# Patient Record
Sex: FEMALE | Race: WHITE | NOT HISPANIC OR LATINO | Employment: UNEMPLOYED | URBAN - METROPOLITAN AREA
[De-identification: names, ages, dates, MRNs, and addresses within clinical notes are randomized per-mention and may not be internally consistent; named-entity substitution may affect disease eponyms.]

---

## 2019-05-22 ENCOUNTER — OFFICE VISIT (OUTPATIENT)
Dept: GASTROENTEROLOGY | Facility: CLINIC | Age: 64
End: 2019-05-22
Payer: COMMERCIAL

## 2019-05-22 VITALS
HEIGHT: 64 IN | WEIGHT: 156.2 LBS | DIASTOLIC BLOOD PRESSURE: 80 MMHG | BODY MASS INDEX: 26.67 KG/M2 | SYSTOLIC BLOOD PRESSURE: 116 MMHG | HEART RATE: 57 BPM

## 2019-05-22 DIAGNOSIS — R19.7 DIARRHEA, UNSPECIFIED TYPE: Primary | ICD-10-CM

## 2019-05-22 PROCEDURE — 99204 OFFICE O/P NEW MOD 45 MIN: CPT | Performed by: PHYSICIAN ASSISTANT

## 2019-05-22 RX ORDER — LAMOTRIGINE 150 MG/1
TABLET ORAL
COMMUNITY
End: 2022-03-14 | Stop reason: ALTCHOICE

## 2019-05-22 RX ORDER — CYCLOBENZAPRINE HCL 10 MG
TABLET ORAL AS NEEDED
COMMUNITY

## 2019-05-22 RX ORDER — ESTRADIOL 10 UG/1
INSERT VAGINAL 2 TIMES WEEKLY
COMMUNITY
End: 2022-03-14 | Stop reason: ALTCHOICE

## 2019-05-22 RX ORDER — ATORVASTATIN CALCIUM 10 MG/1
TABLET, FILM COATED ORAL
COMMUNITY
End: 2021-08-20 | Stop reason: ALTCHOICE

## 2019-05-22 RX ORDER — HYDROCHLOROTHIAZIDE 25 MG/1
TABLET ORAL
COMMUNITY
End: 2022-02-02

## 2019-05-22 RX ORDER — ZOLPIDEM TARTRATE 10 MG/1
TABLET ORAL AS NEEDED
COMMUNITY
End: 2022-08-08 | Stop reason: SDUPTHER

## 2019-05-22 RX ORDER — BUPROPION HYDROCHLORIDE 300 MG/1
TABLET ORAL
COMMUNITY
End: 2021-11-18

## 2019-05-22 RX ORDER — ATENOLOL 25 MG/1
TABLET ORAL
COMMUNITY

## 2019-05-24 ENCOUNTER — TELEPHONE (OUTPATIENT)
Dept: GASTROENTEROLOGY | Facility: CLINIC | Age: 64
End: 2019-05-24

## 2019-05-24 DIAGNOSIS — K59.1 FUNCTIONAL DIARRHEA: Primary | ICD-10-CM

## 2019-05-24 RX ORDER — CHOLESTYRAMINE 4 G/9G
1 POWDER, FOR SUSPENSION ORAL
Qty: 90 PACKET | Refills: 1 | Status: SHIPPED | OUTPATIENT
Start: 2019-05-24 | End: 2019-06-11

## 2019-05-25 DIAGNOSIS — K59.1 FUNCTIONAL DIARRHEA: ICD-10-CM

## 2019-05-25 RX ORDER — CHOLESTYRAMINE 4 G/9G
1 POWDER, FOR SUSPENSION ORAL
Qty: 90 PACKET | Refills: 6 | Status: SHIPPED | OUTPATIENT
Start: 2019-05-25 | End: 2019-06-11

## 2019-05-31 ENCOUNTER — TELEPHONE (OUTPATIENT)
Dept: GASTROENTEROLOGY | Facility: CLINIC | Age: 64
End: 2019-05-31

## 2019-06-06 RX ORDER — SODIUM, POTASSIUM,MAG SULFATES 17.5-3.13G
SOLUTION, RECONSTITUTED, ORAL ORAL AS NEEDED
COMMUNITY
Start: 2019-05-17 | End: 2022-02-15

## 2019-06-06 RX ORDER — ATORVASTATIN CALCIUM 20 MG/1
10 TABLET, FILM COATED ORAL
COMMUNITY
Start: 2019-05-11

## 2019-06-06 RX ORDER — MONTELUKAST SODIUM 10 MG/1
TABLET ORAL
COMMUNITY
Start: 2019-04-15 | End: 2021-10-07

## 2019-06-06 RX ORDER — CLONAZEPAM 0.5 MG/1
TABLET ORAL 2 TIMES DAILY
COMMUNITY
Start: 2019-05-22 | End: 2022-08-08 | Stop reason: SDUPTHER

## 2019-06-11 ENCOUNTER — OFFICE VISIT (OUTPATIENT)
Dept: GASTROENTEROLOGY | Facility: CLINIC | Age: 64
End: 2019-06-11
Payer: COMMERCIAL

## 2019-06-11 VITALS
HEIGHT: 64 IN | BODY MASS INDEX: 26.5 KG/M2 | WEIGHT: 155.2 LBS | HEART RATE: 56 BPM | DIASTOLIC BLOOD PRESSURE: 70 MMHG | SYSTOLIC BLOOD PRESSURE: 118 MMHG

## 2019-06-11 DIAGNOSIS — R10.84 GENERALIZED ABDOMINAL PAIN: ICD-10-CM

## 2019-06-11 DIAGNOSIS — K52.839 MICROSCOPIC COLITIS, UNSPECIFIED MICROSCOPIC COLITIS TYPE: Primary | ICD-10-CM

## 2019-06-11 PROCEDURE — 99213 OFFICE O/P EST LOW 20 MIN: CPT | Performed by: PHYSICIAN ASSISTANT

## 2019-06-11 RX ORDER — MESALAMINE 375 MG/1
CAPSULE, EXTENDED RELEASE ORAL
COMMUNITY
Start: 2019-06-07 | End: 2021-08-25

## 2019-06-11 RX ORDER — CLOTRIMAZOLE AND BETAMETHASONE DIPROPIONATE 10; .64 MG/G; MG/G
CREAM TOPICAL AS NEEDED
Refills: 1 | COMMUNITY
Start: 2019-06-03

## 2019-06-13 ENCOUNTER — TELEPHONE (OUTPATIENT)
Dept: GASTROENTEROLOGY | Facility: CLINIC | Age: 64
End: 2019-06-13

## 2019-06-19 ENCOUNTER — HOSPITAL ENCOUNTER (OUTPATIENT)
Dept: CT IMAGING | Facility: HOSPITAL | Age: 64
Discharge: HOME | End: 2019-06-19
Payer: COMMERCIAL

## 2019-06-19 DIAGNOSIS — R10.84 GENERALIZED ABDOMINAL PAIN: ICD-10-CM

## 2019-06-19 PROCEDURE — 74177 CT ABD & PELVIS W/CONTRAST: CPT

## 2019-06-19 RX ADMIN — IOHEXOL 100 ML: 350 INJECTION, SOLUTION INTRAVENOUS at 16:40

## 2019-06-21 ENCOUNTER — TELEPHONE (OUTPATIENT)
Dept: GASTROENTEROLOGY | Facility: CLINIC | Age: 64
End: 2019-06-21

## 2019-06-24 ENCOUNTER — TELEPHONE (OUTPATIENT)
Dept: GASTROENTEROLOGY | Facility: CLINIC | Age: 64
End: 2019-06-24

## 2019-06-24 DIAGNOSIS — R10.84 GENERALIZED ABDOMINAL PAIN: Primary | ICD-10-CM

## 2019-06-26 ENCOUNTER — TELEPHONE (OUTPATIENT)
Dept: GASTROENTEROLOGY | Facility: CLINIC | Age: 64
End: 2019-06-26

## 2020-01-06 ENCOUNTER — TRANSCRIBE ORDERS (OUTPATIENT)
Dept: ADMINISTRATIVE | Facility: HOSPITAL | Age: 65
End: 2020-01-06

## 2020-01-06 DIAGNOSIS — K82.4 CHOLESTEROLOSIS OF GALLBLADDER: Primary | ICD-10-CM

## 2020-01-06 DIAGNOSIS — K82.4 GALLBLADDER POLYP: ICD-10-CM

## 2020-01-28 ENCOUNTER — HOSPITAL ENCOUNTER (OUTPATIENT)
Dept: ULTRASOUND IMAGING | Facility: HOSPITAL | Age: 65
Discharge: HOME | End: 2020-01-28
Attending: COLON & RECTAL SURGERY
Payer: COMMERCIAL

## 2020-01-28 DIAGNOSIS — K82.4 GALLBLADDER POLYP: ICD-10-CM

## 2020-01-28 PROCEDURE — 76705 ECHO EXAM OF ABDOMEN: CPT

## 2020-12-04 ENCOUNTER — TRANSCRIBE ORDERS (OUTPATIENT)
Dept: ADMINISTRATIVE | Facility: HOSPITAL | Age: 65
End: 2020-12-04

## 2020-12-04 DIAGNOSIS — K82.4 GALLBLADDER POLYP: Primary | ICD-10-CM

## 2020-12-10 ENCOUNTER — HOSPITAL ENCOUNTER (OUTPATIENT)
Dept: ULTRASOUND IMAGING | Facility: HOSPITAL | Age: 65
Discharge: HOME | End: 2020-12-10
Payer: COMMERCIAL

## 2020-12-10 DIAGNOSIS — K82.4 GALLBLADDER POLYP: ICD-10-CM

## 2020-12-10 PROCEDURE — 76705 ECHO EXAM OF ABDOMEN: CPT

## 2021-03-10 DIAGNOSIS — Z23 ENCOUNTER FOR IMMUNIZATION: ICD-10-CM

## 2021-08-25 ENCOUNTER — OFFICE VISIT (OUTPATIENT)
Dept: GASTROENTEROLOGY | Facility: CLINIC | Age: 66
End: 2021-08-25
Payer: COMMERCIAL

## 2021-08-25 VITALS
BODY MASS INDEX: 26.12 KG/M2 | DIASTOLIC BLOOD PRESSURE: 80 MMHG | SYSTOLIC BLOOD PRESSURE: 126 MMHG | HEART RATE: 70 BPM | WEIGHT: 153 LBS | HEIGHT: 64 IN

## 2021-08-25 DIAGNOSIS — K58.2 IRRITABLE BOWEL SYNDROME WITH BOTH CONSTIPATION AND DIARRHEA: Primary | ICD-10-CM

## 2021-08-25 PROCEDURE — 99213 OFFICE O/P EST LOW 20 MIN: CPT | Performed by: PHYSICIAN ASSISTANT

## 2021-08-25 RX ORDER — CLOBETASOL PROPIONATE 0.5 MG/G
CREAM TOPICAL
COMMUNITY

## 2021-08-25 NOTE — PROGRESS NOTES
[facility] - Outpatient Follow-up Note  Trace Rodgers 63 y o  female MRN: [id number]  Encounter: [id number]          ASSESSMENT AND PLAN:      1  Irritable bowel syndrome with both constipation and diarrhea  She has been diagnosed with microscopic colitis but her symptoms are much more consistent with IBS-M  Start fiber - benefiber BID  Continue probiotic  Start digestive enzymes - reviewed ingredients and discussed at length - this also contains licorice root and peppermint oil by Dr Manny Summers 1 pill daily    She also struggles with GERD  Continue to avoid problem foods and use Pepcid PRN    ______________________________________________________________________    SUBJECTIVE:    82-year-old female with irritable bowel syndrome and GERD presents for routine follow-up  Although she has been diagnosed with microscopic colitis in the past her symptoms are much more consistent with IBS  She reports that she will fluctuate between diarrhea and constipation  Her constipation happens significant in May and June to or she did not have a great bowel movement for approximately 3 and half weeks  She admits that she had taken more Imodium than usual leading up to this but this was only approximately 4 5 pills  Since that time she has not been taking anything except for her probiotic  Although she tries to be somewhat cautious with her diet she is very clear that she has no obvious triggers to her symptoms  Except for potato products, soy and artificial sweeteners  She is understandably increasingly frustrated by her symptoms  She denies any rectal bleeding, melena, dysphagia or unexpected weight loss  She is taking Pepcid as needed for acid reflux with relief  She had been on both budesonide and Apriso for her microscopic colitis without relief  At 1 point she had been taking Imodium 1-2 pills a day which was helping    She is not sure exactly why she stop doing this       REVIEW OF SYSTEMS IS OTHERWISE NEGATIVE  Historical Information   Past Medical History:   Diagnosis Date    Depression     Hyperlipidemia     Hypertension     Skin disorder     lichens sclerosis     Past Surgical History:   Procedure Laterality Date    COLONOSCOPY      2013      Social History   Social History     Substance and Sexual Activity   Alcohol Use Yes    Comment: occ     Social History     Substance and Sexual Activity   Drug Use Never     Social History     Tobacco Use   Smoking Status Never Smoker   Smokeless Tobacco Never Used     Family History   Problem Relation Age of Onset    Kidney disease Father        Meds/Allergies       Current Outpatient Medications:     atenolol (TENORMIN) 25 mg tablet    atorvastatin (LIPITOR) 20 mg tablet    buPROPion (WELLBUTRIN XL) 300 mg 24 hr tablet    clobetasol (TEMOVATE) 0 05 % cream    clonazePAM (KlonoPIN) 0 5 mg tablet    clotrimazole-betamethasone (LOTRISONE) 1-0 05 % cream    cyclobenzaprine (FLEXERIL) 10 mg tablet    estradiol (YUVAFEM) 10 MCG TABS vaginal tablet    hydrochlorothiazide (HYDRODIURIL) 25 mg tablet    lamoTRIgine (LAMICTAL) 150 MG tablet    montelukast (SINGULAIR) 10 mg tablet    SUPREP BOWEL PREP KIT 17 5-3 13-1 6 GM/177ML SOLN    zolpidem (AMBIEN) 10 mg tablet    Allergies   Allergen Reactions    Levofloxacin Other (See Comments)    Cephalexin Diarrhea    Nitrofurantoin Fever    Sulfa Antibiotics Fever    Topiramate Other (See Comments)    Bacitracin-Neomycin-Polymyxin Rash    Sulfamethoxazole-Trimethoprim Diarrhea           Objective     Blood pressure 126/80, pulse 70, height 5' 3 5" (1 613 m), weight 69 4 kg (153 lb)  Body mass index is 26 68 kg/m²        PHYSICAL EXAM:      General Appearance:   Alert, cooperative, no distress   HEENT:   Normocephalic, atraumatic, anicteric      Neck:  Supple, symmetrical, trachea midline   Lungs:   Clear to auscultation bilaterally; no rales, rhonchi or wheezing; respirations unlabored    Heart[de-identified]   Regular rate and rhythm; no murmur, rub, or gallop  Abdomen:   Soft, non-tender, non-distended; normal bowel sounds; no masses, no organomegaly    Genitalia:   Deferred    Rectal:   Deferred    Extremities:  No cyanosis, clubbing or edema    Pulses:  2+ and symmetric    Skin:  No jaundice, rashes, or lesions    Lymph nodes:  No palpable cervical lymphadenopathy        Lab Results:   No visits with results within 1 Day(s) from this visit  Latest known visit with results is:   No results found for any previous visit  Radiology Results:   No results found

## 2021-09-17 ENCOUNTER — HOSPITAL ENCOUNTER (OUTPATIENT)
Dept: ULTRASOUND IMAGING | Facility: HOSPITAL | Age: 66
Discharge: HOME | End: 2021-09-17
Payer: COMMERCIAL

## 2021-09-17 DIAGNOSIS — K58.2 IRRITABLE BOWEL SYNDROME WITH BOTH CONSTIPATION AND DIARRHEA: ICD-10-CM

## 2021-09-17 PROCEDURE — 76700 US EXAM ABDOM COMPLETE: CPT

## 2021-09-24 ENCOUNTER — TELEPHONE (OUTPATIENT)
Dept: SURGERY | Facility: CLINIC | Age: 66
End: 2021-09-24

## 2021-09-24 DIAGNOSIS — K58.9 IRRITABLE BOWEL SYNDROME: Primary | ICD-10-CM

## 2021-09-24 NOTE — TELEPHONE ENCOUNTER
----- Message from Kishor Nowak PA-C sent at 9/22/2021  3:44 PM EDT -----  Has upcoming appt - pls place recall for 1 year us

## 2021-10-07 ENCOUNTER — TELEMEDICINE (OUTPATIENT)
Dept: GASTROENTEROLOGY | Facility: CLINIC | Age: 66
End: 2021-10-07
Payer: COMMERCIAL

## 2021-10-07 DIAGNOSIS — R11.2 NON-INTRACTABLE VOMITING WITH NAUSEA, UNSPECIFIED VOMITING TYPE: ICD-10-CM

## 2021-10-07 DIAGNOSIS — K58.2 IRRITABLE BOWEL SYNDROME WITH BOTH CONSTIPATION AND DIARRHEA: Primary | ICD-10-CM

## 2021-10-07 PROCEDURE — 99213 OFFICE O/P EST LOW 20 MIN: CPT | Performed by: PHYSICIAN ASSISTANT

## 2021-11-18 ENCOUNTER — OFFICE VISIT (OUTPATIENT)
Dept: GASTROENTEROLOGY | Facility: CLINIC | Age: 66
End: 2021-11-18
Payer: COMMERCIAL

## 2021-11-18 VITALS
SYSTOLIC BLOOD PRESSURE: 114 MMHG | DIASTOLIC BLOOD PRESSURE: 80 MMHG | WEIGHT: 152 LBS | BODY MASS INDEX: 26.5 KG/M2 | HEART RATE: 72 BPM

## 2021-11-18 DIAGNOSIS — N18.9 CHRONIC RENAL FAILURE, UNSPECIFIED CKD STAGE: ICD-10-CM

## 2021-11-18 DIAGNOSIS — K58.2 IRRITABLE BOWEL SYNDROME WITH BOTH CONSTIPATION AND DIARRHEA: Primary | ICD-10-CM

## 2021-11-18 PROCEDURE — 99213 OFFICE O/P EST LOW 20 MIN: CPT | Performed by: PHYSICIAN ASSISTANT

## 2021-11-18 RX ORDER — MESALAMINE 375 MG/1
750 CAPSULE, EXTENDED RELEASE ORAL DAILY
Qty: 60 CAPSULE | Refills: 3 | Status: SHIPPED | OUTPATIENT
Start: 2021-11-18 | End: 2022-03-19

## 2021-11-18 RX ORDER — LAMOTRIGINE 100 MG/1
TABLET ORAL
COMMUNITY
Start: 2021-11-09 | End: 2021-11-18

## 2022-01-18 ENCOUNTER — APPOINTMENT (OUTPATIENT)
Dept: LAB | Facility: CLINIC | Age: 67
End: 2022-01-18
Payer: COMMERCIAL

## 2022-01-18 ENCOUNTER — TELEPHONE (OUTPATIENT)
Dept: NEPHROLOGY | Facility: CLINIC | Age: 67
End: 2022-01-18

## 2022-01-18 ENCOUNTER — TELEPHONE (OUTPATIENT)
Dept: GASTROENTEROLOGY | Facility: CLINIC | Age: 67
End: 2022-01-18

## 2022-01-18 DIAGNOSIS — N18.30 STAGE 3 CHRONIC KIDNEY DISEASE, UNSPECIFIED WHETHER STAGE 3A OR 3B CKD (HCC): ICD-10-CM

## 2022-01-18 LAB
ANION GAP SERPL CALCULATED.3IONS-SCNC: 4 MMOL/L (ref 4–13)
BASOPHILS # BLD AUTO: 0.05 THOUSANDS/ΜL (ref 0–0.1)
BASOPHILS NFR BLD AUTO: 1 % (ref 0–1)
BUN SERPL-MCNC: 19 MG/DL (ref 5–25)
CALCIUM SERPL-MCNC: 10.4 MG/DL (ref 8.3–10.1)
CHLORIDE SERPL-SCNC: 103 MMOL/L (ref 100–108)
CO2 SERPL-SCNC: 32 MMOL/L (ref 21–32)
CREAT SERPL-MCNC: 1.05 MG/DL (ref 0.6–1.3)
EOSINOPHIL # BLD AUTO: 0 THOUSAND/ΜL (ref 0–0.61)
EOSINOPHIL NFR BLD AUTO: 0 % (ref 0–6)
ERYTHROCYTE [DISTWIDTH] IN BLOOD BY AUTOMATED COUNT: 12.3 % (ref 11.6–15.1)
GFR SERPL CREATININE-BSD FRML MDRD: 56 ML/MIN/1.73SQ M
GLUCOSE SERPL-MCNC: 126 MG/DL (ref 65–140)
HCT VFR BLD AUTO: 39 % (ref 34.8–46.1)
HGB BLD-MCNC: 13.1 G/DL (ref 11.5–15.4)
IMM GRANULOCYTES # BLD AUTO: 0.02 THOUSAND/UL (ref 0–0.2)
IMM GRANULOCYTES NFR BLD AUTO: 0 % (ref 0–2)
LYMPHOCYTES # BLD AUTO: 3.21 THOUSANDS/ΜL (ref 0.6–4.47)
LYMPHOCYTES NFR BLD AUTO: 34 % (ref 14–44)
MCH RBC QN AUTO: 30.3 PG (ref 26.8–34.3)
MCHC RBC AUTO-ENTMCNC: 33.6 G/DL (ref 31.4–37.4)
MCV RBC AUTO: 90 FL (ref 82–98)
MONOCYTES # BLD AUTO: 0.62 THOUSAND/ΜL (ref 0.17–1.22)
MONOCYTES NFR BLD AUTO: 7 % (ref 4–12)
NEUTROPHILS # BLD AUTO: 5.55 THOUSANDS/ΜL (ref 1.85–7.62)
NEUTS SEG NFR BLD AUTO: 58 % (ref 43–75)
NRBC BLD AUTO-RTO: 0 /100 WBCS
PHOSPHATE SERPL-MCNC: 3.2 MG/DL (ref 2.3–4.1)
PLATELET # BLD AUTO: 308 THOUSANDS/UL (ref 149–390)
PMV BLD AUTO: 10 FL (ref 8.9–12.7)
POTASSIUM SERPL-SCNC: 3.2 MMOL/L (ref 3.5–5.3)
PTH-INTACT SERPL-MCNC: 77.5 PG/ML (ref 18.4–80.1)
RBC # BLD AUTO: 4.33 MILLION/UL (ref 3.81–5.12)
SODIUM SERPL-SCNC: 139 MMOL/L (ref 136–145)
WBC # BLD AUTO: 9.45 THOUSAND/UL (ref 4.31–10.16)

## 2022-01-18 PROCEDURE — 83970 ASSAY OF PARATHORMONE: CPT

## 2022-01-18 PROCEDURE — 36415 COLL VENOUS BLD VENIPUNCTURE: CPT

## 2022-01-18 PROCEDURE — 80048 BASIC METABOLIC PNL TOTAL CA: CPT

## 2022-01-18 PROCEDURE — 82306 VITAMIN D 25 HYDROXY: CPT

## 2022-01-18 PROCEDURE — 85025 COMPLETE CBC W/AUTO DIFF WBC: CPT

## 2022-01-18 PROCEDURE — 84100 ASSAY OF PHOSPHORUS: CPT

## 2022-01-18 NOTE — TELEPHONE ENCOUNTER
Markyl Rape patient - Patient will be out of med tomorrow and needs pre-auth for ([phone]) [employer] below med    mesalamine (APRISO) 0 375 g 24 hr capsule [[id number]    [facility] needs pre-auth for above med     Thx

## 2022-01-19 ENCOUNTER — TELEPHONE (OUTPATIENT)
Dept: NEPHROLOGY | Facility: CLINIC | Age: 67
End: 2022-01-19

## 2022-01-19 LAB — 25(OH)D3 SERPL-MCNC: 24 NG/ML (ref 30–100)

## 2022-01-19 NOTE — TELEPHONE ENCOUNTER
Called pt and advised to get urine culture test done to see if there is a bacteria or not, per Dr Brunner Mt  Pt will get it done tomorrow or Friday

## 2022-01-19 NOTE — TELEPHONE ENCOUNTER
I called [facility]/ Benefits [employer] EPO @ [phone] (Automated System) to check eligible for the patient and as of 1/19/2022 this patient plan terminated on 1/1/2022  Mary Arnold       I called [id number] [facility] @ [phone] and spoke to [facility] () to check eligible for the patient and as of 1/19/2022 the patient has current active coverage as of 1/15/2022  The reference number for this call is: [id number]    Mary Arnold

## 2022-01-19 NOTE — TELEPHONE ENCOUNTER
Called and informed pt that Dr Shanae Ellison advised to get urine culture test done (order in Epic)   Pt stated that did blood and urine tests yesterday and they are  abnormal  Pt stated that does not feel good and dont know if and when will get the urine culture test

## 2022-01-19 NOTE — TELEPHONE ENCOUNTER
Patient of Dr Jazmyne Schulte called stating that she had her blood work and urine test done on Tuesday 1/18/2022 at [facility] lab  Patient has been dealing with a UTI since 11/14/2021, patient does not have burning anymore but her bladder feels like it is on fire and sore  Patient puts a hot water bottle on her abdomen just to make it feel better  Patient would like for Dr Jazmyne Schulte to review labs and urine test results  and see what patient can take  Patient does have an up coming Nephrology Consult appointment on Tuesday with Dr Jazmyne Schulte  Please call patient @ [phone] to advise her on what she should do   Monalisa Martinez,

## 2022-01-19 NOTE — TELEPHONE ENCOUNTER
Called and LM letting pt know that her [employer] coverage was e-rejected  Acknowledged that is does show her [employer] is active and requested that she call us back to verify all info before her appt with Dr Sarah Beth Bonilla on 1/25/22

## 2022-01-20 ENCOUNTER — APPOINTMENT (OUTPATIENT)
Dept: LAB | Facility: CLINIC | Age: 67
End: 2022-01-20
Payer: COMMERCIAL

## 2022-01-20 DIAGNOSIS — N39.0 URINARY TRACT INFECTION WITHOUT HEMATURIA, SITE UNSPECIFIED: ICD-10-CM

## 2022-01-20 PROCEDURE — 87086 URINE CULTURE/COLONY COUNT: CPT

## 2022-01-22 LAB — BACTERIA UR CULT: NORMAL

## 2022-01-25 ENCOUNTER — CONSULT (OUTPATIENT)
Dept: NEPHROLOGY | Facility: CLINIC | Age: 67
End: 2022-01-25
Payer: COMMERCIAL

## 2022-01-25 VITALS
HEART RATE: 65 BPM | DIASTOLIC BLOOD PRESSURE: 80 MMHG | WEIGHT: 158 LBS | SYSTOLIC BLOOD PRESSURE: 140 MMHG | TEMPERATURE: 97.1 F | OXYGEN SATURATION: 97 % | HEIGHT: 63 IN | BODY MASS INDEX: 28 KG/M2 | RESPIRATION RATE: 16 BRPM

## 2022-01-25 DIAGNOSIS — M79.7 FIBROMYALGIA: ICD-10-CM

## 2022-01-25 DIAGNOSIS — F31.9 BIPOLAR 1 DISORDER, DEPRESSED (HCC): ICD-10-CM

## 2022-01-25 DIAGNOSIS — N18.9 CHRONIC RENAL FAILURE, UNSPECIFIED CKD STAGE: ICD-10-CM

## 2022-01-25 DIAGNOSIS — I10 PRIMARY HYPERTENSION: ICD-10-CM

## 2022-01-25 DIAGNOSIS — K52.839 MICROSCOPIC COLITIS, UNSPECIFIED MICROSCOPIC COLITIS TYPE: Primary | ICD-10-CM

## 2022-01-25 PROCEDURE — 99244 OFF/OP CNSLTJ NEW/EST MOD 40: CPT | Performed by: INTERNAL MEDICINE

## 2022-01-25 RX ORDER — BUPROPION HYDROCHLORIDE 150 MG/1
150 TABLET ORAL AS NEEDED
COMMUNITY
End: 2022-02-15

## 2022-01-25 NOTE — ASSESSMENT & PLAN NOTE
Blood pressure is reasonably well controlled with medication  I will stop hydrochlorothiazide at this point as causing possible kidney failure    Advised to monitor blood pressure at home

## 2022-01-25 NOTE — ASSESSMENT & PLAN NOTE
Lab Results   Component Value Date    EGFR 56 01/18/2022    CREATININE 1 05 01/18/2022   Patient does have fluctuating kidney function  GFR is about 56 though I think is partly acute  Patient is not drinking water and she is also on hydrochlorothiazide  I will stop hydrochlorothiazide with advised to drink lots of water    Pathophysiology kidney disease discussed at length with her  Importance of hydration and avoiding nephrotoxic medicine also discussed with     I will repeat blood test and urine test before next visit    Patient had a kidney ultrasound which was essentially normal

## 2022-01-25 NOTE — PROGRESS NOTES
NEPHROLOGY OFFICE CONSULT  Perez Becerra 61 y o  female MRN: [id number]    Encounter: [id number] 1/25/2022    REASON FOR VISIT: Perez Becerra is a 61 y  o female who was referred by Keesha Steiner for evaluation of Consult and Chronic Kidney Disease    HPI:    Briana Boyd came in today for evaluation management of CKD  [de-identified] woman with his hypertension who also problem the bipolar disorder along with lichen planus  She is complaining of recurrent UTI  Also has some bladder spasm  She denies fever chills    Does have abdominal discomfort     She claims he had kidney problem the past which responded well to hydration     Denies any chest pain or palpitation  Denies taking nonsteroidal pain killer      REVIEW OF SYSTEMS:    Review of Systems   Constitutional: Negative for activity change and fatigue  HENT: Negative for congestion and ear discharge  Eyes: Negative for photophobia and pain  Respiratory: Negative for apnea and choking  Cardiovascular: Negative for chest pain and palpitations  Gastrointestinal: Negative for abdominal distention and blood in stool  Endocrine: Negative for heat intolerance and polyphagia  Genitourinary: Positive for dysuria  Negative for flank pain and urgency  Musculoskeletal: Negative for neck pain and neck stiffness  Skin: Negative for color change and wound  Allergic/Immunologic: Negative for food allergies and immunocompromised state  Neurological: Negative for seizures and facial asymmetry  Hematological: Negative for adenopathy  Does not bruise/bleed easily  Psychiatric/Behavioral: Negative for self-injury and suicidal ideas           PAST MEDICAL HISTORY:  Past Medical History:   Diagnosis Date    Chronic kidney disease     Depression     H/O bladder infections     Hyperlipidemia     Hypertension     Skin disorder     lichens sclerosis       PAST SURGICAL HISTORY:  Past Surgical History:   Procedure Laterality Date    COLONOSCOPY      2013 SOCIAL HISTORY:  Social History     Substance and Sexual Activity   Alcohol Use Yes    Comment: occ     Social History     Substance and Sexual Activity   Drug Use Never     Social History     Tobacco Use   Smoking Status Never Smoker   Smokeless Tobacco Never Used       FAMILY HISTORY:  Family History   Problem Relation Age of Onset    Kidney disease Father        MEDICATIONS:    Current Outpatient Medications:     atenolol (TENORMIN) 25 mg tablet, atenolol 25 mg tabs, Disp: , Rfl:     atorvastatin (LIPITOR) 20 mg tablet, 10 mg  , Disp: , Rfl:     buPROPion (WELLBUTRIN XL) 150 mg 24 hr tablet, Take 150 mg by mouth as needed  , Disp: , Rfl:     clobetasol (TEMOVATE) 0 05 % cream, clobetasol 0 05 % topical cream, Disp: , Rfl:     clonazePAM (KlonoPIN) 0 5 mg tablet, , Disp: , Rfl:     clotrimazole-betamethasone (LOTRISONE) 1-0 05 % cream, as needed  , Disp: , Rfl: 1    cyclobenzaprine (FLEXERIL) 10 mg tablet, as needed  , Disp: , Rfl:     estradiol (YUVAFEM) 10 MCG TABS vaginal tablet, 2 (two) times a week  , Disp: , Rfl:     hydrochlorothiazide (HYDRODIURIL) 25 mg tablet, hydrochlorothiazide 25 mg tabs, Disp: , Rfl:     lamoTRIgine (LAMICTAL) 150 MG tablet, Lamictal 150 mg tablet, Disp: , Rfl:     mesalamine (APRISO) 0 375 g 24 hr capsule, Take 2 capsules (0 75 g total) by mouth daily, Disp: 60 capsule, Rfl: 3    SUPREP BOWEL PREP KIT 17 5-3 13-1 6 GM/177ML SOLN, as needed  , Disp: , Rfl:     zolpidem (AMBIEN) 10 mg tablet, as needed  , Disp: , Rfl:     PHYSICAL EXAM:  Vitals:    01/25/22 1258   BP: 140/80   BP Location: Right arm   Patient Position: Sitting   Pulse: 65   Resp: 16   Temp: (!) 97 1 °F (36 2 °C)   TempSrc: Temporal   SpO2: 97%   Weight: 71 7 kg (158 lb)   Height: 5' 3" (1 6 m)     Body mass index is 27 99 kg/m²  Physical Exam  Constitutional:       General: She is not in acute distress  Appearance: She is well-developed  HENT:      Head: Normocephalic     Eyes: General: No scleral icterus  Conjunctiva/sclera: Conjunctivae normal       Pupils: Pupils are equal, round, and reactive to light  Neck:      Vascular: No JVD  Cardiovascular:      Rate and Rhythm: Normal rate  Heart sounds: Normal heart sounds  Pulmonary:      Effort: Pulmonary effort is normal  No respiratory distress  Breath sounds: Normal breath sounds  No wheezing  Abdominal:      General: Bowel sounds are normal       Palpations: Abdomen is soft  Tenderness: There is abdominal tenderness  Musculoskeletal:         General: Normal range of motion  Cervical back: Neck supple  No rigidity  Skin:     General: Skin is warm  Findings: No rash  Neurological:      Mental Status: She is alert and oriented to person, place, and time  Psychiatric:         Behavior: Behavior normal          LAB RESULTS:  Results for orders placed or performed in visit on 01/20/22   Urine culture    Specimen: Urine, Clean Catch   Result Value Ref Range    Urine Culture >100,000 cfu/ml         ASSESSMENT and PLAN:    Chronic renal failure  Lab Results   Component Value Date    EGFR 56 01/18/2022    CREATININE 1 05 01/18/2022   Patient does have fluctuating kidney function  GFR is about 56 though I think is partly acute  Patient is not drinking water and she is also on hydrochlorothiazide  I will stop hydrochlorothiazide with advised to drink lots of water    Pathophysiology kidney disease discussed at length with her  Importance of hydration and avoiding nephrotoxic medicine also discussed with     I will repeat blood test and urine test before next visit  Patient had a kidney ultrasound which was essentially normal    Primary hypertension  Blood pressure is reasonably well controlled with medication  I will stop hydrochlorothiazide at this point as causing possible kidney failure  Advised to monitor blood pressure at home    Microscopic colitis  At present asymptomatic    Being monitored by GI    Bipolar 1 disorder, depressed ([facility] )  On multiple medication    Fibromyalgia  Does have diffuse pain though denies taking nonsteroidal pain killer    Everything discussed at length with the patient  Importance of hydration discussed with her  Advised to come find primary care doctor for multitude of complaints she has  She also need to see urologist because of recurrent UTI and possible bladder spasm  Thank you very much for consultation I will monitor the patient with you        Portions of the record may have been created with voice recognition software  Occasional wrong word or "sound a like" substitutions may have occurred due to the inherent limitations of voice recognition software  Read the chart carefully and recognize, using context, where substitutions have occurred  If you have any questions, please contact the dictating provider

## 2022-01-25 NOTE — PATIENT INSTRUCTIONS
Chronic Kidney Disease   AMBULATORY CARE:   Chronic kidney disease (CKD)  is the gradual and permanent loss of kidney function  Normally, the kidneys remove fluid, chemicals, and waste from your blood  These wastes are turned into urine by your kidneys  CKD may worsen over time and lead to kidney failure  Common signs and symptoms include the following:   · Changes in how often you need to urinate    · Swelling in your arms, legs, or feet    · Shortness of breath    · Fatigue or weakness    · Bad or bitter taste in your mouth    · Nausea, vomiting, or loss of appetite    Call your local emergency number (911 in the [address] [facility]) if:   · You have a seizure  · You have shortness of breath  Seek care immediately if:   · You are confused and very drowsy  Call your doctor or nephrologist if:   · You suddenly gain or lose more weight than your healthcare provider has told you is okay  · You have itchy skin or a rash  · You urinate more or less than you normally do  · You have blood in your urine  · You have nausea and are vomiting  · You have fatigue or muscle weakness  · You have hiccups that will not stop  · You have questions or concerns about your condition or care  How CKD is diagnosed:  CKD has 5 stages  Your healthcare provider will use results from the following tests to find the stage of CKD you have:  · Blood and urine tests  show how well your kidneys are working  They may also show the cause of your CKD  · Ultrasound, CT scan, or MRI  pictures may be used to check your kidneys  You may be given contrast liquid to help your kidneys show up better in the pictures  Tell the healthcare provider if you have ever had an allergic reaction to contrast liquid  Do not enter the MRI room with anything metal  Metal can cause serious injury  Tell the healthcare provider if you have any metal in or on your body      · A biopsy  is a procedure to remove a small piece of tissue from your kidney  It is done to find the cause of your CKD  Treatment  can help control signs and symptoms, and prevent a worse stage of CKD  Your care team may include specialists, such as a dietitian or a heart specialist  This depends on the stage of your CKD and if you have other health conditions to manage  Healthcare providers will work with you to create a plan based on your decisions for treatment  Your treatment plan may include any of the following:  · Medicines  may be given to decrease your blood pressure and get rid of extra fluid  You may also receive medicine to manage health conditions that may occur with CKD, such as anemia, diabetes, and heart disease  · Dialysis  is a treatment to remove chemicals and waste from your blood when your kidneys can no longer do this  · Surgery  may be needed to create an arteriovenous fistula (AVF) in your arm or insert a catheter into your abdomen  This is done so you can receive dialysis  · A kidney transplant  may be done if your CKD becomes severe  What you can do to manage CKD: Management may include making some lifestyle changes  Tell your healthcare provider if you have any concerns about being able to make changes  He or she can help you find solutions, including working with specialists  Ask for help creating a plan to break large goals into smaller steps  Your plan may include any of the following:  · Manage other health conditions  Your healthcare provider will work with you to make a care plan that meets your needs  You will be checked regularly for heart disease or other conditions that can make CKD worse, such as diabetes  Your blood pressure will be closely monitored  You will also get a target blood pressure and help making a plan to reach your target  This may include taking your blood pressure at home  · Maintain a healthy weight  Your weight and body mass index (BMI) will be checked regularly   BMI helps find if your weight is healthy for your height  Your healthcare provider will use other tests to check your muscle and protein levels  Extra weight can strain your kidneys  A low weight or low muscle mass can make you feel more tired  You may have trouble doing your daily activities  Ask your provider what a healthy weight is for you  He or she can help you create a plan to lose or gain weight safely, if needed  The plan may include keeping a food diary  This is a list of foods and liquids you have each day  Your provider will use the diary to help you make changes, if needed  Changes are based on your health and any other conditions you have, such as diabetes  · Create an exercise plan  Regular exercise can help you manage CKD, high blood pressure, and diabetes  Exercise also helps control weight  Your provider can help you create exercise goals and a plan to reach those goals  For example, your goal may be to exercise for 30 minutes in a day  Your plan can include breaking exercise into 10 minute sessions, 3 times during the day  · Create a healthy eating plan  Your provider may tell you to eat food low in potassium, phosphorus, or protein  Your provider may also recommend vitamin or mineral supplements  Do not take any supplements without talking to your provider  A dietitian can help you plan meals if needed  Ask how much liquid to drink each day and which liquids are best for you  · Limit sodium (salt) as directed  You may need to limit sodium to less than 2,300 milligrams (mg) each day  Ask your dietitian or healthcare provider how much sodium you can have each day  The amount depends on your stage of kidney disease  Table salt, canned foods, soups, salted snacks, and processed meats, like deli meats and sausage, are high in sodium  Your provider or a dietitian can show you how to read food labels for sodium  · Limit alcohol as directed  Alcohol can cause fluid retention and can affect your kidneys   Ask how much alcohol is safe for you  A drink of alcohol is 12 ounces of beer, 5 ounces of wine, or 1½ ounces of liquor  · Do not smoke  Nicotine and other chemicals in cigarettes and cigars can cause kidney damage  Ask your provider for information if you currently smoke and need help to quit  E-cigarettes or smokeless tobacco still contain nicotine  Talk to your provider before you use these products  · Ask about over-the-counter medicines  Medicines such as NSAIDs and laxatives may harm your kidneys  Some cough and cold medicines can raise your blood pressure  Always ask if a medicine is safe before you take it  · Ask about vaccines you may need  CKD can increase your risk for infections such as pneumonia, influenza, and hepatitis  Vaccines lower your risk for infection  Your healthcare provider will tell you which vaccines you need and when to get them  Follow up with your doctor or nephrologist as directed: You will need to return for tests to monitor your kidney and nerve function, and your parathyroid hormone level  Your medicines may be changed, based on certain test results  Write down your questions so you remember to ask them during your visits  © Copyright [employer] 2021 Information is for End User's use only and may not be sold, redistributed or otherwise used for commercial purposes  All illustrations and images included in CareNotes® are the copyrighted property of [employer]  or Momo Castillo  The above information is an  only  It is not intended as medical advice for individual conditions or treatments  Talk to your doctor, nurse or pharmacist before following any medical regimen to see if it is safe and effective for you

## 2022-01-26 ENCOUNTER — OFFICE VISIT (OUTPATIENT)
Dept: FAMILY MEDICINE CLINIC | Facility: CLINIC | Age: 67
End: 2022-01-26
Payer: COMMERCIAL

## 2022-01-26 VITALS
DIASTOLIC BLOOD PRESSURE: 78 MMHG | OXYGEN SATURATION: 99 % | TEMPERATURE: 97.7 F | BODY MASS INDEX: 27.82 KG/M2 | HEIGHT: 63 IN | WEIGHT: 157 LBS | HEART RATE: 66 BPM | SYSTOLIC BLOOD PRESSURE: 112 MMHG

## 2022-01-26 DIAGNOSIS — N32.89 BLADDER SPASM: ICD-10-CM

## 2022-01-26 DIAGNOSIS — Z12.31 ENCOUNTER FOR SCREENING MAMMOGRAM FOR MALIGNANT NEOPLASM OF BREAST: ICD-10-CM

## 2022-01-26 DIAGNOSIS — R53.83 FATIGUE, UNSPECIFIED TYPE: ICD-10-CM

## 2022-01-26 DIAGNOSIS — E78.2 MIXED HYPERLIPIDEMIA: ICD-10-CM

## 2022-01-26 DIAGNOSIS — Z01.419 ENCOUNTER FOR GYNECOLOGICAL EXAMINATION: ICD-10-CM

## 2022-01-26 DIAGNOSIS — F31.9 BIPOLAR 1 DISORDER, DEPRESSED (HCC): ICD-10-CM

## 2022-01-26 DIAGNOSIS — N18.31 CHRONIC RENAL FAILURE, STAGE 3A (HCC): ICD-10-CM

## 2022-01-26 DIAGNOSIS — R41.3 MEMORY DIFFICULTIES: ICD-10-CM

## 2022-01-26 DIAGNOSIS — L90.0 LICHEN SCLEROSUS: ICD-10-CM

## 2022-01-26 DIAGNOSIS — M79.7 FIBROMYALGIA: ICD-10-CM

## 2022-01-26 DIAGNOSIS — Z76.89 ENCOUNTER TO ESTABLISH CARE: Primary | ICD-10-CM

## 2022-01-26 DIAGNOSIS — K52.839 MICROSCOPIC COLITIS, UNSPECIFIED MICROSCOPIC COLITIS TYPE: ICD-10-CM

## 2022-01-26 DIAGNOSIS — I10 PRIMARY HYPERTENSION: ICD-10-CM

## 2022-01-26 PROCEDURE — 0503F POSTPARTUM CARE VISIT: CPT

## 2022-01-26 PROCEDURE — 99204 OFFICE O/P NEW MOD 45 MIN: CPT

## 2022-01-26 NOTE — PROGRESS NOTES
BMI Counseling: Body mass index is 27 81 kg/m²  The BMI is above normal  Nutrition recommendations include encouraging healthy choices of fruits and vegetables, decreasing fast food intake, consuming healthier snacks and increasing intake of lean protein  Exercise recommendations include moderate physical activity 150 minutes/week  Rationale for BMI follow-up plan is due to patient being overweight or obese         Assessment/Plan:     Problem List Items Addressed This Visit        Digestive    Microscopic colitis     Stable - sees GI regularly, on Lamictal 150mg daily             Cardiovascular and Mediastinum    Primary hypertension     Stable takes atenolol daily, continue current medication             Genitourinary    Chronic renal failure     Lab Results   Component Value Date    EGFR 56 01/18/2022    CREATININE 1 05 01/18/2022    Sees Dr No Mcguire            Other    Fibromyalgia     Reports diffused pain, on Flexeril 10mg as needed          Relevant Orders    Ambulatory Referral to Rheumatology    TSH, 3rd generation with Free T4 reflex    Bipolar 1 disorder, depressed ([address] )     On Wellbutrin, Ambien and Klonopin, looking to establish with new psychiatrist  She plans to ween of the medications in feature          Relevant Orders    Ambulatory Referral to Psychiatry    Lichen sclerosus     Sees OBGYN and using estradiol, looks to establish with new gyn          Relevant Orders    Ambulatory Referral to Rheumatology    Ambulatory Referral to Dermatology    Ambulatory Referral to Urogynecology      Other Visit Diagnoses     Encounter to establish care    -  Primary    Relevant Orders    Ambulatory referral to Obstetrics / Gynecology    Encounter for screening mammogram for malignant neoplasm of breast        Relevant Orders    Mammo screening bilateral w 3d & cad    Memory difficulties        reports ongoing issue with memory and concentraion, saw neurologist over 20 years ago     Relevant Orders    Ambulatory Referral to Neurology    Mixed hyperlipidemia        On lipitor 20mg daily, will check lipid panel     Relevant Orders    Lipid panel    Bladder spasm        Chronic UTI and bladder spasms, sees Dr Yefri Peace Referral to Urogynecology    Fatigue, unspecified type        Ongoing fatigue not improving on Wellbutrin     Relevant Orders    TSH, 3rd generation with Free T4 reflex    Encounter for gynecological examination        Relevant Orders    Ambulatory referral to Obstetrics / Gynecology      - schedule appointments with the specialists  - obtain mammogram   - obtain blood work   - return in about 4 months     Subjective:      Patient ID: Jalyn Jasso is a 61 y o  female  Here to establish care  She is transfering her care to [facility] due to insurance changes  She has multiple chronic complains that she is looking to be refered for  No acute complains today   She already sees Dr Karen Garcia for her chronic UTIs  She is looking to establish with new psychiatrist, GYN, neurology, rheumatology, and dermatology       The following portions of the patient's history were reviewed and updated as appropriate:   Past Medical History:  She has a past medical history of Bipolar 1 disorder, depressed ([address] ), Chronic kidney disease, Depression, H/O bladder infections, Hyperlipidemia, Hypertension, Lichen sclerosus, Microscopic colitis, Skin disorder, and Urinary tract infection  ,  _______________________________________________________________________  Medical Problems:  does not have any pertinent problems on file ,  _______________________________________________________________________  Past Surgical History:   has a past surgical history that includes Colonoscopy  ,  _______________________________________________________________________  Family History:  family history includes Kidney disease in her father ,  _______________________________________________________________________  Social History:   reports that she has never smoked  She has never used smokeless tobacco  She reports current alcohol use  She reports that she does not use drugs  ,  _______________________________________________________________________  Allergies:  is allergic to levofloxacin, cephalexin, nitrofurantoin, sulfa antibiotics, topiramate, bacitracin-neomycin-polymyxin, and sulfamethoxazole-trimethoprim     _______________________________________________________________________  Current Outpatient Medications   Medication Sig Dispense Refill    atenolol (TENORMIN) 25 mg tablet atenolol 25 mg tabs      atorvastatin (LIPITOR) 20 mg tablet 10 mg        buPROPion (WELLBUTRIN XL) 150 mg 24 hr tablet Take 150 mg by mouth as needed        clobetasol (TEMOVATE) 0 05 % cream clobetasol 0 05 % topical cream      clonazePAM (KlonoPIN) 0 5 mg tablet       clotrimazole-betamethasone (LOTRISONE) 1-0 05 % cream as needed    1    cyclobenzaprine (FLEXERIL) 10 mg tablet as needed        estradiol (YUVAFEM) 10 MCG TABS vaginal tablet 2 (two) times a week        hydrochlorothiazide (HYDRODIURIL) 25 mg tablet hydrochlorothiazide 25 mg tabs      lamoTRIgine (LAMICTAL) 150 MG tablet Lamictal 150 mg tablet      mesalamine (APRISO) 0 375 g 24 hr capsule Take 2 capsules (0 75 g total) by mouth daily 60 capsule 3    SUPREP BOWEL PREP KIT 17 5-3 13-1 6 GM/177ML SOLN as needed        zolpidem (AMBIEN) 10 mg tablet as needed         No current facility-administered medications for this visit      _______________________________________________________________________  Review of Systems   Constitutional: Positive for chills and fatigue (ongoing )  Negative for fever  HENT: Negative for congestion, ear pain, sinus pressure and sinus pain  Respiratory: Negative for cough, chest tightness and shortness of breath  Cardiovascular: Negative for chest pain and palpitations     Gastrointestinal: Positive for abdominal distention, abdominal pain and diarrhea (chronic)  Negative for blood in stool, nausea and vomiting  Endocrine: Positive for cold intolerance (ongoing )  Genitourinary: Positive for frequency, pelvic pain and urgency  Negative for difficulty urinating, dyspareunia, flank pain and hematuria  Chronic UTSs, sees Dr Estrada Drop    Musculoskeletal: Positive for arthralgias (chronic)  Negative for back pain  Skin: Positive for rash (hx of Lichen sclerosus)  Allergic/Immunologic: Positive for environmental allergies  Neurological: Negative for dizziness, light-headedness, numbness and headaches  Psychiatric/Behavioral: Positive for decreased concentration (ongoing ) and dysphoric mood  The patient is not nervous/anxious  Objective:  Vitals:    01/26/22 1504   BP: 112/78   BP Location: Right arm   Patient Position: Sitting   Pulse: 66   Temp: 97 7 °F (36 5 °C)   TempSrc: Tympanic   SpO2: 99%   Weight: 71 2 kg (157 lb)   Height: 5' 3" (1 6 m)     Body mass index is 27 81 kg/m²  Physical Exam  Constitutional:       General: She is not in acute distress  Appearance: Normal appearance  She is not ill-appearing  HENT:      Head: Normocephalic  Right Ear: Tympanic membrane and ear canal normal       Left Ear: Tympanic membrane and ear canal normal       Nose: Nose normal       Mouth/Throat:      Mouth: Mucous membranes are moist    Eyes:      Conjunctiva/sclera: Conjunctivae normal       Pupils: Pupils are equal, round, and reactive to light  Cardiovascular:      Rate and Rhythm: Normal rate and regular rhythm  Pulses: Normal pulses  Heart sounds: Normal heart sounds  Pulmonary:      Effort: Pulmonary effort is normal  No respiratory distress  Breath sounds: Normal breath sounds  No wheezing  Abdominal:      General: Bowel sounds are normal       Palpations: Abdomen is soft  Musculoskeletal:         General: Tenderness (joints) present  No swelling  Normal range of motion     Skin: General: Skin is warm and dry  Neurological:      Mental Status: She is alert and oriented to person, place, and time  Cranial Nerves: No cranial nerve deficit  Sensory: No sensory deficit  Motor: No weakness  Gait: Gait normal    Psychiatric:         Mood and Affect: Mood is depressed  Affect is tearful  Behavior: Behavior normal          Thought Content:  Thought content normal          Judgment: Judgment normal

## 2022-01-26 NOTE — ASSESSMENT & PLAN NOTE
On Wellbutrin, Ambien and Klonopin, looking to establish with new psychiatrist  She plans to ween of the medications in feature

## 2022-01-29 ENCOUNTER — TELEPHONE (OUTPATIENT)
Dept: RHEUMATOLOGY | Facility: CLINIC | Age: 67
End: 2022-01-29

## 2022-01-30 NOTE — TELEPHONE ENCOUNTER
Patient scheduled on Wednesday for fibromyalgia - please check if she has seen rheum before and advise her that we do not manage fibromyalgia

## 2022-01-31 NOTE — PROGRESS NOTES
Assessment and Plan:   Ms Nicho Bolden is a 51-year-old female with history significant for microscopic colitis and lichen sclerosis, who presents for further evaluation of joint pains  She is referred by RICK Dela Cruz for a rheumatology consult  Sydnee Shelbi presents today for further evaluation of upper extremity symmetric arthralgias she has been experiencing prominently over the past 5-6 months, which appears to be more so related to exertional activities  She does not describe symptoms that would be concerning for an inflammatory arthritis such as prominent joint swelling or morning stiffness, and I do not appreciate any significant synovitis on her examination today  Her presentation appears to be consistent with a component of fibromyalgia and likely tendinitis related issues affecting her shoulders and elbows  I will complete an autoimmune workup, but my suspicion for this contributing to her joint pains is low  I recommend she continue with Tylenol as needed and also establish with physical therapy to help with her symptoms     - For management of the fibromyalgia she can continue this through her primary care physician  Plan:  Diagnoses and all orders for this visit:    Fibromyalgia  -     Ambulatory Referral to Rheumatology    Diffuse arthralgia  -     Sjogren's Antibodies; Future  -     RF Screen w/ Reflex to Titer; Future  -     Cyclic citrul peptide antibody, IgG; Future  -     Sedimentation rate, automated; Future  -     C-reactive protein; Future  -     HLA-B27 antigen; Future  -     XR knee 3 vw left non injury; Future  -     XR wrist 3+ vw left; Future  -     Ambulatory Referral to Physical Therapy;  Future    Microscopic colitis, unspecified microscopic colitis type    Lichen sclerosus  -     Ambulatory Referral to Rheumatology    Other orders  -     lamoTRIgine (LaMICtal) 100 mg tablet      I have personally reviewed pertinent films in PACS of the CT abdomen which does not show osseous disease  Activities as tolerated  Exercise: try to maintain a low impact exercise regimen as much as possible  Walk for 30 minutes a day for at least 3 days a week  Continue other medications as prescribed by PCP and other specialists  RTC PRN  HPI  Ms Pritesh Mayfield is a 21-year-old female with history significant for microscopic colitis and lichen sclerosis, who presents for further evaluation of joint pains  She is referred by RICK Al for a rheumatology consult  Patient reports over the past 5-6 months she has felt "tendon like pain" in her bilateral shoulders and elbows  She is able to raise her arms above her head but has difficulty with flexing at her elbows and pushing with her upper arms  She also reports knee pain and has previously been diagnosed with osteoarthritis  She reports pain at the bases of her bilateral thumbs and occasionally in her left wrist   She denies any significant pain throughout her hands otherwise, at the right wrist, hips, ankles or feet  She has noticed swelling of her left wrist and after sustaining a fall and injury to her left knee a few weeks ago also feels like her left knee has been swollen but has improved in appearance  She does not experience any morning stiffness of her joints  She does not take any over-the-counter pain medications for her symptoms  She reports dry eyes  She has a skin rash on her lower abdomen which has been diagnosed as lichen sclerosus  She has a second opinion scheduled with Dr Dimitri Whitehead in June  She has a history of microscopic colitis and is scheduled to see Gastroenterology  Her symptoms have been controlled with mesalamine  She reports a history of 1 miscarriage    She denies fevers, unintentional weight loss, alopecia, dry mouth, inflammatory eye disease, other types of skin rash, photosensitivity, mouth/nose ulcers, swollen glands, pleuritic chest pain, blood clots, miscarriages or a family history of autoimmune disease  In the past through her primary care physician she has been checked for autoimmune diseases such as lupus and rheumatoid arthritis and reports that the testing was normal     The following portions of the patient's history were reviewed and updated as appropriate: allergies, current medications, past family history, past medical history, past social history, past surgical history and problem list       Review of Systems  Constitutional: Negative for fevers, chills, night sweats, fatigue  ENT/Mouth: Negative for hearing changes, ear pain, nasal congestion, sinus pain, hoarseness, sore throat, rhinorrhea, swallowing difficulty  Eyes: Negative for pain, redness, discharge, vision changes  Cardiovascular: Negative for chest pain, SOB, palpitations  Respiratory: Negative for cough, sputum, wheezing, dyspnea  Gastrointestinal: Negative for nausea, vomiting, diarrhea, constipation, pain, heartburn  Genitourinary: Negative for dysuria, urinary frequency, hematuria  Musculoskeletal: As per HPI  Skin: Negative for color changes  Neuro: Negative for weakness, numbness, tingling, loss of consciousness  Psych: Negative for anxiety  Heme/Lymph: Negative for easy bruising, bleeding, lymphadenopathy          Past Medical History:   Diagnosis Date    Bipolar 1 disorder, depressed ([facility] [address]     Chronic kidney disease     Depression     H/O bladder infections     Hyperlipidemia     Hypertension     Lichen sclerosus     Microscopic colitis     Skin disorder     lichens sclerosis    Urinary tract infection        Past Surgical History:   Procedure Laterality Date    COLONOSCOPY      2013        Social History     Socioeconomic History    Marital status: Single     Spouse name: Not on file    Number of children: Not on file    Years of education: Not on file    Highest education level: Not on file   Occupational History    Not on file   Tobacco Use    Smoking status: Former Smoker    Smokeless tobacco: Never Used    Tobacco comment: heavy smoker previously   Vaping Use    Vaping Use: Never used   Substance and Sexual Activity    Alcohol use: Yes     Comment: socially    Drug use: Never    Sexual activity: Not on file   Other Topics Concern    Not on file   Social History Narrative    Not on file     Social Determinants of Health     Financial Resource Strain: Not on file   Food Insecurity: Not on file   Transportation Needs: Not on file   Physical Activity: Not on file   Stress: Not on file   Social Connections: Not on file   Intimate Partner Violence: Not on file   Housing Stability: Not on file       Family History   Problem Relation Age of Onset    Kidney disease Father        Allergies   Allergen Reactions    Levofloxacin Other (See Comments)    Cephalexin Diarrhea    Nitrofurantoin Fever    Sulfa Antibiotics Fever    Topiramate Other (See Comments)    Bacitracin-Neomycin-Polymyxin Rash    Sulfamethoxazole-Trimethoprim Diarrhea       Current Outpatient Medications:     atenolol (TENORMIN) 25 mg tablet, atenolol 25 mg tabs, Disp: , Rfl:     atorvastatin (LIPITOR) 20 mg tablet, 10 mg  , Disp: , Rfl:     clonazePAM (KlonoPIN) 0 5 mg tablet, , Disp: , Rfl:     cyclobenzaprine (FLEXERIL) 10 mg tablet, as needed  , Disp: , Rfl:     lamoTRIgine (LaMICtal) 100 mg tablet, , Disp: , Rfl:     lamoTRIgine (LAMICTAL) 150 MG tablet, Lamictal 150 mg tablet, Disp: , Rfl:     mesalamine (APRISO) 0 375 g 24 hr capsule, Take 2 capsules (0 75 g total) by mouth daily, Disp: 60 capsule, Rfl: 3    zolpidem (AMBIEN) 10 mg tablet, as needed  , Disp: , Rfl:     buPROPion (WELLBUTRIN XL) 150 mg 24 hr tablet, Take 150 mg by mouth as needed   (Patient not taking: Reported on 2/2/2022 ), Disp: , Rfl:     clobetasol (TEMOVATE) 0 05 % cream, clobetasol 0 05 % topical cream, Disp: , Rfl:     clotrimazole-betamethasone (LOTRISONE) 1-0 05 % cream, as needed  , Disp: , Rfl: 1    estradiol (YUVAFEM) 10 MCG TABS vaginal tablet, 2 (two) times a week  , Disp: , Rfl:     SUPREP BOWEL PREP KIT 17 5-3 13-1 6 GM/177ML SOLN, as needed   (Patient not taking: Reported on 2/2/2022 ), Disp: , Rfl:       Objective:    Vitals:    02/02/22 1153   BP: 138/84   Pulse: 55   Weight: 71 2 kg (157 lb)       Physical Exam  General: Well appearing, well nourished, in no distress  Oriented x 3, normal mood and affect  Ambulating without difficulty  Skin: Good turgor, no unusual bruising or prominent lesions  She has scaly white plaque like lesions on her abdomen that was diagnosed as lichen sclerosus  Hair: Normal texture and distribution  Nails: Normal color, no deformities  HEENT:  Head: Normocephalic, atraumatic  Eyes: Conjunctiva clear, sclera non-icteric, EOM intact  Extremities: No amputations or deformities, cyanosis, edema  Musculoskeletal:   Hands - bilateral CMC mild squaring noted  Her bilateral PIP and MCP joints are unremarkable  Wrists - I do not appreciate any significant soft tissue swelling or tenderness bilaterally  Elbows - lateral epicondyle tenderness noted  Otherwise the bilateral elbows are unremarkable  Shoulders - unremarkable  Knees - mild soft tissue swelling noted at the left knee without tenderness  The right knee is unremarkable  Ankles and feet - unremarkable  Around 14/18 fibromyalgia tender points positive  Neurologic: Alert and oriented  No focal neurological deficits appreciated  Psychiatric: Normal mood and affect  KANDACE Bañuelos    Rheumatology

## 2022-01-31 NOTE — TELEPHONE ENCOUNTER
The other issues are not rheumatological diagnoses  Microscopic colitis needs to be addressed with GI and lichen sclerosis with gynecology  If she still wants to keep the appointment thats fine but please let her know if she is diagnosed with fibromyalgia we do not treat this in Rheumatology, thanks

## 2022-01-31 NOTE — TELEPHONE ENCOUNTER
Spoke with patient, has never seen Rheum before, she would still like to be seen for her other issues sclerosis issues and microscopic colitis  I did let her know I would have you review these DX and let her know if she could still keep the appointment, she became very upset on the phone

## 2022-02-02 ENCOUNTER — OFFICE VISIT (OUTPATIENT)
Dept: RHEUMATOLOGY | Facility: CLINIC | Age: 67
End: 2022-02-02
Payer: COMMERCIAL

## 2022-02-02 VITALS
SYSTOLIC BLOOD PRESSURE: 138 MMHG | WEIGHT: 157 LBS | DIASTOLIC BLOOD PRESSURE: 84 MMHG | HEART RATE: 55 BPM | BODY MASS INDEX: 27.81 KG/M2

## 2022-02-02 DIAGNOSIS — K52.839 MICROSCOPIC COLITIS, UNSPECIFIED MICROSCOPIC COLITIS TYPE: ICD-10-CM

## 2022-02-02 DIAGNOSIS — L90.0 LICHEN SCLEROSUS: ICD-10-CM

## 2022-02-02 DIAGNOSIS — M79.7 FIBROMYALGIA: Primary | ICD-10-CM

## 2022-02-02 DIAGNOSIS — M25.50 DIFFUSE ARTHRALGIA: ICD-10-CM

## 2022-02-02 PROCEDURE — 99205 OFFICE O/P NEW HI 60 MIN: CPT | Performed by: INTERNAL MEDICINE

## 2022-02-02 RX ORDER — LAMOTRIGINE 100 MG/1
TABLET ORAL
COMMUNITY
Start: 2022-02-01 | End: 2022-04-04 | Stop reason: SDUPTHER

## 2022-02-04 NOTE — PROGRESS NOTES
2/7/2022      Chief Complaint   Patient presents with    Urinary Tract Infection    Lichen sclerosus     Assessment and Plan    61 y o  female new patient    1  Recurrent UTI  2  Pelvic pain/bladder pain  3  UTI  4  Lichen sclerosus    - 1 positive urine culture on file from 1/26/22 growing E Coli  This was not treated with abx  UA micro from 1/18/22- positive nitrites, no culture to review  She is currently symptomatic for UTI complaints of severe bladder pain and burning with urination   - Discussed with patient that likely symptoms and recurrent UTIs are multifactorial given lichen sclerosis, atrophic vaginitis, and her microscopic colitis with ongoing constipation issues  - recommend continuing regimen of topical clobestasol as well as estradiol  - recommend proper hydration, avoidance of constipation, cranberry supplementation, D mannose, and probiotics to prevent UTIs  - Standing urine culture orders placed  If recurrent UTIs will place on a daily low-dose antibiotic  If urine cultures are negative would recommend treatment for painful bladder syndrome   - Rx for Augmentin x1 week sent to her pharmacy for treatment of current UTI  - Will obtain US kidney and bladder given reported history below  - will follow up in 3 months for symptom reassessment  History of Present Illness  Ashutosh Sheppard is a 61 y o  female here for new patient evaluation recurrent UTIs  Per chart review she has had one positive urine culture on file from 1/26/22 growing E Coli  UA micro from 1/18/22- positive nitrites, no culture to review  She reports she was not treated with antibiotics for her recent positive urine culture  She complains of UTI symptoms including bladder pain and burning with urination  She has lichen sclerosis and is currently managed on estradiol vaginal tablets and clobetasol  She does report longstanding and lifelong history of recurrent UTIs    She reports that approximately 30 years ago she had a workup of what sounds like VCUG or IVP and had prior dilation of urethral stricture  She denies any other urologic procedures  She does report previously being hospitalized for 1 UTI in the past when she was younger  She denies any family history of  malignancy  She is a former smoker  Abdominal ultrasound from 09/17/2021 was negative for any urologic abnormalities  Medical history includes microscopic colitis, fibromyalgia, hypertension, bipolar disorder, and CKD  PVR=7 mL   Urine dip negative for blood, leukocytes, or nitrites  Review of Systems   Constitutional: Negative for chills and fever  Respiratory: Negative for shortness of breath  Cardiovascular: Negative for chest pain  Gastrointestinal: Positive for abdominal pain, constipation and diarrhea  Genitourinary: Positive for dysuria, pelvic pain and vaginal pain  Negative for difficulty urinating, flank pain, frequency, hematuria and urgency  Neurological: Negative for dizziness       Past Medical History  Past Medical History:   Diagnosis Date    B[facility] 1 disorder, depressed ([facility] [address] )     Chronic kidney disease     Depression     H/O bladder infections     Hyperlipidemia     Hypertension     Lichen sclerosus     Microscopic colitis     Skin disorder     lichens sclerosis    Urinary tract infection        Past Social History  Past Surgical History:   Procedure Laterality Date    COLONOSCOPY      2013      Social History     Tobacco Use   Smoking Status Former Smoker   Smokeless Tobacco Never Used   Tobacco Comment    heavy smoker previously       Past Family History  Family History   Problem Relation Age of Onset    Kidney disease Father        Past Social history  Social History     Socioeconomic History    Marital status: Single     Spouse name: Not on file    Number of children: Not on file    Years of education: Not on file    Highest education level: Not on file   Occupational History    Not on file   Tobacco Use    Smoking status: Former Smoker    Smokeless tobacco: Never Used    Tobacco comment: heavy smoker previously   Vaping Use    Vaping Use: Never used   Substance and Sexual Activity    Alcohol use: Yes     Comment: socially    Drug use: Never    Sexual activity: Not on file   Other Topics Concern    Not on file   Social History Narrative    Not on file     Social Determinants of Health     Financial Resource Strain: Not on file   Food Insecurity: Not on file   Transportation Needs: Not on file   Physical Activity: Not on file   Stress: Not on file   Social Connections: Not on file   Intimate Partner Violence: Not on file   Housing Stability: Not on file       Current Medications  Current Outpatient Medications   Medication Sig Dispense Refill    atenolol (TENORMIN) 25 mg tablet atenolol 25 mg tabs      atorvastatin (LIPITOR) 20 mg tablet 10 mg        clobetasol (TEMOVATE) 0 05 % cream clobetasol 0 05 % topical cream      clonazePAM (KlonoPIN) 0 5 mg tablet       clotrimazole-betamethasone (LOTRISONE) 1-0 05 % cream as needed    1    cyclobenzaprine (FLEXERIL) 10 mg tablet as needed        estradiol (YUVAFEM) 10 MCG TABS vaginal tablet 2 (two) times a week        lamoTRIgine (LaMICtal) 100 mg tablet       lamoTRIgine (LAMICTAL) 150 MG tablet Lamictal 150 mg tablet      mesalamine (APRISO) 0 375 g 24 hr capsule Take 2 capsules (0 75 g total) by mouth daily 60 capsule 3    zolpidem (AMBIEN) 10 mg tablet as needed        buPROPion (WELLBUTRIN XL) 150 mg 24 hr tablet Take 150 mg by mouth as needed   (Patient not taking: Reported on 2/2/2022 )      SUPREP BOWEL PREP KIT 17 5-3 13-1 6 GM/177ML SOLN as needed   (Patient not taking: Reported on 2/2/2022 )       No current facility-administered medications for this visit         Allergies  Allergies   Allergen Reactions    Levofloxacin Other (See Comments)    Cephalexin Diarrhea    Nitrofurantoin Fever    Sulfa Antibiotics Fever    Topiramate Other (See Comments)    Bacitracin-Neomycin-Polymyxin Rash    Sulfamethoxazole-Trimethoprim Diarrhea         The following portions of the patient's history were reviewed and updated as appropriate: allergies, current medications, past medical history, past social history, past surgical history and problem list       Vitals  Vitals:    02/07/22 1240   BP: 110/82   Pulse: 55   SpO2: 98%   Weight: 71 7 kg (158 lb)   Height: 5' 3" (1 6 m)           Physical Exam  Physical Exam  Constitutional:       Appearance: Normal appearance  HENT:      Head: Normocephalic and atraumatic  Right Ear: External ear normal       Left Ear: External ear normal    Eyes:      General: No scleral icterus  Conjunctiva/sclera: Conjunctivae normal    Cardiovascular:      Pulses: Normal pulses  Pulmonary:      Effort: Pulmonary effort is normal    Musculoskeletal:         General: Normal range of motion  Cervical back: Normal range of motion  Neurological:      General: No focal deficit present  Mental Status: She is alert and oriented to person, place, and time  Psychiatric:         Mood and Affect: Mood normal          Behavior: Behavior normal          Thought Content:  Thought content normal          Judgment: Judgment normal            Results  Recent Results (from the past 1 hour(s))   POCT urine dip    Collection Time: 02/07/22 12:51 PM   Result Value Ref Range    LEUKOCYTE ESTERASE,UA -     NITRITE,UA -     SL AMB POCT UROBILINOGEN 0 2     POCT URINE PROTEIN -      PH,UA 5 0     BLOOD,UA -     SPECIFIC GRAVITY,UA 1 020     KETONES,UA -     BILIRUBIN,UA -     GLUCOSE, UA -      COLOR,UA yellow     CLARITY,UA cloudy    POCT Measure PVR    Collection Time: 02/07/22 12:52 PM   Result Value Ref Range    POST-VOID RESIDUAL VOLUME, ML POC 7 mL   ]  No results found for: PSA  Lab Results   Component Value Date    CALCIUM 10 4 (H) 01/18/2022    K 3 2 (L) 01/18/2022    CO2 32 01/18/2022     01/18/2022    BUN 19 01/18/2022    CREATININE 1 05 01/18/2022     Lab Results   Component Value Date    WBC 9 45 01/18/2022    HGB 13 1 01/18/2022    HCT 39 0 01/18/2022    MCV 90 01/18/2022     01/18/2022           Orders  Orders Placed This Encounter   Procedures    POCT urine dip    POCT Measure PVR       Vlad Junior PA-C

## 2022-02-07 ENCOUNTER — OFFICE VISIT (OUTPATIENT)
Dept: UROLOGY | Facility: CLINIC | Age: 67
End: 2022-02-07
Payer: COMMERCIAL

## 2022-02-07 VITALS
WEIGHT: 158 LBS | DIASTOLIC BLOOD PRESSURE: 82 MMHG | HEIGHT: 63 IN | BODY MASS INDEX: 28 KG/M2 | SYSTOLIC BLOOD PRESSURE: 110 MMHG | HEART RATE: 55 BPM | OXYGEN SATURATION: 98 %

## 2022-02-07 DIAGNOSIS — N39.0 RECURRENT UTI: Primary | ICD-10-CM

## 2022-02-07 DIAGNOSIS — N30.00 ACUTE CYSTITIS WITHOUT HEMATURIA: ICD-10-CM

## 2022-02-07 DIAGNOSIS — R10.2 PELVIC PAIN: ICD-10-CM

## 2022-02-07 LAB
POST-VOID RESIDUAL VOLUME, ML POC: 7 ML
SL AMB  POCT GLUCOSE, UA: NORMAL
SL AMB LEUKOCYTE ESTERASE,UA: NORMAL
SL AMB POCT BILIRUBIN,UA: NORMAL
SL AMB POCT BLOOD,UA: NORMAL
SL AMB POCT CLARITY,UA: NORMAL
SL AMB POCT COLOR,UA: YELLOW
SL AMB POCT KETONES,UA: NORMAL
SL AMB POCT NITRITE,UA: NORMAL
SL AMB POCT PH,UA: 5
SL AMB POCT SPECIFIC GRAVITY,UA: 1.02
SL AMB POCT URINE PROTEIN: NORMAL
SL AMB POCT UROBILINOGEN: 0.2

## 2022-02-07 PROCEDURE — 51798 US URINE CAPACITY MEASURE: CPT | Performed by: PHYSICIAN ASSISTANT

## 2022-02-07 PROCEDURE — 99203 OFFICE O/P NEW LOW 30 MIN: CPT | Performed by: PHYSICIAN ASSISTANT

## 2022-02-07 PROCEDURE — 81002 URINALYSIS NONAUTO W/O SCOPE: CPT | Performed by: PHYSICIAN ASSISTANT

## 2022-02-07 RX ORDER — AMOXICILLIN AND CLAVULANATE POTASSIUM 500; 125 MG/1; MG/1
1 TABLET, FILM COATED ORAL EVERY 12 HOURS SCHEDULED
Qty: 14 TABLET | Refills: 0 | Status: SHIPPED | OUTPATIENT
Start: 2022-02-07 | End: 2022-02-14

## 2022-02-09 ENCOUNTER — TELEPHONE (OUTPATIENT)
Dept: PSYCHIATRY | Facility: CLINIC | Age: 67
End: 2022-02-09

## 2022-02-09 NOTE — TELEPHONE ENCOUNTER
Behavorial Health Outpatient Intake Questions    Referred by:PCP    Please advised interviewee that they need to answer all questions truthfully to allow for best care and any misrepresentations of information may affect their ability to be seen at this clinic   => Was this discussed? Yes     Behavorial Health Outpatient Intake History -     Presenting Problem (in patient's words): Major depression since childhood, was disabled for like 7 yrs due to Major Depression  PCP discouraged her from going back to work told her she would have to either choose her work life or social life  Has trouble functioning day to day, needs to make lists, spends lots of time alone good at putting on a smile  Has lots of autoimmune diseases wants to wean off of medications to see if she can live w/o medication  Went to counseling since 10years old but stopped 20 yrs ago  Are there any developmental disabilities? ? If yes, can they speak to you on the phone? If they are too limited to speak to you on phone, refer out No    Are you taking any psychiatric medications? Yes    => If yes, who prescribes? If yes, are they injectable medications? Lamotrigine and Kolonopin prescribed by PCP  Does the patient have a language barrier or hearing impairment? No    Have you been treated at [address] [place] [address] by a therapist or a doctor in the past? If yes, who? No    Has the patient been hospitalized for mental health? Yes   If yes, how long ago was last hospitalization and where was it? 20 years ago at [facility] in [place]    Do you actively use alcohol or marijuana or illegal substances? If yes, what and how much - refer out to Drug and alcohol treatment if use is excessive or daily use of illegal substances No concerns of substance abuse are reported  Sober 30 years    Do you have a community treatment team or ? No    Legal History-     Does the patient have any history of arrests, longterm/assisted time, or DUIs?  No  If Yes-  1) What types of charges? 2) When were they last incarcerated? 3) Are they currently on parole or probation? Minor Child-    Who has custody of the child? Is there a custody agreement? If there is a custody agreement remind parent that they must bring a copy to the first appt or they will not be seen  Intake Team, please check with provider before scheduling if flags come up such as:  - complex case  - legal history (other than DUI)  - communication barrier concerns are present  - if, in your judgment, this needs further review    ACCEPTED as a patient Yes  => Appointment Date: Monday April 4,2022 at 11:30am with Kenan Hollis    Referred Elsewhere? No    Name of Insurance Co: [employer] ID# [id number]  Insurance Phone # [phone]  If ins is primary or secondary Primary  If patient is a minor, parents information such as Name, D  O B of guarantor

## 2022-02-14 ENCOUNTER — APPOINTMENT (OUTPATIENT)
Dept: RADIOLOGY | Facility: CLINIC | Age: 67
End: 2022-02-14
Payer: COMMERCIAL

## 2022-02-14 ENCOUNTER — LAB (OUTPATIENT)
Dept: LAB | Facility: CLINIC | Age: 67
End: 2022-02-14
Payer: COMMERCIAL

## 2022-02-14 DIAGNOSIS — M25.50 DIFFUSE ARTHRALGIA: ICD-10-CM

## 2022-02-14 DIAGNOSIS — R53.83 FATIGUE, UNSPECIFIED TYPE: ICD-10-CM

## 2022-02-14 DIAGNOSIS — E78.2 MIXED HYPERLIPIDEMIA: ICD-10-CM

## 2022-02-14 DIAGNOSIS — M79.7 FIBROMYALGIA: ICD-10-CM

## 2022-02-14 LAB
CHOLEST SERPL-MCNC: 151 MG/DL
CRP SERPL QL: <3 MG/L
ERYTHROCYTE [SEDIMENTATION RATE] IN BLOOD: 10 MM/HOUR (ref 0–29)
HDLC SERPL-MCNC: 76 MG/DL
LDLC SERPL CALC-MCNC: 60 MG/DL (ref 0–100)
NONHDLC SERPL-MCNC: 75 MG/DL
TRIGL SERPL-MCNC: 77 MG/DL
TSH SERPL DL<=0.05 MIU/L-ACNC: 1.5 UIU/ML (ref 0.36–3.74)

## 2022-02-14 PROCEDURE — 86200 CCP ANTIBODY: CPT

## 2022-02-14 PROCEDURE — 84443 ASSAY THYROID STIM HORMONE: CPT

## 2022-02-14 PROCEDURE — 80061 LIPID PANEL: CPT

## 2022-02-14 PROCEDURE — 86235 NUCLEAR ANTIGEN ANTIBODY: CPT

## 2022-02-14 PROCEDURE — 86430 RHEUMATOID FACTOR TEST QUAL: CPT

## 2022-02-14 PROCEDURE — 81374 HLA I TYPING 1 ANTIGEN LR: CPT

## 2022-02-14 PROCEDURE — 85652 RBC SED RATE AUTOMATED: CPT

## 2022-02-14 PROCEDURE — 73562 X-RAY EXAM OF KNEE 3: CPT

## 2022-02-14 PROCEDURE — 36415 COLL VENOUS BLD VENIPUNCTURE: CPT

## 2022-02-14 PROCEDURE — 73110 X-RAY EXAM OF WRIST: CPT

## 2022-02-14 PROCEDURE — 86140 C-REACTIVE PROTEIN: CPT

## 2022-02-15 ENCOUNTER — OFFICE VISIT (OUTPATIENT)
Dept: GASTROENTEROLOGY | Facility: CLINIC | Age: 67
End: 2022-02-15
Payer: COMMERCIAL

## 2022-02-15 VITALS
WEIGHT: 157.2 LBS | BODY MASS INDEX: 27.85 KG/M2 | HEIGHT: 63 IN | HEART RATE: 56 BPM | DIASTOLIC BLOOD PRESSURE: 62 MMHG | SYSTOLIC BLOOD PRESSURE: 122 MMHG

## 2022-02-15 DIAGNOSIS — K52.839 MICROSCOPIC COLITIS, UNSPECIFIED MICROSCOPIC COLITIS TYPE: Primary | ICD-10-CM

## 2022-02-15 LAB
CCP AB SER IA-ACNC: 1.6
ENA SS-A AB SER-ACNC: <0.2 AI (ref 0–0.9)
ENA SS-B AB SER-ACNC: <0.2 AI (ref 0–0.9)
RHEUMATOID FACT SER QL LA: NEGATIVE

## 2022-02-15 PROCEDURE — 99213 OFFICE O/P EST LOW 20 MIN: CPT | Performed by: PHYSICIAN ASSISTANT

## 2022-02-15 NOTE — PROGRESS NOTES
[facility] - Outpatient Follow-up Note  Audra Harrison 61 y o  female MRN: [id number]  Encounter: [id number]          ASSESSMENT AND PLAN:      1  Microscopic colitis, unspecified microscopic colitis type  She is better on Lialda 2 pills daily and Imodium 1 pill daily   She still has lower abdominal/pelvic discomfort which she has been relating to UTIs  She still has rare episodes of watery stool with incontinence    She is being treated for recurrent UTIs  She is weaning off her antidepressants  She is coping a new dx of CKD stage 3    We agreed she would f/u in 4 mos and re-evaluate symptoms    ______________________________________________________________________    SUBJECTIVE:  61-year-old female with a history of microscopic colitis presents for routine follow-up  After her last visit she restarted mesalamine 2 pills daily  She remains on Imodium 1 pill daily  She admits that her symptoms have improved  She still has rare episodes watery stool with incontinence  She reports that 2 weeks ago she was walking around surrounds her and had an incontinent episode but did not even realize it until she got home later that day  She continues to report that her bowel movements typically will happen at night which wakes her up  She is currently struggling with recurrent urinary tract infections as follow-up with urology for this  She is trying to wean off some of her antidepressants  She is coping with a new diagnosis of chronic kidney disease  She is applying for disability  She reports lower abdominal discomfort which she has always related to her urinary tract infections however now admits that it does seem to worsen slightly after a bowel movement  REVIEW OF SYSTEMS IS OTHERWISE NEGATIVE        Historical Information   Past Medical History:   Diagnosis Date    B[facility] disorder[facility] depressed [facility] [address] [facility]     Chronic kidney disease     Depression     H/O bladder infections     Hyperlipidemia     Hypertension     Lichen sclerosus     Microscopic colitis     Skin disorder     lichens sclerosis    Urinary tract infection      Past Surgical History:   Procedure Laterality Date    COLONOSCOPY      2013      Social History   Social History     Substance and Sexual Activity   Alcohol Use Yes    Comment: socially     Social History     Substance and Sexual Activity   Drug Use Never     Social History     Tobacco Use   Smoking Status Former Smoker   Smokeless Tobacco Never Used   Tobacco Comment    heavy smoker previously     Family History   Problem Relation Age of Onset    Kidney disease Father        Meds/Allergies       Current Outpatient Medications:     atenolol (TENORMIN) 25 mg tablet    atorvastatin (LIPITOR) 20 mg tablet    clobetasol (TEMOVATE) 0 05 % cream    clonazePAM (KlonoPIN) 0 5 mg tablet    clotrimazole-betamethasone (LOTRISONE) 1-0 05 % cream    cyclobenzaprine (FLEXERIL) 10 mg tablet    estradiol (YUVAFEM) 10 MCG TABS vaginal tablet    lamoTRIgine (LaMICtal) 100 mg tablet    lamoTRIgine (LAMICTAL) 150 MG tablet    mesalamine (APRISO) 0 375 g 24 hr capsule    zolpidem (AMBIEN) 10 mg tablet    Allergies   Allergen Reactions    Levofloxacin Other (See Comments)    Cephalexin Diarrhea    Nitrofurantoin Fever    Sulfa Antibiotics Fever    Topiramate Other (See Comments)    Bacitracin-Neomycin-Polymyxin Rash    Sulfamethoxazole-Trimethoprim Diarrhea           Objective     There were no vitals taken for this visit  There is no height or weight on file to calculate BMI  PHYSICAL EXAM:      General Appearance:   Alert, cooperative, no distress   HEENT:   Normocephalic, atraumatic, anicteric      Neck:  Supple, symmetrical, trachea midline   Lungs:   Clear to auscultation bilaterally; no rales, rhonchi or wheezing; respirations unlabored    Heart[de-identified]   Regular rate and rhythm; no murmur, rub, or gallop     Abdomen:   Soft, non-tender, non-distended; normal bowel sounds; no masses, no organomegaly    Genitalia:   Deferred    Rectal:   Deferred    Extremities:  No cyanosis, clubbing or edema    Pulses:  2+ and symmetric    Skin:  No jaundice, rashes, or lesions    Lymph nodes:  No palpable cervical lymphadenopathy        Lab Results:   No visits with results within 1 Day(s) from this visit  Latest known visit with results is:   Appointment on 02/14/2022   Component Date Value    Cyclic Citrullinated Pep* 02/14/2022 1 6     Sed Rate 02/14/2022 10     CRP 02/14/2022 <3 0     Cholesterol 02/14/2022 151     Triglycerides 02/14/2022 77     HDL, Direct 02/14/2022 76     LDL Calculated 02/14/2022 60     Non-HDL-Chol (CHOL-HDL) 02/14/2022 75     TSH 3RD GENERATON 02/14/2022 1 500          Radiology Results:   No results found  Answers for HPI/ROS submitted by the patient on 2/8/2022  Chronicity: chronic  Onset: more than 1 year ago  Onset quality: gradual  Frequency: rarely  Episode duration: 3 days  Progression since onset: waxing and waning  Pain location: LLQ, RLQ  Pain - numeric: 7/10  Pain quality: burning, a sensation of fullness  Radiates to: perineum  anorexia: Yes  arthralgias: Yes  belching: Yes  constipation: Yes  diarrhea: Yes  dysuria: Yes  fever: No  flatus: Yes  frequency: Yes  headaches: No  hematochezia: No  hematuria: No  melena: No  myalgias: Yes  nausea:  No  weight loss: No  vomiting: No  Aggravated by: bowel movement  Relieved by: being still, palpation, recumbency

## 2022-02-22 ENCOUNTER — TELEPHONE (OUTPATIENT)
Dept: RHEUMATOLOGY | Facility: CLINIC | Age: 67
End: 2022-02-22

## 2022-02-22 LAB — HLA-B27 QL NAA+PROBE: POSITIVE

## 2022-02-28 ENCOUNTER — HOSPITAL ENCOUNTER (OUTPATIENT)
Dept: MAMMOGRAPHY | Facility: CLINIC | Age: 67
Discharge: HOME | End: 2022-02-28
Payer: COMMERCIAL

## 2022-02-28 VITALS — BODY MASS INDEX: 27.82 KG/M2 | HEIGHT: 63 IN | WEIGHT: 157 LBS

## 2022-02-28 DIAGNOSIS — Z12.31 ENCOUNTER FOR SCREENING MAMMOGRAM FOR MALIGNANT NEOPLASM OF BREAST: ICD-10-CM

## 2022-02-28 PROCEDURE — 77063 BREAST TOMOSYNTHESIS BI: CPT

## 2022-02-28 PROCEDURE — 77067 SCR MAMMO BI INCL CAD: CPT

## 2022-03-14 ENCOUNTER — EVALUATION (OUTPATIENT)
Dept: PHYSICAL THERAPY | Facility: CLINIC | Age: 67
End: 2022-03-14
Payer: COMMERCIAL

## 2022-03-14 ENCOUNTER — OFFICE VISIT (OUTPATIENT)
Dept: OBGYN CLINIC | Facility: CLINIC | Age: 67
End: 2022-03-14
Payer: COMMERCIAL

## 2022-03-14 VITALS
BODY MASS INDEX: 28.17 KG/M2 | HEIGHT: 63 IN | SYSTOLIC BLOOD PRESSURE: 118 MMHG | DIASTOLIC BLOOD PRESSURE: 74 MMHG | WEIGHT: 159 LBS

## 2022-03-14 DIAGNOSIS — Z78.0 ASYMPTOMATIC POSTMENOPAUSAL STATUS: ICD-10-CM

## 2022-03-14 DIAGNOSIS — N89.8 VAGINAL DRYNESS: ICD-10-CM

## 2022-03-14 DIAGNOSIS — Z01.419 ENCOUNTER FOR GYNECOLOGICAL EXAMINATION: Primary | ICD-10-CM

## 2022-03-14 DIAGNOSIS — Z76.89 ENCOUNTER TO ESTABLISH CARE: ICD-10-CM

## 2022-03-14 DIAGNOSIS — M25.50 DIFFUSE ARTHRALGIA: Primary | ICD-10-CM

## 2022-03-14 PROBLEM — M24.149 ARTICULAR CARTILAGE DISORDER OF HAND: Status: ACTIVE | Noted: 2020-03-09

## 2022-03-14 PROBLEM — M65.341 ACQUIRED TRIGGER FINGER OF RIGHT RING FINGER: Status: ACTIVE | Noted: 2020-03-09

## 2022-03-14 PROBLEM — M18.0 OSTEOARTHRITIS OF CARPOMETACARPAL (CMC) JOINT OF BOTH THUMBS: Status: ACTIVE | Noted: 2020-03-09

## 2022-03-14 PROCEDURE — 97112 NEUROMUSCULAR REEDUCATION: CPT | Performed by: PHYSICAL THERAPIST

## 2022-03-14 PROCEDURE — 97162 PT EVAL MOD COMPLEX 30 MIN: CPT | Performed by: PHYSICAL THERAPIST

## 2022-03-14 PROCEDURE — 0503F POSTPARTUM CARE VISIT: CPT | Performed by: NURSE PRACTITIONER

## 2022-03-14 PROCEDURE — 99386 PREV VISIT NEW AGE 40-64: CPT | Performed by: NURSE PRACTITIONER

## 2022-03-14 RX ORDER — ESTRADIOL 10 UG/1
1 INSERT VAGINAL 3 TIMES WEEKLY
Qty: 12 TABLET | Refills: 12 | Status: SHIPPED | OUTPATIENT
Start: 2022-03-14 | End: 2022-07-21

## 2022-03-14 RX ORDER — LOPERAMIDE HYDROCHLORIDE 2 MG/1
2 TABLET ORAL DAILY
COMMUNITY

## 2022-03-14 RX ORDER — BUPROPION HYDROCHLORIDE 150 MG/1
TABLET, EXTENDED RELEASE ORAL
COMMUNITY
Start: 2022-03-05 | End: 2022-03-14 | Stop reason: ALTCHOICE

## 2022-03-14 NOTE — PROGRESS NOTES
Diagnoses and all orders for this visit:    Encounter for gynecological examination  -     Ambulatory referral to Obstetrics / Gynecology    Asymptomatic postmenopausal status    Vaginal dryness  -     estradiol (VAGIFEM, YUVAFEM) 10 MCG TABS vaginal tablet; Insert 1 tablet (10 mcg total) into the vagina 3 (three) times a week    Encounter to establish care  -     Ambulatory referral to Obstetrics / Gynecology    Other orders  -     Vaseline prn  -     Coconut oil prn        Call as needed, encouraged calcium/vit D in her diet, call with any PMB, all questions answered  RV in 3-6 months for re-evaluation      Pleasant 61 y  o NP postmenopausal female here for annual exam  She denies postmenopausal bleeding  She had a Total hysterectomy many years ago for menorrhagia  She was diagnosed with Lichen sclerosus 14 years ago via biopsy and has been on Vagifem with good relief  However, she would like to increase it to 3 times weekly because she seems to do better when it is more often  She is also on clobetasol prn  Denies history of abnormal pap smears, last Pap 01/06/2022 was ASCUS HPV neg in [place], no pap done today  She agrees to ASCCP guidelines to repeat Pap in 3 years  Previous Pap was in 2020 and neg  She denies vaginal issues but her symptoms are controlled by multiple topical natural creams in addition to the prescribed ones  She denies pelvic pain  She denies postmenopausal issues  Not sexually active in 3 years  Last colonoscopy done in 2019, she may be due soon but will check with Dr Thelma You  Last mammogram 02/28/2022  She is seeing Nephrology now for recent diagnosis of kidney failure      Past Medical History:   Diagnosis Date    B[facility] disorder[facility] depressed [facility] [address]     Chronic kidney disease     Depression     H/O bladder infections     Hyperlipidemia     Hypertension     Lichen sclerosus     Microscopic colitis     Skin disorder     lichens sclerosis    Urinary tract infection      Past Surgical History:   Procedure Laterality Date    COLONOSCOPY      2013      Family History   Problem Relation Age of Onset    Kidney disease Father     No Known Problems Half-Sister     No Known Problems Son      Social History     Tobacco Use    Smoking status: Former Smoker    Smokeless tobacco: Never Used    Tobacco comment: heavy smoker previously   Vaping Use    Vaping Use: Never used   Substance Use Topics    Alcohol use: Yes     Comment: socially    Drug use: Never       Current Outpatient Medications:     atenolol (TENORMIN) 25 mg tablet, atenolol 25 mg tabs, Disp: , Rfl:     atorvastatin (LIPITOR) 20 mg tablet, 10 mg  , Disp: , Rfl:     clobetasol (TEMOVATE) 0 05 % cream, clobetasol 0 05 % topical cream, Disp: , Rfl:     clonazePAM (KlonoPIN) 0 5 mg tablet, , Disp: , Rfl:     clotrimazole-betamethasone (LOTRISONE) 1-0 05 % cream, as needed  , Disp: , Rfl: 1    cyclobenzaprine (FLEXERIL) 10 mg tablet, as needed  , Disp: , Rfl:     lamoTRIgine (LaMICtal) 100 mg tablet, , Disp: , Rfl:     loperamide (IMODIUM A-D) 2 MG tablet, Take 2 mg by mouth 4 (four) times a day as needed for diarrhea, Disp: , Rfl:     mesalamine (APRISO) 0 375 g 24 hr capsule, Take 2 capsules (0 75 g total) by mouth daily, Disp: 60 capsule, Rfl: 3    zolpidem (AMBIEN) 10 mg tablet, as needed  , Disp: , Rfl:     estradiol (VAGIFEM, YUVAFEM) 10 MCG TABS vaginal tablet, Insert 1 tablet (10 mcg total) into the vagina 3 (three) times a week, Disp: 12 tablet, Rfl: 12  Patient Active Problem List    Diagnosis Date Noted    Chronic renal failure 01/25/2022    Primary hypertension 01/25/2022    Fibromyalgia 01/24/2022    Articular cartilage disorder of hand 03/09/2020    Acquired trigger finger of right ring finger 03/09/2020    Osteoarthritis of carpometacarpal (CMC) joint of both thumbs 03/09/2020    Microscopic colitis 76/34/4656    Lichen planus 03/52/0723    Lichen sclerosus 40/84/0037    [facility]       Allergies   Allergen Reactions    Levofloxacin Other (See Comments)    Cephalexin Diarrhea    Nitrofurantoin Fever    Sulfa Antibiotics Fever    Topiramate Other (See Comments)    Bacitracin-Neomycin-Polymyxin Rash    Sulfamethoxazole-Trimethoprim Diarrhea       OB History    Para Term  AB Living   1 1 1     1   SAB IAB Ectopic Multiple Live Births                  # Outcome Date GA Lbr Johnathan/2nd Weight Sex Delivery Anes PTL Lv   1 Term               Obstetric Comments   1st menses 10years old          Vitals:    22 1314   BP: 118/74   BP Location: Left arm   Patient Position: Sitting   Cuff Size: Standard   Weight: 72 1 kg (159 lb)   Height: 5' 3" (1 6 m)     Body mass index is 28 17 kg/m²  Review of Systems   Constitutional: Negative for chills, fatigue, fever and unexpected weight change  Respiratory: Negative for shortness of breath  Gastrointestinal: Negative for anal bleeding, blood in stool, constipation and diarrhea  Genitourinary: Negative for difficulty urinating, dysuria and hematuria  Physical Exam   Constitutional: She appears well-developed and well-nourished  No distress  HENT: atraumatic, EOMI  Head: Normocephalic  Neck: Normal range of motion  Neck supple  Pulmonary: Effort normal   Breasts: bilateral without masses, skin changes or nipple discharge  Bilaterally soft and warm to touch  No areas of erythema or pain  Abdominal: Soft  Pelvic exam was performed with patient supine  No labial fusion  There is no rash, tenderness, lesion or injury on the right labia  There is no rash, tenderness, lesion or injury on the left labia  Urethral meatus does not show any tenderness, inflammation or discharge  Palpation of midline bladder without pain or discomfort  UTERUS/CERVIX ABSENT  Right adnexum displays no mass, no tenderness and no fullness  Left adnexum displays no mass, no tenderness and no fullness   No erythema or tenderness in the vagina  No foreign body in the vagina  No signs of injury around the vagina or anus  Perineum without lesions, signs of injury, erythema or swelling  No vaginal discharge found  Vagina with hypopigmented areas c/w LSA, as well as surrounding erythema noted on external vulva, very atrophic vagina  Loss of labia minor noted  Lymphadenopathy:        Right: No inguinal adenopathy present  Left: No inguinal adenopathy present

## 2022-03-14 NOTE — PROGRESS NOTES
PT Evaluation     Today's date: 3/14/2022  Patient name: Abebe Martin  : 1955  MRN: [id number]  Referring provider: Gabriel Odell MD  Dx:   Encounter Diagnosis     ICD-10-CM    1  Diffuse arthralgia  M25 50 Ambulatory Referral to Physical Therapy       Start Time:   Stop Time: 1010  Total time in clinic (min): 43 minutes    Assessment  Assessment details: Pt is a 62 y/o female presenting to physical therapy with chief complaint of B shoulder pain secondary to rheumatological factors  She presents with WNL B shoulder AROM with minimal discomfort at end range  She has weakness and pain in positions that require more stabilization (flexion and abduction), and only minimal weakness in ER/IR and pronation/supination  She has difficulty resisting rhythmic stabilization in a position where her scapula is supported  This in combination with her weakness in pain with flex/abd MMT suggest poor stabilization, endurance, and strength of the RC musculature  She would benefit from physical therapy in order to improve pain, strength, and function to reach maximal therapeutic potential  Impairments: abnormal gait, abnormal or restricted ROM, activity intolerance, impaired physical strength, lacks appropriate home exercise program, pain with function and poor posture     Symptom irritability: moderateBarriers to therapy: Rheumatological factors  Understanding of Dx/Px/POC: good   Prognosis: good    Goals  STG: 3 weeks  1  Pt will demonstrate independence with HEP  2  Pt will improve R shoulder strength by at least 1/2 grade  3  Pt will report pain no more than 5/10    LT weeks  1  Pt will improve B shoulder strength to at least 4+/5  2  Pt will be able to lift weights at the gym with minimal discomfort  3  Pt will be able to drive for 1 hour without B shoulder pain  4   Pt will report pain no more than 2/10      Plan  Patient would benefit from: skilled physical therapy  Planned modality interventions: cryotherapy and thermotherapy: hydrocollator packs  Planned therapy interventions: therapeutic exercise, therapeutic activities, stretching, strengthening, patient education, neuromuscular re-education, massage, manual therapy, balance, gait training and home exercise program  Frequency: 2x week  Duration in weeks: 6  Treatment plan discussed with: patient        Subjective Evaluation    History of Present Illness  Mechanism of injury: Pt reports she is feeling good today, but in the past couple weeks she has most discomfort in the shoulders and elbows  She reports she went to the rheumatologist, and is HLA-B27 positive, however she cannot take anti-inflammatories due to colitis  She goes to the gym and mainly does the recumbent bike, but when she does any UE lifting exercises she feels it in the shoulders and elbows  She reports she is able to do all of her normal activities but she compensates when she starts to feel pain  Recurrent probem    Quality of life: good    Pain  At best pain ratin  At worst pain ratin  Quality: dull ache and discomfort  Relieving factors: rest  Aggravating factors: lifting  Progression: improved    Treatments  Previous treatment: physical therapy  Patient Goals  Patient goals for therapy: increased strength, decreased pain, independence with ADLs/IADLs and return to sport/leisure activities          Objective     Postural Observations  Seated posture: fair  Standing posture: fair        Active Range of Motion   Left Shoulder   Normal active range of motion    Right Shoulder   Normal active range of motion    Joint Play   Left Shoulder  Joints within functional limits are the posterior capsule and inferior capsule  Right Shoulder  Joints within functional limits are the posterior capsule and inferior capsule       Strength/Myotome Testing     Left Shoulder     Planes of Motion   Flexion: 4-   Abduction: 4-   External rotation at 0°: 4   Internal rotation at 0°: 4 Right Shoulder     Planes of Motion   Flexion: 4-   Abduction: 4-   External rotation at 0°: 4   Internal rotation at 0°: 4     Additional Strength Details  B pron/sup: 4/5 no pain    Pain with B shoulder flexion and abduction MMT    Tests     Additional Tests Details  Mod difficulty with resisting rhythmic stab on each UE in a supine position (with scap supported)             Precautions: HLA-B27 pos, HTN, HLD, depression    Access Code: [email or web address]  URL: [email or web address]       Manuals 3/14                                                                Neuro Re-Ed             Standing ABC HEP            Standing serratus punch HEP            Scap ret HEP            Prone I T                                                    Ther Ex             UBE             S/L er/abd                                                                                           Ther Activity                                       Gait Training                                       Modalities

## 2022-03-14 NOTE — PATIENT INSTRUCTIONS
Breast Self Exam for Women   AMBULATORY CARE:   A breast self-exam (BSE)  is a way to check your breasts for lumps and other changes  Regular BSEs can help you know how your breasts normally look and feel  Most breast lumps or changes are not cancer, but you should always have them checked by a healthcare provider  Why you should do a BSE:  Breast cancer is the most common type of cancer in women  Even if you have mammograms, you may still want to do a BSE regularly  If you know how your breasts normally feel and look, it may help you know when to contact your healthcare provider  Mammograms can miss some cancers  You may find a lump during a BSE that did not show up on a mammogram   When you should do a BSE:  If you have periods, you may want to do your BSE 1 week after your period ends  This is the time when your breasts may be the least swollen, lumpy, or tender  You can do regular BSEs even if you are breastfeeding or have breast implants  Call your doctor if:   · You find any lumps or changes in your breasts  · You have breast pain or fluid coming from your nipples  · You have questions or concerns about your condition or care  How to do a BSE:       · Look at your breasts in a mirror  Look at the size and shape of each breast and nipple  Check for swelling, lumps, dimpling, scaly skin, or other skin changes  Look for nipple changes, such as a nipple that is painful or beginning to pull inward  Gently squeeze both nipples and check to see if fluid (that is not breast milk) comes out of them  If you find any of these or other breast changes, contact your healthcare provider  Check your breasts while you sit or  the following 3 positions:    ? Hang your arms down at your sides  ? Raise your hands and join them behind your head  ? Put firm pressure with your hands on your hips  Bend slightly forward while you look at your breasts in the mirror  · Lie down and feel your breasts    When you lie down, your breast tissue spreads out evenly over your chest  This makes it easier for you to feel for lumps and anything that may not be normal for your breasts  Do a BSE on one breast at a time  ? Place a small pillow or towel under your left shoulder  Put your left arm behind your head  ? Use the 3 middle fingers of your right hand  Use your fingertip pads, on the top of your fingers  Your fingertip pad is the most sensitive part of your finger  ? Use small circles to feel your breast tissue  Use your fingertip pads to make dime-sized, overlapping circles on your breast and armpits  Use light, medium, and firm pressure  First, press lightly  Second, press with medium pressure to feel a little deeper into the breast  Last, use firm pressure to feel deep within your breast     ? Examine your entire breast area  Examine the breast area from above the breast to below the breast where you feel only ribs  Make small circles with your fingertips, starting in the middle of your armpit  Make circles going up and down the breast area  Continue toward your breast and all the way across it  Examine the area from your armpit all the way over to the middle of your chest (breastbone)  Stop at the middle of your chest     ? Move the pillow or towel to your right shoulder, and put your right arm behind your head  Use the 3 fingertip pads of your left hand, and repeat the above steps to do a BSE on your right breast     What else you can do to check for breast problems or cancer:  Talk to your healthcare provider about mammograms  A mammogram is an x-ray of your breasts to screen for breast cancer or other problems  Your provider can tell you the benefits and risks of mammograms  The first mammogram is usually at age 39 or 48  Your provider may recommend you start at 36 or younger if your risk for breast cancer is high  Mammograms usually continue every 1 to 2 years until age 76         Follow up with your doctor as directed:  Write down your questions so you remember to ask them during your visits  © Copyright [employer] 2022 Information is for End User's use only and may not be sold, redistributed or otherwise used for commercial purposes  All illustrations and images included in CareNotes® are the copyrighted property of [employer]  or [employer]o Castillo  The above information is an  only  It is not intended as medical advice for individual conditions or treatments  Talk to your doctor, nurse or pharmacist before following any medical regimen to see if it is safe and effective for you

## 2022-03-21 ENCOUNTER — APPOINTMENT (OUTPATIENT)
Dept: PHYSICAL THERAPY | Facility: CLINIC | Age: 67
End: 2022-03-21
Payer: COMMERCIAL

## 2022-03-22 ENCOUNTER — APPOINTMENT (OUTPATIENT)
Dept: PHYSICAL THERAPY | Facility: CLINIC | Age: 67
End: 2022-03-22
Payer: COMMERCIAL

## 2022-03-29 ENCOUNTER — APPOINTMENT (OUTPATIENT)
Dept: PHYSICAL THERAPY | Facility: CLINIC | Age: 67
End: 2022-03-29
Payer: COMMERCIAL

## 2022-03-29 NOTE — PROGRESS NOTES
Pt called to cancel all future appointments, stating she had arm pain after visit and has a lot of other health issues  Feels she cannot commit  Subjective and objective information and goals unable to be updated at this time  Pt DC from skilled therapy

## 2022-03-31 ENCOUNTER — APPOINTMENT (OUTPATIENT)
Dept: PHYSICAL THERAPY | Facility: CLINIC | Age: 67
End: 2022-03-31
Payer: COMMERCIAL

## 2022-04-04 ENCOUNTER — TELEPHONE (OUTPATIENT)
Dept: PSYCHOLOGY | Facility: CLINIC | Age: 67
End: 2022-04-04

## 2022-04-04 ENCOUNTER — OFFICE VISIT (OUTPATIENT)
Dept: PSYCHIATRY | Facility: CLINIC | Age: 67
End: 2022-04-04
Payer: COMMERCIAL

## 2022-04-04 DIAGNOSIS — F39 MOOD DISORDER (HCC): Primary | ICD-10-CM

## 2022-04-04 DIAGNOSIS — F31.9 BIPOLAR 1 DISORDER, DEPRESSED (HCC): ICD-10-CM

## 2022-04-04 PROCEDURE — 90792 PSYCH DIAG EVAL W/MED SRVCS: CPT

## 2022-04-04 RX ORDER — LAMOTRIGINE 25 MG/1
75 TABLET ORAL DAILY
Qty: 90 TABLET | Refills: 0 | Status: SHIPPED | OUTPATIENT
Start: 2022-04-04 | End: 2022-05-02

## 2022-04-04 NOTE — PSYCH
[facility]    Name and Date of Birth:  Agustin Dotson 61 y o  1955    Date of Referral: April 4, 2022    Presenting Symptoms and Stressors:      Symptoms:  depressive symptoms, worsening depression, anxiety, anxiety symptoms, worsening anxiety, mood instability, increased confusion and problems with memory  Stressors:  family issues, financial problems, health issues, chronic pain, ongoing anxiety and chronic mental illness    Access to Weapons:  No    Smoking Status: denies current use    Substance Use:  None at present    Suicidal Ideation: None at present    Homicidal Ideation: None    Depressed Mood: Yes, depressed mood, anhedonia, sadness, hopelessness, helplessness, low energy, low motivation, decreased interest, excessive guilt, difficulty with decision making, poor concentration, decreased memory, ruminations, negative thoughts, mood swings, irritability    Jeanine/Hypomania: mood swings, racing thoughts, poor concentration, increase in goal directed activity    Psychosis: None    Agitation: No    Appetite Changes: fluctuating appetite    Sleep Disturbance: fluctuating sleep pattern    Diagnoses:  1  mood disorder    Current Psychiatrist or Therapist:    Psychiatrist: Nurse Practitioner with [address] [address] in [facility]  Therapist: None    Do they Require Ambulatory Assistance: No    Communication Assistance: not required     Legal Issues: None        RICK Herron

## 2022-04-04 NOTE — BH TREATMENT PLAN
TREATMENT PLAN (Medication Management Only)        [facility]    Name and Date of Birth:  Leslee Traylor 61 y o  1955  Date of Treatment Plan: April 4, 2022  Diagnosis/Diagnoses:    1  Mood disorder ([facility] [address] )    2  Bipolar 1 disorder, depressed ([facility] [address] )      Strengths/Personal Resources for Self-Care: supportive family, supportive friends  Area/Areas of need (in own words): anxiety symptoms, depressive symptoms  1  Long Term Goal: alleviate acceptable anxiety level  Target Date:6 months - 10/4/2022  Person/Persons responsible for completion of goal: family  2  Short Term Objective (s) - How will we reach this goal?:   A  Provider new recommended medication/dosage changes and/or continue medication(s): decrease lamictal to 75mg  B  Attend medication management appointments regularly  C  Take psychiatric medications responsibly  Target Date:6 months - 10/4/2022  Person/Persons Responsible for Completion of Goal: Marylu Collins and gagandeep martinez  Progress Towards Goals: starting treatment  Treatment Modality: medication management every 4 weeks  Review due 180 days from date of this plan: 6 months - 10/4/2022  Expected length of service: maintenance  My Physician/PA/NP and I have developed this plan together and I agree to work on the goals and objectives  I understand the treatment goals that were developed for my treatment
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
## 2022-04-19 PROBLEM — F39 MOOD DISORDER (HCC): Status: ACTIVE | Noted: 2022-04-19

## 2022-04-20 ENCOUNTER — HOSPITAL ENCOUNTER (OUTPATIENT)
Dept: ULTRASOUND IMAGING | Facility: CLINIC | Age: 67
Discharge: HOME | End: 2022-04-20
Payer: COMMERCIAL

## 2022-04-20 DIAGNOSIS — N39.0 RECURRENT UTI: ICD-10-CM

## 2022-04-20 DIAGNOSIS — R10.2 PELVIC PAIN: ICD-10-CM

## 2022-04-20 PROCEDURE — 76770 US EXAM ABDO BACK WALL COMP: CPT

## 2022-04-26 ENCOUNTER — LAB (OUTPATIENT)
Dept: LAB | Facility: CLINIC | Age: 67
End: 2022-04-26
Payer: COMMERCIAL

## 2022-04-26 DIAGNOSIS — K52.839 MICROSCOPIC COLITIS, UNSPECIFIED MICROSCOPIC COLITIS TYPE: ICD-10-CM

## 2022-04-26 DIAGNOSIS — N18.9 CHRONIC RENAL FAILURE, UNSPECIFIED CKD STAGE: ICD-10-CM

## 2022-04-26 LAB
25(OH)D3 SERPL-MCNC: 39.1 NG/ML (ref 30–100)
ALBUMIN SERPL BCP-MCNC: 4.2 G/DL (ref 3.5–5)
ALP SERPL-CCNC: 76 U/L (ref 46–116)
ALT SERPL W P-5'-P-CCNC: 58 U/L (ref 12–78)
ANION GAP SERPL CALCULATED.3IONS-SCNC: 4 MMOL/L (ref 4–13)
AST SERPL W P-5'-P-CCNC: 32 U/L (ref 5–45)
BACTERIA UR QL AUTO: NORMAL /HPF
BASOPHILS # BLD AUTO: 0.04 THOUSANDS/ΜL (ref 0–0.1)
BASOPHILS NFR BLD AUTO: 1 % (ref 0–1)
BILIRUB SERPL-MCNC: 0.52 MG/DL (ref 0.2–1)
BILIRUB UR QL STRIP: NEGATIVE
BUN SERPL-MCNC: 22 MG/DL (ref 5–25)
CALCIUM SERPL-MCNC: 9.6 MG/DL (ref 8.3–10.1)
CHLORIDE SERPL-SCNC: 104 MMOL/L (ref 100–108)
CLARITY UR: CLEAR
CO2 SERPL-SCNC: 30 MMOL/L (ref 21–32)
COLOR UR: ABNORMAL
CREAT SERPL-MCNC: 1.33 MG/DL (ref 0.6–1.3)
CREAT UR-MCNC: 155 MG/DL
CRP SERPL QL: <3 MG/L
EOSINOPHIL # BLD AUTO: 0.12 THOUSAND/ΜL (ref 0–0.61)
EOSINOPHIL NFR BLD AUTO: 2 % (ref 0–6)
ERYTHROCYTE [DISTWIDTH] IN BLOOD BY AUTOMATED COUNT: 12.8 % (ref 11.6–15.1)
GFR SERPL CREATININE-BSD FRML MDRD: 42 ML/MIN/1.73SQ M
GLUCOSE P FAST SERPL-MCNC: 109 MG/DL (ref 65–99)
GLUCOSE UR STRIP-MCNC: NEGATIVE MG/DL
HCT VFR BLD AUTO: 39.4 % (ref 34.8–46.1)
HGB BLD-MCNC: 13.5 G/DL (ref 11.5–15.4)
HGB UR QL STRIP.AUTO: NEGATIVE
IMM GRANULOCYTES # BLD AUTO: 0.01 THOUSAND/UL (ref 0–0.2)
IMM GRANULOCYTES NFR BLD AUTO: 0 % (ref 0–2)
KETONES UR STRIP-MCNC: NEGATIVE MG/DL
LEUKOCYTE ESTERASE UR QL STRIP: NEGATIVE
LYMPHOCYTES # BLD AUTO: 2.98 THOUSANDS/ΜL (ref 0.6–4.47)
LYMPHOCYTES NFR BLD AUTO: 43 % (ref 14–44)
MCH RBC QN AUTO: 28.9 PG (ref 26.8–34.3)
MCHC RBC AUTO-ENTMCNC: 34.3 G/DL (ref 31.4–37.4)
MCV RBC AUTO: 84 FL (ref 82–98)
MONOCYTES # BLD AUTO: 0.55 THOUSAND/ΜL (ref 0.17–1.22)
MONOCYTES NFR BLD AUTO: 8 % (ref 4–12)
NEUTROPHILS # BLD AUTO: 3.32 THOUSANDS/ΜL (ref 1.85–7.62)
NEUTS SEG NFR BLD AUTO: 46 % (ref 43–75)
NITRITE UR QL STRIP: NEGATIVE
NON-SQ EPI CELLS URNS QL MICRO: NORMAL /HPF
NRBC BLD AUTO-RTO: 0 /100 WBCS
PH UR STRIP.AUTO: 6 [PH]
PHOSPHATE SERPL-MCNC: 3.9 MG/DL (ref 2.3–4.1)
PLATELET # BLD AUTO: 265 THOUSANDS/UL (ref 149–390)
PMV BLD AUTO: 10.3 FL (ref 8.9–12.7)
POTASSIUM SERPL-SCNC: 3.5 MMOL/L (ref 3.5–5.3)
PROT SERPL-MCNC: 7.5 G/DL (ref 6.4–8.2)
PROT UR STRIP-MCNC: ABNORMAL MG/DL
PROT UR-MCNC: 14 MG/DL
PROT/CREAT UR: 0.09 MG/G{CREAT} (ref 0–0.1)
PTH-INTACT SERPL-MCNC: 106.3 PG/ML (ref 18.4–80.1)
RBC # BLD AUTO: 4.67 MILLION/UL (ref 3.81–5.12)
RBC #/AREA URNS AUTO: NORMAL /HPF
SODIUM SERPL-SCNC: 138 MMOL/L (ref 136–145)
SP GR UR STRIP.AUTO: 1.02 (ref 1–1.03)
UROBILINOGEN UR STRIP-ACNC: <2 MG/DL
WBC # BLD AUTO: 7.02 THOUSAND/UL (ref 4.31–10.16)
WBC #/AREA URNS AUTO: NORMAL /HPF

## 2022-04-26 PROCEDURE — 80053 COMPREHEN METABOLIC PANEL: CPT

## 2022-04-26 PROCEDURE — 83970 ASSAY OF PARATHORMONE: CPT

## 2022-04-26 PROCEDURE — 81001 URINALYSIS AUTO W/SCOPE: CPT

## 2022-04-26 PROCEDURE — 84100 ASSAY OF PHOSPHORUS: CPT

## 2022-04-26 PROCEDURE — 82570 ASSAY OF URINE CREATININE: CPT

## 2022-04-26 PROCEDURE — 82306 VITAMIN D 25 HYDROXY: CPT

## 2022-04-26 PROCEDURE — 86140 C-REACTIVE PROTEIN: CPT

## 2022-04-26 PROCEDURE — 36415 COLL VENOUS BLD VENIPUNCTURE: CPT

## 2022-04-26 PROCEDURE — 85025 COMPLETE CBC W/AUTO DIFF WBC: CPT

## 2022-04-26 PROCEDURE — 84156 ASSAY OF PROTEIN URINE: CPT

## 2022-04-27 ENCOUNTER — TELEPHONE (OUTPATIENT)
Dept: UROLOGY | Facility: CLINIC | Age: 67
End: 2022-04-27

## 2022-04-27 NOTE — TELEPHONE ENCOUNTER
Patient called stating she had a missed call and was calling back regarding previous encounter, please advise

## 2022-04-27 NOTE — TELEPHONE ENCOUNTER
Called and left message for patient to call back regarding the following message from [address] [place] [address]    ----- Message from Troy Wilks PA-C sent at 4/27/2022  2:35 PM EDT -----  Urine culture negative

## 2022-05-02 ENCOUNTER — OFFICE VISIT (OUTPATIENT)
Dept: PSYCHIATRY | Facility: CLINIC | Age: 67
End: 2022-05-02
Payer: COMMERCIAL

## 2022-05-02 DIAGNOSIS — F31.9 BIPOLAR 1 DISORDER, DEPRESSED (HCC): Primary | ICD-10-CM

## 2022-05-02 DIAGNOSIS — F39 MOOD DISORDER (HCC): ICD-10-CM

## 2022-05-02 PROCEDURE — 99214 OFFICE O/P EST MOD 30 MIN: CPT

## 2022-05-02 RX ORDER — LAMOTRIGINE 25 MG/1
25 TABLET ORAL DAILY
Qty: 60 TABLET | Refills: 0 | Status: SHIPPED | OUTPATIENT
Start: 2022-05-02 | End: 2022-06-14

## 2022-05-02 RX ORDER — HYDROXYZINE HYDROCHLORIDE 25 MG/1
25 TABLET, FILM COATED ORAL EVERY 8 HOURS PRN
Qty: 30 TABLET | Refills: 0 | Status: SHIPPED | OUTPATIENT
Start: 2022-05-02 | End: 2022-08-08 | Stop reason: SINTOL

## 2022-05-02 NOTE — PSYCH
Regular Visit    Problem List Items Addressed This Visit        Other    Mood disorder ([facility] )    Relevant Medications    lamoTRIgine (LaMICtal) 25 mg tablet    hydrOXYzine HCL (ATARAX) 25 mg tablet      Other Visit Diagnoses     Bipolar 1 disorder, depressed ([facility] )    -  Primary    Relevant Medications    hydrOXYzine HCL (ATARAX) 25 mg tablet             Encounter provider RICK Lazo    Provider located at   [address] [address] [phone]    Recent Visits  No visits were found meeting these conditions  Showing recent visits within past 7 days and meeting all other requirements  Today's Visits  Date Type Provider Dept   05/02/22 Office Visit RICK Lazo  [facility]   Showing today's visits and meeting all other requirements  Future Appointments  No visits were found meeting these conditions    Showing future appointments within next 150 days and meeting all other requirements       HPI     Current Outpatient Medications   Medication Sig Dispense Refill    atenolol (TENORMIN) 25 mg tablet atenolol 25 mg tabs      atorvastatin (LIPITOR) 20 mg tablet 10 mg        clobetasol (TEMOVATE) 0 05 % cream clobetasol 0 05 % topical cream      clonazePAM (KlonoPIN) 0 5 mg tablet       clotrimazole-betamethasone (LOTRISONE) 1-0 05 % cream as needed    1    cyclobenzaprine (FLEXERIL) 10 mg tablet as needed        estradiol (VAGIFEM, YUVAFEM) 10 MCG TABS vaginal tablet Insert 1 tablet (10 mcg total) into the vagina 3 (three) times a week 12 tablet 12    hydrochlorothiazide (HYDRODIURIL) 25 mg tablet Take 1 tablet (25 mg total) by mouth daily 60 tablet 1    hydrOXYzine HCL (ATARAX) 25 mg tablet Take 1 tablet (25 mg total) by mouth every 8 (eight) hours as needed for anxiety 30 tablet 0    lamoTRIgine (LaMICtal) 25 mg tablet Take 1 tablet (25 mg total) by mouth daily 60 tablet 0    loperamide (IMODIUM A-D) 2 MG tablet Take 2 mg by mouth 4 (four) times a day as needed for diarrhea      mesalamine (APRISO) 0 375 g 24 hr capsule TAKE 2 CAPSULES BY MOUTH EVERY DAY 60 capsule 0    zolpidem (AMBIEN) 10 mg tablet as needed         No current facility-administered medications for this visit  Review of Systems        MEDICATION MANAGEMENT NOTE        Kanawha Devan    Name and Date of Birth:  Ly Garcia 61 y o  1955 MRN: [id number]    Date of Visit: May 2, 2022    Allergies   Allergen Reactions    Levofloxacin Other (See Comments)    Cephalexin Diarrhea    Nitrofurantoin Fever    Sulfa Antibiotics Fever    Topiramate Other (See Comments)    Bacitracin-Neomycin-Polymyxin Rash    Sulfamethoxazole-Trimethoprim Diarrhea     SUBJECTIVE:    Tanisha Guaman is seen today for a follow up for Bipolar Disorder  She continues to do relatively well since the last visit  Patient continues to wean off Lamictal per her choice, as she reports chronic mental confusion, inability to write legibly due to being on the medication for over 20 years  Feels a break in medication would be very beneficial for her and she is interested in starting an antidepressant once the Lamictal is completely tapered off  Has a history of bipolar disorder, however denies all manic and elevated mood symptoms, struggling more with the depression symptoms including dysphoria, lack of energy motivation, fatigue, hopelessness  Stressors causing the symptoms include her physical health, feeling guilty about being on disability, living alone in her house, struggling to develop personal relationships, and worrying about her son and money  She agrees that an antidepressant in the near future will most likely be needed but she really wants to see how she reacts to having no medications in her system as she has been on these medications almost all her life    She agrees to go to the emergency room if severe symptoms arise such as suicidal ideation or manic symptoms  She remains on therapy wait list is looking for to beginning  She was previously referred to the partial program during our initial visit however patient did not to follow through because the program is online and she does not want to drive all the way to [facility] to complete the program in person  Let provider know if she ever wants to pursue the partial program in the future  Denies suicidal ideation  She denies any side effects from medications  PLAN:    Decrease Lamictal 25 mg daily before patient discontinues medication completely in 1 month as she would like to try a medication holiday due to potential side effects of Lamictal including mental confusion  Consider Wellbutrin during next visit for depression and low energy symptoms  Continue Klonopin 0 5 mg daily prescribed and managed by PCP  Initiate p r n  Atarax 25 mg as needed for breakthrough anxiety    Aware of 24 hour and weekend coverage for urgent situations accessed by calling [facility] main practice number  Continue psychotherapy with therapist    Diagnoses and all orders for this visit:    Bipolar 1 disorder, depressed ([facility] )  -     hydrOXYzine HCL (ATARAX) 25 mg tablet; Take 1 tablet (25 mg total) by mouth every 8 (eight) hours as needed for anxiety    Mood disorder ([facility])  -     lamoTRIgine (LaMICtal) 25 mg tablet;  Take 1 tablet (25 mg total) by mouth daily        Current Outpatient Medications on File Prior to Visit   Medication Sig Dispense Refill    atenolol (TENORMIN) 25 mg tablet atenolol 25 mg tabs      atorvastatin (LIPITOR) 20 mg tablet 10 mg        clobetasol (TEMOVATE) 0 05 % cream clobetasol 0 05 % topical cream      clonazePAM (KlonoPIN) 0 5 mg tablet       clotrimazole-betamethasone (LOTRISONE) 1-0 05 % cream as needed    1    cyclobenzaprine (FLEXERIL) 10 mg tablet as needed        estradiol (VAGIFEM, YUVAFEM) 10 MCG TABS vaginal tablet Insert 1 tablet (10 mcg total) into the vagina 3 (three) times a week 12 tablet 12    hydrochlorothiazide (HYDRODIURIL) 25 mg tablet Take 1 tablet (25 mg total) by mouth daily 60 tablet 1    loperamide (IMODIUM A-D) 2 MG tablet Take 2 mg by mouth 4 (four) times a day as needed for diarrhea      mesalamine (APRISO) 0 375 g 24 hr capsule TAKE 2 CAPSULES BY MOUTH EVERY DAY 60 capsule 0    zolpidem (AMBIEN) 10 mg tablet as needed        [DISCONTINUED] lamoTRIgine (LaMICtal) 25 mg tablet Take 3 tablets (75 mg total) by mouth daily 90 tablet 0     No current facility-administered medications on file prior to visit  HPI ROS Appetite Changes and Sleep:     She reports frequent awakenings, adequate appetite, low energy   Denies homicidal ideation, denies suicidal ideation    Review Of Systems:      HPI ROS:               Medication Side Effects:  Mental confusion from Lamictal, has been on for over 20 years    Depression (10 worst): 8/10    Anxiety (10 worst): 8/10    Safety concerns (SI, HI, etc): Denies si and hi    Sleep: Waking up at 4am with anxiety    Energy: low    Appetite: good    Weight Change: denies        Mental Status Evaluation:    Appearance Adequate hygiene and grooming   Behavior calm and cooperative   Mood anxious and depressed  Depression Scale - 8 of 10 (0 = No depression)  Anxiety Scale - 8 of 10 (0 = No anxiety)   Speech Normal rate and volume   Affect appropriate   Thought Processes Goal directed and coherent   Thought Content Does not verbalize delusional material   Associations Tightly connected   Perceptual Disturbances Denies hallucinations and does not appear to be responding to internal stimuli   Risk Potential Suicidal/Homicidal Ideation - No evidence of suicidal or homicidal ideation and patient does not verbalize suicidal or homicidal ideation  Risk of Violence - No evidence of risk for violence found on assessment  Risk of Self Mutilation - No evidence of risk for self mutilation found on assessment   Orientation oriented to person, place, time/date and situation   Memory recent and remote memory grossly intact   Consciousness alert and awake   Attention/Concentration attention span and concentration are age appropriate   Insight intact   Judgement intact   Muscle Strength and Gait normal muscle strength and normal muscle tone, normal gait/station and normal balance   Motor Activity no abnormal movements   Language no difficulty naming common objects, no difficulty repeating a phrase, no difficulty writing a sentence   Fund of Knowledge adequate knowledge of current events  adequate fund of knowledge regarding past history  adequate fund of knowledge regarding vocabulary      Past Psychiatric History Update:     Inpatient Psychiatric Admission Since Last Encounter:   no  Changes to Outpatient Psychiatric Treatment Team:    no  Suicide Attempt Or Self Mutilation Since Last Encounter:   no  Incidence of Violent Behavior Since Last Encounter:   no    Traumatic History Update:     New Onset of Abuse Since Last Encounter:   no  Traumatic Events Since Last Encounter:   no    Past Medical History:    Past Medical History:   Diagnosis Date    Bipolar 1 disorder, depressed ([facility] )     Bipolar 1 disorder, depressed ([facility] ) 1/24/1975    Chronic kidney disease     Depression     H/O bladder infections     Hyperlipidemia     Hypertension     Lichen sclerosus     Microscopic colitis     Skin disorder     lichens sclerosis    Urinary tract infection      No past medical history pertinent negatives    Past Surgical History:   Procedure Laterality Date    COLONOSCOPY      2013      Allergies   Allergen Reactions    Levofloxacin Other (See Comments)    Cephalexin Diarrhea    Nitrofurantoin Fever    Sulfa Antibiotics Fever    Topiramate Other (See Comments)    Bacitracin-Neomycin-Polymyxin Rash    Sulfamethoxazole-Trimethoprim Diarrhea     Substance Abuse History:    Social History     Substance and Sexual Activity   Alcohol Use Yes    Comment: socially     Social History     Substance and Sexual Activity   Drug Use Never     Social History:    Social History     Socioeconomic History    Marital status: Single     Spouse name: Not on file    Number of children: Not on file    Years of education: Not on file    Highest education level: Not on file   Occupational History    Not on file   Tobacco Use    Smoking status: Former Smoker    Smokeless tobacco: Never Used    Tobacco comment: heavy smoker previously   Vaping Use    Vaping Use: Never used   Substance and Sexual Activity    Alcohol use: Yes     Comment: socially    Drug use: Never    Sexual activity: Not Currently     Partners: Male   Other Topics Concern    Not on file   Social History Narrative    Not on file     Social Determinants of Health     Financial Resource Strain: Not on file   Food Insecurity: Not on file   Transportation Needs: Not on file   Physical Activity: Not on file   Stress: Not on file   Social Connections: Not on file   Intimate Partner Violence: Not on file   Housing Stability: Not on file     Family Psychiatric History:     Family History   Problem Relation Age of Onset    Kidney disease Father     No Known Problems Half-Sister     No Known Problems Son      History Review: The following portions of the patient's history were reviewed and updated as appropriate: allergies, current medications, past family history, past medical history, past social history, past surgical history and problem list     OBJECTIVE:     Vital signs in last 24 hours: There were no vitals filed for this visit  Laboratory Results: I have personally reviewed all pertinent laboratory/tests results      Suicide/Homicide Risk Assessment:    Risk of Harm to Self:  Protective Factors: no current suicidal ideation, access to mental health treatment, being a parent, compliant with medications, compliant with mental health treatment, connection to community  Based on today's assessment, Isha Newman presents the following risk of harm to self: minimal    Risk of Harm to Others:  Based on today's assessment, Isha Newman presents the following risk of harm to others: minimal    The following interventions are recommended: referral for psychotherapy    Medications Risks/Benefits:      Risks, Benefits And Possible Side Effects Of Medications:    Discussed risks and benefits of treatment with patient including risks of misuse, abuse or dependence, sedation and respiratory depression related to treatment with benzodiazepine medications and risk of rash related to treatment with Lamictal     Controlled Medication Discussion:     Isha Newman has been filling controlled prescriptions on time as prescribed according to [address] [place] [address] Drive Monitoring Program  Discussed with Isha Newman the risks of sedation, respiratory depression, impairment of ability to drive and potential for abuse and addiction related to treatment with benzodiazepine medications  She understands risk of treatment with benzodiazepine medications, agrees to not drive if feels impaired and agrees to take medications as prescribed  Discussed with Bettye Hamman Box warning on concurrent use of benzodiazepines and opioid medications including sedation, respiratory depression, coma and death  She understands the risk of treatment with benzodiazepines in addition to opioids and wants to continue taking those medications  Discussed with Isha Trent increased risk of impairment related to concurrent use of benzodiazepines and hypnotic medications including excessive sedation, psychomotor impairment and respiratory depression  She understands the risk of treatment with benzodiazepines in addition to hypnotic medications and wants to continue taking those medications    Treatment Plan:    Due for update/Updated:   no  Last treatment plan done 4/4/22 by gagandeep martinez    Treatment Plan due on 10/4/22      RICK Mcneal 05/02/22

## 2022-05-12 ENCOUNTER — TELEPHONE (OUTPATIENT)
Dept: NEPHROLOGY | Facility: CLINIC | Age: 67
End: 2022-05-12

## 2022-05-12 NOTE — TELEPHONE ENCOUNTER
I called [phone] @ [phone] (Automated System) to check eligible for the patient and as of 5/12/2022 the patient has current active coverage as of 5/12/2022   Piper Naylor,

## 2022-05-13 ENCOUNTER — TELEMEDICINE (OUTPATIENT)
Dept: FAMILY MEDICINE CLINIC | Facility: CLINIC | Age: 67
End: 2022-05-13
Payer: COMMERCIAL

## 2022-05-13 DIAGNOSIS — R05.9 COUGH: ICD-10-CM

## 2022-05-13 DIAGNOSIS — B34.9 VIRAL INFECTION, UNSPECIFIED: Primary | ICD-10-CM

## 2022-05-13 DIAGNOSIS — R09.81 CONGESTION OF NASAL SINUS: ICD-10-CM

## 2022-05-13 DIAGNOSIS — Z20.822 EXPOSURE TO COVID-19 VIRUS: ICD-10-CM

## 2022-05-13 PROCEDURE — 99214 OFFICE O/P EST MOD 30 MIN: CPT

## 2022-05-13 RX ORDER — FLUTICASONE PROPIONATE 50 MCG
1 SPRAY, SUSPENSION (ML) NASAL DAILY
Qty: 18.2 ML | Refills: 1 | Status: SHIPPED | OUTPATIENT
Start: 2022-05-13

## 2022-05-13 RX ORDER — GUAIFENESIN 600 MG
1200 TABLET, EXTENDED RELEASE 12 HR ORAL EVERY 12 HOURS SCHEDULED
Qty: 30 TABLET | Refills: 0 | Status: SHIPPED | OUTPATIENT
Start: 2022-05-13 | End: 2022-08-08 | Stop reason: SINTOL

## 2022-05-13 RX ORDER — AZITHROMYCIN 250 MG/1
TABLET, FILM COATED ORAL
Qty: 6 TABLET | Refills: 0 | Status: SHIPPED | OUTPATIENT
Start: 2022-05-13 | End: 2022-05-18

## 2022-05-13 RX ORDER — METHYLPREDNISOLONE 4 MG/1
TABLET ORAL
Qty: 21 EACH | Refills: 0 | Status: SHIPPED | OUTPATIENT
Start: 2022-05-13 | End: 2022-08-08 | Stop reason: ALTCHOICE

## 2022-05-13 NOTE — PROGRESS NOTES
COVID-19 Outpatient Progress Note    Assessment/Plan:    Problem List Items Addressed This Visit    None     Visit Diagnoses     Viral infection, unspecified    -  Primary    Relevant Medications    azithromycin (Zithromax) 250 mg tablet    methylPREDNISolone 4 MG tablet therapy pack    guaiFENesin (MUCINEX) 600 mg 12 hr tablet    fluticasone (FLONASE) 50 mcg/act nasal spray    Congestion of nasal sinus        Relevant Medications    azithromycin (Zithromax) 250 mg tablet    methylPREDNISolone 4 MG tablet therapy pack    guaiFENesin (MUCINEX) 600 mg 12 hr tablet    fluticasone (FLONASE) 50 mcg/act nasal spray    Cough        Is bothering her the most     Relevant Medications    azithromycin (Zithromax) 250 mg tablet    methylPREDNISolone 4 MG tablet therapy pack    guaiFENesin (MUCINEX) 600 mg 12 hr tablet    fluticasone (FLONASE) 50 mcg/act nasal spray    Exposure to COVID-19 virus        exposed early thhis month     Relevant Medications    azithromycin (Zithromax) 250 mg tablet    methylPREDNISolone 4 MG tablet therapy pack    guaiFENesin (MUCINEX) 600 mg 12 hr tablet    fluticasone (FLONASE) 50 mcg/act nasal spray         Disposition:     Patient is fully vaccinated and has received their booster shot  According to CDC guidelines, quarantine is not required after close contact exposure and they were advised to wear a mask for 10 days after the exposure  If patient were to develop symptoms, they should immediately self isolate and call our office for further guidance  Discussed symptom directed medication options with patient  Discussed vitamin D, vitamin C, and/or zinc supplementation with patient  I have spent 20 minutes directly with the patient   Greater than 50% of this time was spent in counseling/coordination of care regarding: diagnostic results, prognosis, risks and benefits of treatment options, instructions for management, patient and family education, importance of treatment compliance, risk factor reductions and impressions  Encounter provider RICK Wilson    Provider located at [facility] [address] [facility]  [address] [place] [address] [address] [place] [address] [phone]    Recent Visits  No visits were found meeting these conditions  Showing recent visits within past 7 days and meeting all other requirements  Today's Visits  Date Type Provider Dept   05/13/22 Telemedicine RICK Wilson [facility] [place] [address] [facility]   Showing today's visits and meeting all other requirements  Future Appointments  No visits were found meeting these conditions  Showing future appointments within next 150 days and meeting all other requirements     This virtual check-in was done via [employer] and patient was informed that this is a secure, HIPAA-compliant platform  She agrees to proceed  Patient agrees to participate in a virtual check in via telephone or video visit instead of presenting to the office to address urgent/immediate medical needs  Patient is aware this is a billable service  After connecting through [facility], the patient was identified by name and date of birth  Agustin Dotson was informed that this was a telemedicine visit and that the exam was being conducted confidentially over secure lines  My office door was closed  No one else was in the room  Agustin Dotson acknowledged consent and understanding of privacy and security of the telemedicine visit  I informed the patient that I have reviewed her record in Epic and presented the opportunity for her to ask any questions regarding the visit today  The patient agreed to participate  Verification of patient location:  Patient is located in the following state in which I hold an active license: [place]    Subjective:   Agustin Dotson is a 61 y o  female who is concerned about COVID-19   Patient's symptoms include fever, fatigue, nasal congestion, sore throat, cough (improved ), chest tightness, myalgias and headache  Patient denies chills, malaise, rhinorrhea, anosmia, loss of taste, shortness of breath, abdominal pain, nausea, vomiting and diarrhea  - Date of symptom onset: 5/3/2022  - Date of exposure: 5/1/2022      COVID-19 vaccination status: Fully vaccinated with booster    Exposure:   Contact with a person who is under investigation (PUI) for or who is positive for COVID-19 within the last 14 days?: Yes    Hospitalized recently for fever and/or lower respiratory symptoms?: No      Currently a healthcare worker that is involved in direct patient care?: No      Lab Results   Component Value Date    SARSCOV2 Negative 03/29/2021     Past Medical History:   Diagnosis Date    Bipolar 1 disorder, depressed ([facility] )     Bipolar 1 disorder, depressed ([facility] ) 1/24/1975    Chronic kidney disease     Depression     H/O bladder infections     Hyperlipidemia     Hypertension     Lichen sclerosus     Microscopic colitis     Skin disorder     lichens sclerosis    Urinary tract infection      Past Surgical History:   Procedure Laterality Date    COLONOSCOPY      2013      Current Outpatient Medications   Medication Sig Dispense Refill    azithromycin (Zithromax) 250 mg tablet Take 2 tablets (500 mg total) by mouth daily for 1 day, THEN 1 tablet (250 mg total) daily for 4 days  6 tablet 0    fluticasone (FLONASE) 50 mcg/act nasal spray 1 spray into each nostril in the morning   18 2 mL 1    guaiFENesin (MUCINEX) 600 mg 12 hr tablet Take 2 tablets (1,200 mg total) by mouth every 12 (twelve) hours 30 tablet 0    methylPREDNISolone 4 MG tablet therapy pack Use as directed on package 21 each 0    atenolol (TENORMIN) 25 mg tablet atenolol 25 mg tabs      atorvastatin (LIPITOR) 20 mg tablet 10 mg        clobetasol (TEMOVATE) 0 05 % cream clobetasol 0 05 % topical cream      clonazePAM (KlonoPIN) 0 5 mg tablet       clotrimazole-betamethasone (LOTRISONE) 1-0 05 % cream as needed    1    cyclobenzaprine (FLEXERIL) 10 mg tablet as needed        estradiol (VAGIFEM, YUVAFEM) 10 MCG TABS vaginal tablet Insert 1 tablet (10 mcg total) into the vagina 3 (three) times a week 12 tablet 12    hydrochlorothiazide (HYDRODIURIL) 25 mg tablet Take 1 tablet (25 mg total) by mouth daily 60 tablet 1    hydrOXYzine HCL (ATARAX) 25 mg tablet Take 1 tablet (25 mg total) by mouth every 8 (eight) hours as needed for anxiety 30 tablet 0    lamoTRIgine (LaMICtal) 25 mg tablet Take 1 tablet (25 mg total) by mouth daily 60 tablet 0    loperamide (IMODIUM A-D) 2 MG tablet Take 2 mg by mouth 4 (four) times a day as needed for diarrhea      mesalamine (APRISO) 0 375 g 24 hr capsule TAKE 2 CAPSULES BY MOUTH EVERY DAY 60 capsule 0    zolpidem (AMBIEN) 10 mg tablet as needed         No current facility-administered medications for this visit  Allergies   Allergen Reactions    Levofloxacin Other (See Comments)    Cephalexin Diarrhea    Nitrofurantoin Fever    Sulfa Antibiotics Fever    Topiramate Other (See Comments)    Bacitracin-Neomycin-Polymyxin Rash    Sulfamethoxazole-Trimethoprim Diarrhea       Review of Systems   Constitutional: Positive for fatigue and fever  Negative for chills  HENT: Positive for congestion and sore throat  Negative for rhinorrhea  Respiratory: Positive for cough (improved ) and chest tightness  Negative for shortness of breath  Gastrointestinal: Negative for abdominal pain, diarrhea, nausea and vomiting  Musculoskeletal: Positive for myalgias  Neurological: Positive for headaches  Objective: There were no vitals filed for this visit  Physical Exam  Nursing note reviewed  Constitutional:       General: She is not in acute distress  Appearance: Normal appearance  She is not ill-appearing  HENT:      Head: Normocephalic  Pulmonary:      Effort: Pulmonary effort is normal  No respiratory distress     Neurological:      Mental Status: She is alert and oriented to person, place, and time  Psychiatric:         Mood and Affect: Mood normal          Behavior: Behavior normal          Thought Content: Thought content normal          Judgment: Judgment normal          VIRTUAL VISIT DISCLAIMER    Carlota Castelan verbally agrees to participate in [employer]  Pt is aware that [employer] could be limited without vital signs or the ability to perform a full hands-on physical Scooter Clark understands she or the provider may request at any time to terminate the video visit and request the patient to seek care or treatment in person

## 2022-05-21 DIAGNOSIS — K58.2 IRRITABLE BOWEL SYNDROME WITH BOTH CONSTIPATION AND DIARRHEA: ICD-10-CM

## 2022-05-21 RX ORDER — MESALAMINE 375 MG/1
CAPSULE, EXTENDED RELEASE ORAL
Qty: 60 CAPSULE | Refills: 0 | Status: SHIPPED | OUTPATIENT
Start: 2022-05-21 | End: 2022-06-20

## 2022-05-24 DIAGNOSIS — R05.9 COUGH: Primary | ICD-10-CM

## 2022-05-24 DIAGNOSIS — R53.83 FATIGUE, UNSPECIFIED TYPE: ICD-10-CM

## 2022-05-24 RX ORDER — ALBUTEROL SULFATE 90 UG/1
2 AEROSOL, METERED RESPIRATORY (INHALATION) EVERY 6 HOURS PRN
Qty: 18 G | Refills: 5 | Status: SHIPPED | OUTPATIENT
Start: 2022-05-24 | End: 2023-07-10 | Stop reason: ALTCHOICE

## 2022-05-24 RX ORDER — MONTELUKAST SODIUM 10 MG/1
10 TABLET ORAL
Qty: 30 TABLET | Refills: 1 | Status: SHIPPED | OUTPATIENT
Start: 2022-05-24 | End: 2022-07-18

## 2022-05-25 ENCOUNTER — APPOINTMENT (OUTPATIENT)
Dept: RADIOLOGY | Facility: CLINIC | Age: 67
End: 2022-05-25
Payer: COMMERCIAL

## 2022-05-25 DIAGNOSIS — R05.9 COUGH: ICD-10-CM

## 2022-05-25 DIAGNOSIS — R53.83 FATIGUE, UNSPECIFIED TYPE: ICD-10-CM

## 2022-05-25 PROCEDURE — 71046 X-RAY EXAM CHEST 2 VIEWS: CPT

## 2022-05-31 ENCOUNTER — OFFICE VISIT (OUTPATIENT)
Dept: UROLOGY | Facility: CLINIC | Age: 67
End: 2022-05-31
Payer: COMMERCIAL

## 2022-05-31 VITALS
HEIGHT: 63 IN | DIASTOLIC BLOOD PRESSURE: 96 MMHG | BODY MASS INDEX: 27.5 KG/M2 | SYSTOLIC BLOOD PRESSURE: 142 MMHG | HEART RATE: 65 BPM | OXYGEN SATURATION: 98 % | WEIGHT: 155.2 LBS

## 2022-05-31 DIAGNOSIS — N39.0 RECURRENT UTI: Primary | ICD-10-CM

## 2022-05-31 DIAGNOSIS — N28.89 DILATED RENAL PELVIS: ICD-10-CM

## 2022-05-31 LAB
POST-VOID RESIDUAL VOLUME, ML POC: 100 ML
SL AMB  POCT GLUCOSE, UA: NORMAL
SL AMB LEUKOCYTE ESTERASE,UA: NORMAL
SL AMB POCT BILIRUBIN,UA: NORMAL
SL AMB POCT BLOOD,UA: NORMAL
SL AMB POCT CLARITY,UA: CLEAR
SL AMB POCT COLOR,UA: YELLOW
SL AMB POCT KETONES,UA: NORMAL
SL AMB POCT NITRITE,UA: NORMAL
SL AMB POCT PH,UA: 7
SL AMB POCT SPECIFIC GRAVITY,UA: 1.01
SL AMB POCT URINE PROTEIN: NORMAL
SL AMB POCT UROBILINOGEN: 0.2

## 2022-05-31 PROCEDURE — 51798 US URINE CAPACITY MEASURE: CPT | Performed by: PHYSICIAN ASSISTANT

## 2022-05-31 PROCEDURE — 99214 OFFICE O/P EST MOD 30 MIN: CPT | Performed by: PHYSICIAN ASSISTANT

## 2022-05-31 PROCEDURE — 81002 URINALYSIS NONAUTO W/O SCOPE: CPT | Performed by: PHYSICIAN ASSISTANT

## 2022-05-31 RX ORDER — BENZONATATE 200 MG/1
CAPSULE ORAL AS NEEDED
COMMUNITY
Start: 2022-05-06 | End: 2022-06-20

## 2022-05-31 NOTE — PROGRESS NOTES
5/31/2022      Chief Complaint   Patient presents with    Follow-up     Assessment and Plan    1  Recurrent UTI symptoms  2  Pelvic pain/bladder pain, resolved  3  Atrophic vaginitis  4  Lichen sclerosus  - 1 positive urine culture on file from 1/26/22 growing E Coli   no positive urine cultures since last office visit  - recommend continuing regimen of topical clobestasol as well as estradiol  - recommend proper hydration, avoidance of constipation, and avoidance of bladder irritants  - she is currently asymptomatic and reports above his provided improvement in her symptoms  If any ongoing issues could consider further evaluation with cystoscopy  5  Mild right pelvocaliectasis  - May be similar to CT 6/19/2019 but increased since ultrasound 9/17/2021, of unclear significance  - Renal function recently worsened, Cr 1 33 and GFR 42  - given worsening renal function and above findings would recommend further evaluating with CT urogram   She is asymptomatic and denies any flank pain  Will obtain BMP prior to scan to check renal function and ensure she is able to receive contrast     - Consider further evaluation with Lasix renal scan if indicated    - will follow-up after completion of CT  History of Present Illness  Jo Ann Rosales is a 61 y o  female here for follow up evaluation of  frequent UTI symptoms  She has not had any positive urine culture since last office visit  She has a total of 1 positive urine culture on file from 01/26/2022 growing E coli  She has history of lichen sclerosis and is managed on estradiol vaginal tablets and clobetasol  She apparently has history of longstanding and lifelong recurrent UTI issues  She reports that approximately 30 years ago she had a workup of what sounds like VCUG or IVP and had prior dilation of urethral stricture  She denies any other urologic procedures  She does report previously being hospitalized for 1 UTI in the past when she was younger    She denies any family history of  malignancy  She is a former smoker  Since last office visit she reports she has overall been doing well with conservative measures  She denies any dysuria of or pelvic/bladder pain  She denies any UTIs since last visit  She denies any flank pain  She is following with nephrologist and has had a decline in her kidney function  Urine dip negative leukocytes, blood, or nitrites  VTV=092 mL     Review of Systems   Constitutional: Negative for chills and fever  Respiratory: Negative for shortness of breath  Cardiovascular: Negative for chest pain  Gastrointestinal: Negative for abdominal pain  Genitourinary: Negative for difficulty urinating, dysuria, flank pain, frequency, hematuria, pelvic pain and urgency  Neurological: Negative for dizziness         Past Medical History  Past Medical History:   Diagnosis Date    Bipolar 1 disorder, depressed ([facility] )     Bipolar 1 disorder, depressed ([place] [address] ) 1[birth year]24/1975    Chronic kidney disease     Depression     H/O bladder infections     Hyperlipidemia     Hypertension     Lichen sclerosus     Microscopic colitis     Skin disorder     lichens sclerosis    Urinary tract infection        Past Social History  Past Surgical History:   Procedure Laterality Date    COLONOSCOPY      2013      Social History     Tobacco Use   Smoking Status Former Smoker   Smokeless Tobacco Never Used   Tobacco Comment    heavy smoker previously       Past Family History  Family History   Problem Relation Age of Onset    Kidney disease Father     No Known Problems Half-Sister     No Known Problems Son        Past Social history  Social History     Socioeconomic History    Marital status: Single     Spouse name: Not on file    Number of children: Not on file    Years of education: Not on file    Highest education level: Not on file   Occupational History    Not on file   Tobacco Use    Smoking status: Former Smoker    Smokeless tobacco: Never Used    Tobacco comment: heavy smoker previously   Vaping Use    Vaping Use: Never used   Substance and Sexual Activity    Alcohol use: Yes     Comment: socially    Drug use: Never    Sexual activity: Not Currently     Partners: Male   Other Topics Concern    Not on file   Social History Narrative    Not on file     Social Determinants of Health     Financial Resource Strain: Not on file   Food Insecurity: Not on file   Transportation Needs: Not on file   Physical Activity: Not on file   Stress: Not on file   Social Connections: Not on file   Intimate Partner Violence: Not on file   Housing Stability: Not on file       Current Medications  Current Outpatient Medications   Medication Sig Dispense Refill    albuterol (Ventolin HFA) 90 mcg/act inhaler Inhale 2 puffs every 6 (six) hours as needed for wheezing 18 g 5    atenolol (TENORMIN) 25 mg tablet atenolol 25 mg tabs      atorvastatin (LIPITOR) 20 mg tablet 10 mg        benzonatate (TESSALON) 200 MG capsule as needed      clobetasol (TEMOVATE) 0 05 % cream clobetasol 0 05 % topical cream      clonazePAM (KlonoPIN) 0 5 mg tablet 2 (two) times a day      clotrimazole-betamethasone (LOTRISONE) 1-0 05 % cream as needed    1    cyclobenzaprine (FLEXERIL) 10 mg tablet as needed        estradiol (VAGIFEM, YUVAFEM) 10 MCG TABS vaginal tablet Insert 1 tablet (10 mcg total) into the vagina 3 (three) times a week 12 tablet 12    hydrochlorothiazide (HYDRODIURIL) 25 mg tablet Take 1 tablet (25 mg total) by mouth daily 60 tablet 1    loperamide (IMODIUM A-D) 2 MG tablet Take 2 mg by mouth daily      mesalamine (APRISO) 0 375 g 24 hr capsule TAKE 2 CAPSULES BY MOUTH EVERY DAY 60 capsule 0    montelukast (SINGULAIR) 10 mg tablet Take 1 tablet (10 mg total) by mouth daily at bedtime 30 tablet 1    zolpidem (AMBIEN) 10 mg tablet as needed        fluticasone (FLONASE) 50 mcg/act nasal spray 1 spray into each nostril in the morning   (Patient not taking: No sig reported) 18 2 mL 1    guaiFENesin (MUCINEX) 600 mg 12 hr tablet Take 2 tablets (1,200 mg total) by mouth every 12 (twelve) hours (Patient not taking: No sig reported) 30 tablet 0    hydrOXYzine HCL (ATARAX) 25 mg tablet Take 1 tablet (25 mg total) by mouth every 8 (eight) hours as needed for anxiety (Patient not taking: No sig reported) 30 tablet 0    lamoTRIgine (LaMICtal) 25 mg tablet Take 1 tablet (25 mg total) by mouth daily (Patient not taking: No sig reported) 60 tablet 0    methylPREDNISolone 4 MG tablet therapy pack Use as directed on package (Patient not taking: No sig reported) 21 each 0     No current facility-administered medications for this visit  Allergies  Allergies   Allergen Reactions    Levofloxacin Other (See Comments)    Cephalexin Diarrhea    Nitrofurantoin Fever    Sulfa Antibiotics Fever    Topiramate Other (See Comments)    Bacitracin-Neomycin-Polymyxin Rash    Sulfamethoxazole-Trimethoprim Diarrhea         The following portions of the patient's history were reviewed and updated as appropriate: allergies, current medications, past medical history, past social history, past surgical history and problem list       Vitals  Vitals:    05/31/22 1009   BP: 142/96   BP Location: Left arm   Patient Position: Sitting   Cuff Size: Adult   Pulse: 65   SpO2: 98%   Weight: 70 4 kg (155 lb 3 2 oz)   Height: 5' 3" (1 6 m)           Physical Exam  Physical Exam  Constitutional:       Appearance: Normal appearance  HENT:      Head: Normocephalic and atraumatic  Right Ear: External ear normal       Left Ear: External ear normal    Eyes:      General: No scleral icterus  Conjunctiva/sclera: Conjunctivae normal    Cardiovascular:      Pulses: Normal pulses  Pulmonary:      Effort: Pulmonary effort is normal    Musculoskeletal:         General: Normal range of motion  Cervical back: Normal range of motion  Neurological:      General: No focal deficit present  Mental Status: She is alert and oriented to person, place, and time  Psychiatric:         Mood and Affect: Mood normal          Behavior: Behavior normal          Thought Content:  Thought content normal          Judgment: Judgment normal            Results  Recent Results (from the past 1 hour(s))   POCT urine dip    Collection Time: 05/31/22 10:20 AM   Result Value Ref Range    LEUKOCYTE ESTERASE,UA -     NITRITE,UA -     SL AMB POCT UROBILINOGEN 0 2     POCT URINE PROTEIN -      PH,UA 7 0     BLOOD,UA -     SPECIFIC GRAVITY,UA 1 010     KETONES,UA -     BILIRUBIN,UA -     GLUCOSE, UA -      COLOR,UA yellow     CLARITY,UA Clear    POCT Measure PVR    Collection Time: 05/31/22 10:22 AM   Result Value Ref Range    POST-VOID RESIDUAL VOLUME, ML  mL   ]  No results found for: PSA  Lab Results   Component Value Date    CALCIUM 9 6 04/26/2022    K 3 5 04/26/2022    CO2 30 04/26/2022     04/26/2022    BUN 22 04/26/2022    CREATININE 1 33 (H) 04/26/2022     Lab Results   Component Value Date    WBC 7 02 04/26/2022    HGB 13 5 04/26/2022    HCT 39 4 04/26/2022    MCV 84 04/26/2022     04/26/2022           Orders  Orders Placed This Encounter   Procedures    POCT urine dip    POCT Measure PVR       Kelechi Kowalski PA-C

## 2022-06-06 ENCOUNTER — OFFICE VISIT (OUTPATIENT)
Dept: PSYCHIATRY | Facility: CLINIC | Age: 67
End: 2022-06-06
Payer: COMMERCIAL

## 2022-06-06 DIAGNOSIS — F31.9 BIPOLAR 1 DISORDER, DEPRESSED (HCC): Primary | ICD-10-CM

## 2022-06-06 PROCEDURE — 99214 OFFICE O/P EST MOD 30 MIN: CPT

## 2022-06-06 NOTE — PSYCH
Regular Visit    Problem List Items Addressed This Visit    None     Visit Diagnoses     Bipolar 1 disorder, depressed ([address] )    -  Primary             Encounter provider RICK Lowry    Provider located at   [address] [address] [phone]    Recent Visits  No visits were found meeting these conditions  Showing recent visits within past 7 days and meeting all other requirements  Today's Visits  Date Type Provider Dept   06/06/22 Office Visit RICK Lowry  [facility]   Showing today's visits and meeting all other requirements  Future Appointments  No visits were found meeting these conditions  Showing future appointments within next 150 days and meeting all other requirements       HPI     Current Outpatient Medications   Medication Sig Dispense Refill    albuterol (Ventolin HFA) 90 mcg/act inhaler Inhale 2 puffs every 6 (six) hours as needed for wheezing 18 g 5    atenolol (TENORMIN) 25 mg tablet atenolol 25 mg tabs      atorvastatin (LIPITOR) 20 mg tablet 10 mg        benzonatate (TESSALON) 200 MG capsule as needed      clobetasol (TEMOVATE) 0 05 % cream clobetasol 0 05 % topical cream      clonazePAM (KlonoPIN) 0 5 mg tablet 2 (two) times a day      clotrimazole-betamethasone (LOTRISONE) 1-0 05 % cream as needed    1    cyclobenzaprine (FLEXERIL) 10 mg tablet as needed        estradiol (VAGIFEM, YUVAFEM) 10 MCG TABS vaginal tablet Insert 1 tablet (10 mcg total) into the vagina 3 (three) times a week 12 tablet 12    fluticasone (FLONASE) 50 mcg/act nasal spray 1 spray into each nostril in the morning   (Patient not taking: No sig reported) 18 2 mL 1    guaiFENesin (MUCINEX) 600 mg 12 hr tablet Take 2 tablets (1,200 mg total) by mouth every 12 (twelve) hours (Patient not taking: No sig reported) 30 tablet 0    hydrochlorothiazide (HYDRODIURIL) 25 mg tablet Take 1 tablet (25 mg total) by mouth daily 60 tablet 1    hydrOXYzine HCL (ATARAX) 25 mg tablet Take 1 tablet (25 mg total) by mouth every 8 (eight) hours as needed for anxiety (Patient not taking: No sig reported) 30 tablet 0    lamoTRIgine (LaMICtal) 25 mg tablet Take 1 tablet (25 mg total) by mouth daily (Patient not taking: No sig reported) 60 tablet 0    loperamide (IMODIUM A-D) 2 MG tablet Take 2 mg by mouth daily      mesalamine (APRISO) 0 375 g 24 hr capsule TAKE 2 CAPSULES BY MOUTH EVERY DAY 60 capsule 0    methylPREDNISolone 4 MG tablet therapy pack Use as directed on package (Patient not taking: No sig reported) 21 each 0    montelukast (SINGULAIR) 10 mg tablet Take 1 tablet (10 mg total) by mouth daily at bedtime 30 tablet 1    zolpidem (AMBIEN) 10 mg tablet as needed         No current facility-administered medications for this visit  Review of Systems        MEDICATION MANAGEMENT NOTE        [facility]    Name and Date of Birth:  Taylor Garibay 61 y o  1955 MRN: [id number]    Date of Visit: June 6, 2022    Allergies   Allergen Reactions    Levofloxacin Other (See Comments)    Cephalexin Diarrhea    Nitrofurantoin Fever    Sulfa Antibiotics Fever    Topiramate Other (See Comments)    Bacitracin-Neomycin-Polymyxin Rash    Sulfamethoxazole-Trimethoprim Diarrhea     SUBJECTIVE:    Nadja Rodriguez is seen today for a follow up for Bipolar Disorder  She continues to do relatively well since the last visit  Patient has slowly weaned off Lamictal and medication has been discontinued completely 2 weeks ago  She wanted to stop the medication due to side effects of chronic mental confusion, inability to write legibly due to being on the medication for over 20 years  Patient states it is too early to tell how she is doing since Lamictal has been discontinued because she was sick with flu-like symptoms the past month    Plans to continue this medication holiday and assess her mother health this coming month as she no longer feels physically sick  She was also worried about her dx of chronic renal failure, has an upcoming appointment with the nephrologist to determine the best course of action and she does not want to start any new medications until she develops a plan with the nephrologist   Is open to an antidepressant if needed in the future  Hx of history of bipolar disorder, denies all manic and elevated mood symptoms, struggling more with the depression symptoms such as  dysphoria, fatigue, hopelessness  Stressors remain the same and include her physical health, feeling guilty about being on disability, and living alone  Appears euthymic during today's encounter and optimistic for the future  She brightens when talking about her hobbies  Continues to remain on the therapy wait list and is looking forward to beginning, she was given additional therapy resources today to initiate therapy as soon as possible  Denies thoughts of self-harm or suicidal ideation  She denies any side effects from medications  PLAN:    Pt discontinued Lamictal as previously discussed, continues to denies all symptoms of elevated mood  Continue Klonopin 0 5 mg daily prescribed and managed by PCP Discussed with patient the risks of sedation, respiratory depression, impairment of ability to drive and potential for abuse and addiction related to treatment with benzodiazepine medications  The patient understands risk of treatment with benzodiazepine medications, agrees to not drive if feels impaired and agrees to take medications as prescribed  Patient was also informed of risks of being on or starting opioid medications due to drug interactions and potential for serious respiratory depression and death  Continue Ambien as needed for insomnia  Continue p r n   Atarax 25 mg as needed for breakthrough anxiety          Aware of 24 hour and weekend coverage for urgent situations accessed by calling [facility] main practice number  Referral for individual psychotherapy    Diagnoses and all orders for this visit:    Bipolar 1 disorder, depressed ([address] )        Current Outpatient Medications on File Prior to Visit   Medication Sig Dispense Refill    albuterol (Ventolin HFA) 90 mcg/act inhaler Inhale 2 puffs every 6 (six) hours as needed for wheezing 18 g 5    atenolol (TENORMIN) 25 mg tablet atenolol 25 mg tabs      atorvastatin (LIPITOR) 20 mg tablet 10 mg        benzonatate (TESSALON) 200 MG capsule as needed      clobetasol (TEMOVATE) 0 05 % cream clobetasol 0 05 % topical cream      clonazePAM (KlonoPIN) 0 5 mg tablet 2 (two) times a day      clotrimazole-betamethasone (LOTRISONE) 1-0 05 % cream as needed    1    cyclobenzaprine (FLEXERIL) 10 mg tablet as needed        estradiol (VAGIFEM, YUVAFEM) 10 MCG TABS vaginal tablet Insert 1 tablet (10 mcg total) into the vagina 3 (three) times a week 12 tablet 12    fluticasone (FLONASE) 50 mcg/act nasal spray 1 spray into each nostril in the morning   (Patient not taking: No sig reported) 18 2 mL 1    guaiFENesin (MUCINEX) 600 mg 12 hr tablet Take 2 tablets (1,200 mg total) by mouth every 12 (twelve) hours (Patient not taking: No sig reported) 30 tablet 0    hydrochlorothiazide (HYDRODIURIL) 25 mg tablet Take 1 tablet (25 mg total) by mouth daily 60 tablet 1    hydrOXYzine HCL (ATARAX) 25 mg tablet Take 1 tablet (25 mg total) by mouth every 8 (eight) hours as needed for anxiety (Patient not taking: No sig reported) 30 tablet 0    lamoTRIgine (LaMICtal) 25 mg tablet Take 1 tablet (25 mg total) by mouth daily (Patient not taking: No sig reported) 60 tablet 0    loperamide (IMODIUM A-D) 2 MG tablet Take 2 mg by mouth daily      mesalamine (APRISO) 0 375 g 24 hr capsule TAKE 2 CAPSULES BY MOUTH EVERY DAY 60 capsule 0    methylPREDNISolone 4 MG tablet therapy pack Use as directed on package (Patient not taking: No sig reported) 21 each 0    montelukast (SINGULAIR) 10 mg tablet Take 1 tablet (10 mg total) by mouth daily at bedtime 30 tablet 1    zolpidem (AMBIEN) 10 mg tablet as needed         No current facility-administered medications on file prior to visit  HPI ROS Appetite Changes and Sleep:     She reports difficulty falling asleep, adequate appetite, adequate energy level   Denies homicidal ideation, denies suicidal ideation    Review Of Systems:      HPI ROS:               Medication Side Effects:  denies    Depression (10 worst): 6/10    Anxiety (10 worst): 7/10    Safety concerns (SI, HI, etc): Denies si and hi    Sleep: Restless during the night    Energy: low    Appetite: fair    Weight Change: denies        Mental Status Evaluation:    Appearance Adequate hygiene and grooming   Behavior calm and cooperative   Mood anxious and depressed  Depression Scale - 6 of 10 (0 = No depression)  Anxiety Scale - 7 of 10 (0 = No anxiety)   Speech Normal rate and volume   Affect appropriate   Thought Processes Goal directed and coherent   Thought Content Does not verbalize delusional material   Associations Tightly connected   Perceptual Disturbances Denies hallucinations and does not appear to be responding to internal stimuli   Risk Potential Suicidal/Homicidal Ideation - No evidence of suicidal or homicidal ideation and patient does not verbalize suicidal or homicidal ideation  Risk of Violence - No evidence of risk for violence found on assessment  Risk of Self Mutilation - No evidence of risk for self mutilation found on assessment   Orientation oriented to person, place, time/date and situation   Memory recent and remote memory grossly intact   Consciousness alert and awake   Attention/Concentration attention span and concentration appear shorter than expected for age   Insight intact   Judgement intact   Muscle Strength and Gait normal muscle strength and normal muscle tone, normal gait/station and normal balance Motor Activity no abnormal movements   Language no difficulty naming common objects, no difficulty repeating a phrase, no difficulty writing a sentence   Fund of Knowledge adequate knowledge of current events  adequate fund of knowledge regarding past history  adequate fund of knowledge regarding vocabulary      Past Psychiatric History Update:     Inpatient Psychiatric Admission Since Last Encounter:   no  Changes to Outpatient Psychiatric Treatment Team:    no  Suicide Attempt Or Self Mutilation Since Last Encounter:   no  Incidence of Violent Behavior Since Last Encounter:   no    Traumatic History Update:     New Onset of Abuse Since Last Encounter:   no  Traumatic Events Since Last Encounter:   no    Past Medical History:    Past Medical History:   Diagnosis Date    Bipolar 1 disorder, depressed ([facility] )     Bipolar 1 disorder, depressed ([facility] ) 1/24/1975    Chronic kidney disease     Depression     H/O bladder infections     Hyperlipidemia     Hypertension     Lichen sclerosus     Microscopic colitis     Skin disorder     lichens sclerosis    Urinary tract infection      No past medical history pertinent negatives    Past Surgical History:   Procedure Laterality Date    COLONOSCOPY      2013      Allergies   Allergen Reactions    Levofloxacin Other (See Comments)    Cephalexin Diarrhea    Nitrofurantoin Fever    Sulfa Antibiotics Fever    Topiramate Other (See Comments)    Bacitracin-Neomycin-Polymyxin Rash    Sulfamethoxazole-Trimethoprim Diarrhea     Substance Abuse History:    Social History     Substance and Sexual Activity   Alcohol Use Yes    Comment: socially     Social History     Substance and Sexual Activity   Drug Use Never     Social History:    Social History     Socioeconomic History    Marital status: Single     Spouse name: Not on file    Number of children: Not on file    Years of education: Not on file    Highest education level: Not on file   Occupational History    Not on file   Tobacco Use    Smoking status: Former Smoker    Smokeless tobacco: Never Used    Tobacco comment: heavy smoker previously   Vaping Use    Vaping Use: Never used   Substance and Sexual Activity    Alcohol use: Yes     Comment: socially    Drug use: Never    Sexual activity: Not Currently     Partners: Male   Other Topics Concern    Not on file   Social History Narrative    Not on file     Social Determinants of Health     Financial Resource Strain: Not on file   Food Insecurity: Not on file   Transportation Needs: Not on file   Physical Activity: Not on file   Stress: Not on file   Social Connections: Not on file   Intimate Partner Violence: Not on file   Housing Stability: Not on file     Family Psychiatric History:     Family History   Problem Relation Age of Onset    Kidney disease Father     No Known Problems Half-Sister     No Known Problems Son      History Review: The following portions of the patient's history were reviewed and updated as appropriate: allergies, current medications, past family history, past medical history, past social history, past surgical history and problem list     OBJECTIVE:     Vital signs in last 24 hours: There were no vitals filed for this visit    Laboratory Results:   Recent Labs (last 2 months):   Office Visit on 05/31/2022   Component Date Value    LEUKOCYTE ESTERASE,UA 05/31/2022 -     NITRITE,UA 05/31/2022 -     SL AMB POCT UROBILINOGEN 05/31/2022 0 2     POCT URINE PROTEIN 05/31/2022 -      PH,UA 05/31/2022 7 0     BLOOD,UA 05/31/2022 -     SPECIFIC GRAVITY,UA 05/31/2022 1 010     KETONES,UA 05/31/2022 -     BILIRUBIN,UA 05/31/2022 -     GLUCOSE, UA 05/31/2022 -      COLOR,UA 05/31/2022 yellow     CLARITY,UA 05/31/2022 Clear     POST-VOID RESIDUAL VOLUM* 05/31/2022 100    Lab on 04/26/2022   Component Date Value    Sodium 04/26/2022 138     Potassium 04/26/2022 3 5     Chloride 04/26/2022 104     CO2 04/26/2022 30     ANION GAP 04/26/2022 4     BUN 04/26/2022 22     Creatinine 04/26/2022 1 33 (A)    Glucose, Fasting 04/26/2022 109 (A)    Calcium 04/26/2022 9 6     AST 04/26/2022 32     ALT 04/26/2022 58     Alkaline Phosphatase 04/26/2022 76     Total Protein 04/26/2022 7 5     Albumin 04/26/2022 4 2     Total Bilirubin 04/26/2022 0 52     eGFR 04/26/2022 42     CRP 04/26/2022 <3 0     WBC 04/26/2022 7 02     RBC 04/26/2022 4 67     Hemoglobin 04/26/2022 13 5     Hematocrit 04/26/2022 39 4     MCV 04/26/2022 84     MCH 04/26/2022 28 9     MCHC 04/26/2022 34 3     RDW 04/26/2022 12 8     MPV 04/26/2022 10 3     Platelets 25/84/4314 265     nRBC 04/26/2022 0     Neutrophils Relative 04/26/2022 46     Immat GRANS % 04/26/2022 0     Lymphocytes Relative 04/26/2022 43     Monocytes Relative 04/26/2022 8     Eosinophils Relative 04/26/2022 2     Basophils Relative 04/26/2022 1     Neutrophils Absolute 04/26/2022 3 32     Immature Grans Absolute 04/26/2022 0 01     Lymphocytes Absolute 04/26/2022 2 98     Monocytes Absolute 04/26/2022 0 55     Eosinophils Absolute 04/26/2022 0 12     Basophils Absolute 04/26/2022 0 04     Phosphorus 04/26/2022 3 9     Creatinine, Ur 04/26/2022 155 0     Protein Urine Random 04/26/2022 14     Prot/Creat Ratio, Ur 04/26/2022 0 09     PTH 04/26/2022 106 3 (A)    Color, UA 04/26/2022 Light Yellow     Clarity, UA 04/26/2022 Clear     Specific Gravity, UA 04/26/2022 1 017     pH, UA 04/26/2022 6 0     Leukocytes, UA 04/26/2022 Negative     Nitrite, UA 04/26/2022 Negative     Protein, UA 04/26/2022 Trace (A)    Glucose, UA 04/26/2022 Negative     Ketones, UA 04/26/2022 Negative     Urobilinogen, UA 04/26/2022 <2 0     Bilirubin, UA 04/26/2022 Negative     Blood, UA 04/26/2022 Negative     Vit D, 25-Hydroxy 04/26/2022 39 1     RBC, UA 04/26/2022 1-2     WBC, UA 04/26/2022 1-2     Epithelial Cells 04/26/2022 Occasional     Bacteria, UA 04/26/2022 None Seen Ancillary Orders on 04/08/2022   Component Date Value    Urine Culture 04/26/2022 40,000-49,000 cfu/ml       I have personally reviewed all pertinent laboratory/tests results  Suicide/Homicide Risk Assessment:    Risk of Harm to Self:  Protective Factors: no current suicidal ideation, access to mental health treatment, being a parent, compliant with medications, compliant with mental health treatment  Based on today's assessment, Viola Huston presents the following risk of harm to self: minimal    Risk of Harm to Others:  Based on today's assessment, Viola Huston presents the following risk of harm to others: none    The following interventions are recommended: referral for psychotherapy    Medications Risks/Benefits:      Risks, Benefits And Possible Side Effects Of Medications:    Discussed risks and benefits of treatment with patient including risks of misuse, abuse or dependence, sedation and respiratory depression related to treatment with benzodiazepine medications     Controlled Medication Discussion:     Viola Huston has been filling controlled prescriptions on time as prescribed according to [address] [place] [address] Drive Monitoring Program  Discussed with Viola Huston the risks of sedation, respiratory depression, impairment of ability to drive and potential for abuse and addiction related to treatment with benzodiazepine medications  She understands risk of treatment with benzodiazepine medications, agrees to not drive if feels impaired and agrees to take medications as prescribed  Discussed with Brittney Cordero warning on concurrent use of benzodiazepines and opioid medications including sedation, respiratory depression, coma and death   She understands the risk of treatment with benzodiazepines in addition to opioids and wants to continue taking those medications  Discussed with Viola Huston increased risk of impairment related to concurrent use of benzodiazepines and hypnotic medications including excessive sedation, psychomotor impairment and respiratory depression  She understands the risk of treatment with benzodiazepines in addition to hypnotic medications and wants to continue taking those medications    Treatment Plan:    Due for update/Updated:   no  Last treatment plan done 4/4/22 by gagandeep martinez  Treatment Plan due on 10/4/22      RICK Boyle 06/06/22

## 2022-06-07 ENCOUNTER — TELEPHONE (OUTPATIENT)
Dept: NEPHROLOGY | Facility: CLINIC | Age: 67
End: 2022-06-07

## 2022-06-07 NOTE — TELEPHONE ENCOUNTER
I called [address] [place] [address] @ [phone] ([employer]) to check eligible for the patient and as of 6/7/2022 the patient has current active coverage as of 6/7/2022   Obdulio Liang,

## 2022-06-13 ENCOUNTER — OFFICE VISIT (OUTPATIENT)
Dept: DERMATOLOGY | Facility: CLINIC | Age: 67
End: 2022-06-13
Payer: COMMERCIAL

## 2022-06-13 VITALS — BODY MASS INDEX: 27.46 KG/M2 | WEIGHT: 155 LBS | HEIGHT: 63 IN

## 2022-06-13 DIAGNOSIS — L90.0 LICHEN SCLEROSUS: ICD-10-CM

## 2022-06-13 DIAGNOSIS — L98.9 UNKNOWN SKIN LESION: Primary | ICD-10-CM

## 2022-06-13 DIAGNOSIS — D22.9 NEVUS: ICD-10-CM

## 2022-06-13 DIAGNOSIS — Z13.89 SCREENING FOR SKIN CONDITION: ICD-10-CM

## 2022-06-13 PROCEDURE — 11104 PUNCH BX SKIN SINGLE LESION: CPT | Performed by: DERMATOLOGY

## 2022-06-13 PROCEDURE — 88313 SPECIAL STAINS GROUP 2: CPT | Performed by: DERMATOLOGY

## 2022-06-13 PROCEDURE — 11103 TANGNTL BX SKIN EA SEP/ADDL: CPT | Performed by: DERMATOLOGY

## 2022-06-13 PROCEDURE — 88305 TISSUE EXAM BY PATHOLOGIST: CPT | Performed by: DERMATOLOGY

## 2022-06-13 PROCEDURE — 99204 OFFICE O/P NEW MOD 45 MIN: CPT | Performed by: DERMATOLOGY

## 2022-06-13 NOTE — PROGRESS NOTES
[facility] DERMATOLOGY  [address] [place] [id number]  [phone]  [phone]     MRN: [id number] : 1955  Encounter: [id number]  Patient Information: Layo Perez  Chief complaint: lichen sclerosis    History of present illness:  82-year-old female presents for concerns regarding history of lichen sclerosis biopsied previously on the vulva over 15 years ago with extensive involvement on the abdomen which has never been confirmed and treated  Also lesion that she noted in the scalp as well numerous pigmented lesions nothing particularly that is changed  Past Medical History:   Diagnosis Date    Bipolar 1 disorder, depressed ([facility] [address] )     Bipolar 1 disorder, depressed ([facility] ) 1975    Chronic kidney disease     Depression     H/O bladder infections     Hyperlipidemia     Hypertension     Lichen sclerosus     Microscopic colitis     Skin disorder     lichens sclerosis    Urinary tract infection      Past Surgical History:   Procedure Laterality Date    COLONOSCOPY            Social History   Social History     Substance and Sexual Activity   Alcohol Use Yes    Comment: socially     Social History     Substance and Sexual Activity   Drug Use Never     Social History     Tobacco Use   Smoking Status Former Smoker   Smokeless Tobacco Never Used   Tobacco Comment    heavy smoker previously     Family History   Problem Relation Age of Onset    Kidney disease Father     No Known Problems Half-Sister     No Known Problems Son      Meds/Allergies   Allergies   Allergen Reactions    Levofloxacin Other (See Comments)    Cephalexin Diarrhea    Nitrofurantoin Fever    Sulfa Antibiotics Fever    Topiramate Other (See Comments)    Bacitracin-Neomycin-Polymyxin Rash    Sulfamethoxazole-Trimethoprim Diarrhea       Meds:  Prior to Admission medications    Medication Sig Start Date End Date Taking?  Authorizing Provider   albuterol (Ventolin HFA) 90 mcg/act inhaler Inhale 2 puffs every 6 (six) hours as needed for wheezing 5/24/22  Yes Ann Glory CRNP   atenolol (TENORMIN) 25 mg tablet atenolol 25 mg tabs   Yes Historical Provider, MD   atorvastatin (LIPITOR) 20 mg tablet 10 mg   5/11/19  Yes Historical Provider, MD   benzonatate (TESSALON) 200 MG capsule as needed 5/6/22  Yes Historical Provider, MD   clobetasol (TEMOVATE) 0 05 % cream clobetasol 0 05 % topical cream   Yes Historical Provider, MD   clonazePAM (KlonoPIN) 0 5 mg tablet 2 (two) times a day 5/22/19  Yes Historical Provider, MD   cyclobenzaprine (FLEXERIL) 10 mg tablet as needed     Yes Historical Provider, MD   hydrochlorothiazide (HYDRODIURIL) 25 mg tablet Take 1 tablet (25 mg total) by mouth daily 3/22/22  Yes Ann Glory CRPHILLIP   loperamide (IMODIUM A-D) 2 MG tablet Take 2 mg by mouth daily   Yes Historical Provider, MD   mesalamine (APRISO) 0 375 g 24 hr capsule TAKE 2 CAPSULES BY MOUTH EVERY DAY 5/21/22  Yes Noy Stapleton PA-C   montelukast (SINGULAIR) 10 mg tablet Take 1 tablet (10 mg total) by mouth daily at bedtime 5/24/22  Yes Ann Glory CRPHILLIP   zolpidem (AMBIEN) 10 mg tablet as needed     Yes Historical Provider, MD   clotrimazole-betamethasone (Vincent [place]) 1-0 05 % cream as needed   6/3/19   Historical Provider, MD   estradiol (VAGIFEM, YUVAFEM) 10 MCG TABS vaginal tablet Insert 1 tablet (10 mcg total) into the vagina 3 (three) times a week 3/14/22 5/31/22  AnaliaRICK Polk   fluticasone (FLONASE) 50 mcg/act nasal spray 1 spray into each nostril in the morning    Patient not taking: No sig reported 5/13/22   Ann Glory, CRNP   guaiFENesin (MUCINEX) 600 mg 12 hr tablet Take 2 tablets (1,200 mg total) by mouth every 12 (twelve) hours  Patient not taking: No sig reported 5/13/22   Ann Glory, CRNP   hydrOXYzine HCL (ATARAX) 25 mg tablet Take 1 tablet (25 mg total) by mouth every 8 (eight) hours as needed for anxiety  Patient not taking: No sig reported 5/2/22 RICK Jhaveri   lamoTRIgine (LaMICtal) 25 mg tablet Take 1 tablet (25 mg total) by mouth daily  Patient not taking: No sig reported 5/2/22   RICK Jhaveri   methylPREDNISolone 4 MG tablet therapy pack Use as directed on package  Patient not taking: No sig reported 5/13/22   RICK Mishra       Subjective:     Review of Systems:    General: negative for - chills, fatigue, fever,  weight gain or weight loss  Psychological: negative for - anxiety, behavioral disorder, concentration difficulties, decreased libido, depression, irritability, memory difficulties, mood swings, sleep disturbances or suicidal ideation  ENT: negative for - hearing difficulties , nasal congestion, nasal discharge, oral lesions, sinus pain, sneezing, sore throat  Allergy and Immunology: negative for - hives, insect bite sensitivity,  Hematological and Lymphatic: negative for - bleeding problems, blood clots,bruising, swollen lymph nodes  Endocrine: negative for - hair pattern changes, hot flashes, malaise/lethargy, mood swings, palpitations, polydipsia/polyuria, skin changes, temperature intolerance or unexpected weight change  Respiratory: negative for - cough, hemoptysis, orthopnea, shortness of breath, or wheezing  Cardiovascular: negative for - chest pain, dyspnea on exertion, edema,  Gastrointestinal: negative for - abdominal pain, nausea/vomiting  Genito-Urinary: negative for - dysuria, incontinence, irregular/heavy menses or urinary frequency/urgency  Musculoskeletal: negative for - gait disturbance, joint pain, joint stiffness, joint swelling, muscle pain, muscular weakness  Dermatological:  As in HPI  Neurological: negative for confusion, dizziness, headaches, impaired coordination/balance, memory loss, numbness/tingling, seizures, speech problems, tremors or weakness       Objective:   Ht 5' 3" (1 6 m)   Wt 70 3 kg (155 lb)   BMI 27 46 kg/m²     Physical Exam:    General Appearance:    Alert, cooperative, no distress   Head:    Normocephalic, without obvious abnormality, atraumatic           Skin:   A full skin exam was performed including scalp, head scalp, eyes, ears, nose, lips, neck, chest, axilla, abdomen, back, buttocks, bilateral upper extremities, bilateral lower extremities, hands, feet, fingers, toes, fingernails, and toenails pearly crusted 13 mm area noted on the left parietal scalp above the ear extensive sclerotic plaques noted on widespread areas the abdomen as shown below numerous pigmented lesions all regular shape color nothing markedly atypical noted on exam          Shave Biopsy Procedure Note    Pre-operative Diagnosis:  Rule out basal cell carcinoma    Plan:  1  Instructed to keep the wound dry and covered for 24 and clean thereafter  2  Warning signs of infection were reviewed  3  Recommended that the patient use OTC acetaminophen as needed for pain  4  Return  Pending results of biopsy(ies)    Locations:  Left parietal scalp    Indications:  Suspicious area    Anesthesia: Lidocaine 1% with epinephrine without added sodium bicarbonate    Procedure Details     Patient informed of the risks (including bleeding and infection) and benefits of the   procedure and Verbal informed consent obtained  The lesion and surrounding area were given a sterile prep using alcohol and draped in the usual sterile fashion  A Blue blade razor was used to obtain a specimen  Hemostasis achieved with aluminum chloride  Petrolatum and a sterile dressing applied  The specimen was sent for pathologic examination  The patient tolerated the procedure(s) well  Complications:  None  Punch Biopsy Procedure Note    Pre-operative Diagnosis:  Lichen sclerosis    Plan:  1  Instructed to keep the wound dry and covered for 24-48h and clean thereafter  2  Warning signs of infection were reviewed  3  Recommended that the patient use acetaminophen as needed for pain     Location(s):  Left abdomen    Indications:  Confirm diagnosis    Anesthesia: Lidocaine 1% with epinephrine without added sodium bicarbonate    Procedure Details     Patient informed of the risks (including bleeding,scaring and infection) and benefits of the procedure explained  Verbal informed consent obtained  The lesion and surrounding area was given a sterile prep using alcohol  The skin was then stretched perpendicular to the skin tension lines and the specimen obtained  using the 4 mm punch with a forceps and iris scissor  Gel foam was used for hemostasis along with a pressure bandage  The specimen was sent for pathologic examination  The patient tolerated the procedure well  Wound care instructions given to patient  Condition:  Stable    Complications:  none  Assessment:     1  Unknown skin lesion     2  Lichen sclerosus  Ambulatory Referral to Dermatology   3  Nevus     4  Screening for skin condition           Plan:   Skin lesion possible basal cell carcinoma will go ahead and see with biopsy shows if it is positive would refer for Mohs surgery  Lichen sclerosis pretty extensive will go ahead and give patient a larger tube of clobetasol to see if this will help because of the extensive areas involved will go ahead and have her treat 1 area on the left abdomen as we discussed no improvement we will consider other options  Nevi reviewed the concept of ABCDE and ugly duckling nothing markedly atypical patient reassured  Screening for Dermatologic Disorders: Nothing else of concern noted on complete exam follow up in 1 year       Janelle Fierro MD  6/13/2022,12:57 PM    Portions of the record may have been created with voice recognition software   Occasional wrong word or "sound a like" substitutions may have occurred due to the inherent limitations of voice recognition software   Read the chart carefully and recognize, using context, where substitutions have occurred

## 2022-06-13 NOTE — PATIENT INSTRUCTIONS
Skin lesion possible basal cell carcinoma will go ahead and see with biopsy shows if it is positive would refer for Mohs surgery  Lichen sclerosis pretty extensive will go ahead and give patient a larger tube of clobetasol to see if this will help because of the extensive areas involved will go ahead and have her treat 1 area on the left abdomen as we discussed no improvement we will consider other options  Nevi reviewed the concept of ABCDE and ugly duckling nothing markedly atypical patient reassured  Screening for Dermatologic Disorders: Nothing else of concern noted on complete exam follow up in 1 year

## 2022-06-14 DIAGNOSIS — C44.41 BASAL CELL CARCINOMA (BCC) OF PARIETAL REGION OF SCALP: Primary | ICD-10-CM

## 2022-06-15 ENCOUNTER — OFFICE VISIT (OUTPATIENT)
Dept: GASTROENTEROLOGY | Facility: CLINIC | Age: 67
End: 2022-06-15
Payer: COMMERCIAL

## 2022-06-15 ENCOUNTER — TELEPHONE (OUTPATIENT)
Dept: DERMATOLOGY | Facility: CLINIC | Age: 67
End: 2022-06-15

## 2022-06-15 DIAGNOSIS — N18.9 CHRONIC KIDNEY DISEASE, UNSPECIFIED CKD STAGE: ICD-10-CM

## 2022-06-15 DIAGNOSIS — K52.839 MICROSCOPIC COLITIS, UNSPECIFIED MICROSCOPIC COLITIS TYPE: Primary | ICD-10-CM

## 2022-06-15 PROCEDURE — 99213 OFFICE O/P EST LOW 20 MIN: CPT | Performed by: PHYSICIAN ASSISTANT

## 2022-06-15 NOTE — PROGRESS NOTES
[facility] - Outpatient Follow-up Note  Shazia Cisneros 61 y o  female MRN: [id number]  Encounter: [id number]          ASSESSMENT AND PLAN:      1  Microscopic colitis, unspecified microscopic colitis type  She is better on Mesalamine 2 pills daily  She wants to come off due to concerns about her CKD which is reasonable  Will drop to one pill daily for 3 weeks then stop if doing well    ______________________________________________________________________    SUBJECTIVE:  79-year-old female with a history of microscopic colitis presents for follow-up  Overall she is doing very well  She remains on mesalamine 2 pills daily  She has only very intermittent episodes of diarrhea  She denies any severe abdominal pain and has had no rectal bleeding  He she continues to struggle with her diagnosis of chronic kidney disease  She is also struggling because she was diagnosed with a basal cell carcinoma on her scalp which she has to have surgically removed  She is being tested for various rheumatological issues  She successfully came off her Lamictal completely but is still struggling to cope with this  Because of all of these things she is under a great deal of stress  REVIEW OF SYSTEMS IS OTHERWISE NEGATIVE        Historical Information   Past Medical History:   Diagnosis Date    [facility] ([facility] )     Bipolar 1 disorder, depressed ([facility] ) 1/24/1975    Chronic kidney disease     Depression     H/O bladder infections     Hyperlipidemia     Hypertension     Lichen sclerosus     Microscopic colitis     Skin disorder     lichens sclerosis    Urinary tract infection      Past Surgical History:   Procedure Laterality Date    COLONOSCOPY      2013      Social History   Social History     Substance and Sexual Activity   Alcohol Use Yes    Comment: socially     Social History     Substance and Sexual Activity   Drug Use Never     Social History     Tobacco Use Smoking Status Former Smoker   Smokeless Tobacco Never Used   Tobacco Comment    heavy smoker previously     Family History   Problem Relation Age of Onset    Kidney disease Father     No Known Problems Half-Sister     No Known Problems Son        Meds/Allergies       Current Outpatient Medications:     albuterol (Ventolin HFA) 90 mcg/act inhaler    atenolol (TENORMIN) 25 mg tablet    atorvastatin (LIPITOR) 20 mg tablet    benzonatate (TESSALON) 200 MG capsule    clobetasol (TEMOVATE) 0 05 % cream    clonazePAM (KlonoPIN) 0 5 mg tablet    clotrimazole-betamethasone (LOTRISONE) 1-0 05 % cream    cyclobenzaprine (FLEXERIL) 10 mg tablet    estradiol (VAGIFEM, YUVAFEM) 10 MCG TABS vaginal tablet    fluticasone (FLONASE) 50 mcg/act nasal spray    guaiFENesin (MUCINEX) 600 mg 12 hr tablet    hydrochlorothiazide (HYDRODIURIL) 25 mg tablet    hydrOXYzine HCL (ATARAX) 25 mg tablet    loperamide (IMODIUM A-D) 2 MG tablet    mesalamine (APRISO) 0 375 g 24 hr capsule    methylPREDNISolone 4 MG tablet therapy pack    montelukast (SINGULAIR) 10 mg tablet    zolpidem (AMBIEN) 10 mg tablet    Allergies   Allergen Reactions    Levofloxacin Other (See Comments)    Cephalexin Diarrhea    Nitrofurantoin Fever    Sulfa Antibiotics Fever    Topiramate Other (See Comments)    Bacitracin-Neomycin-Polymyxin Rash    Sulfamethoxazole-Trimethoprim Diarrhea           Objective     not currently breastfeeding  There is no height or weight on file to calculate BMI  PHYSICAL EXAM:      General Appearance:   Alert, cooperative, no distress   HEENT:   Normocephalic, atraumatic, anicteric      Neck:  Supple, symmetrical, trachea midline   Lungs:   Clear to auscultation bilaterally; no rales, rhonchi or wheezing; respirations unlabored    Heart[de-identified]   Regular rate and rhythm; no murmur, rub, or gallop     Abdomen:   Soft, non-tender, non-distended; normal bowel sounds; no masses, no organomegaly    Genitalia:   Deferred  Rectal:   Deferred    Extremities:  No cyanosis, clubbing or edema    Pulses:  2+ and symmetric    Skin:  No jaundice, rashes, or lesions    Lymph nodes:  No palpable cervical lymphadenopathy        Lab Results:   No visits with results within 1 Day(s) from this visit  Latest known visit with results is:   Office Visit on 06/13/2022   Component Date Value    Case Report 06/13/2022                      Value:Surgical Pathology Report                         Case: [id number]                                   Authorizing Provider:  Pina Oviedo MD          Collected:           06/13/2022 1313              Ordering Location:     [facility]           Received:            06/13/2022 [place] [address] Dermatology                                                           Pathologist:           Nguyen Borrego MD                                                       Specimens:   A) - Skin, Other, specimen a : left parietal scalp                                                  B) - Skin, Other, specimen b: left abdomen                                                 Final Diagnosis 06/13/2022                      Value: This result contains rich text formatting which cannot be displayed here   Additional Information 06/13/2022                      Value: This result contains rich text formatting which cannot be displayed here  Emaline Folds Gross Description 06/13/2022                      Value: This result contains rich text formatting which cannot be displayed here      Clinical Information 06/13/2022                      Value:Specimen a: left parietal scalp; skin; shave biopsy; 61year old female with a suspicious area; differential rule out basal cell carcinoma  Specimen b: left abdomen; skin; punch biopsy; 61year old female with a confirm diagnosis; differential lichen sclerosis    Attention; derm path         Radiology Results:   XR chest pa & lateral    Result Date: 5/27/2022  Narrative: CHEST INDICATION:   R05 9: Cough, unspecified R53 83: Other fatigue  COMPARISON:  Abdomen CT from 6/19/2019  EXAM PERFORMED/VIEWS:  XR CHEST PA & LATERAL FINDINGS: Cardiomediastinal silhouette appears unremarkable  The lungs are clear  No pneumothorax or pleural effusion  Osseous structures appear within normal limits for patient age  Impression: No acute cardiopulmonary disease  Workstation performed: [id number]   Answers for HPI/ROS submitted by the patient on 6/12/2022  Chronicity: chronic  Onset: more than 1 year ago  Onset quality: sudden  Frequency: intermittently  Episode duration: 5 hours  Progression since onset: resolved  Pain location: generalized abdominal region  Pain - numeric: 10/10  Pain quality: a sensation of fullness  anorexia: No  arthralgias: Yes  belching: Yes  constipation: Yes  diarrhea: Yes  dysuria: No  fever: No  flatus: No  frequency: Yes  headaches: Yes  hematochezia: No  hematuria: No  melena: No  myalgias: No  nausea:  No  weight loss: No  vomiting: No  Aggravated by: nothing  Relieved by: nothing  Diagnostic workup: ultrasound

## 2022-06-15 NOTE — TELEPHONE ENCOUNTER
Pre- operative Mohs Telephone Scheduling Note    Do you have a pacemaker or defibrillator? no    Do you take antibiotics before skin or dental procedures? no  If yes, will likely require pre-operative antibiotics  Ask  the patient why they take the antibiotics (usually because of joint replacement)  Do you have a history of a joint replacements within the past 2 years? no   If yes, will likely require pre-operative antibiotics  Ask if orthopaedic surgeon has prescribed pre-operative antibiotics to take before procedures/dental work? Do you take any OTC medications that thin your blood (Aspirin, Aleve, Ibuprofen) or supplements that thin your blood (fish oil, garlic, vitamin E, Ginko Biloba)? no    Do you take any prescribed medications that thin your blood (Coumadin, Plavix, Xarelto, Eliquis or another prescribed blood thinner)? no    Do you have an allergy to lidocaine or epinephrine? no    Do you have an allergy to shellfish? no    Do you smoke? no      If yes,  patient to try and stop 2 days before surgery and 7 days after the surgery  Minimizing smoking as much as possible during this time will improve healing and the cosmetic result after surgery  Date scheduled: 06/28/2022 with Dr Wily Cardenas @ 11:00 am [facility]  Left parietal scalp     Coordination of Care with other provider (Oculoplastics, Plastics, ENT) required? no   IF YES, PLEASE FORWARD TO APPROPRIATE PERSONNEL TO HELP COORDINATE  Are there remaining tumors to be scheduled? no    Was Prior Authorization obtained?  No (please use  [email or web address] to document prior auth)

## 2022-06-15 NOTE — TELEPHONE ENCOUNTER
Telephone call to patient, In regards scheduling MOHS  Left voice message to patient to please return my call at there earliest convenience

## 2022-06-20 ENCOUNTER — OFFICE VISIT (OUTPATIENT)
Dept: NEPHROLOGY | Facility: CLINIC | Age: 67
End: 2022-06-20
Payer: COMMERCIAL

## 2022-06-20 VITALS
HEIGHT: 63 IN | TEMPERATURE: 97.6 F | DIASTOLIC BLOOD PRESSURE: 80 MMHG | HEART RATE: 68 BPM | OXYGEN SATURATION: 100 % | WEIGHT: 155.6 LBS | SYSTOLIC BLOOD PRESSURE: 130 MMHG | BODY MASS INDEX: 27.57 KG/M2 | RESPIRATION RATE: 16 BRPM

## 2022-06-20 DIAGNOSIS — K58.2 IRRITABLE BOWEL SYNDROME WITH BOTH CONSTIPATION AND DIARRHEA: ICD-10-CM

## 2022-06-20 DIAGNOSIS — N18.31 CHRONIC RENAL FAILURE, STAGE 3A (HCC): Primary | ICD-10-CM

## 2022-06-20 DIAGNOSIS — M18.0 OSTEOARTHRITIS OF CARPOMETACARPAL (CMC) JOINT OF BOTH THUMBS: ICD-10-CM

## 2022-06-20 DIAGNOSIS — I10 PRIMARY HYPERTENSION: ICD-10-CM

## 2022-06-20 PROBLEM — Z94.0 KIDNEY TRANSPLANT STATUS: Status: ACTIVE | Noted: 2022-06-20

## 2022-06-20 PROBLEM — N17.9 AKI (ACUTE KIDNEY INJURY) (HCC): Status: ACTIVE | Noted: 2022-06-20

## 2022-06-20 PROCEDURE — 99214 OFFICE O/P EST MOD 30 MIN: CPT | Performed by: INTERNAL MEDICINE

## 2022-06-20 RX ORDER — MESALAMINE 375 MG/1
CAPSULE, EXTENDED RELEASE ORAL
Qty: 60 CAPSULE | Refills: 0 | Status: SHIPPED | OUTPATIENT
Start: 2022-06-20 | End: 2022-07-19

## 2022-06-20 RX ORDER — MELATONIN
1000 DAILY
COMMUNITY

## 2022-06-20 NOTE — ASSESSMENT & PLAN NOTE
May be related to volume depletion because of intercurrent illness  Patient is back on hydrochlorothiazide 25 mg because of blood pressure control    Advised to reduce hydrochlorothiazide to 12 5 and we will continue to monitor

## 2022-06-20 NOTE — PROGRESS NOTES
NEPHROLOGY OFFICE FOLLOW UP  Sherly Donahue 61 y o  female MRN: [id number]    Encounter: [id number] 6/20/2022    REASON FOR VISIT: Sherly Donahue is a 61 y o  female who is here on 6/20/2022 for Chronic Kidney Disease and Follow-up    HPI:    Rick Houser came into regular follow-up recurrent chronic renal failure  [de-identified] woman with history of hypertension who also to microscopic colitis    Since I saw her last she has some sort of viral syndrome  She is feeling much better now  Quite worried and anxious about CKD as does runs in the family     Denies any acute complaint except some back pain    She is also being closely monitored by urologist      REVIEW OF SYSTEMS:    Review of Systems   Constitutional: Negative for activity change and fatigue  HENT: Negative for congestion and ear discharge  Eyes: Negative for photophobia and pain  Respiratory: Negative for apnea and choking  Cardiovascular: Negative for chest pain and palpitations  Gastrointestinal: Negative for abdominal distention and blood in stool  Endocrine: Negative for heat intolerance and polyphagia  Genitourinary: Negative for flank pain and urgency  Musculoskeletal: Negative for neck pain and neck stiffness  Skin: Negative for color change and wound  Allergic/Immunologic: Negative for food allergies and immunocompromised state  Neurological: Negative for seizures and facial asymmetry  Hematological: Negative for adenopathy  Does not bruise/bleed easily  Psychiatric/Behavioral: Negative for self-injury and suicidal ideas           PAST MEDICAL HISTORY:  Past Medical History:   Diagnosis Date    [facility]     Bipolar 1 disorder, depressed ([facility] ) 1/24/1975    Chronic kidney disease     Depression     H/O bladder infections     Hyperlipidemia     Hypertension     Lichen sclerosus     Microscopic colitis     Skin disorder     lichens sclerosis    Urinary tract infection        PAST SURGICAL HISTORY:  Past Surgical History:   Procedure Laterality Date    COLONOSCOPY      2013        SOCIAL HISTORY:  Social History     Substance and Sexual Activity   Alcohol Use Yes    Comment: socially     Social History     Substance and Sexual Activity   Drug Use Never     Social History     Tobacco Use   Smoking Status Former Smoker   Smokeless Tobacco Never Used   Tobacco Comment    heavy smoker previously       FAMILY HISTORY:  Family History   Problem Relation Age of Onset    Kidney disease Father     No Known Problems Half-Sister     No Known Problems Son        MEDICATIONS:    Current Outpatient Medications:     albuterol (Ventolin HFA) 90 mcg/act inhaler, Inhale 2 puffs every 6 (six) hours as needed for wheezing, Disp: 18 g, Rfl: 5    atenolol (TENORMIN) 25 mg tablet, atenolol 25 mg tabs, Disp: , Rfl:     atorvastatin (LIPITOR) 20 mg tablet, 10 mg  , Disp: , Rfl:     cholecalciferol (VITAMIN D3) 1,000 units tablet, Take 1,000 Units by mouth daily, Disp: , Rfl:     clonazePAM (KlonoPIN) 0 5 mg tablet, 2 (two) times a day, Disp: , Rfl:     cyclobenzaprine (FLEXERIL) 10 mg tablet, as needed  , Disp: , Rfl:     estradiol (VAGIFEM, YUVAFEM) 10 MCG TABS vaginal tablet, Insert 1 tablet (10 mcg total) into the vagina 3 (three) times a week, Disp: 12 tablet, Rfl: 12    hydrochlorothiazide (HYDRODIURIL) 25 mg tablet, Take 1 tablet (25 mg total) by mouth daily (Patient taking differently: Take 12 5 mg by mouth daily), Disp: 60 tablet, Rfl: 1    loperamide (IMODIUM A-D) 2 MG tablet, Take 2 mg by mouth daily, Disp: , Rfl:     mesalamine (APRISO) 0 375 g 24 hr capsule, TAKE 2 CAPSULES BY MOUTH EVERY DAY (Patient taking differently: Take 375 mg by mouth 1 (one) time 1 cap daily), Disp: 60 capsule, Rfl: 0    montelukast (SINGULAIR) 10 mg tablet, Take 1 tablet (10 mg total) by mouth daily at bedtime, Disp: 30 tablet, Rfl: 1    zolpidem (AMBIEN) 10 mg tablet, as needed  , Disp: , Rfl:     clobetasol (TEMOVATE) 0 05 % cream, clobetasol 0 05 % topical cream (Patient not taking: No sig reported), Disp: , Rfl:     clotrimazole-betamethasone (LOTRISONE) 1-0 05 % cream, as needed   (Patient not taking: No sig reported), Disp: , Rfl: 1    fluticasone (FLONASE) 50 mcg/act nasal spray, 1 spray into each nostril in the morning  (Patient not taking: No sig reported), Disp: 18 2 mL, Rfl: 1    guaiFENesin (MUCINEX) 600 mg 12 hr tablet, Take 2 tablets (1,200 mg total) by mouth every 12 (twelve) hours (Patient not taking: No sig reported), Disp: 30 tablet, Rfl: 0    hydrOXYzine HCL (ATARAX) 25 mg tablet, Take 1 tablet (25 mg total) by mouth every 8 (eight) hours as needed for anxiety (Patient not taking: No sig reported), Disp: 30 tablet, Rfl: 0    methylPREDNISolone 4 MG tablet therapy pack, Use as directed on package (Patient not taking: No sig reported), Disp: 21 each, Rfl: 0    PHYSICAL EXAM:  Vitals:    06/20/22 1438   BP: 130/80   BP Location: Right arm   Patient Position: Sitting   Pulse: 68   Resp: 16   Temp: 97 6 °F (36 4 °C)   TempSrc: Temporal   SpO2: 100%   Weight: 70 6 kg (155 lb 9 6 oz)   Height: 5' 3" (1 6 m)     Body mass index is 27 56 kg/m²  Physical Exam  Constitutional:       General: She is not in acute distress  Appearance: She is well-developed  HENT:      Head: Normocephalic  Eyes:      General: No scleral icterus  Conjunctiva/sclera: Conjunctivae normal    Neck:      Vascular: No JVD  Cardiovascular:      Rate and Rhythm: Normal rate  Heart sounds: Normal heart sounds  Pulmonary:      Effort: Pulmonary effort is normal       Breath sounds: No wheezing  Abdominal:      Palpations: Abdomen is soft  Tenderness: There is no abdominal tenderness  Musculoskeletal:         General: Normal range of motion  Cervical back: Neck supple  Skin:     General: Skin is warm  Findings: No rash     Neurological:      Mental Status: She is alert and oriented to person, place, and time    Psychiatric:         Behavior: Behavior normal          LAB RESULTS:  Results for orders placed or performed in visit on 06/13/22   Tissue Exam   Result Value Ref Range    Case Report       Surgical Pathology Report                         Case: [id number]                                   Authorizing Provider:  Singh Walls MD          Collected:           06/13/2022 1313              Ordering Location:     [facility]           Received[facility]            [facility]                                                           Pathologist:           Heahter Srivastava MD                                                       Specimens:   A) - Skin, Other, specimen a : left parietal scalp                                                  B) - Skin, Other, specimen b: left abdomen                                                 Final Diagnosis       A  Skin, left parietal scalp, shave biopsy:  BASAL CELL CARCINOMA, NODULAR AND MICRONODULAR TYPES; transected at the base of the biopsy and present at peripheral biopsy margins  B  Skin, left abdomen, punch biopsy:  FINDINGS CONSISTENT WITH LICHEN SCLEROSUS ET ATROPHICUS  (See note)    Note: A PASF was reviewed and is negative for fungal elements  Additional Information       All reported additional testing was performed with appropriately reactive controls  These tests were developed and their performance characteristics determined by [facility] or appropriate performing facility, though some tests may be performed on tissues which have not been validated for performance characteristics (such as staining performed on alcohol exposed cell blocks and decalcified tissues)  Results should be interpreted with caution and in the context of the patients clinical condition   These tests may not be cleared or approved by the [place]  Food and Drug Administration, though the FDA has determined that such clearance or approval is not necessary  These tests are used for clinical purposes and they should not be regarded as investigational or for research  This laboratory has been approved by [place], designated as a high-complexity laboratory and is qualified to perform these tests  Vineet Gupta Description          A  The specimen is received in formalin, labeled with the patient's name and hospital number, and is designated "left parietal scalp"  The specimen consists of a 0 8 x 0 6 x 0 1 cm shave biopsy of tan-white skin  The epithelial surface exhibits a brown keratotic patch abutting the closest peripheral margin measuring 0 4 x 0 3 cm in greatest dimension  The epithelial surface is inked red and the margin of resection is inked green  The specimen is bisected revealing grossly unremarkable cut surfaces  The specimen is entirely submitted between sponges, 1 cassette  B  The specimen is received in formalin, labeled with the patient's name and hospital number, and is designated "left abdomen"  The specimen consists of a 0 4 x 0 4 cm punch biopsy of tan-white skin excised to a depth of 0 3 cm  The epithelial surface appears keratotic and is inked red and the margin of resection is inked green  The specimen is bisected revealing grossly unremarkable cut surfaces  The specimen is entirely submitted between sponges, 1 cassette  Note: The estimated total formalin fixation time based upon information provided by the submitting clinician and the standard processing schedule is under 72 hours    Bharat García          Clinical Information       Specimen a: left parietal scalp; skin; shave biopsy; 61year old female with a suspicious area; differential rule out basal cell carcinoma  Specimen b: left abdomen; skin; punch biopsy; 61year old female with a confirm diagnosis; differential lichen sclerosis    Attention; derm path       ASSESSMENT and PLAN:      NANDA (acute kidney injury) ([address] )  May be related to volume depletion because of intercurrent illness  Patient is back on hydrochlorothiazide 25 mg because of blood pressure control  Advised to reduce hydrochlorothiazide to 12 5 and we will continue to monitor    Chronic renal failure  Lab Results   Component Value Date    EGFR 42 04/26/2022    EGFR 56 01/18/2022    CREATININE 1 33 (H) 04/26/2022    CREATININE 1 05 01/18/2022   Does seems to have stage III CKD likely because of hypertension  Asymptomatic and progress notes I discussed with her  Advised high distended and avoiding nephrotoxic medicine and we will continue to monitor    Primary hypertension  Reasonable control provided with hydrochlorothiazide 12 5 to avoid any nephrotoxicity advised to monitor blood pressure at home        Everything discussed at length with the patient  Discussed with the  too  As I mentioned before we would reduce hydrochlorothiazide 12 5 for blood pressure management  Patient will be getting CT with contrast for the anatomic abnormal in kidney  We will like to avoid any contrast in view of CKD and possible acute kidney injury  We will discuss with the urologist   She will get BMP before contrast and she will get further blood work before I see her      Portions of the record may have been created with voice recognition software  Occasional wrong word or "sound a like" substitutions may have occurred due to the inherent limitations of voice recognition software  Read the chart carefully and recognize, using context, where substitutions have occurred  If you have any questions, please contact the dictating provider

## 2022-06-20 NOTE — ASSESSMENT & PLAN NOTE
Lab Results   Component Value Date    EGFR 42 04/26/2022    EGFR 56 01/18/2022    CREATININE 1 33 (H) 04/26/2022    CREATININE 1 05 01/18/2022   Does seems to have stage III CKD likely because of hypertension  Asymptomatic and progress notes I discussed with her    Advised high distended and avoiding nephrotoxic medicine and we will continue to monitor

## 2022-06-20 NOTE — ASSESSMENT & PLAN NOTE
Reasonable control provided with hydrochlorothiazide 12 5 to avoid any nephrotoxicity advised to monitor blood pressure at home

## 2022-06-21 ENCOUNTER — OFFICE VISIT (OUTPATIENT)
Dept: NEUROLOGY | Facility: CLINIC | Age: 67
End: 2022-06-21
Payer: COMMERCIAL

## 2022-06-21 VITALS
BODY MASS INDEX: 27.56 KG/M2 | DIASTOLIC BLOOD PRESSURE: 70 MMHG | HEIGHT: 63 IN | OXYGEN SATURATION: 98 % | SYSTOLIC BLOOD PRESSURE: 120 MMHG | HEART RATE: 51 BPM

## 2022-06-21 DIAGNOSIS — I10 PRIMARY HYPERTENSION: ICD-10-CM

## 2022-06-21 DIAGNOSIS — R41.3 MEMORY DIFFICULTIES: ICD-10-CM

## 2022-06-21 PROCEDURE — 99244 OFF/OP CNSLTJ NEW/EST MOD 40: CPT | Performed by: PSYCHIATRY & NEUROLOGY

## 2022-06-21 RX ORDER — HYDROCHLOROTHIAZIDE 12.5 MG/1
12.5 TABLET ORAL DAILY
Qty: 60 TABLET | Refills: 2 | Status: SHIPPED | OUTPATIENT
Start: 2022-06-21

## 2022-06-21 NOTE — PROGRESS NOTES
Bala Marquez is a 61 y o  female  Chief Complaint   Patient presents with    Memory difficulties       Assessment:  1  Memory difficulties        Plan:  MRI of the brain with neuro quant  Blood work  Referral to neuropsych  Follow-up in 3 months  Discussion:  Differential diagnosis discussed with the patient, I do not think patient has a true cognitive impairment but given her symptoms will do an MRI of the brain with neuro quant and also would recommend blood work, patient to call me after the above test to discuss the results, she was advised mentally stimulating exercises and to keep her blood pressure cholesterol and sugar under control, I have advised her to discuss with her family physician and with her psychiatrist regarding her difficulty in attention and concentration, to take safety precautions and to go to the hospital if has any worsening symptoms and call me otherwise to see me back in 3 months and follow up with other physicians  Subjective:    HPI   Patient is here for evaluation of difficulty in attention and concentration and memory, he has been having the symptoms for the last several months she tells me that she was on Lamictal Wellbutrin and Klonopin for almost 20 years for her mood disorder and recently she has gone off of the medications and has been having more difficulty with attention and concentration and focusing, she is able to do her ADLs she has slight difficulty with managing her finances she lives by herself and has a son who is not living at home she is currently retired does not have any suicidal or homicidal thoughts, she is able to drive without any issues and is able to cook, she is under lot of stress as she was diagnosed with kidney disease, no other complaints      Vitals:    06/21/22 1249   BP: 120/70   BP Location: Right arm   Patient Position: Sitting   Cuff Size: Adult   Pulse: (!) 51   SpO2: 98%   Height: 5' 3" (1 6 m)       Current Medications    Current Outpatient Medications:     albuterol (Ventolin HFA) 90 mcg/act inhaler, Inhale 2 puffs every 6 (six) hours as needed for wheezing, Disp: 18 g, Rfl: 5    atenolol (TENORMIN) 25 mg tablet, atenolol 25 mg tabs, Disp: , Rfl:     atorvastatin (LIPITOR) 20 mg tablet, 10 mg  , Disp: , Rfl:     cholecalciferol (VITAMIN D3) 1,000 units tablet, Take 1,000 Units by mouth daily, Disp: , Rfl:     clobetasol (TEMOVATE) 0 05 % cream, clobetasol 0 05 % topical cream, Disp: , Rfl:     clonazePAM (KlonoPIN) 0 5 mg tablet, 2 (two) times a day, Disp: , Rfl:     clotrimazole-betamethasone (LOTRISONE) 1-0 05 % cream, as needed, Disp: , Rfl: 1    cyclobenzaprine (FLEXERIL) 10 mg tablet, as needed  , Disp: , Rfl:     fluticasone (FLONASE) 50 mcg/act nasal spray, 1 spray into each nostril in the morning , Disp: 18 2 mL, Rfl: 1    hydrochlorothiazide (HYDRODIURIL) 12 5 mg tablet, Take 1 tablet (12 5 mg total) by mouth daily, Disp: 60 tablet, Rfl: 2    loperamide (IMODIUM A-D) 2 MG tablet, Take 2 mg by mouth daily, Disp: , Rfl:     mesalamine (APRISO) 0 375 g 24 hr capsule, TAKE 2 CAPSULES BY MOUTH EVERY DAY (Patient taking differently: Take 375 mg by mouth 1 (one) time 1 cap daily), Disp: 60 capsule, Rfl: 0    montelukast (SINGULAIR) 10 mg tablet, Take 1 tablet (10 mg total) by mouth daily at bedtime, Disp: 30 tablet, Rfl: 1    zolpidem (AMBIEN) 10 mg tablet, as needed  , Disp: , Rfl:     estradiol (VAGIFEM, YUVAFEM) 10 MCG TABS vaginal tablet, Insert 1 tablet (10 mcg total) into the vagina 3 (three) times a week, Disp: 12 tablet, Rfl: 12    guaiFENesin (MUCINEX) 600 mg 12 hr tablet, Take 2 tablets (1,200 mg total) by mouth every 12 (twelve) hours (Patient not taking: No sig reported), Disp: 30 tablet, Rfl: 0    hydrOXYzine HCL (ATARAX) 25 mg tablet, Take 1 tablet (25 mg total) by mouth every 8 (eight) hours as needed for anxiety (Patient not taking: No sig reported), Disp: 30 tablet, Rfl: 0    methylPREDNISolone 4 MG tablet therapy pack, Use as directed on package (Patient not taking: Reported on 6/21/2022), Disp: 21 each, Rfl: 0      Allergies  Levofloxacin, Cephalexin, Nitrofurantoin, Sulfa antibiotics, Topiramate, Bacitracin-neomycin-polymyxin, and Sulfamethoxazole-trimethoprim    Past Medical History  Past Medical History:   Diagnosis Date    [facility] )     Bipolar 1 disorder, depressed ([facility] ) 1/24/1975    Chronic kidney disease     Depression     H/O bladder infections     Hyperlipidemia     Hypertension     Lichen sclerosus     Microscopic colitis     Skin disorder     lichens sclerosis    Urinary tract infection          Past Surgical History:  Past Surgical History:   Procedure Laterality Date    COLONOSCOPY      2013          Family History:  Family History   Problem Relation Age of Onset    Kidney disease Father     No Known Problems Half-Sister     No Known Problems Son        Social History:   reports that she has quit smoking  She has never used smokeless tobacco  She reports current alcohol use  She reports that she does not use drugs  I have reviewed the past medical history, surgical history, social and family history, current medications, allergies vitals, review of systems, and updated this information as appropriate today  Objective:    Physical Exam    Neurological Exam    GENERAL:  Cooperative in no acute distress  Well-developed and well-nourished    HEAD and NECK   Head is atraumatic normocephalic with no lesions or masses  Neck is supple with full range of motion    CARDIOVASCULAR  Carotid Arteries-no carotid bruits  NEUROLOGIC:  Mental Status-the patient is awake alert and oriented without aphasia or apraxia, Dale is 25/30  Cranial Nerves: Visual fields are full to confrontation  Extraocular movements are full without nystagmus  Pupils are 2-1/2 mm and reactive  Face is symmetrical to light touch  Movements of facial expression move symmetrically   Hearing is normal to finger rub bilaterally  Soft palate lifts symmetrically  Shoulder shrug is symmetrical  Tongue is midline without atrophy  Motor: No drift is noted on arm extension  Strength is full in the upper and lower extremities with normal bulk and tone  Sensory: Intact to temperature and vibratory sensation in the upper and lower extremities bilaterally  Cortical function is intact  Coordination: Finger to nose testing is performed accurately  Romberg is negative  Gait reveals a normal base with symmetrical arm swing  Tandem walk is normal   Reflexes 2+ and symmetrical          ROS:  Review of Systems   Constitutional: Negative  Negative for appetite change and fever  HENT: Negative  Negative for hearing loss, tinnitus, trouble swallowing and voice change  Eyes: Negative  Negative for photophobia and pain  Respiratory: Negative  Negative for shortness of breath  Cardiovascular: Negative  Negative for palpitations  Gastrointestinal: Negative  Negative for nausea and vomiting  Endocrine: Negative  Negative for cold intolerance  Genitourinary: Negative  Negative for dysuria, frequency and urgency  Musculoskeletal: Negative  Negative for myalgias and neck pain  Skin: Negative  Negative for rash  Neurological: Positive for tremors, weakness and numbness  Negative for dizziness, seizures, syncope, facial asymmetry, speech difficulty, light-headedness and headaches  Patient states numbness of face  Hematological: Negative  Does not bruise/bleed easily  Psychiatric/Behavioral: Positive for sleep disturbance  Negative for confusion and hallucinations

## 2022-06-28 ENCOUNTER — PROCEDURE VISIT (OUTPATIENT)
Dept: DERMATOLOGY | Facility: CLINIC | Age: 67
End: 2022-06-28
Payer: COMMERCIAL

## 2022-06-28 VITALS
SYSTOLIC BLOOD PRESSURE: 102 MMHG | HEIGHT: 64 IN | DIASTOLIC BLOOD PRESSURE: 62 MMHG | HEART RATE: 59 BPM | TEMPERATURE: 97.9 F | BODY MASS INDEX: 26.63 KG/M2 | WEIGHT: 156 LBS

## 2022-06-28 DIAGNOSIS — C44.41 BASAL CELL CARCINOMA (BCC) OF PARIETAL REGION OF SCALP: ICD-10-CM

## 2022-06-28 PROCEDURE — 17312 MOHS ADDL STAGE: CPT | Performed by: DERMATOLOGY

## 2022-06-28 PROCEDURE — 17311 MOHS 1 STAGE H/N/HF/G: CPT | Performed by: DERMATOLOGY

## 2022-06-28 PROCEDURE — 12032 INTMD RPR S/A/T/EXT 2.6-7.5: CPT | Performed by: DERMATOLOGY

## 2022-06-28 NOTE — PROGRESS NOTES
MOHS Procedure Note    Patient: Suhas Miranda  : 1955  MRN: [id number]  Date: 2022    History of Present Illness:  The patient is a 61 y o  female who presents with complaints of Basal Cell Carcinoma, left parietal scalp    Past Medical History:   Diagnosis Date    Basal cell carcinoma (BCC) of parietal region of scalp 2022    Left parietal scalp    [facility]     Bipolar 1 disorder, depressed ([facility] ) 1975    Chronic kidney disease     Depression     H/O bladder infections     Hyperlipidemia     Hypertension     Lichen sclerosus     Microscopic colitis     Skin disorder     lichens sclerosis    Urinary tract infection        Past Surgical History:   Procedure Laterality Date    COLONOSCOPY      2013     MOHS SURGERY Left 2022    Left parietal scalp         Current Outpatient Medications:     albuterol (Ventolin HFA) 90 mcg/act inhaler, Inhale 2 puffs every 6 (six) hours as needed for wheezing, Disp: 18 g, Rfl: 5    atenolol (TENORMIN) 25 mg tablet, atenolol 25 mg tabs, Disp: , Rfl:     atorvastatin (LIPITOR) 20 mg tablet, 10 mg  , Disp: , Rfl:     cholecalciferol (VITAMIN D3) 1,000 units tablet, Take 1,000 Units by mouth daily, Disp: , Rfl:     clobetasol (TEMOVATE) 0 05 % cream, clobetasol 0 05 % topical cream, Disp: , Rfl:     clonazePAM (KlonoPIN) 0 5 mg tablet, 2 (two) times a day, Disp: , Rfl:     clotrimazole-betamethasone (LOTRISONE) 1-0 05 % cream, as needed, Disp: , Rfl: 1    cyclobenzaprine (FLEXERIL) 10 mg tablet, as needed  , Disp: , Rfl:     fluticasone (FLONASE) 50 mcg/act nasal spray, 1 spray into each nostril in the morning , Disp: 18 2 mL, Rfl: 1    hydrochlorothiazide (HYDRODIURIL) 12 5 mg tablet, Take 1 tablet (12 5 mg total) by mouth daily, Disp: 60 tablet, Rfl: 2    loperamide (IMODIUM A-D) 2 MG tablet, Take 2 mg by mouth daily, Disp: , Rfl:     mesalamine (APRISO) 0 375 g 24 hr capsule, TAKE 2 CAPSULES BY MOUTH EVERY DAY (Patient taking differently: Take 375 mg by mouth 1 (one) time 1 cap daily), Disp: 60 capsule, Rfl: 0    montelukast (SINGULAIR) 10 mg tablet, Take 1 tablet (10 mg total) by mouth daily at bedtime, Disp: 30 tablet, Rfl: 1    zolpidem (AMBIEN) 10 mg tablet, as needed  , Disp: , Rfl:     estradiol (VAGIFEM, YUVAFEM) 10 MCG TABS vaginal tablet, Insert 1 tablet (10 mcg total) into the vagina 3 (three) times a week, Disp: 12 tablet, Rfl: 12    guaiFENesin (MUCINEX) 600 mg 12 hr tablet, Take 2 tablets (1,200 mg total) by mouth every 12 (twelve) hours (Patient not taking: No sig reported), Disp: 30 tablet, Rfl: 0    hydrOXYzine HCL (ATARAX) 25 mg tablet, Take 1 tablet (25 mg total) by mouth every 8 (eight) hours as needed for anxiety (Patient not taking: No sig reported), Disp: 30 tablet, Rfl: 0    methylPREDNISolone 4 MG tablet therapy pack, Use as directed on package (Patient not taking: No sig reported), Disp: 21 each, Rfl: 0    Allergies   Allergen Reactions    Levofloxacin Other (See Comments)    Cephalexin Diarrhea    Nitrofurantoin Fever    Sulfa Antibiotics Fever    Topiramate Other (See Comments)    Bacitracin-Neomycin-Polymyxin Rash    Sulfamethoxazole-Trimethoprim Diarrhea       Physical Exam:   Vitals:    06/28/22 1142   BP: 102/62   Pulse: 59   Temp: 97 9 °F (36 6 °C)     General: Awake, Alert, Oriented x 3, Mood and affect appropriate  Respiratory: Respirations even and unlabored  Cardiovascular: Peripheral pulses intact; no edema  Musculoskeletal Exam: N/A    Assessment: 1 5 cm x 1 5 cm pink car, biopsy proven Basal Cell Carcinoma nodular micronodular type     Plan: MOHS    MOHS Procedure Timeout    Flowsheet Row Most Recent Value   Timeout: 1150   Patient Identity Verified: Yes   Correct Site Verified: Yes   Correct Procedure Verified: Yes          MOHS Diagnosis/Indication/Location/ID    Flowsheet Row Most Recent Value   Pathology Type Basal cell carcinoma   Anatomic Site --  [facility] parietal scalp]   Indications for MOHS tumor location   MOHS ID [id number]          MOHS Site/Accession/Pre-Post    Flowsheet Row Most Recent Value   Original Site Identified (as submitted by referring clinician) Photo   Biopsy Accession/Specimen # (as submitted by referring clincian) [id number]   Pre-MOHS Size Length (cm) 1 5   Pre-MOHS Size Width (cm) 1 5   Post-MOHS Size-Length (cm) 2   Post MOHS Size-Width (cm) 2 4   Repair Type Intermediate layered closure   Suture Type Vicryl, Prolene   Prolene Suture Size 4   Vicryl Suture Size 4   Final repair length (cm): 3 5   Anesthetic Used 1% Lidocaine with epinephrine          MOHS Tumor Stage 1 Information    Flowsheet Row Most Recent Value   Tissue Sections (blocks) 2   Microscopic Exam Section 1: No tumor identified in section  Microscopic Exam Section 2: Irregularly shaped islands of basaloid keratinocytes with peripheral palisading and retraction artifact consistent with basal cell carcinoma were noted on microscopic analysis  The cells have scant cytoplasm and round dark nuclei , Arising from the epidermis and superficial follicles are small, superficial, multicentric buds of basaloid keratinocytes associated with a fibromyxoid stroma and clefting  Nuclei at the periphery of the islands have a palisaded arrangement  Tumor Clear After Stage I? No          MOHS Tumor Stage 2 Information    Flowsheet Row Most Recent Value   Tissue Sections (blocks) 1   Microscopic Exam Section 1: No tumor identified in section  Tumor Clear After Stage II? Yes                      Patient identified, procedure verified, site identified and verified  Time out completed  Surgical removal of the lesion discussed with the patient (risks and benefits, including possibility of scarring, infection, recurrence or potential for further treatment)  I have specifically identified the site with the patient   I have discussed the fact that the patient will have a scar after the procedure regardless of granulation or repair with sutures  I have discussed that the repair options can range from granulation in some cases to linear or curvilinear closures to larger flaps or grafts  There are sometimes flaps or grafts used that require multiples stages of surgery and will not be completed today, rather be completed over a series of appointments  I have discussed that occasionally due to location, size or depth of the lesion I may recommend consultation with and transfer of care for further removal or the reconstruction to another provider such as ophthalmology surgery, plastic surgery, ENT surgery, or surgical oncology  There are cases in which other testing such as imaging with MRI or CT scan or testing of lymph nodes is recommended because of the nature/depth/location of tumor seen during the removal  There is a risk of injury to nerves causing temporary or permanent numbness or the inability to move muscles full such as the inability to lift eyebrows  Questions answered and verbal and written consent was obtained  The tumor qualifies for Mohs based on AUC criteria  Dr Phillip Molina  served as the surgeon and pathologist during the procedure  With the patient in the supine position and under adequate local anesthesia with 1% lidocaine with epinephrine 1:100,000, the defect was scrubbed with Hibiclens  Sterile drapes were placed from the sterile tray  Because of the location of the surgical defect, an intermediate closure was judged to give the best possible cosmetic and functional result  The edges of the defect were carefully debrided removing any dead or coagulated tissue  Hemostasis was obtained by pinpoint electrocoagulation  Careful planning of removal of redundant tissue at either end of the defect was drawn out so that the suture lines would fall in the optimal orientation with regard to the relaxed skin tension lines  These were then removed with a #15 blade scalpel    The wound was then approximated by a deep layer of buried vertical mattress sutures and the cutaneous margins were approximated and closed by superficial sutures as noted above  Estimated blood loss was less than 5 mL  The patient tolerated the procedure well  The wound was dressed with petrolatum, a non-stick pad, and a compression dressing  Sonia Sotomayor MD served as the surgeon and pathologist during the procedure  Postoperative care: Wound care discussed at length  I urged the patient to call us if any problems or question should arise  Complications: none  Post-op medications: none  Patient condition after procedure: stable  Discharge plans: Plan for suture removal 14 days, at next scheduled appointment  BCC cleared with 2 stages of mohs and repaired with 3 5cm closure  Well tolerated  S/R in 2 weeks      Scribe Attestation    I,:  Nilam Morrell MA am acting as a scribe while in the presence of the attending physician :       I,:  Sonia Sotomayor MD personally performed the services described in this documentation    as scribed in my presence :

## 2022-06-28 NOTE — PATIENT INSTRUCTIONS
Mohs Microscopic Surgery After Care    WOUND CARE AFTER SURGERY:    Do NOT to remove the pressure bandage for 48 hours  Keep the area clean and dry while this bandage is on  After removing the bandage for the first time, gently clean the area with soap and water  If the bandage is difficult to remove, getting the bandage wet in the shower will sometimes help soften the adhesive and allow it to be removed more easily  You will now need to cleanse this area daily in the shower with gentle soap  There is no need to scrub the area  Apply plain Vaseline ointment (this is over the counter and not a prescription) to the site followed by a clean appropriately sized bandage to area  Non stick dressing and paper tape (or Hypafix) are recommended for sensitive skin but a bandaid is fine if it covers the area well  You will need to continue the above daily wound care until you return for suture removal in 10 days (generally 7 days for the face, 10-14 days off the face)      RESTRICTIONS:     For two DAYS:   - You will need to take it very easy as this time is highest risk for bleeding  Being a "couch potato" during these two days is generally recommended  - For surgeries on the face/neck/scalp: Avoid leaning down to pick things up off the floor as this brings blood up to your head  Instead, squat down to pick things up  For two WEEKS:   - No heavy lifting (anything greater than 10 pounds)   - You can start to do slow, gentle activities such as slow walking but nothing to increase your heart rate and blood pressure too much (such as cardiovascular exercise)  It is important to take it easy as there is still a risk for bleeding and a high risk popping of stitches open during this time  If we did surgery near the eyes (including the nose, forehead, front part of your scalp, cheeks): It is VERY common to get a large amount of swelling around your eyes (puffy eyes)   Although less frequent, this can be enough to swell your eyes shut and can also come along with bruising  This should not hurt and is very expected and normal  It is typically worst at ~ 3 days out from your surgery and dramatically better 1 week post-operatively  MANAGING YOUR PAIN AFTER SURGERY     You can expect to have some pain after surgery  This is normal  The pain is typically worse the first two days after surgery, and quickly begins to get better  The best strategy for controlling your pain after surgery is around the clock pain control  You can take over the counter Acetaminophen (Tylenol) for discomfort, if no contraindications  If you are taking this at the maximum dose, you can alternate this with Motrin (ibuprofen or Advil) as well  Alternating these medications with each other allows you to maximize your pain control  In addition to Tylenol and Motrin, you can use heating pads or ice packs on your incisions to help reduce your pain  How will I alternate your regular strength over-the-counter pain medication? You will take a dose of pain medication every three hours  Start by taking 650 mg of Tylenol (2 pills of 325 mg)   3 hours later take 600 mg of Motrin (3 pills of 200 mg)   3 hours after taking the Motrin take 650 mg of Tylenol   3 hours after that take 600 mg of Motrin  See example - if your first dose of Tylenol is at 12:00 PM     12:00 PM  Tylenol 650 mg (2 pills of 325 mg)    3:00 PM  Motrin 600 mg (3 pills of 200 mg)    6:00 PM  Tylenol 650 mg (2 pills of 325 mg)    9:00 PM  Motrin 600 mg (3 pills of 200 mg)    Continue alternating every 3 hours      Important:   Do not take more than 4000mg of Tylenol or 3200mg of Motrin in a 24-hour period  What if I still have pain? If you have pain that is not controlled with the over-the-counter pain medications (Tylenol and Motrin or Advil), don't hesitate to call our staff using the number provided   We will help make sure you are managing your pain in the best way possible, and if necessary, we can provide a prescription for additional pain medication  CALL OUR OFFICE IMMEDIATELY FOR ANY SIGNS OF INFECTION:    This includes fever, chills, increased redness, warmth, tenderness, severe discomfort/pain, or pus or foul smell coming from the wound  [facility] directly at [phone] (SKIN)    IF BLEEDING IS NOTICED:    Place a clean cloth over the area and apply firm pressure for thirty minutes  Check the wound ONLY after 30 minutes of direct pressure; do not cheat and sneak a peak, as that does not count  If bleeding persists after 30 minutes of legitimate direct pressure, then try one more round of direct pressure to the area  Should the bleeding become heavier or not stop after the second attempt, call [facility] directly at [phone] (SKIN)  Your call will get routed to the dermatology surgeon on call even after hours

## 2022-07-08 ENCOUNTER — APPOINTMENT (OUTPATIENT)
Dept: LAB | Facility: CLINIC | Age: 67
End: 2022-07-08
Payer: COMMERCIAL

## 2022-07-08 DIAGNOSIS — N28.89 DILATED RENAL PELVIS: ICD-10-CM

## 2022-07-08 DIAGNOSIS — R41.3 MEMORY DIFFICULTIES: ICD-10-CM

## 2022-07-08 LAB
ANION GAP SERPL CALCULATED.3IONS-SCNC: 5 MMOL/L (ref 4–13)
BUN SERPL-MCNC: 20 MG/DL (ref 5–25)
CALCIUM SERPL-MCNC: 9.6 MG/DL (ref 8.3–10.1)
CHLORIDE SERPL-SCNC: 106 MMOL/L (ref 100–108)
CO2 SERPL-SCNC: 30 MMOL/L (ref 21–32)
CREAT SERPL-MCNC: 1.08 MG/DL (ref 0.6–1.3)
FOLATE SERPL-MCNC: >20 NG/ML (ref 3.1–17.5)
GFR SERPL CREATININE-BSD FRML MDRD: 54 ML/MIN/1.73SQ M
GLUCOSE P FAST SERPL-MCNC: 104 MG/DL (ref 65–99)
POTASSIUM SERPL-SCNC: 4 MMOL/L (ref 3.5–5.3)
SODIUM SERPL-SCNC: 141 MMOL/L (ref 136–145)
VIT B12 SERPL-MCNC: 656 PG/ML (ref 100–900)

## 2022-07-08 PROCEDURE — 86618 LYME DISEASE ANTIBODY: CPT

## 2022-07-08 PROCEDURE — 82746 ASSAY OF FOLIC ACID SERUM: CPT

## 2022-07-08 PROCEDURE — 82607 VITAMIN B-12: CPT

## 2022-07-08 PROCEDURE — 36415 COLL VENOUS BLD VENIPUNCTURE: CPT

## 2022-07-08 PROCEDURE — 80048 BASIC METABOLIC PNL TOTAL CA: CPT

## 2022-07-09 LAB — B BURGDOR IGG+IGM SER-ACNC: <0.2 AI

## 2022-07-11 ENCOUNTER — OFFICE VISIT (OUTPATIENT)
Dept: DERMATOLOGY | Facility: CLINIC | Age: 67
End: 2022-07-11

## 2022-07-11 DIAGNOSIS — Z48.02 ENCOUNTER FOR REMOVAL OF SUTURES: Primary | ICD-10-CM

## 2022-07-11 PROCEDURE — RECHECK: Performed by: DERMATOLOGY

## 2022-07-11 NOTE — PROGRESS NOTES
Suture removal    Date/Time: 7/11/2022 1:01 PM  Performed by: Ruth Salinas MA  Authorized by: Kervin Mcadams MD   Universal Protocol:  Procedure performed by:  Consent given by: patient  Timeout called at: 7/11/2022 12:45 PM   Patient understanding: patient states understanding of the procedure being performed  Patient consent: the patient's understanding of the procedure matches consent given  Procedure consent: procedure consent matches procedure scheduled  Relevant documents: relevant documents present and verified  Test results: test results not available  Site marked: the operative site was not marked  Radiology Images displayed and confirmed  If images not available, report reviewed: imaging studies not available  Patient identity confirmed: verbally with patient        Patient location:  Clinic  Location:     Laterality:  Left    Location:  [address] location:  Scalp (left parietal scalp)  Procedure details: Tools used:  Suture removal kit    Wound appearance:  No sign(s) of infection    Number of sutures removed:  10  Post-procedure details:     Post-removal:  No dressing applied (Vaseline)    Patient tolerance of procedure: Tolerated well, no immediate complications  Comments: Follow Up 3-6 months skin exam           Well healing scar, sutures removed      Scribe Attestation    I,:  Ruth Salinas MA am acting as a scribe while in the presence of the attending physician :       I,:  Kervin Mcadams MD personally performed the services described in this documentation    as scribed in my presence :

## 2022-07-12 ENCOUNTER — HOSPITAL ENCOUNTER (OUTPATIENT)
Dept: MRI IMAGING | Facility: CLINIC | Age: 67
Discharge: HOME | End: 2022-07-12
Payer: COMMERCIAL

## 2022-07-12 DIAGNOSIS — R41.3 MEMORY DIFFICULTIES: ICD-10-CM

## 2022-07-12 DIAGNOSIS — N18.9 CKD (CHRONIC KIDNEY DISEASE): Primary | ICD-10-CM

## 2022-07-12 PROCEDURE — G1004 CDSM NDSC: HCPCS

## 2022-07-12 PROCEDURE — 70551 MRI BRAIN STEM W/O DYE: CPT

## 2022-07-18 ENCOUNTER — HOSPITAL ENCOUNTER (OUTPATIENT)
Dept: CT IMAGING | Facility: CLINIC | Age: 67
Discharge: HOME | End: 2022-07-18
Payer: COMMERCIAL

## 2022-07-18 DIAGNOSIS — N28.89 DILATED RENAL PELVIS: ICD-10-CM

## 2022-07-18 DIAGNOSIS — R53.83 FATIGUE, UNSPECIFIED TYPE: ICD-10-CM

## 2022-07-18 DIAGNOSIS — R05.9 COUGH: ICD-10-CM

## 2022-07-18 PROCEDURE — G1004 CDSM NDSC: HCPCS

## 2022-07-18 PROCEDURE — 74178 CT ABD&PLV WO CNTR FLWD CNTR: CPT

## 2022-07-18 RX ORDER — MONTELUKAST SODIUM 10 MG/1
TABLET ORAL
Qty: 30 TABLET | Refills: 1 | Status: SHIPPED | OUTPATIENT
Start: 2022-07-18 | End: 2022-09-07

## 2022-07-18 RX ADMIN — IOHEXOL 100 ML: 350 INJECTION, SOLUTION INTRAVENOUS at 09:18

## 2022-07-19 DIAGNOSIS — K58.2 IRRITABLE BOWEL SYNDROME WITH BOTH CONSTIPATION AND DIARRHEA: ICD-10-CM

## 2022-07-19 RX ORDER — MESALAMINE 0.38 G/1
CAPSULE, EXTENDED RELEASE ORAL
Qty: 60 CAPSULE | Refills: 0 | Status: SHIPPED | OUTPATIENT
Start: 2022-07-19 | End: 2022-08-11

## 2022-07-20 NOTE — PROGRESS NOTES
7/21/2022      Chief Complaint   Patient presents with    Follow-up     Assessment and Plan    1  Right extrarenal pelvis  - Advised patient this is a normal anatomical variant   - No hydronephrosis seen on CT     2  Left renal cyst  - This appears benign on CT and measures 1 1 cm, which does not require ongoing surveillance    3  Recurrent UTI symptoms  - No recent positive urine cultures  - Urine dip today negative  PVR=7 mL   - She has atrophic vaginitis and lichen sclerosis which are contributory  - recommend continued topical clobetasol as well as estradiol     - can follow-up as needed at this time    History of Present Illness  Obdulia Buckley is a 61 y o  female here for follow up evaluation of  imaging review  Patient was seen for frequent UTI symptoms  She has had 1 positive urine culture on file in January of 2022 growing E coli  No recent positive urine cultures  She is significant history of lichen sclerosis and atrophic vaginitis and is managed on topical agents  Has had lifelong issues with recurrent UTIs  Approximately 30 years ago she reportedly had workup of what sounds like VCUG or IVP and had prior dilation of urethral stricture  She apparently had been hospitalized for UTI in the past when she was younger  She has no family history of  malignancy  She is a former smoker  She was seen to have mild right pelvocaliectasis on imaging  CT renal protocol shows a right extrarenal pelvis  No hydronephrosis is seen  She no concerning masses or lesions  She was seen to have a decline in her renal function which appears to have improved  Patient reports she is overall doing better since she discontinued her Lamictal   She denies any recent UTIs  Currently is asymptomatic  Review of Systems   Constitutional: Negative for chills and fever  Respiratory: Negative for shortness of breath  Cardiovascular: Negative for chest pain  Gastrointestinal: Negative for abdominal pain  Genitourinary: Negative for difficulty urinating, dysuria, flank pain, frequency, hematuria and urgency  Neurological: Negative for dizziness                    Past Medical History  Past Medical History:   Diagnosis Date    Basal cell carcinoma (BCC) of parietal region of scalp 06/13/2022    Left parietal scalp    [facility] )     Bipolar 1 disorder, depressed ([facility] ) 01/24/1975    Chronic kidney disease     Depression     H/O bladder infections     Hyperlipidemia     Hypertension     Lichen sclerosus     Microscopic colitis     Skin disorder     lichens sclerosis    Urinary tract infection        Past Social History  Past Surgical History:   Procedure Laterality Date    COLONOSCOPY      2013     MOHS SURGERY Left 06/28/2022    Left parietal scalp     Social History     Tobacco Use   Smoking Status Former Smoker   Smokeless Tobacco Never Used   Tobacco Comment    heavy smoker previously       Past Family History  Family History   Problem Relation Age of Onset    Squamous cell carcinoma Mother     Kidney disease Father     Diabetic kidney disease Father     No Known Problems Son     No Known Problems Half-Sister        Past Social history  Social History     Socioeconomic History    Marital status: Single     Spouse name: Not on file    Number of children: Not on file    Years of education: Not on file    Highest education level: Not on file   Occupational History    Not on file   Tobacco Use    Smoking status: Former Smoker    Smokeless tobacco: Never Used    Tobacco comment: heavy smoker previously   Vaping Use    Vaping Use: Never used   Substance and Sexual Activity    Alcohol use: Yes     Comment: socially    Drug use: Never    Sexual activity: Not Currently     Partners: Male   Other Topics Concern    Not on file   Social History Narrative    Not on file     Social Determinants of Health     Financial Resource Strain: Not on file   Food Insecurity: Not on file   Transportation Needs: Not on file   Physical Activity: Not on file   Stress: Not on file   Social Connections: Not on file   Intimate Partner Violence: Not on file   Housing Stability: Not on file       Current Medications  Current Outpatient Medications   Medication Sig Dispense Refill    albuterol (Ventolin HFA) 90 mcg/act inhaler Inhale 2 puffs every 6 (six) hours as needed for wheezing 18 g 5    atenolol (TENORMIN) 25 mg tablet atenolol 25 mg tabs      atorvastatin (LIPITOR) 20 mg tablet 10 mg        cholecalciferol (VITAMIN D3) 1,000 units tablet Take 1,000 Units by mouth daily      clobetasol (TEMOVATE) 0 05 % cream clobetasol 0 05 % topical cream      clonazePAM (KlonoPIN) 0 5 mg tablet 2 (two) times a day      cyclobenzaprine (FLEXERIL) 10 mg tablet as needed        estradiol (VAGIFEM, YUVAFEM) 10 MCG TABS vaginal tablet Insert 1 tablet (10 mcg total) into the vagina 3 (three) times a week 12 tablet 12    hydrochlorothiazide (HYDRODIURIL) 12 5 mg tablet Take 1 tablet (12 5 mg total) by mouth daily 60 tablet 2    loperamide (IMODIUM A-D) 2 MG tablet Take 2 mg by mouth daily      mesalamine (APRISO) 0 375 g 24 hr capsule TAKE 2 CAPSULES BY MOUTH EVERY DAY (Patient taking differently: Take 375 mg by mouth daily) 60 capsule 0    montelukast (SINGULAIR) 10 mg tablet TAKE 1 TABLET BY MOUTH DAILY AT BEDTIME 30 tablet 1    Specialty Vitamins Products (VITAMINS FOR THE HAIR PO) Take by mouth SUGAR BEAR HAIR VITAMIN      zolpidem (AMBIEN) 10 mg tablet as needed        clotrimazole-betamethasone (LOTRISONE) 1-0 05 % cream as needed (Patient not taking: Reported on 7/21/2022)  1    fluticasone (FLONASE) 50 mcg/act nasal spray 1 spray into each nostril in the morning   18 2 mL 1    guaiFENesin (MUCINEX) 600 mg 12 hr tablet Take 2 tablets (1,200 mg total) by mouth every 12 (twelve) hours (Patient not taking: No sig reported) 30 tablet 0    hydrOXYzine HCL (ATARAX) 25 mg tablet Take 1 tablet (25 mg total) by mouth every 8 (eight) hours as needed for anxiety (Patient not taking: No sig reported) 30 tablet 0    methylPREDNISolone 4 MG tablet therapy pack Use as directed on package (Patient not taking: No sig reported) 21 each 0     No current facility-administered medications for this visit  Allergies  Allergies   Allergen Reactions    Levofloxacin Other (See Comments)    Cephalexin Diarrhea    Nitrofurantoin Fever    Sulfa Antibiotics Fever    Topiramate Other (See Comments)    Bacitracin-Neomycin-Polymyxin Rash    Sulfamethoxazole-Trimethoprim Diarrhea         The following portions of the patient's history were reviewed and updated as appropriate: allergies, current medications, past medical history, past social history, past surgical history and problem list       Vitals  Vitals:    07/21/22 1101   BP: 120/76   Pulse: 55   SpO2: 99%   Weight: 70 3 kg (155 lb)   Height: 5' 4" (1 626 m)           Physical Exam  Physical Exam  Constitutional:       Appearance: Normal appearance  HENT:      Head: Normocephalic and atraumatic  Right Ear: External ear normal       Left Ear: External ear normal    Eyes:      General: No scleral icterus  Conjunctiva/sclera: Conjunctivae normal    Cardiovascular:      Pulses: Normal pulses  Pulmonary:      Effort: Pulmonary effort is normal    Musculoskeletal:         General: Normal range of motion  Cervical back: Normal range of motion  Neurological:      General: No focal deficit present  Mental Status: She is alert and oriented to person, place, and time  Psychiatric:         Mood and Affect: Mood normal          Behavior: Behavior normal          Thought Content:  Thought content normal          Judgment: Judgment normal            Results  Recent Results (from the past 1 hour(s))   POCT urine dip    Collection Time: 07/21/22 11:07 AM   Result Value Ref Range    LEUKOCYTE ESTERASE,UA -     NITRITE,UA -     SL AMB POCT UROBILINOGEN 0 2 POCT URINE PROTEIN -      PH,UA 6 5     BLOOD,UA -     SPECIFIC GRAVITY,UA 1 015     [address] [facility] -     GLUCOSE, UA -      [id number] [facility]    POCT Measure PVR    Collection Time: 07/21/22 11:10 AM   Result Value Ref Range    POST-VOID RESIDUAL VOLUME, ML POC 7 mL   ]  No results found for: PSA  Lab Results   Component Value Date    CALCIUM 9 6 07/08/2022    K 4 0 07/08/2022    CO2 30 07/08/2022     07/08/2022    BUN 20 07/08/2022    CREATININE 1 08 07/08/2022     Lab Results   Component Value Date    WBC 7 02 04/26/2022    HGB 13 5 04/26/2022    HCT 39 4 04/26/2022    MCV 84 04/26/2022     04/26/2022           Orders  Orders Placed This Encounter   Procedures    POCT urine dip    POCT Measure PVR     Bri Adhikari PA-C

## 2022-07-21 ENCOUNTER — OFFICE VISIT (OUTPATIENT)
Dept: UROLOGY | Facility: CLINIC | Age: 67
End: 2022-07-21
Payer: COMMERCIAL

## 2022-07-21 VITALS
OXYGEN SATURATION: 99 % | HEIGHT: 64 IN | BODY MASS INDEX: 26.46 KG/M2 | DIASTOLIC BLOOD PRESSURE: 76 MMHG | WEIGHT: 155 LBS | SYSTOLIC BLOOD PRESSURE: 120 MMHG | HEART RATE: 55 BPM

## 2022-07-21 DIAGNOSIS — N39.0 RECURRENT UTI: Primary | ICD-10-CM

## 2022-07-21 LAB
POST-VOID RESIDUAL VOLUME, ML POC: 7 ML
SL AMB  POCT GLUCOSE, UA: NORMAL
SL AMB LEUKOCYTE ESTERASE,UA: NORMAL
SL AMB POCT BILIRUBIN,UA: NORMAL
SL AMB POCT BLOOD,UA: NORMAL
SL AMB POCT CLARITY,UA: CLEAR
SL AMB POCT COLOR,UA: YELLOW
SL AMB POCT KETONES,UA: NORMAL
SL AMB POCT NITRITE,UA: NORMAL
SL AMB POCT PH,UA: 6.5
SL AMB POCT SPECIFIC GRAVITY,UA: 1.01
SL AMB POCT URINE PROTEIN: NORMAL
SL AMB POCT UROBILINOGEN: 0.2

## 2022-07-21 PROCEDURE — 51798 US URINE CAPACITY MEASURE: CPT | Performed by: PHYSICIAN ASSISTANT

## 2022-07-21 PROCEDURE — 99213 OFFICE O/P EST LOW 20 MIN: CPT | Performed by: PHYSICIAN ASSISTANT

## 2022-07-21 PROCEDURE — 81002 URINALYSIS NONAUTO W/O SCOPE: CPT | Performed by: PHYSICIAN ASSISTANT

## 2022-08-01 ENCOUNTER — TELEPHONE (OUTPATIENT)
Dept: NEPHROLOGY | Facility: CLINIC | Age: 67
End: 2022-08-01

## 2022-08-08 ENCOUNTER — OFFICE VISIT (OUTPATIENT)
Dept: PSYCHIATRY | Facility: CLINIC | Age: 67
End: 2022-08-08
Payer: COMMERCIAL

## 2022-08-08 DIAGNOSIS — F31.9 BIPOLAR 1 DISORDER, DEPRESSED (HCC): Primary | ICD-10-CM

## 2022-08-08 DIAGNOSIS — F39 MOOD DISORDER (HCC): ICD-10-CM

## 2022-08-08 PROCEDURE — 99214 OFFICE O/P EST MOD 30 MIN: CPT

## 2022-08-08 RX ORDER — CLONAZEPAM 0.5 MG/1
0.5 TABLET ORAL 2 TIMES DAILY
Qty: 60 TABLET | Refills: 0 | Status: SHIPPED | OUTPATIENT
Start: 2022-08-08 | End: 2022-09-13

## 2022-08-08 RX ORDER — ZOLPIDEM TARTRATE 10 MG/1
10 TABLET ORAL
Qty: 30 TABLET | Refills: 0 | Status: SHIPPED | OUTPATIENT
Start: 2022-08-08

## 2022-08-11 DIAGNOSIS — K58.2 IRRITABLE BOWEL SYNDROME WITH BOTH CONSTIPATION AND DIARRHEA: ICD-10-CM

## 2022-08-11 RX ORDER — MESALAMINE 0.38 G/1
CAPSULE, EXTENDED RELEASE ORAL
Qty: 60 CAPSULE | Refills: 0 | Status: SHIPPED | OUTPATIENT
Start: 2022-08-11 | End: 2022-09-05

## 2022-09-04 DIAGNOSIS — K58.2 IRRITABLE BOWEL SYNDROME WITH BOTH CONSTIPATION AND DIARRHEA: ICD-10-CM

## 2022-09-05 DIAGNOSIS — K58.2 IRRITABLE BOWEL SYNDROME WITH BOTH CONSTIPATION AND DIARRHEA: ICD-10-CM

## 2022-09-05 RX ORDER — MESALAMINE 0.38 G/1
CAPSULE, EXTENDED RELEASE ORAL
Qty: 60 CAPSULE | Refills: 0 | Status: SHIPPED | OUTPATIENT
Start: 2022-09-05 | End: 2022-09-06

## 2022-09-06 DIAGNOSIS — K58.2 IRRITABLE BOWEL SYNDROME WITH BOTH CONSTIPATION AND DIARRHEA: ICD-10-CM

## 2022-09-06 RX ORDER — MESALAMINE 0.38 G/1
CAPSULE, EXTENDED RELEASE ORAL
Qty: 60 CAPSULE | Refills: 0 | Status: SHIPPED | OUTPATIENT
Start: 2022-09-06 | End: 2022-09-06

## 2022-09-06 RX ORDER — MESALAMINE 0.38 G/1
CAPSULE, EXTENDED RELEASE ORAL
Qty: 60 CAPSULE | Refills: 0 | Status: SHIPPED | OUTPATIENT
Start: 2022-09-06 | End: 2022-09-07

## 2022-09-07 DIAGNOSIS — R05.9 COUGH: ICD-10-CM

## 2022-09-07 DIAGNOSIS — R53.83 FATIGUE, UNSPECIFIED TYPE: ICD-10-CM

## 2022-09-07 RX ORDER — MONTELUKAST SODIUM 10 MG/1
TABLET ORAL
Qty: 30 TABLET | Refills: 1 | Status: SHIPPED | OUTPATIENT
Start: 2022-09-07 | End: 2022-10-03

## 2022-09-07 RX ORDER — MESALAMINE 0.38 G/1
CAPSULE, EXTENDED RELEASE ORAL
Qty: 60 CAPSULE | Refills: 0 | Status: SHIPPED | OUTPATIENT
Start: 2022-09-07

## 2022-09-12 DIAGNOSIS — F39 MOOD DISORDER (HCC): ICD-10-CM

## 2022-09-13 RX ORDER — CLONAZEPAM 0.5 MG/1
TABLET ORAL
Qty: 60 TABLET | Refills: 0 | Status: SHIPPED | OUTPATIENT
Start: 2022-09-13

## 2022-09-14 ENCOUNTER — OFFICE VISIT (OUTPATIENT)
Dept: OBGYN CLINIC | Facility: CLINIC | Age: 67
End: 2022-09-14
Payer: MEDICARE

## 2022-09-14 ENCOUNTER — TELEPHONE (OUTPATIENT)
Dept: FAMILY MEDICINE CLINIC | Facility: CLINIC | Age: 67
End: 2022-09-14

## 2022-09-14 ENCOUNTER — OFFICE VISIT (OUTPATIENT)
Dept: GASTROENTEROLOGY | Facility: CLINIC | Age: 67
End: 2022-09-14
Payer: MEDICARE

## 2022-09-14 VITALS
HEART RATE: 46 BPM | SYSTOLIC BLOOD PRESSURE: 120 MMHG | BODY MASS INDEX: 27.14 KG/M2 | OXYGEN SATURATION: 98 % | DIASTOLIC BLOOD PRESSURE: 78 MMHG | WEIGHT: 159 LBS | HEIGHT: 64 IN

## 2022-09-14 VITALS
HEIGHT: 64 IN | SYSTOLIC BLOOD PRESSURE: 120 MMHG | HEART RATE: 43 BPM | OXYGEN SATURATION: 99 % | BODY MASS INDEX: 26.98 KG/M2 | WEIGHT: 158 LBS | DIASTOLIC BLOOD PRESSURE: 64 MMHG

## 2022-09-14 DIAGNOSIS — N95.2 ATROPHIC VAGINITIS: ICD-10-CM

## 2022-09-14 DIAGNOSIS — K52.839 MICROSCOPIC COLITIS, UNSPECIFIED MICROSCOPIC COLITIS TYPE: Primary | ICD-10-CM

## 2022-09-14 DIAGNOSIS — R00.1 BRADYCARDIA: Primary | ICD-10-CM

## 2022-09-14 DIAGNOSIS — L90.0 LICHEN SCLEROSUS: Primary | ICD-10-CM

## 2022-09-14 DIAGNOSIS — N94.9 VAGINAL BURNING: ICD-10-CM

## 2022-09-14 PROBLEM — H40.20X0 UNSPECIFIED PRIMARY ANGLE-CLOSURE GLAUCOMA, STAGE UNSPECIFIED: Status: ACTIVE | Noted: 2022-09-14

## 2022-09-14 PROBLEM — H81.09 MENIERE DISEASE: Status: ACTIVE | Noted: 2022-09-14

## 2022-09-14 PROBLEM — Q76.5 THORACIC OUTLET SYNDROME ASSOCIATED WITH CERVICAL RIB: Status: ACTIVE | Noted: 2022-09-14

## 2022-09-14 PROBLEM — G54.0 THORACIC OUTLET SYNDROME ASSOCIATED WITH CERVICAL RIB: Status: ACTIVE | Noted: 2022-09-14

## 2022-09-14 PROBLEM — Q76.0 THORACIC OUTLET SYNDROME ASSOCIATED WITH CERVICAL RIB: Status: ACTIVE | Noted: 2022-09-14

## 2022-09-14 PROCEDURE — 99213 OFFICE O/P EST LOW 20 MIN: CPT | Performed by: PHYSICIAN ASSISTANT

## 2022-09-14 PROCEDURE — 99213 OFFICE O/P EST LOW 20 MIN: CPT | Performed by: NURSE PRACTITIONER

## 2022-09-14 RX ORDER — CLOTRIMAZOLE AND BETAMETHASONE DIPROPIONATE 10; .64 MG/G; MG/G
CREAM TOPICAL 2 TIMES DAILY
Qty: 45 G | Refills: 1 | Status: SHIPPED | OUTPATIENT
Start: 2022-09-14 | End: 2022-10-03

## 2022-09-14 NOTE — TELEPHONE ENCOUNTER
----- Message from Mortimer Lorenzo, [address] sent at 9/14/2022 11:53 AM EDT -----  Can you please call the patient and let her know that I put in a referral for her to see Cardiology due to her low heart rate- thank you

## 2022-09-14 NOTE — PROGRESS NOTES
Patient presents for: Jessika Rodriguez    Menarche- 12  Last Pap Smear- 01/06/2022  Hx of abnormal paps-yes  Birth control-postmenopausal    Mammogram- 02/28/2022  DXA-not on file   Colonoscopy-02/14/13    Smoking status- former  Last sexual activity-not currently   Family history of uterine, ovarian, cervical or breast cancer-  none  Concerns-

## 2022-09-14 NOTE — TELEPHONE ENCOUNTER
Pt returned call back -    I gave her test results and Kenya's  note regarding referral to Cardiologist,   Referral did not generate any info, pt advised to contact her insurance career for in network participating providers and to schedule on myc portal as well  Pt aware  Pt expressed verbal understanding and will f/u with Cardio

## 2022-09-14 NOTE — PROGRESS NOTES
Diagnoses and all orders for this visit:    Lichen sclerosus    Atrophic vaginitis    Vaginal burning  -     clotrimazole-betamethasone (LOTRISONE) 1-0 05 % cream; Apply topically 2 (two) times a day        -     Continue to f/u with Dermatology  Continue with all her ointments that feel good  Continue with Vagifem TIW  Call if no symptom improvement, all questions answered, return for recheck in 6 months  Pleasant 61 y o  here for follow up of vaginal complaints  SHE HAS SEVERE LS WHICH HAS NOW PROGRESSED TO HER ABDOMEN  This was confirmed by skin biopsy  She sees Dr Afshan Martinez  She had increased her Vagifem to 3 times per week and it has provided some relief  It also is preventing her UTIs  She denies any other treatments tried  She does use Emu oill, Vaseline, Desitin equivalent and coconut oil prn  She denies recent antibiotic use  She denies douching  She denies fever, pelvic pain or dyspareunia  She denies new sexual partners  She will be seeing Endocrinology soon for possible prediabetes      Past Medical History:   Diagnosis Date    Basal cell carcinoma (BCC) of parietal region of scalp 06/13/2022    Left parietal scalp    [facility]     Bipolar 1 disorder, depressed ([facility] [address] [facility] ) 01/24/1975    Chronic kidney disease     Depression     H/O bladder infections     Hyperlipidemia     Hypertension     Lichen sclerosus     Microscopic colitis     Skin disorder     lichens sclerosis    Urinary tract infection      Past Surgical History:   Procedure Laterality Date    COLONOSCOPY      2013     MOHS SURGERY Left 06/28/2022    Left parietal scalp     Social History     Tobacco Use    Smoking status: Former Smoker    Smokeless tobacco: Never Used    Tobacco comment: heavy smoker previously   Vaping Use    Vaping Use: Never used   Substance Use Topics    Alcohol use: Yes     Comment: socially    Drug use: Never     Family History   Problem Relation Age of Onset    Squamous cell carcinoma Mother     Kidney disease Father     Diabetic kidney disease Father     No Known Problems Son     No Known Problems Half-Sister        Current Outpatient Medications:     albuterol (Ventolin HFA) 90 mcg/act inhaler, Inhale 2 puffs every 6 (six) hours as needed for wheezing, Disp: 18 g, Rfl: 5    atenolol (TENORMIN) 25 mg tablet, atenolol 25 mg tabs, Disp: , Rfl:     atorvastatin (LIPITOR) 20 mg tablet, 10 mg  , Disp: , Rfl:     cholecalciferol (VITAMIN D3) 1,000 units tablet, Take 1,000 Units by mouth daily, Disp: , Rfl:     clobetasol (TEMOVATE) 0 05 % cream, clobetasol 0 05 % topical cream, Disp: , Rfl:     clonazePAM (KlonoPIN) 0 5 mg tablet, TAKE 1 TABLET BY MOUTH 2 TIMES A DAY , Disp: 60 tablet, Rfl: 0    clotrimazole-betamethasone (LOTRISONE) 1-0 05 % cream, Apply topically 2 (two) times a day, Disp: 45 g, Rfl: 1    cyclobenzaprine (FLEXERIL) 10 mg tablet, as needed  , Disp: , Rfl:     hydrochlorothiazide (HYDRODIURIL) 12 5 mg tablet, Take 1 tablet (12 5 mg total) by mouth daily, Disp: 60 tablet, Rfl: 2    loperamide (IMODIUM A-D) 2 MG tablet, Take 2 mg by mouth daily, Disp: , Rfl:     mesalamine (APRISO) 0 375 g 24 hr capsule, TAKE 2 CAPSULES BY MOUTH EVERY DAY, Disp: 60 capsule, Rfl: 0    montelukast (SINGULAIR) 10 mg tablet, TAKE 1 TABLET BY MOUTH EVERYDAY AT BEDTIME, Disp: 30 tablet, Rfl: 1    Specialty Vitamins Products (VITAMINS FOR THE HAIR PO), Take by mouth SUGAR BEAR HAIR VITAMIN, Disp: , Rfl:     zolpidem (AMBIEN) 10 mg tablet, Take 1 tablet (10 mg total) by mouth daily at bedtime as needed (insomnia), Disp: 30 tablet, Rfl: 0    estradiol (VAGIFEM, YUVAFEM) 10 MCG TABS vaginal tablet, Insert 1 tablet (10 mcg total) into the vagina 3 (three) times a week, Disp: 12 tablet, Rfl: 12    Allergies   Allergen Reactions    Levofloxacin Other (See Comments)    Cephalexin Diarrhea    Nitrofurantoin Fever    Sulfa Antibiotics Fever    Topiramate Other (See Comments)    Bacitracin-Neomycin-Polymyxin Rash    Sulfamethoxazole-Trimethoprim Diarrhea     OB History    Para Term  AB Living   1 1 1     1   SAB IAB Ectopic Multiple Live Births                  # Outcome Date GA Lbr Johnathan/2nd Weight Sex Delivery Anes PTL Lv   1 Term               Obstetric Comments   1st menses 10years old        Vitals:    22 1401   BP: 120/78   BP Location: Left arm   Patient Position: Sitting   Cuff Size: Standard   Pulse: (!) 46   SpO2: 98%   Weight: 72 1 kg (159 lb)   Height: 5' 4" (1 626 m)     Body mass index is 27 29 kg/m²  No LMP recorded  Patient is postmenopausal     Review of Systems   Constitutional: Negative for chills, fatigue, fever and unexpected weight change  Respiratory: Negative for shortness of breath  Gastrointestinal: Negative for anal bleeding, blood in stool, constipation and diarrhea  Genitourinary: Negative for difficulty urinating, dysuria and hematuria  Physical Exam   Constitutional: She appears well-developed and well-nourished  No distress  Alert and oriented  HENT: atraumatic  Head: Normocephalic  Neck: Normal range of motion  Neck supple  Pulmonary: Effort normal   Abdominal: Soft  Pelvic exam was performed with patient supine  No labial fusion  There is no rash, tenderness, lesion or injury on the right labia  There is no rash, tenderness, lesion or injury on the left labia  Urethral meatus does not show any tenderness, inflammation or discharge  Palpation of midline bladder without pain or discomfort  UTERUS AND CERVIX ABSENT  Right adnexum displays no mass, no tenderness and no fullness  Left adnexum displays no mass, no tenderness and no fullness  No erythema or tenderness in the vagina  No foreign body in the vagina  No signs of injury around the vagina  NO Vaginal discharge found  Perineum and anus without areas of injury  No lesions noted or swelling  Vagina with hypopigmented areas c/w LSA, as well as surrounding erythema noted on external vulva, very atrophic vagina  Loss of labia minor noted and clitoral and labial fusion  VIRGINAL SPEC used  LAS on abdomen and thighs noted  Lymphadenopathy:        Right: No inguinal adenopathy present  Left: No inguinal adenopathy present

## 2022-09-14 NOTE — PATIENT INSTRUCTIONS
Lichen Sclerosus   AMBULATORY CARE:   Lichen sclerosus  is a skin condition that most often affects the vulva, penis, or skin around the anus  Lichen sclerosus occurs most often in females  The cause of lichen sclerosus may not be known  Common symptoms include the following: You may not have any symptoms, or you may have any of the following:  Pain, itching, or burning    Areas of skin that are thin, wrinkled, and white    Blisters    Bleeding, bruises, or tears on affected skin    Contact your healthcare provider if:   Your symptoms do not get better with treatment  You have questions or concerns about your condition or care  Treatment and management:  You do not need treatment if you do not have any symptoms  Your healthcare provider may recommend a steroid cream or ointment  Your provider may also recommend a topical medicine that prevents your immune system from attacking your skin  If you are female, your healthcare provider may recommend that you do not take bubble baths, or use scented soaps and scented detergents  This may help decrease irritation of the vulva  Rarely, adults may need surgery to repair scarring that can occur over time  Follow up with your doctor as directed:  Write down your questions so you remember to ask them during your visits  © Copyright [employer] 2022 Information is for End User's use only and may not be sold, redistributed or otherwise used for commercial purposes  All illustrations and images included in CareNotes® are the copyrighted property of [employer]  or Momo Castillo  The above information is an  only  It is not intended as medical advice for individual conditions or treatments  Talk to your doctor, nurse or pharmacist before following any medical regimen to see if it is safe and effective for you

## 2022-09-14 NOTE — PROGRESS NOTES
Last script 12/11/17  30 day   Last OV 11/6/17 [facility] - Outpatient Follow-up Note  Dilshad Palmer 61 y o  female MRN: [id number]  Encounter: [id number]          ASSESSMENT AND PLAN:      1  Microscopic colitis, unspecified microscopic colitis type  She had been doing very well on mesalamine 0 375 g 2 pills daily and decided she wanted to try to stop over the summer  She dropped to 1 pill daily in July and stopped 2 weeks ago  Unfortunately, her symptoms have worsened  She is willing to restart as long as Dr Marcelo napoles this - will discuss with him    ______________________________________________________________________    SUBJECTIVE:  47-year-old female with a history of microscopic colitis presents for routine follow-up  She had been doing very well on Apriso 2 pills daily  Over the summer she decided she wanted to try to stop the medication due to her concerns over renal side effects  She dropped to 1 pill daily in July and then stop completely 2 weeks ago  Even after dropping to 1 pill daily she noticed her symptoms were worsening  She reports episodes of severe urgent watery diarrhea which she was not having previously  She reports more frequent episodes of mild diarrhea  She denies any rectal bleeding  She reports a poor appetite but denies any nausea or vomiting  She also successfully stopped her Lamictal last May which she had been on for many years  She is not totally sure how she is doing with this  She has a very long history of depression and anxiety but again is concerned about have these medications are affecting her kidneys in her overall well-being  REVIEW OF SYSTEMS IS OTHERWISE NEGATIVE        Historical Information   Past Medical History:   Diagnosis Date    Basal cell carcinoma (BCC) of parietal region of scalp 06/13/2022    Left parietal scalp    Bipolar 1 disorder, depressed (HCC)     [facility] 01/24/1975    Chronic kidney disease     Depression     H/O bladder infections     Hyperlipidemia     Hypertension     Lichen sclerosus     Microscopic colitis     Skin disorder     lichens sclerosis    Urinary tract infection      Past Surgical History:   Procedure Laterality Date    COLONOSCOPY      2013     MOHS SURGERY Left 06/28/2022    Left parietal scalp     Social History   Social History     Substance and Sexual Activity   Alcohol Use Yes    Comment: socially     Social History     Substance and Sexual Activity   Drug Use Never     Social History     Tobacco Use   Smoking Status Former Smoker   Smokeless Tobacco Never Used   Tobacco Comment    heavy smoker previously     Family History   Problem Relation Age of Onset    Squamous cell carcinoma Mother     Kidney disease Father     Diabetic kidney disease Father     No Known Problems Son     No Known Problems Half-Sister        Meds/Allergies       Current Outpatient Medications:     albuterol (Ventolin HFA) 90 mcg/act inhaler    atenolol (TENORMIN) 25 mg tablet    atorvastatin (LIPITOR) 20 mg tablet    cholecalciferol (VITAMIN D3) 1,000 units tablet    clobetasol (TEMOVATE) 0 05 % cream    clonazePAM (KlonoPIN) 0 5 mg tablet    cyclobenzaprine (FLEXERIL) 10 mg tablet    hydrochlorothiazide (HYDRODIURIL) 12 5 mg tablet    loperamide (IMODIUM A-D) 2 MG tablet    mesalamine (APRISO) 0 375 g 24 hr capsule    montelukast (SINGULAIR) 10 mg tablet    Specialty Vitamins Products (VITAMINS FOR THE HAIR PO)    zolpidem (AMBIEN) 10 mg tablet    clotrimazole-betamethasone (LOTRISONE) 1-0 05 % cream    estradiol (VAGIFEM, YUVAFEM) 10 MCG TABS vaginal tablet    Allergies   Allergen Reactions    Levofloxacin Other (See Comments)    Cephalexin Diarrhea    Nitrofurantoin Fever    Sulfa Antibiotics Fever    Topiramate Other (See Comments)    Bacitracin-Neomycin-Polymyxin Rash    Sulfamethoxazole-Trimethoprim Diarrhea           Objective     Blood pressure 120/64, pulse (!) 43, height 5' 4" (1 626 m), weight 71 7 kg (158 lb), SpO2 99 %, not currently breastfeeding  Body mass index is 27 12 kg/m²  PHYSICAL EXAM:      General Appearance:   Alert, cooperative, no distress   HEENT:   Normocephalic, atraumatic, anicteric      Neck:  Supple, symmetrical, trachea midline   Lungs:   Clear to auscultation bilaterally; no rales, rhonchi or wheezing; respirations unlabored    Heart[de-identified]   Regular rate and rhythm; no murmur, rub, or gallop  Abdomen:   Soft, non-tender, non-distended; normal bowel sounds; no masses, no organomegaly    Genitalia:   Deferred    Rectal:   Deferred    Extremities:  No cyanosis, clubbing or edema    Pulses:  2+ and symmetric    Skin:  No jaundice, rashes, or lesions    Lymph nodes:  No palpable cervical lymphadenopathy        Lab Results:   No visits with results within 1 Day(s) from this visit  Latest known visit with results is:   Office Visit on 07/21/2022   Component Date Value    LEUKOCYTE ESTERASE,UA 07/21/2022 -     NITRITE,UA 07/21/2022 -     SL AMB POCT UROBILINOGEN 07/21/2022 0 2     POCT URINE PROTEIN 07/21/2022 -      PH,UA 07/21/2022 6 5     BLOOD,UA 07/21/2022 -     SPECIFIC GRAVITY,UA 07/21/2022 1 015     KETONES,UA 07/21/2022 -     BILIRUBIN,UA 07/21/2022 -     GLUCOSE, UA 07/21/2022 -      COLOR,UA 07/21/2022 YELLOW     CLARITY,UA 07/21/2022 CLEAR     POST-VOID RESIDUAL VOLUM* 07/21/2022 7          Radiology Results:   No results found    Answers for HPI/ROS submitted by the patient on 9/7/2022  Chronicity: recurrent  Onset: more than 1 year ago  Onset quality: gradual  Frequency: every several days  Progression since onset: unchanged  Pain location: generalized abdominal region  Pain - numeric: 8/10  Pain quality: burning  Radiates to: does not radiate, back  anorexia: No  arthralgias: Yes  belching: Yes  constipation: Yes  diarrhea: Yes  dysuria: No  fever: No  flatus: No  frequency: No  headaches: No  hematochezia: No  hematuria: No  melena: No  myalgias: No  nausea: No  weight loss: No  vomiting: No  Aggravated by: nothing  Relieved by: being still, recumbency  Diagnostic workup: CT scan

## 2022-09-15 ENCOUNTER — TELEPHONE (OUTPATIENT)
Dept: GASTROENTEROLOGY | Facility: CLINIC | Age: 67
End: 2022-09-15

## 2022-09-15 NOTE — TELEPHONE ENCOUNTER
----- Message from Perez Becerra sent at 9/15/2022 10:54 AM EDT -----  Regarding: Atenolol  161/94 pulse 53   8am  126/85 pulse 63    11am    Have cardiologist appt 9/26,  should I not take until than?

## 2022-09-16 ENCOUNTER — TELEPHONE (OUTPATIENT)
Dept: DERMATOLOGY | Facility: CLINIC | Age: 67
End: 2022-09-16

## 2022-09-16 NOTE — TELEPHONE ENCOUNTER
Patient states that her mohs site is still scabbing and flaking, it bleeds sometimes when it flakes    Patient will be coming in to have it looked at 09/21/2022 @ 10:15 am

## 2022-09-21 ENCOUNTER — OFFICE VISIT (OUTPATIENT)
Dept: DERMATOLOGY | Facility: CLINIC | Age: 67
End: 2022-09-21
Payer: MEDICARE

## 2022-09-21 ENCOUNTER — CONSULT (OUTPATIENT)
Dept: ENDOCRINOLOGY | Facility: CLINIC | Age: 67
End: 2022-09-21
Payer: MEDICARE

## 2022-09-21 VITALS
BODY MASS INDEX: 27.14 KG/M2 | DIASTOLIC BLOOD PRESSURE: 70 MMHG | SYSTOLIC BLOOD PRESSURE: 128 MMHG | WEIGHT: 159 LBS | HEIGHT: 64 IN | OXYGEN SATURATION: 97 % | HEART RATE: 47 BPM

## 2022-09-21 DIAGNOSIS — E55.9 VITAMIN D DEFICIENCY, UNSPECIFIED: ICD-10-CM

## 2022-09-21 DIAGNOSIS — Z48.89 ENCOUNTER FOR POST SURGICAL WOUND CHECK: Primary | ICD-10-CM

## 2022-09-21 DIAGNOSIS — R73.01 IMPAIRED FASTING GLUCOSE: ICD-10-CM

## 2022-09-21 DIAGNOSIS — E21.3 HYPERPARATHYROIDISM (HCC): Primary | ICD-10-CM

## 2022-09-21 DIAGNOSIS — R53.83 FATIGUE, UNSPECIFIED TYPE: ICD-10-CM

## 2022-09-21 PROCEDURE — 99204 OFFICE O/P NEW MOD 45 MIN: CPT | Performed by: INTERNAL MEDICINE

## 2022-09-21 PROCEDURE — 99212 OFFICE O/P EST SF 10 MIN: CPT | Performed by: STUDENT IN AN ORGANIZED HEALTH CARE EDUCATION/TRAINING PROGRAM

## 2022-09-21 RX ORDER — CLOBETASOL PROPIONATE 0.46 MG/ML
SOLUTION TOPICAL 2 TIMES DAILY
Qty: 50 ML | Refills: 0 | Status: SHIPPED | OUTPATIENT
Start: 2022-09-21

## 2022-09-21 NOTE — PROGRESS NOTES
Chief compliant: Lab finding of hyperparathyroidism     Referring Provider  Evaristo Melchor  [phone] [address]     History of Present Illness:   Vaishali Jerez is a 74 y o  female with hypercalcemia seen in consultation at the request of Janusz Li (PCP) for hyperparathyroidism- elevated  3 in 04/2022  Patient with history of CKD, Vit D deficiency currently not on Vit D supplementation  She reports history of multiple medical conditions including fibromyalgia, depression, microscopic colitis, chronic bradycardia, basal call carcinoma s/p Mohs procedure, lichen sclerosis  Labwork done in 01/2022 showed elevated Ca 10 4 (uncorrected), however repeat labwork in 07/2022 showed normal corrected Ca of 9 4  She denied any history of bone pains, kidney stones, history of fractures (reports DEXA scans done in the past were normal - no Dexa scan report on chart)  Fhx:  Denies any known family history of hypercalcemia or kidney stones       Patient Active Problem List   Diagnosis    Microscopic colitis    Fibromyalgia    Chronic renal failure    Primary hypertension    Lichen sclerosus    Articular cartilage disorder of hand    Acquired trigger finger of right ring finger    Osteoarthritis of carpometacarpal (CMC) joint of both thumbs    Mood disorder ([address] )    NANDA (acute kidney injury) ([address] )    Unspecified primary angle-closure glaucoma, stage unspecified    Thoracic outlet syndrome associated with cervical rib    Meniere disease      Past Medical History:   Diagnosis Date    Basal cell carcinoma (BCC) of parietal region of scalp 06/13/2022    Left parietal scalp    Bipolar 1 disorder, depressed ([address] )     Bipolar 1 disorder, depressed ([address] ) 01/24/1975    Chronic kidney disease     Depression     H/O bladder infections     Hyperlipidemia     Hypertension     Lichen sclerosus     Microscopic colitis     Skin disorder     lichens sclerosis    Urinary tract infection       Past Surgical History:   Procedure Laterality Date    COLONOSCOPY      2013     MOHS SURGERY Left 06/28/2022    Left parietal scalp      Family History   Problem Relation Age of Onset    Squamous cell carcinoma Mother     Kidney disease Father     Diabetic kidney disease Father     No Known Problems Son     No Known Problems Half-Sister      Social History     Tobacco Use    Smoking status: Former Smoker    Smokeless tobacco: Never Used    Tobacco comment: heavy smoker previously   Substance Use Topics    Alcohol use: Yes     Comment: socially     Allergies   Allergen Reactions    Levofloxacin Other (See Comments)    Cephalexin Diarrhea    Nitrofurantoin Fever    Sulfa Antibiotics Fever    Topiramate Other (See Comments)    Bacitracin-Neomycin-Polymyxin Rash    Sulfamethoxazole-Trimethoprim Diarrhea         Current Outpatient Medications:     albuterol (Ventolin HFA) 90 mcg/act inhaler, Inhale 2 puffs every 6 (six) hours as needed for wheezing, Disp: 18 g, Rfl: 5    atenolol (TENORMIN) 25 mg tablet, atenolol 25 mg tabs, Disp: , Rfl:     atorvastatin (LIPITOR) 20 mg tablet, 10 mg  , Disp: , Rfl:     cholecalciferol (VITAMIN D3) 1,000 units tablet, Take 2,000 Units by mouth in the morning , Disp: , Rfl:     clobetasol (TEMOVATE) 0 05 % cream, clobetasol 0 05 % topical cream, Disp: , Rfl:     clonazePAM (KlonoPIN) 0 5 mg tablet, TAKE 1 TABLET BY MOUTH 2 TIMES A DAY , Disp: 60 tablet, Rfl: 0    clotrimazole-betamethasone (LOTRISONE) 1-0 05 % cream, Apply topically 2 (two) times a day, Disp: 45 g, Rfl: 1    cyclobenzaprine (FLEXERIL) 10 mg tablet, as needed  , Disp: , Rfl:     hydrochlorothiazide (HYDRODIURIL) 12 5 mg tablet, Take 1 tablet (12 5 mg total) by mouth daily, Disp: 60 tablet, Rfl: 2    loperamide (IMODIUM A-D) 2 MG tablet, Take 2 mg by mouth daily, Disp: , Rfl:     mesalamine (APRISO) 0 375 g 24 hr capsule, TAKE 2 CAPSULES BY MOUTH EVERY DAY, Disp: 60 capsule, Rfl: 0    montelukast (SINGULAIR) 10 mg tablet, TAKE 1 TABLET BY MOUTH EVERYDAY AT BEDTIME, Disp: 30 tablet, Rfl: 1    Specialty Vitamins Products (VITAMINS FOR THE HAIR PO), Take by mouth SUGAR BEAR HAIR VITAMIN, Disp: , Rfl:     zolpidem (AMBIEN) 10 mg tablet, Take 1 tablet (10 mg total) by mouth daily at bedtime as needed (insomnia), Disp: 30 tablet, Rfl: 0    clobetasol (TEMOVATE) 0 05 % external solution, Apply topically 2 (two) times a day, Disp: 50 mL, Rfl: 0    estradiol (VAGIFEM, YUVAFEM) 10 MCG TABS vaginal tablet, Insert 1 tablet (10 mcg total) into the vagina 3 (three) times a week, Disp: 12 tablet, Rfl: 12   ROS  Constitutional: Negative for appetite change  Negative for activity change, fatigue and unexpected weight change  Respiratory: Negative for shortness of breath, wheezing, cough  Cardiovascular: Bradycardia+, Negative for chest pain and palpitations  Gastrointestinal: Negative for abdominal pain, nausea and vomiting  Negative for diarrhea or constipation  Musculoskeletal: arthralgias+  Neurological: Negative for dizziness, light-headedness and headaches  All other ROS reviewed and negative  Physical Exam:  Body mass index is 27 29 kg/m²  /70 (BP Location: Left arm, Patient Position: Sitting, Cuff Size: Extra-Large)   Pulse (!) 47   Ht 5' 4" (1 626 m)   Wt 72 1 kg (159 lb)   SpO2 97%   BMI 27 29 kg/m²    Wt Readings from Last 3 Encounters:   09/21/22 72 1 kg (159 lb)   09/14/22 72 1 kg (159 lb)   09/14/22 71 7 kg (158 lb)       GEN:  Well appearing, not in acute distress      Eyes: no stare or proptosis, EOMI  Neck: trachea midline, thyroid NT to palpation, nl in size, no nodules or neck masses noted, no cervical LAD  CV; heart reg rate s1s2, bradycardia  Resp: CTAB, good effort  Ab+BS  Skin: warm and dry, no palmar erythema  Ext:  no edema bilaterally  Psych: nl mood and affect, no gross lapses in memory    DATA:  Labs:       No results found for: TSH, FREET4, TSI  Lab Results   Component Value Date    SODIUM 141 07/08/2022    K 4 0 07/08/2022     07/08/2022    CO2 30 07/08/2022    BUN 20 07/08/2022    CREATININE 1 08 07/08/2022    GLUC 126 01/18/2022    CALCIUM 9 6 07/08/2022      Lab Results   Component Value Date     3 (H) 04/26/2022    CALCIUM 9 6 07/08/2022    PHOS 3 9 04/26/2022      No results found for: LABPROT   25-oh D      Radiology           Impression:  1  Hyperparathyroidism ([address] )    2  Fatigue, unspecified type    3  Vitamin D deficiency, unspecified    4  Impaired fasting glucose           Plan:    Diagnoses and all orders for this visit:    Hyperparathyroidism ([address] )  -     PTH, intact Lab [facility]; Future  -     Basic metabolic panel Lab Collect; Future  -     Albumin Lab Collect; Future  -     DXA bone density spine hip and pelvis; Future  -     Calcium, ionized; Future    Fatigue, unspecified type  -     Ambulatory Referral to Endocrinology    Vitamin D deficiency, unspecified  -     Vitamin D 25 hydroxy Lab Collect; Future    Impaired fasting glucose  -     HEMOGLOBIN A1C W/ EAG ESTIMATION Lab Collect; Future      1  Hyperparathyroidism  Likely secondary hyperparathyroidism from vitamin-D deficiency with undermining CKD  Primary hyperparathyroidism also a possible differential  Recommend starting on vitamin-D supplementation 2000 units daily  Recheck vitamin D, ionized calcium, PTH levels in 3 months  If ionized calcium, PTH comes back elevated, will check 24 hour urinary calcium  Check DEXA scan to rule out osteopenia/osteoporosis   on avoiding risk (low lying rugs, handrails, bathtubs, high beds, etc)  Office follow-up in 4 months  2  Vitamin-D deficiency  Low vitamin-D 24 (01/2022) improved to 39 (04/2022)  Start on vitamin-D 2000 units daily  Recheck vitamin D level in 3 months  3  Impaired fasting glucose  Check A1c      Discussed with the patient and all questioned fully answered   She will call me if any problems arise

## 2022-09-21 NOTE — PATIENT INSTRUCTIONS
Please start taking Vit D 2000 units daily  Check labwork including PTH, Vit D, ionized calcium, HbA1c prior to next office appointment in 4 months

## 2022-09-21 NOTE — PROGRESS NOTES
WOUND CHECK    Physical Exam:   Anatomic Location Affected:  Left Parietal Scalp   Description of wound: Pink scar like changes with some adherent crusting    Closure Type: Second Intention    Additional History of Present Condition:  S/P Mohs Procedure on 06/28/2022, Sutures were removed on 07/11/2022  Pt with hx of Lichen sclerosis extra-genital, concerned that this could be what is forming in this area as she has a hx of extragenital LS formign in areas of trauma  No treatments tried  Assessment and Plan:  Based on a thorough discussion of this condition and the management approach to it (including a comprehensive discussion of the known risks, side effects and potential benefits of treatment), the patient (family) agrees to implement the following specific plan:   Discussed DDx including extragenital LSetA vs recurrent of skin cancer and possible re-bx of area vs trial of clobetasol for several weeks  Pt would like to hold off bx for now and trial clobetasol  Rx sent for clobetasol solution- to use BID for 2-3 weeks  Will plan to follow up after this time   Follow-Up in 3-4 weeks, if needed we will biopsy at this time                 Scribe Attestation    I,:  Heather Yeager RN am acting as a scribe while in the presence of the attending physician :       I,:  Annalee Bobo MD personally performed the services described in this documentation    as scribed in my presence :

## 2022-09-23 ENCOUNTER — TELEPHONE (OUTPATIENT)
Dept: OBGYN CLINIC | Facility: CLINIC | Age: 67
End: 2022-09-23

## 2022-09-23 ENCOUNTER — APPOINTMENT (OUTPATIENT)
Dept: LAB | Facility: CLINIC | Age: 67
End: 2022-09-23
Payer: MEDICARE

## 2022-09-23 DIAGNOSIS — N18.9 CKD (CHRONIC KIDNEY DISEASE): ICD-10-CM

## 2022-09-23 DIAGNOSIS — I10 PRIMARY HYPERTENSION: ICD-10-CM

## 2022-09-23 DIAGNOSIS — N18.31 CHRONIC RENAL FAILURE, STAGE 3A (HCC): ICD-10-CM

## 2022-09-23 LAB
25(OH)D3 SERPL-MCNC: 34.4 NG/ML (ref 30–100)
ANION GAP SERPL CALCULATED.3IONS-SCNC: 8 MMOL/L (ref 4–13)
BACTERIA UR QL AUTO: ABNORMAL /HPF
BASOPHILS # BLD AUTO: 0.06 THOUSANDS/ΜL (ref 0–0.1)
BASOPHILS NFR BLD AUTO: 1 % (ref 0–1)
BILIRUB UR QL STRIP: NEGATIVE
BUN SERPL-MCNC: 18 MG/DL (ref 5–25)
CALCIUM SERPL-MCNC: 9.7 MG/DL (ref 8.3–10.1)
CHLORIDE SERPL-SCNC: 105 MMOL/L (ref 96–108)
CLARITY UR: CLEAR
CO2 SERPL-SCNC: 26 MMOL/L (ref 21–32)
COLOR UR: ABNORMAL
CREAT SERPL-MCNC: 1.08 MG/DL (ref 0.6–1.3)
CREAT UR-MCNC: 155 MG/DL
EOSINOPHIL # BLD AUTO: 0.41 THOUSAND/ΜL (ref 0–0.61)
EOSINOPHIL NFR BLD AUTO: 6 % (ref 0–6)
ERYTHROCYTE [DISTWIDTH] IN BLOOD BY AUTOMATED COUNT: 12.7 % (ref 11.6–15.1)
GFR SERPL CREATININE-BSD FRML MDRD: 53 ML/MIN/1.73SQ M
GLUCOSE P FAST SERPL-MCNC: 94 MG/DL (ref 65–99)
GLUCOSE UR STRIP-MCNC: NEGATIVE MG/DL
HCT VFR BLD AUTO: 39.7 % (ref 34.8–46.1)
HGB BLD-MCNC: 13.1 G/DL (ref 11.5–15.4)
HGB UR QL STRIP.AUTO: NEGATIVE
HYALINE CASTS #/AREA URNS LPF: ABNORMAL /LPF
IMM GRANULOCYTES # BLD AUTO: 0.02 THOUSAND/UL (ref 0–0.2)
IMM GRANULOCYTES NFR BLD AUTO: 0 % (ref 0–2)
KETONES UR STRIP-MCNC: NEGATIVE MG/DL
LEUKOCYTE ESTERASE UR QL STRIP: NEGATIVE
LYMPHOCYTES # BLD AUTO: 2.5 THOUSANDS/ΜL (ref 0.6–4.47)
LYMPHOCYTES NFR BLD AUTO: 34 % (ref 14–44)
MCH RBC QN AUTO: 30 PG (ref 26.8–34.3)
MCHC RBC AUTO-ENTMCNC: 33 G/DL (ref 31.4–37.4)
MCV RBC AUTO: 91 FL (ref 82–98)
MONOCYTES # BLD AUTO: 0.55 THOUSAND/ΜL (ref 0.17–1.22)
MONOCYTES NFR BLD AUTO: 8 % (ref 4–12)
MUCOUS THREADS UR QL AUTO: ABNORMAL
NEUTROPHILS # BLD AUTO: 3.77 THOUSANDS/ΜL (ref 1.85–7.62)
NEUTS SEG NFR BLD AUTO: 51 % (ref 43–75)
NITRITE UR QL STRIP: NEGATIVE
NON-SQ EPI CELLS URNS QL MICRO: ABNORMAL /HPF
NRBC BLD AUTO-RTO: 0 /100 WBCS
PH UR STRIP.AUTO: 7 [PH]
PHOSPHATE SERPL-MCNC: 3.7 MG/DL (ref 2.3–4.1)
PLATELET # BLD AUTO: 268 THOUSANDS/UL (ref 149–390)
PMV BLD AUTO: 10.6 FL (ref 8.9–12.7)
POTASSIUM SERPL-SCNC: 3.9 MMOL/L (ref 3.5–5.3)
PROT UR STRIP-MCNC: ABNORMAL MG/DL
PROT UR-MCNC: 11 MG/DL
PROT/CREAT UR: 0.07 MG/G{CREAT} (ref 0–0.1)
PTH-INTACT SERPL-MCNC: 75.9 PG/ML (ref 18.4–80.1)
RBC # BLD AUTO: 4.36 MILLION/UL (ref 3.81–5.12)
RBC #/AREA URNS AUTO: ABNORMAL /HPF
SODIUM SERPL-SCNC: 139 MMOL/L (ref 135–147)
SP GR UR STRIP.AUTO: 1.02 (ref 1–1.03)
UROBILINOGEN UR STRIP-ACNC: <2 MG/DL
WBC # BLD AUTO: 7.31 THOUSAND/UL (ref 4.31–10.16)
WBC #/AREA URNS AUTO: ABNORMAL /HPF

## 2022-09-23 PROCEDURE — 85025 COMPLETE CBC W/AUTO DIFF WBC: CPT

## 2022-09-23 PROCEDURE — 82306 VITAMIN D 25 HYDROXY: CPT

## 2022-09-23 PROCEDURE — 82570 ASSAY OF URINE CREATININE: CPT

## 2022-09-23 PROCEDURE — 80048 BASIC METABOLIC PNL TOTAL CA: CPT

## 2022-09-23 PROCEDURE — 83970 ASSAY OF PARATHORMONE: CPT

## 2022-09-23 PROCEDURE — 84100 ASSAY OF PHOSPHORUS: CPT

## 2022-09-23 PROCEDURE — 84156 ASSAY OF PROTEIN URINE: CPT

## 2022-09-23 PROCEDURE — 36415 COLL VENOUS BLD VENIPUNCTURE: CPT

## 2022-09-23 PROCEDURE — 81001 URINALYSIS AUTO W/SCOPE: CPT

## 2022-09-23 NOTE — TELEPHONE ENCOUNTER
Lm for pt we got notified her vagifem is no longer covered, advised pt to call her ins to check on this

## 2022-09-26 ENCOUNTER — CONSULT (OUTPATIENT)
Dept: CARDIOLOGY CLINIC | Facility: CLINIC | Age: 67
End: 2022-09-26
Payer: MEDICARE

## 2022-09-26 VITALS
HEIGHT: 64 IN | RESPIRATION RATE: 18 BRPM | HEART RATE: 54 BPM | DIASTOLIC BLOOD PRESSURE: 82 MMHG | BODY MASS INDEX: 26.63 KG/M2 | OXYGEN SATURATION: 97 % | WEIGHT: 156 LBS | SYSTOLIC BLOOD PRESSURE: 130 MMHG

## 2022-09-26 DIAGNOSIS — R94.31 ABNORMAL EKG: ICD-10-CM

## 2022-09-26 DIAGNOSIS — I10 PRIMARY HYPERTENSION: ICD-10-CM

## 2022-09-26 DIAGNOSIS — R06.02 SHORTNESS OF BREATH: Primary | ICD-10-CM

## 2022-09-26 DIAGNOSIS — N18.9 CHRONIC RENAL FAILURE, UNSPECIFIED CKD STAGE: ICD-10-CM

## 2022-09-26 DIAGNOSIS — R00.1 BRADYCARDIA: ICD-10-CM

## 2022-09-26 PROCEDURE — 93000 ELECTROCARDIOGRAM COMPLETE: CPT | Performed by: INTERNAL MEDICINE

## 2022-09-26 PROCEDURE — 99204 OFFICE O/P NEW MOD 45 MIN: CPT | Performed by: INTERNAL MEDICINE

## 2022-09-26 NOTE — PROGRESS NOTES
Cardiology Consultation     [facility]  [phone]  1955  [address] [place] [address]    She is referred  from her primary care doctor primary care doc for bradycardia which was noted on physical exam   She has a past medical history of hypertension well treated with atenolol and hctz, seasonal,  allergies,  colitis, depression even requiring ECT in the past,  fibromyalgia and lichen sclerosis  She complains of shortness of breath and dyspnea on exertion  She does get  Tiredness, wooziness and lightheadedness  She has not had any syncope  She thinks she may have had an have had an abnormal EKG  after receiving  a shock for depression, ECT, electroconvulsive therapy  Her father  her father  at 35 of CKD at 35 of CKD and mother has no heart disease  Otherwise she is feeling well  1  Shortness of breath  NM myocardial perfusion spect (stress and/or rest)    Echo complete w/ contrast if indicated   2  Bradycardia  Ambulatory Referral to Cardiology    POCT ECG    AMB extended holter monitor   3  Primary hypertension     4  Chronic renal failure, unspecified CKD stage     5   Abnormal EKG  NM myocardial perfusion spect (stress and/or rest)     Patient Active Problem List   Diagnosis    Microscopic colitis    Fibromyalgia    Chronic renal failure    Primary hypertension    Lichen sclerosus    Articular cartilage disorder of hand    Acquired trigger finger of right ring finger    Osteoarthritis of carpometacarpal (CMC) joint of both thumbs    Mood disorder (HCC)    NANDA [facility]) ([facility] [address] )    Unspecified primary angle-closure glaucoma, stage unspecified    Thoracic outlet syndrome associated with cervical rib    Meniere disease     Past Medical History:   Diagnosis Date    Basal cell carcinoma (BCC) of parietal region of scalp 2022    Left parietal scalp    [facility]     Bipolar 1 disorder, depressed ([facility] [address] ) 01/24/1975    Chronic kidney disease     Depression     H/O bladder infections     Hyperlipidemia     Hypertension     Lichen sclerosus     Microscopic colitis     Skin disorder     lichens sclerosis    Urinary tract infection      Social History     Socioeconomic History    Marital status: Single     Spouse name: Not on file    Number of children: Not on file    Years of education: Not on file    Highest education level: Not on file   Occupational History    Not on file   Tobacco Use    Smoking status: Former Smoker    Smokeless tobacco: Never Used    Tobacco comment: heavy smoker previously   Vaping Use    Vaping Use: Never used   Substance and Sexual Activity    Alcohol use: Yes     Comment: socially    Drug use: Never    Sexual activity: Not Currently     Partners: Male   Other Topics Concern    Not on file   Social History Narrative    Not on file     Social Determinants of Health     Financial Resource Strain: Not on file   Food Insecurity: Not on file   Transportation Needs: Not on file   Physical Activity: Not on file   Stress: Not on file   Social Connections: Not on file   Intimate Partner Violence: Not on file   Housing Stability: Not on file      Family History   Problem Relation Age of Onset    Squamous cell carcinoma Mother     Kidney disease Father     Diabetic kidney disease Father     No Known Problems Son     No Known Problems Half-Sister      Past Surgical History:   Procedure Laterality Date    COLONOSCOPY      2013     MOHS SURGERY Left 06/28/2022    Left parietal scalp       Current Outpatient Medications:     atenolol (TENORMIN) 25 mg tablet, atenolol 25 mg tabs, Disp: , Rfl:     atorvastatin (LIPITOR) 20 mg tablet, 10 mg Currently taking 10 mg, Disp: , Rfl:     cholecalciferol (VITAMIN D3) 1,000 units tablet, Take 2,000 Units by mouth in the morning , Disp: , Rfl:   clobetasol (TEMOVATE) 0 05 % cream, clobetasol 0 05 % topical cream, Disp: , Rfl:     clobetasol (TEMOVATE) 0 05 % external solution, Apply topically 2 (two) times a day, Disp: 50 mL, Rfl: 0    clonazePAM (KlonoPIN) 0 5 mg tablet, TAKE 1 TABLET BY MOUTH 2 TIMES A DAY   (Patient taking differently: Currently once daily, extra half if needed for depression), Disp: 60 tablet, Rfl: 0    clotrimazole-betamethasone (LOTRISONE) 1-0 05 % cream, Apply topically 2 (two) times a day, Disp: 45 g, Rfl: 1    cyclobenzaprine (FLEXERIL) 10 mg tablet, as needed  , Disp: , Rfl:     estradiol (VAGIFEM, YUVAFEM) 10 MCG TABS vaginal tablet, Insert 1 tablet (10 mcg total) into the vagina 3 (three) times a week, Disp: 12 tablet, Rfl: 12    hydrochlorothiazide (HYDRODIURIL) 12 5 mg tablet, Take 1 tablet (12 5 mg total) by mouth daily, Disp: 60 tablet, Rfl: 2    loperamide (IMODIUM A-D) 2 MG tablet, Take 2 mg by mouth daily, Disp: , Rfl:     mesalamine (APRISO) 0 375 g 24 hr capsule, TAKE 2 CAPSULES BY MOUTH EVERY DAY, Disp: 60 capsule, Rfl: 0    Specialty Vitamins Products (VITAMINS FOR THE HAIR PO), Take by mouth SUGAR BEAR HAIR VITAMIN, Disp: , Rfl:     zolpidem (AMBIEN) 10 mg tablet, Take 1 tablet (10 mg total) by mouth daily at bedtime as needed (insomnia), Disp: 30 tablet, Rfl: 0    albuterol (Ventolin HFA) 90 mcg/act inhaler, Inhale 2 puffs every 6 (six) hours as needed for wheezing (Patient not taking: Reported on 9/26/2022), Disp: 18 g, Rfl: 5    montelukast (SINGULAIR) 10 mg tablet, TAKE 1 TABLET BY MOUTH EVERYDAY AT BEDTIME (Patient not taking: Reported on 9/26/2022), Disp: 30 tablet, Rfl: 1  Allergies   Allergen Reactions    Levofloxacin Other (See Comments)    Cephalexin Diarrhea    Nitrofurantoin Fever    Sulfa Antibiotics Fever    Topiramate Other (See Comments)    Bacitracin-Neomycin-Polymyxin Rash    Sulfamethoxazole-Trimethoprim Diarrhea     Vitals:    09/26/22 0822   BP: 130/82   BP Location: Left arm   Patient Position: Sitting   Cuff Size: Standard   Pulse: (!) 54   Resp: 18   SpO2: 97%   Weight: 70 8 kg (156 lb)   Height: 5' 4" (1 626 m)       Labs:  Appointment on 09/23/2022   Component Date Value    PTH 09/23/2022 75 9     Color, UA 09/23/2022 Light Yellow     Clarity, UA 09/23/2022 Clear     Specific Gravity, UA 09/23/2022 1 020     pH, UA 09/23/2022 7 0     Leukocytes, UA 09/23/2022 Negative     Nitrite, UA 09/23/2022 Negative     Protein, UA 09/23/2022 Trace (A)    Glucose, UA 09/23/2022 Negative     Ketones, UA 09/23/2022 Negative     Urobilinogen, UA 09/23/2022 <2 0     Bilirubin, UA 09/23/2022 Negative     Occult Blood, UA 09/23/2022 Negative     Vit D, 25-Hydroxy 09/23/2022 34 4     Sodium 09/23/2022 139     Potassium 09/23/2022 3 9     Chloride 09/23/2022 105     CO2 09/23/2022 26     ANION GAP 09/23/2022 8     BUN 09/23/2022 18     Creatinine 09/23/2022 1 08     Glucose, Fasting 09/23/2022 94     Calcium 09/23/2022 9 7     eGFR 09/23/2022 53     WBC 09/23/2022 7 31     RBC 09/23/2022 4 36     Hemoglobin 09/23/2022 13 1     Hematocrit 09/23/2022 39 7     MCV 09/23/2022 91     MCH 09/23/2022 30 0     MCHC 09/23/2022 33 0     RDW 09/23/2022 12 7     MPV 09/23/2022 10 6     Platelets 61/69/3251 268     nRBC 09/23/2022 0     Neutrophils Relative 09/23/2022 51     Immat GRANS % 09/23/2022 0     Lymphocytes Relative 09/23/2022 34     Monocytes Relative 09/23/2022 8     Eosinophils Relative 09/23/2022 6     Basophils Relative 09/23/2022 1     Neutrophils Absolute 09/23/2022 3 77     Immature Grans Absolute 09/23/2022 0 02     Lymphocytes Absolute 09/23/2022 2 50     Monocytes Absolute 09/23/2022 0 55     Eosinophils Absolute 09/23/2022 0 41     Basophils Absolute 09/23/2022 0 06     Phosphorus 09/23/2022 3 7     Creatinine, Ur 09/23/2022 155 0     Protein Urine Random 09/23/2022 11     Prot/Creat Ratio, Ur 09/23/2022 0 07     RBC, UA 09/23/2022 1-2  WBC, UA 09/23/2022 2-4 (A)    Epithelial Cells 09/23/2022 Moderate (A)    Bacteria, UA 09/23/2022 Occasional     MUCUS THREADS 09/23/2022 Occasional (A)    Hyaline Casts, UA 09/23/2022 5-10 (A)     Imaging: No results found  Review of Systems:  Review of Systems   Constitutional:  No fever or chills  Cardiac:  No chest pain, positive shortness of breath  Respiratory:    No coughing, hemoptysis, wheezing  Abdominal:  No nausea, vomiting, abdominal discomfort  urinary:  No hematuria  Extremities:  No swelling nor edema  Physical Exam:  Physical Exam   HEENT:  Unremarkable  No JVD  Lungs:  Clear  Cardiac:  Regular rate and rhythm with no murmurs rubs nor gallops  Abdomen:  Soft, nontender, no rebound, normal bowel sounds  Extremities:  No clubbing cyanosis nor edema  Neuro:  Grossly nonfocal   Psych:  Alert and oriented x3      Discussion/Summary:  She presents with symptoms of shortness of breath, dyspnea on exertion and some tiredness and lightheadedness  She is found to be bradycardic in the 50s today on atenolol  She has an abnormal EKG with anterior T-wave inversions  Given her symptoms, possible anginal equivalent and bradycardia I would like to rule out coronary artery disease with a nuclear stress test and an echocardiogram to assess for LV function and valvular disease  Given her bradycardia we will check a 2 week ZIO cardiac monitor  Further management based on the results of the studies  For now her blood pressure is well controlled on her current regimen which she has been on for many years so I will not make any changes currently until the studies are back  She will see me back in 6 weeks for review of the studies  Many thanks for this consultation

## 2022-09-28 DIAGNOSIS — N30.00 ACUTE CYSTITIS WITHOUT HEMATURIA: Primary | ICD-10-CM

## 2022-09-28 RX ORDER — AMOXICILLIN AND CLAVULANATE POTASSIUM 500; 125 MG/1; MG/1
1 TABLET, FILM COATED ORAL EVERY 12 HOURS SCHEDULED
Qty: 14 TABLET | Refills: 0 | Status: SHIPPED | OUTPATIENT
Start: 2022-09-28 | End: 2022-10-05

## 2022-10-03 ENCOUNTER — OFFICE VISIT (OUTPATIENT)
Dept: NEPHROLOGY | Facility: CLINIC | Age: 67
End: 2022-10-03
Payer: COMMERCIAL

## 2022-10-03 VITALS
OXYGEN SATURATION: 97 % | HEART RATE: 67 BPM | BODY MASS INDEX: 27.35 KG/M2 | TEMPERATURE: 97.5 F | WEIGHT: 160.2 LBS | RESPIRATION RATE: 16 BRPM | SYSTOLIC BLOOD PRESSURE: 130 MMHG | HEIGHT: 64 IN | DIASTOLIC BLOOD PRESSURE: 80 MMHG

## 2022-10-03 DIAGNOSIS — N18.31 CHRONIC RENAL FAILURE, STAGE 3A (HCC): Primary | ICD-10-CM

## 2022-10-03 DIAGNOSIS — K52.839 MICROSCOPIC COLITIS, UNSPECIFIED MICROSCOPIC COLITIS TYPE: ICD-10-CM

## 2022-10-03 DIAGNOSIS — I10 PRIMARY HYPERTENSION: ICD-10-CM

## 2022-10-03 DIAGNOSIS — M18.0 OSTEOARTHRITIS OF CARPOMETACARPAL (CMC) JOINT OF BOTH THUMBS: ICD-10-CM

## 2022-10-03 PROCEDURE — 99214 OFFICE O/P EST MOD 30 MIN: CPT | Performed by: INTERNAL MEDICINE

## 2022-10-03 NOTE — ASSESSMENT & PLAN NOTE
Lab Results   Component Value Date    EGFR 53 09/23/2022    EGFR 54 07/08/2022    EGFR 42 04/26/2022    CREATININE 1 08 09/23/2022    CREATININE 1 08 07/08/2022    CREATININE 1 33 (H) 04/26/2022   Renal function is quite stable at this point    Advised hydration and avoid any nephrotoxic medication

## 2022-10-03 NOTE — PROGRESS NOTES
NEPHROLOGY OFFICE FOLLOW UP  Mary Carbajal 74 y o  female MRN: [id number]    Encounter: [id number] 10/3/2022    REASON FOR VISIT: Mary Carbajal is a 74 y o  female who is here on 10/3/2022 for Chronic Kidney Disease and Follow-up    HPI:    [facility] came in today for follow-up of CKD  [de-identified] woman with history of hypertension along with my course of the colitis who also has CKD     Patient also recurrent UTI     No other acute complaint     No chest pain no palpitation or shortness of breath     In between patient was quite bradycardic  She claims he was taking singular which she stop it now pulse is much better  At present she is being monitored by cardiologist      REVIEW OF SYSTEMS:    Review of Systems   Constitutional: Negative for activity change and fatigue  HENT: Negative for congestion and ear discharge  Eyes: Negative for photophobia and pain  Respiratory: Negative for apnea and choking  Cardiovascular: Negative for chest pain and palpitations  Gastrointestinal: Negative for abdominal distention and blood in stool  Endocrine: Negative for heat intolerance and polyphagia  Genitourinary: Negative for flank pain and urgency  Musculoskeletal: Negative for neck pain and neck stiffness  Skin: Negative for color change and wound  Allergic/Immunologic: Negative for food allergies and immunocompromised state  Neurological: Negative for seizures and facial asymmetry  Hematological: Negative for adenopathy  Does not bruise/bleed easily  Psychiatric/Behavioral: Negative for self-injury and suicidal ideas           PAST MEDICAL HISTORY:  Past Medical History:   Diagnosis Date    Basal cell carcinoma (BCC) of parietal region of scalp 06/13/2022    Left parietal scalp    [facility] disorder[facility] depressed ([facility] )     Bipolar 1 disorder, depressed ([facility] ) 01/24/1975    Chronic kidney disease     Depression     H/O bladder infections     Hyperlipidemia     Hypertension     Lichen sclerosus     Microscopic colitis     Skin disorder     lichens sclerosis    Urinary tract infection        PAST SURGICAL HISTORY:  Past Surgical History:   Procedure Laterality Date    COLONOSCOPY      2013     MOHS SURGERY Left 06/28/2022    Left parietal scalp       SOCIAL HISTORY:  Social History     Substance and Sexual Activity   Alcohol Use Yes    Comment: socially     Social History     Substance and Sexual Activity   Drug Use Never     Social History     Tobacco Use   Smoking Status Former Smoker   Smokeless Tobacco Never Used   Tobacco Comment    heavy smoker previously       FAMILY HISTORY:  Family History   Problem Relation Age of Onset    Squamous cell carcinoma Mother     Kidney disease Father     Diabetic kidney disease Father     No Known Problems Son     No Known Problems Half-Sister        MEDICATIONS:    Current Outpatient Medications:     amoxicillin-clavulanate (Augmentin) 500-125 mg per tablet, Take 1 tablet by mouth every 12 (twelve) hours for 7 days, Disp: 14 tablet, Rfl: 0    atenolol (TENORMIN) 25 mg tablet, atenolol 25 mg tabs, Disp: , Rfl:     atorvastatin (LIPITOR) 20 mg tablet, 10 mg Currently taking 10 mg, Disp: , Rfl:     cholecalciferol (VITAMIN D3) 1,000 units tablet, Take 2,000 Units by mouth 2 (two) times a day, Disp: , Rfl:     clobetasol (TEMOVATE) 0 05 % cream, clobetasol 0 05 % topical cream, Disp: , Rfl:     clobetasol (TEMOVATE) 0 05 % external solution, Apply topically 2 (two) times a day, Disp: 50 mL, Rfl: 0    clonazePAM (KlonoPIN) 0 5 mg tablet, TAKE 1 TABLET BY MOUTH 2 TIMES A DAY   (Patient taking differently: Currently once daily, extra half if needed for depression), Disp: 60 tablet, Rfl: 0    cyclobenzaprine (FLEXERIL) 10 mg tablet, as needed  , Disp: , Rfl:     estradiol (VAGIFEM, YUVAFEM) 10 MCG TABS vaginal tablet, Insert 1 tablet (10 mcg total) into the vagina 3 (three) times a week, Disp: 12 tablet, Rfl: 12    hydrochlorothiazide (HYDRODIURIL) 12 5 mg tablet, Take 1 tablet (12 5 mg total) by mouth daily, Disp: 60 tablet, Rfl: 2    loperamide (IMODIUM A-D) 2 MG tablet, Take 2 mg by mouth daily, Disp: , Rfl:     mesalamine (APRISO) 0 375 g 24 hr capsule, TAKE 2 CAPSULES BY MOUTH EVERY DAY, Disp: 60 capsule, Rfl: 0    Specialty Vitamins Products (VITAMINS FOR THE HAIR PO), Take by mouth SUGAR BEAR HAIR VITAMIN, Disp: , Rfl:     zolpidem (AMBIEN) 10 mg tablet, Take 1 tablet (10 mg total) by mouth daily at bedtime as needed (insomnia), Disp: 30 tablet, Rfl: 0    albuterol (Ventolin HFA) 90 mcg/act inhaler, Inhale 2 puffs every 6 (six) hours as needed for wheezing (Patient not taking: Reported on 10/3/2022), Disp: 18 g, Rfl: 5    PHYSICAL EXAM:  Vitals:    10/03/22 1301   BP: 130/80   BP Location: Right arm   Patient Position: Sitting   Pulse: 67   Resp: 16   Temp: 97 5 °F (36 4 °C)   TempSrc: Temporal   SpO2: 97%   Weight: 72 7 kg (160 lb 3 2 oz)   Height: 5' 4" (1 626 m)     Body mass index is 27 5 kg/m²  Physical Exam  Constitutional:       General: She is not in acute distress  Appearance: She is well-developed  HENT:      Head: Normocephalic  Eyes:      General: No scleral icterus  Conjunctiva/sclera: Conjunctivae normal    Neck:      Vascular: No JVD  Cardiovascular:      Rate and Rhythm: Normal rate  Heart sounds: Normal heart sounds  Pulmonary:      Effort: Pulmonary effort is normal       Breath sounds: Normal breath sounds  No wheezing  Abdominal:      General: Bowel sounds are normal       Palpations: Abdomen is soft  Tenderness: There is no abdominal tenderness  Musculoskeletal:         General: Normal range of motion  Cervical back: Neck supple  Skin:     General: Skin is warm  Coloration: Skin is not jaundiced  Findings: No rash  Neurological:      General: No focal deficit present  Mental Status: She is alert and oriented to person, place, and time     Psychiatric: Behavior: Behavior normal          LAB RESULTS:  Results for orders placed or performed in visit on 09/23/22   PTH, intact   Result Value Ref Range    PTH 75 9 18 4 - 80 1 pg/mL   UA (URINE) with reflex to Scope   Result Value Ref Range    Color, UA Light Yellow     Clarity, UA Clear     Specific Gravity, UA 1 020 1 003 - 1 030    pH, UA 7 0 4 5, 5 0, 5 5, 6 0, 6 5, 7 0, 7 5, 8 0    Leukocytes, UA Negative Negative    Nitrite, UA Negative Negative    Protein, UA Trace (A) Negative mg/dl    Glucose, UA Negative Negative mg/dl    Ketones, UA Negative Negative mg/dl    Urobilinogen, UA <2 0 <2 0 mg/dl mg/dl    Bilirubin, UA Negative Negative    Occult Blood, UA Negative Negative   Vitamin D 25 hydroxy   Result Value Ref Range    Vit D, 25-Hydroxy 34 4 30 0 - 100 0 ng/mL   Basic metabolic panel   Result Value Ref Range    Sodium 139 135 - 147 mmol/L    Potassium 3 9 3 5 - 5 3 mmol/L    Chloride 105 96 - 108 mmol/L    CO2 26 21 - 32 mmol/L    ANION GAP 8 4 - 13 mmol/L    BUN 18 5 - 25 mg/dL    Creatinine 1 08 0 60 - 1 30 mg/dL    Glucose, Fasting 94 65 - 99 mg/dL    Calcium 9 7 8 3 - 10 1 mg/dL    eGFR 53 ml/min/1 73sq m   CBC and differential   Result Value Ref Range    WBC 7 31 4 31 - 10 16 Thousand/uL    RBC 4 36 3 81 - 5 12 Million/uL    Hemoglobin 13 1 11 5 - 15 4 g/dL    Hematocrit 39 7 34 8 - 46 1 %    MCV 91 82 - 98 fL    MCH 30 0 26 8 - 34 3 pg    MCHC 33 0 31 4 - 37 4 g/dL    RDW 12 7 11 6 - 15 1 %    MPV 10 6 8 9 - 12 7 fL    Platelets 011 098 - 376 Thousands/uL    nRBC 0 /100 WBCs    Neutrophils Relative 51 43 - 75 %    Immat GRANS % 0 0 - 2 %    Lymphocytes Relative 34 14 - 44 %    Monocytes Relative 8 4 - 12 %    Eosinophils Relative 6 0 - 6 %    Basophils Relative 1 0 - 1 %    Neutrophils Absolute 3 77 1 85 - 7 62 Thousands/µL    Immature Grans Absolute 0 02 0 00 - 0 20 Thousand/uL    Lymphocytes Absolute 2 50 0 60 - 4 47 Thousands/µL    Monocytes Absolute 0 55 0 17 - 1 22 Thousand/µL    Eosinophils Absolute 0 41 0 00 - 0 61 Thousand/µL    Basophils Absolute 0 06 0 00 - 0 10 Thousands/µL   Phosphorus   Result Value Ref Range    Phosphorus 3 7 2 3 - 4 1 mg/dL   Protein / creatinine ratio, urine   Result Value Ref Range    Creatinine, Ur 155 0 mg/dL    Protein Urine Random 11 mg/dL    Prot/Creat Ratio, Ur 0 07 0 00 - 0 10   Urine Microscopic   Result Value Ref Range    RBC, UA 1-2 None Seen, 1-2 /hpf    WBC, UA 2-4 (A) None Seen, 1-2 /hpf    Epithelial Cells Moderate (A) None Seen, Occasional /hpf    Bacteria, UA Occasional None Seen, Occasional /hpf    MUCUS THREADS Occasional (A) None Seen    Hyaline Casts, UA 5-10 (A) None Seen /lpf       ASSESSMENT and PLAN:      Chronic renal failure, stage 3a (HCC)  Lab Results   Component Value Date    EGFR 53 09/23/2022    EGFR 54 07/08/2022    EGFR 42 04/26/2022    CREATININE 1 08 09/23/2022    CREATININE 1 08 07/08/2022    CREATININE 1 33 (H) 04/26/2022   Renal function is quite stable at this point  Advised hydration and avoid any nephrotoxic medication    Primary hypertension  Very well controlled with present medication which I will continue    Microscopic colitis  Stable  On medication being monitored by GI  Advised hydration during acute attack      Everything discussed length with the patient  I will see her back in 6 months  Will get blood and urine test before that visit        Portions of the record may have been created with voice recognition software  Occasional wrong word or "sound a like" substitutions may have occurred due to the inherent limitations of voice recognition software  Read the chart carefully and recognize, using context, where substitutions have occurred  If you have any questions, please contact the dictating provider

## 2022-10-04 ENCOUNTER — TELEPHONE (OUTPATIENT)
Dept: GASTROENTEROLOGY | Facility: CLINIC | Age: 67
End: 2022-10-04

## 2022-10-04 DIAGNOSIS — I10 PRIMARY HYPERTENSION: Primary | ICD-10-CM

## 2022-10-04 RX ORDER — ATENOLOL 25 MG/1
25 TABLET ORAL DAILY
Qty: 90 TABLET | Refills: 1 | Status: SHIPPED | OUTPATIENT
Start: 2022-10-04 | End: 2022-10-21

## 2022-10-04 NOTE — TELEPHONE ENCOUNTER
Patient stopped at the office 10/3 to let us know that the medication Mesalamine is not being covered by her [employer] and its to expensive   After talking and letting her know we can let Ines Neither know maybe she has an alternative Patient decided she is going to call her insurance and tell them she really needs this  And she wants to see if they will do anything and will call us if she needs anything

## 2022-10-11 ENCOUNTER — TELEPHONE (OUTPATIENT)
Dept: OBGYN CLINIC | Facility: CLINIC | Age: 67
End: 2022-10-11

## 2022-10-11 DIAGNOSIS — N95.2 ATROPHIC VAGINITIS: Primary | ICD-10-CM

## 2022-10-11 RX ORDER — ESTRADIOL 0.1 MG/G
CREAM VAGINAL
Qty: 42.5 G | Refills: 1 | Status: SHIPPED | OUTPATIENT
Start: 2022-10-11 | End: 2022-12-06

## 2022-10-12 ENCOUNTER — TELEPHONE (OUTPATIENT)
Dept: DERMATOLOGY | Facility: CLINIC | Age: 67
End: 2022-10-12

## 2022-10-12 DIAGNOSIS — L90.0 LICHEN SCLEROSUS ET ATROPHICUS: Primary | ICD-10-CM

## 2022-10-12 NOTE — TELEPHONE ENCOUNTER
Rec'd Denial for Clobetasol 0 05% Solution as Non-formulary    Scanned docs and sent to Dr Otoniel Macias and [facility]

## 2022-10-13 RX ORDER — BETAMETHASONE DIPROPIONATE 0.05 %
OINTMENT (GRAM) TOPICAL 2 TIMES DAILY
Qty: 50 G | Refills: 3 | Status: SHIPPED | OUTPATIENT
Start: 2022-10-13 | End: 2022-10-13

## 2022-10-13 RX ORDER — BETAMETHASONE DIPROPIONATE 0.5 MG/G
LOTION TOPICAL 2 TIMES DAILY
Qty: 60 ML | Refills: 1 | Status: SHIPPED | OUTPATIENT
Start: 2022-10-13

## 2022-10-17 ENCOUNTER — CLINICAL SUPPORT (OUTPATIENT)
Dept: CARDIOLOGY CLINIC | Facility: CLINIC | Age: 67
End: 2022-10-17
Payer: MEDICARE

## 2022-10-17 DIAGNOSIS — R00.1 BRADYCARDIA: ICD-10-CM

## 2022-10-17 PROCEDURE — 93248 EXT ECG>7D<15D REV&INTERPJ: CPT | Performed by: INTERNAL MEDICINE

## 2022-10-19 ENCOUNTER — HOSPITAL ENCOUNTER (OUTPATIENT)
Dept: NON INVASIVE DIAGNOSTICS | Facility: CLINIC | Age: 67
Discharge: HOME | End: 2022-10-19
Payer: MEDICARE

## 2022-10-19 VITALS
SYSTOLIC BLOOD PRESSURE: 156 MMHG | DIASTOLIC BLOOD PRESSURE: 86 MMHG | OXYGEN SATURATION: 99 % | HEIGHT: 64 IN | WEIGHT: 160 LBS | HEART RATE: 45 BPM | BODY MASS INDEX: 27.31 KG/M2

## 2022-10-19 VITALS
WEIGHT: 160 LBS | BODY MASS INDEX: 27.31 KG/M2 | HEART RATE: 68 BPM | SYSTOLIC BLOOD PRESSURE: 130 MMHG | HEIGHT: 64 IN | DIASTOLIC BLOOD PRESSURE: 80 MMHG

## 2022-10-19 DIAGNOSIS — R94.31 ABNORMAL EKG: ICD-10-CM

## 2022-10-19 DIAGNOSIS — R06.02 SHORTNESS OF BREATH: ICD-10-CM

## 2022-10-19 LAB
AORTIC ROOT: 2.9 CM
APICAL FOUR CHAMBER EJECTION FRACTION: 71 %
AV LVOT PEAK GRADIENT: 10 MMHG
AV PEAK GRADIENT: 14 MMHG
BASELINE ST DEPRESSION: 0 MM
E WAVE DECELERATION TIME: 221 MS
FRACTIONAL SHORTENING: 35 % (ref 28–44)
INTERVENTRICULAR SEPTUM IN DIASTOLE (PARASTERNAL SHORT AXIS VIEW): 0.5 CM
INTERVENTRICULAR SEPTUM: 0.5 CM (ref 0.6–1.1)
LAAS-AP2: 10.6 CM2
LAAS-AP4: 14.6 CM2
LEFT ATRIUM AREA SYSTOLE SINGLE PLANE A4C: 14.9 CM2
LEFT ATRIUM SIZE: 2.8 CM
LEFT INTERNAL DIMENSION IN SYSTOLE: 2.6 CM (ref 2.1–4)
LEFT VENTRICULAR INTERNAL DIMENSION IN DIASTOLE: 4 CM (ref 3.5–6)
LEFT VENTRICULAR POSTERIOR WALL IN END DIASTOLE: 0.8 CM
LEFT VENTRICULAR STROKE VOLUME: 44 ML
LVSV (TEICH): 44 ML
MAX HR PERCENT: 88 %
MAX HR: 136 BPM
MV E'TISSUE VEL-LAT: 9 CM/S
MV PEAK A VEL: 0.98 M/S
MV PEAK E VEL: 107 CM/S
MV STENOSIS PRESSURE HALF TIME: 64 MS
MV VALVE AREA P 1/2 METHOD: 3.44 CM2
NUC STRESS DIASTOLIC VOLUME INDEX: 42 ML/M2
NUC STRESS EJECTION FRACTION: 70 %
NUC STRESS SYSTOLIC VOLUME INDEX: 7 ML/M2
RATE PRESSURE PRODUCT: NORMAL
RIGHT ATRIUM AREA SYSTOLE A4C: 13.6 CM2
RIGHT VENTRICLE ID DIMENSION: 3.1 CM
SL CV LEFT ATRIUM LENGTH A2C: 4.2 CM
SL CV LV EF: 60
SL CV PED ECHO LEFT VENTRICLE DIASTOLIC VOLUME (MOD BIPLANE) 2D: 68 ML
SL CV PED ECHO LEFT VENTRICLE SYSTOLIC VOLUME (MOD BIPLANE) 2D: 25 ML
SL CV REST NUCLEAR ISOTOPE DOSE: 10.84 MCI
SL CV STRESS NUCLEAR ISOTOPE DOSE: 30.9 MCI
SL CV STRESS RECOVERY BP: NORMAL MMHG
SL CV STRESS RECOVERY HR: 75 BPM
SL CV STRESS RECOVERY O2 SAT: 100 %
SL CV STRESS STAGE REACHED: 3
STRESS ANGINA INDEX: 0
STRESS BASELINE BP: NORMAL MMHG
STRESS BASELINE HR: 45 BPM
STRESS DUKE TREADMILL SCORE: 8
STRESS O2 SAT REST: 99 %
STRESS PEAK HR: 136 BPM
STRESS POST ESTIMATED WORKLOAD: 10.1 METS
STRESS POST EXERCISE DUR MIN: 7 MIN
STRESS POST EXERCISE DUR SEC: 30 SEC
STRESS POST O2 SAT PEAK: 100 %
STRESS POST PEAK BP: 216 MMHG
STRESS ST DEPRESSION: 0 MM
STRESS/REST PERFUSION RATIO: 1.03
TR MAX PG: 26 MMHG
TR PEAK VELOCITY: 2.5 M/S
TRICUSPID VALVE PEAK REGURGITATION VELOCITY: 2.54 M/S

## 2022-10-19 PROCEDURE — 78452 HT MUSCLE IMAGE SPECT MULT: CPT

## 2022-10-19 PROCEDURE — G1004 CDSM NDSC: HCPCS

## 2022-10-19 PROCEDURE — 93306 TTE W/DOPPLER COMPLETE: CPT

## 2022-10-19 PROCEDURE — 93017 CV STRESS TEST TRACING ONLY: CPT

## 2022-10-19 PROCEDURE — 78452 HT MUSCLE IMAGE SPECT MULT: CPT | Performed by: INTERNAL MEDICINE

## 2022-10-19 PROCEDURE — 93018 CV STRESS TEST I&R ONLY: CPT | Performed by: INTERNAL MEDICINE

## 2022-10-19 PROCEDURE — 93016 CV STRESS TEST SUPVJ ONLY: CPT | Performed by: INTERNAL MEDICINE

## 2022-10-19 PROCEDURE — 93306 TTE W/DOPPLER COMPLETE: CPT | Performed by: INTERNAL MEDICINE

## 2022-10-19 PROCEDURE — A9502 TC99M TETROFOSMIN: HCPCS

## 2022-10-19 RX ADMIN — REGADENOSON 0.4 MG: 0.08 INJECTION, SOLUTION INTRAVENOUS at 13:23

## 2022-10-20 ENCOUNTER — TELEPHONE (OUTPATIENT)
Dept: CARDIOLOGY CLINIC | Facility: CLINIC | Age: 67
End: 2022-10-20

## 2022-10-20 LAB
CHEST PAIN STATEMENT: NORMAL
MAX DIASTOLIC BP: 94 MMHG
MAX HEART RATE: 136 BPM
MAX PREDICTED HEART RATE: 155 BPM
MAX. SYSTOLIC BP: 216 MMHG
PROTOCOL NAME: NORMAL
TARGET HR FORMULA: NORMAL
TIME IN EXERCISE PHASE: NORMAL

## 2022-10-20 NOTE — TELEPHONE ENCOUNTER
Spoke to patient, relayed Dr Patrick Hdez response, patient verbally understood Nuclear and Echo results  Patient states that she would like to know her results for her 14 days heart monitor  Patient states that she wakes up the in the middle of the night panicking due to having  low heart rate  Patient wants to know if her medications needs to be changed from her heart rate being very low  Patient states that she has a appointment on 11/7/22

## 2022-10-20 NOTE — TELEPHONE ENCOUNTER
----- Message from Lobo Hawley MD sent at 10/19/2022  4:44 PM EDT -----  Regarding: stress test  Please call patient and tell her stress test and heart function are normal   No evidence for heart artery blockage    ----- Message -----  From: Jersey Peck MD  Sent: 10/19/2022   3:06 PM EDT  To: Lobo Hawley MD

## 2022-10-21 ENCOUNTER — TELEPHONE (OUTPATIENT)
Dept: CARDIOLOGY CLINIC | Facility: CLINIC | Age: 67
End: 2022-10-21

## 2022-10-21 DIAGNOSIS — I10 PRIMARY HYPERTENSION: ICD-10-CM

## 2022-10-21 RX ORDER — ATENOLOL 25 MG/1
12.5 TABLET ORAL DAILY
Qty: 90 TABLET | Refills: 0 | Status: SHIPPED | OUTPATIENT
Start: 2022-10-21 | End: 2022-11-09

## 2022-11-02 ENCOUNTER — TELEPHONE (OUTPATIENT)
Dept: PSYCHIATRY | Facility: CLINIC | Age: 67
End: 2022-11-02

## 2022-11-02 DIAGNOSIS — M54.9 ACUTE BACK PAIN, UNSPECIFIED BACK LOCATION, UNSPECIFIED BACK PAIN LATERALITY: Primary | ICD-10-CM

## 2022-11-02 RX ORDER — METHYLPREDNISOLONE 4 MG/1
TABLET ORAL
Qty: 21 EACH | Refills: 0 | Status: SHIPPED | OUTPATIENT
Start: 2022-11-02 | End: 2022-11-09

## 2022-11-02 NOTE — TELEPHONE ENCOUNTER
Called patient off talk therapy wait list to see if patient was still interested  Patient stated they were still interested in talk therapy  In person therapy

## 2022-11-09 ENCOUNTER — TELEPHONE (OUTPATIENT)
Dept: CARDIOLOGY CLINIC | Facility: CLINIC | Age: 67
End: 2022-11-09

## 2022-11-09 ENCOUNTER — OFFICE VISIT (OUTPATIENT)
Dept: FAMILY MEDICINE CLINIC | Facility: CLINIC | Age: 67
End: 2022-11-09

## 2022-11-09 VITALS
DIASTOLIC BLOOD PRESSURE: 100 MMHG | TEMPERATURE: 98.2 F | RESPIRATION RATE: 14 BRPM | SYSTOLIC BLOOD PRESSURE: 140 MMHG | OXYGEN SATURATION: 96 % | WEIGHT: 157.4 LBS | HEART RATE: 76 BPM | HEIGHT: 64 IN | BODY MASS INDEX: 26.87 KG/M2

## 2022-11-09 DIAGNOSIS — I10 PRIMARY HYPERTENSION: ICD-10-CM

## 2022-11-09 DIAGNOSIS — N18.31 CHRONIC RENAL FAILURE, STAGE 3A (HCC): ICD-10-CM

## 2022-11-09 DIAGNOSIS — M54.32 SCIATIC PAIN, LEFT: ICD-10-CM

## 2022-11-09 DIAGNOSIS — F11.20 CONTINUOUS OPIOID DEPENDENCE (HCC): ICD-10-CM

## 2022-11-09 DIAGNOSIS — B02.7 DISSEMINATED HERPES ZOSTER: Primary | ICD-10-CM

## 2022-11-09 PROBLEM — N17.9 AKI (ACUTE KIDNEY INJURY) (HCC): Status: RESOLVED | Noted: 2022-06-20 | Resolved: 2022-11-09

## 2022-11-09 RX ORDER — OXYCODONE HYDROCHLORIDE AND ACETAMINOPHEN 5; 325 MG/1; MG/1
TABLET ORAL
COMMUNITY
Start: 2022-11-07 | End: 2022-11-09 | Stop reason: SDUPTHER

## 2022-11-09 RX ORDER — NALOXONE HYDROCHLORIDE 4 MG/.1ML
SPRAY NASAL
Qty: 1 EACH | Refills: 1 | Status: SHIPPED | OUTPATIENT
Start: 2022-11-09

## 2022-11-09 RX ORDER — VALACYCLOVIR HYDROCHLORIDE 500 MG/1
TABLET, FILM COATED ORAL
COMMUNITY
Start: 2022-11-07

## 2022-11-09 RX ORDER — OXYCODONE HYDROCHLORIDE AND ACETAMINOPHEN 5; 325 MG/1; MG/1
1 TABLET ORAL EVERY 6 HOURS PRN
Qty: 8 TABLET | Refills: 0 | Status: SHIPPED | OUTPATIENT
Start: 2022-11-09

## 2022-11-09 RX ORDER — ATENOLOL 25 MG/1
12.5 TABLET ORAL DAILY
Qty: 90 TABLET | Refills: 0 | Status: SHIPPED | OUTPATIENT
Start: 2022-11-09 | End: 2022-11-15

## 2022-11-09 NOTE — PROGRESS NOTES
Assessment/Plan:     Problem List Items Addressed This Visit        Cardiovascular and Mediastinum    Primary hypertension     Slightly elevated blood pressure today  Cardiology decreased her atenolol to 12 5 mg daily  At home she does have some days when her blood pressure is elevated as well with headaches  Continue to check your blood pressure daily  Schedule appointment with Cardiology  Consider taking 12 5 mg atenolol b i d  but you need to monitor your blood pressure and pulse on the days that you do so  Relevant Medications    atenolol (TENORMIN) 25 mg tablet       Genitourinary    Chronic renal failure, stage 3a (HCC)     Lab Results   Component Value Date    EGFR 53 09/23/2022    EGFR 54 07/08/2022    EGFR 42 04/26/2022    CREATININE 1 08 09/23/2022    CREATININE 1 08 07/08/2022    CREATININE 1 33 (H) 04/26/2022   Continue to see Nephrology            Other    Continuous opioid dependence ([facility] [address] )      Other Visit Diagnoses     Disseminated herpes zoster    -  Primary    Continue to take antiviral medicine, continue to take Percocet or Tylenol as needed for pain    Relevant Medications    valACYclovir (VALTREX) 500 mg tablet    oxyCODONE-acetaminophen (PERCOCET) 5-325 mg per tablet    Sciatic pain, left        Make an appointment with physical therapy    Relevant Medications    naloxone (NARCAN) 4 mg/0 1 mL nasal spray    Other Relevant Orders    Ambulatory Referral to Physical Therapy            Subjective:      Patient ID: Shelbie Luke is a 74 y o  female  Maria Ines Ser presents to the office in order to follow-up post her recent emergency room visit and diagnosis of shingles  She was started on antiviral medicine and given Percocet as needed every 6 hours for pain  She has been doing well and pain medicine has been working  She is requesting few additional doses of Percocet in order to get her through few more days    Has been taking all of the medications as prescribed and denies any adverse effects  Continues to follow-up with Cardiology, Nephrology and Psychiatry  Recent studies and blood work reviewed  Denies any chest pain, shortness a breath, dizziness or increase in headaches  The following portions of the patient's history were reviewed and updated as appropriate:   Past Medical History:  She has a past medical history of Basal cell carcinoma (BCC) of parietal region of scalp (06/13/2022), Bipolar 1 disorder, depressed ([facility] ), Bipolar 1 disorder, depressed ([facility] ) ([birth year]1975), Chronic kidney disease, Depression, H/O bladder infections, Hyperlipidemia, Hypertension, Lichen sclerosus, Microscopic colitis, Skin disorder, and Urinary tract infection  ,  _______________________________________________________________________  Medical Problems:  does not have any pertinent problems on file ,  _______________________________________________________________________  Past Surgical History:   has a past surgical history that includes Colonoscopy and Mohs surgery (Left, 06/28/2022)  ,  _______________________________________________________________________  Family History:  family history includes Diabetic kidney disease in her father; Kidney disease in her father; No Known Problems in her half-sister and son; Squamous cell carcinoma in her mother ,  _______________________________________________________________________  Social History:   reports that she has quit smoking  She has never used smokeless tobacco  She reports current alcohol use  She reports that she does not use drugs  ,  _______________________________________________________________________  Allergies:  is allergic to levofloxacin, cephalexin, nitrofurantoin, sulfa antibiotics, topiramate, bacitracin-neomycin-polymyxin, and sulfamethoxazole-trimethoprim     _______________________________________________________________________  Current Outpatient Medications   Medication Sig Dispense Refill   • atenolol (TENORMIN) 25 mg tablet Take 0 5 tablets (12 5 mg total) by mouth daily 90 tablet 0   • atorvastatin (LIPITOR) 20 mg tablet 10 mg Currently taking 10 mg     • cholecalciferol (VITAMIN D3) 1,000 units tablet Take 2,000 Units by mouth 2 (two) times a day     • clonazePAM (KlonoPIN) 0 5 mg tablet TAKE 1 TABLET BY MOUTH 2 TIMES A DAY  (Patient taking differently: Currently once daily, extra half if needed for depression) 60 tablet 0   • estradiol (ESTRACE VAGINAL) 0 1 mg/g vaginal cream Use pea size amount to the vaginal opening 2-3 times per week 42 5 g 1   • hydrochlorothiazide (HYDRODIURIL) 12 5 mg tablet Take 1 tablet (12 5 mg total) by mouth daily 60 tablet 2   • loperamide (IMODIUM A-D) 2 MG tablet Take 2 mg by mouth daily     • mesalamine (APRISO) 0 375 g 24 hr capsule TAKE 2 CAPSULES BY MOUTH EVERY DAY 60 capsule 0   • naloxone (NARCAN) 4 mg/0 1 mL nasal spray Administer 1 spray into a nostril  If no response after 2-3 minutes, give another dose in the other nostril using a new spray  1 each 1   • oxyCODONE-acetaminophen (PERCOCET) 5-325 mg per tablet Take 1 tablet by mouth every 6 (six) hours as needed for moderate pain Max Daily Amount: 4 tablets 8 tablet 0   • Specialty Vitamins Products (VITAMINS FOR THE HAIR PO) Take by mouth SUGAR BEAR HAIR VITAMIN     • valACYclovir (VALTREX) 500 mg tablet TAKE 2 TABLETS (1,000 MG TOTAL) BY MOUTH 2 (TWO) TIMES A DAY FOR 10 DAYS       • zolpidem (AMBIEN) 10 mg tablet Take 1 tablet (10 mg total) by mouth daily at bedtime as needed (insomnia) 30 tablet 0   • albuterol (Ventolin HFA) 90 mcg/act inhaler Inhale 2 puffs every 6 (six) hours as needed for wheezing (Patient not taking: Reported on 10/3/2022) 18 g 5   • betamethasone dipropionate 0 05 % lotion Apply topically 2 (two) times a day 60 mL 1   • clobetasol (TEMOVATE) 0 05 % cream clobetasol 0 05 % topical cream     • clobetasol (TEMOVATE) 0 05 % external solution Apply topically 2 (two) times a day 50 mL 0   • cyclobenzaprine (FLEXERIL) 10 mg tablet as needed         No current facility-administered medications for this visit      _______________________________________________________________________  Review of Systems   Constitutional: Negative for chills and fever  Respiratory: Negative for cough and shortness of breath  Cardiovascular: Negative for chest pain  Gastrointestinal: Negative for abdominal pain and vomiting  Genitourinary: Negative for dysuria  Musculoskeletal: Positive for arthralgias  Negative for back pain  Skin: Positive for rash  Neurological: Negative for headaches  Psychiatric/Behavioral: Positive for sleep disturbance  All other systems reviewed and are negative  Objective:  Vitals:    11/09/22 1105   BP: 140/100   Pulse: 76   Resp: 14   Temp: 98 2 °F (36 8 °C)   SpO2: 96%   Weight: 71 4 kg (157 lb 6 4 oz)   Height: 5' 4" (1 626 m)     Body mass index is 27 02 kg/m²  Physical Exam  Vitals and nursing note reviewed  Constitutional:       General: She is not in acute distress  Appearance: Normal appearance  She is not ill-appearing  Cardiovascular:      Rate and Rhythm: Normal rate and regular rhythm  Heart sounds: Normal heart sounds  Pulmonary:      Effort: Pulmonary effort is normal       Breath sounds: Normal breath sounds  Musculoskeletal:         General: Normal range of motion  Skin:     General: Skin is warm and dry  Findings: Rash present  Neurological:      Mental Status: She is alert and oriented to person, place, and time  Psychiatric:         Mood and Affect: Mood normal          Behavior: Behavior normal          Thought Content:  Thought content normal          Judgment: Judgment normal

## 2022-11-09 NOTE — TELEPHONE ENCOUNTER
Pls call and advise that her BP is high likely secondary to the pain from her shingles    She could use 25mg of atenolol instead of 12 5

## 2022-11-09 NOTE — PATIENT INSTRUCTIONS
Shingles   AMBULATORY CARE:   Shingles  is a painful rash  Shingles is caused by the same virus that causes chickenpox (varicella-zoster)  After you get chickenpox, the virus stays in your body for several years without causing any symptoms  Shingles occurs when the virus becomes active again  The active virus travels along a nerve to your skin and causes a rash  Common signs and symptoms include the following:  Shingles often starts with pain in the back, chest, neck, or face  A rash then develops in the same area  The rash is usually found on only one side of the body  The rash may feel itchy or painful  It starts as red dots that become blisters filled with fluid  The blisters usually grow bigger, become filled with pus, and then crust over after a few days  You may also have any of the following:  Fatigue and muscle weakness    Pain when your skin is lightly touched    Headache    Fever    Eye pain when exposed to light       Call your local emergency number (911 in the [address] [facility]) if:   You have trouble moving your arms, legs, or face  You become confused, or have difficulty speaking  You have a seizure  Seek care immediately if:   You have weakness in an arm or leg  You have dizziness, a severe headache, or hearing or vision loss  You have painful, red, warm skin around the blisters, or the blisters drain pus  Your neck is stiff or you have trouble moving it  Call your doctor if:   You feel weak or have a headache  You have a cough, chills, or a fever  You have abdominal pain or nausea, or you are vomiting  Your rash becomes more itchy or painful  Your rash spreads to other parts of your body  Your pain worsens and does not go away even after you take medicine  You have questions or concerns about your condition or care  Medicines: You may need any of the following:  Antiviral medicine  helps decrease symptoms and healing time   They may also decrease your risk of developing nerve pain  You will need to start taking them within 3 days of the start of symptoms to prevent nerve pain  Pain medicine  may be prescribed or suggested by your healthcare provider  You may need NSAIDs, acetaminophen, or opioid medicine depending on how much pain you are in  Do not wait until the pain is severe before you take more pain medicine  Topical anesthetics  are used to numb the skin and decrease pain  They can be a cream, gel, spray, or patch  Anticonvulsants  decrease nerve pain and may help you sleep at night  Antidepressants  may be used to decrease nerve pain  Self-care:  Keep your rash clean and dry  Cover your rash with a bandage or clothing  Do not use bandages that stick to your skin  The sticky part may irritate your skin and make your rash last longer  Prevent the spread of shingles:       Wash your hands often  Wash your hands several times each day  Wash after you use the bathroom, change a child's diaper, and before you prepare or eat food  Use soap and water every time  Rub your soapy hands together, lacing your fingers  Wash the front and back of your hands, and in between your fingers  Use the fingers of one hand to scrub under the fingernails of the other hand  Wash for at least 20 seconds  Rinse with warm, running water for several seconds  Then dry your hands with a clean towel or paper towel  Use hand  that contains alcohol if soap and water are not available  Do not touch your eyes, nose, or mouth without washing your hands first          Cover a sneeze or cough  Use a tissue that covers your mouth and nose  Throw the tissue away in a trash can right away  Use the bend of your arm if a tissue is not available  Wash your hands well with soap and water or use a hand   Stay away from others while you are sick  Avoid crowds as much as possible  Ask about vaccines you may need  Talk to your healthcare provider about your vaccine history   He or she will tell you which vaccines you need, and when to get them  Prevent shingles or another shingles outbreak:  A vaccine may be given to help prevent shingles  You can get the vaccine even if you already had shingles  The vaccine can help prevent a future outbreak  If you do get shingles again, the vaccine can keep it from becoming severe  The vaccine comes in 2 forms  Your healthcare provider will tell you which form is right for you  The decision is based on your age and any medical conditions you have  A 2-dose vaccine is usually given to adults 48 years or older  A 1-dose vaccine may be given to adults 61 years or older  Follow up with your doctor as directed:  Write down your questions so you remember to ask them during your visits  For more information:   [facility]  [address] [place] , [address]  Phone: 9- 206 - [phone]  Phone: 5- 381 - 2215782  Web Address: [employer] com br    © Copyright [employer] 2022 Information is for End User's use only and may not be sold, redistributed or otherwise used for commercial purposes  All illustrations and images included in CareNotes® are the copyrighted property of [employer]  or [employer]   The above information is an  only  It is not intended as medical advice for individual conditions or treatments  Talk to your doctor, nurse or pharmacist before following any medical regimen to see if it is safe and effective for you

## 2022-11-09 NOTE — ASSESSMENT & PLAN NOTE
Lab Results   Component Value Date    EGFR 53 09/23/2022    EGFR 54 07/08/2022    EGFR 42 04/26/2022    CREATININE 1 08 09/23/2022    CREATININE 1 08 07/08/2022    CREATININE 1 33 (H) 04/26/2022   Continue to see Nephrology

## 2022-11-09 NOTE — ASSESSMENT & PLAN NOTE
Slightly elevated blood pressure today  Cardiology decreased her atenolol to 12 5 mg daily  At home she does have some days when her blood pressure is elevated as well with headaches  Continue to check your blood pressure daily  Schedule appointment with Cardiology  Consider taking 12 5 mg atenolol b i d  but you need to monitor your blood pressure and pulse on the days that you do so

## 2022-11-09 NOTE — TELEPHONE ENCOUNTER
Patient states she having a lot of pain and she has shingles  Patient states her blood pressure has been high  The reading are 140/100 and 140/88  Patient state she taking atenolol 12 5 daily   Patient state her pcp stated she think her medication need to be changed

## 2022-11-10 DIAGNOSIS — K58.2 IRRITABLE BOWEL SYNDROME WITH BOTH CONSTIPATION AND DIARRHEA: ICD-10-CM

## 2022-11-11 ENCOUNTER — TELEPHONE (OUTPATIENT)
Dept: NEUROLOGY | Facility: CLINIC | Age: 67
End: 2022-11-11

## 2022-11-11 RX ORDER — MESALAMINE 0.38 G/1
CAPSULE, EXTENDED RELEASE ORAL
Qty: 60 CAPSULE | Refills: 0 | Status: SHIPPED | OUTPATIENT
Start: 2022-11-11

## 2022-11-11 NOTE — TELEPHONE ENCOUNTER
I thought I told already that we need the patient to be virtual rather than in person      Thank you

## 2022-11-11 NOTE — TELEPHONE ENCOUNTER
Appointment changed to virtual  Pt states that she can use [employer] or email address on file: [email or web address]

## 2022-11-11 NOTE — TELEPHONE ENCOUNTER
Received VM transcription: This is [facility]  I have an appointment coming on Monday morning  I am home with the shingles and I don't think you want me coming into the office  At least the cardiologist didn't want me coming into the office  So I need you to call because I've waited so long for this appointment  If you could give me a call back at [phone] and just assure me that I won't have to wait quite so long since the shingles is totally out of my control  I'd appreciate it  Thank you   --------------------------------------------------------    Dr Riddhi Moore - Pt has the shingles and cannot come to the office for her appt on Monday  Would you be agreeable to virtual visit with this patient? Please advise

## 2022-11-11 NOTE — TELEPHONE ENCOUNTER
Spoke with pt and she states that she feels that her Shingles are on the mend and her pain is not as excruciating as when it started  Pt states that she has been waiting for this appt for a long time now to dicuss her MRI results  Checked Dr Melyssa Wheeler schedule and there is nothing available until next year  Pt frustrated that there is not an available appt that she can take in the near future  Dr Xochitl Tucker - Would it be ok for pt to come in person?

## 2022-11-15 ENCOUNTER — TELEPHONE (OUTPATIENT)
Dept: CARDIOLOGY CLINIC | Facility: CLINIC | Age: 67
End: 2022-11-15

## 2022-11-15 DIAGNOSIS — B02.29 POSTHERPETIC NEURALGIA: Primary | ICD-10-CM

## 2022-11-15 DIAGNOSIS — I10 PRIMARY HYPERTENSION: ICD-10-CM

## 2022-11-15 RX ORDER — HYDROCHLOROTHIAZIDE 12.5 MG/1
25 TABLET ORAL DAILY
Qty: 60 TABLET | Refills: 2 | Status: SHIPPED | OUTPATIENT
Start: 2022-11-15

## 2022-11-15 RX ORDER — GABAPENTIN 100 MG/1
100 CAPSULE ORAL
Qty: 30 CAPSULE | Refills: 1 | Status: SHIPPED | OUTPATIENT
Start: 2022-11-15 | End: 2022-11-21

## 2022-11-15 RX ORDER — ATENOLOL 25 MG/1
50 TABLET ORAL DAILY
Qty: 90 TABLET | Refills: 0 | Status: SHIPPED | OUTPATIENT
Start: 2022-11-15

## 2022-11-15 NOTE — TELEPHONE ENCOUNTER
----- Message from Ashutosh Sheppard sent at 11/15/2022 11:03 AM EST -----  Regarding: Atenolol  Hi    Increased atenolol back to 25mg  BP is running around 150/102 pulse 74 to 95  I've been in pain for going on 3 weeks  Began as severe sciatic pain than broke out in shingles rash along sciatic path from mid lower back wrapped around buttock and thigh inclusive down to knee  I was on oxy for pain but stopped few days ago caused tinnitus  One year ago I was taking 50mg atenolol and 25mg Hydrochlorthiazide  I'm scared with my blood pressure being so elevated  I'm sure my lack of sleep going on 2+ weeks from pain doesn't help  I need help  I'm imploding  Wrote Brett Walter who suggested a low dose of gabapentin at night  I've been through too much to tank now    Please dr Ignacia Han get my blood pressure normal   Thank you

## 2022-11-15 NOTE — TELEPHONE ENCOUNTER
----- Message from Jalyn Jasso sent at 11/15/2022 11:03 AM EST -----  Regarding: Atenolol  Hi    Increased atenolol back to 25mg  BP is running around 150/102 pulse 74 to 95  I've been in pain for going on 3 weeks  Began as severe sciatic pain than broke out in shingles rash along sciatic path from mid lower back wrapped around buttock and thigh inclusive down to knee  I was on oxy for pain but stopped few days ago caused tinnitus  One year ago I was taking 50mg atenolol and 25mg Hydrochlorthiazide  I'm scared with my blood pressure being so elevated  I'm sure my lack of sleep going on 2+ weeks from pain doesn't help  I need help  I'm imploding  Wrote Fabienne Lagos who suggested a low dose of gabapentin at night  I've been through too much to tank now    Please dr Zach Ricks get my blood pressure normal   Thank you

## 2022-11-15 NOTE — TELEPHONE ENCOUNTER
----- Message from Yamile Arias sent at 11/15/2022 11:03 AM EST -----  Regarding: Atenolol  Hi    Increased atenolol back to 25mg  BP is running around 150/102 pulse 74 to 95  I've been in pain for going on 3 weeks  Began as severe sciatic pain than broke out in shingles rash along sciatic path from mid lower back wrapped around buttock and thigh inclusive down to knee  I was on oxy for pain but stopped few days ago caused tinnitus  One year ago I was taking 50mg atenolol and 25mg Hydrochlorthiazide  I'm scared with my blood pressure being so elevated  I'm sure my lack of sleep going on 2+ weeks from pain doesn't help  I need help  I'm imploding  Wrote Yady Fontenot who suggested a low dose of gabapentin at night  I've been through too much to tank now    Please dr Allison Maher get my blood pressure normal   Thank you

## 2022-11-21 DIAGNOSIS — B02.29 POSTHERPETIC NEURALGIA: ICD-10-CM

## 2022-11-21 RX ORDER — GABAPENTIN 100 MG/1
200 CAPSULE ORAL
Qty: 60 CAPSULE | Refills: 1 | Status: SHIPPED | OUTPATIENT
Start: 2022-11-21 | End: 2022-11-28

## 2022-11-28 DIAGNOSIS — B02.29 POSTHERPETIC NEURALGIA: Primary | ICD-10-CM

## 2022-11-28 RX ORDER — GABAPENTIN 300 MG/1
300 CAPSULE ORAL 2 TIMES DAILY
Qty: 60 CAPSULE | Refills: 1 | Status: SHIPPED | OUTPATIENT
Start: 2022-11-28 | End: 2022-12-13

## 2022-12-01 DIAGNOSIS — M54.9 ACUTE BACK PAIN, UNSPECIFIED BACK LOCATION, UNSPECIFIED BACK PAIN LATERALITY: ICD-10-CM

## 2022-12-01 DIAGNOSIS — B02.29 POST HERPETIC NEURALGIA: ICD-10-CM

## 2022-12-01 DIAGNOSIS — M79.7 FIBROMYALGIA: Primary | ICD-10-CM

## 2022-12-01 PROBLEM — F11.20 CONTINUOUS OPIOID DEPENDENCE (HCC): Status: RESOLVED | Noted: 2022-11-09 | Resolved: 2022-12-01

## 2022-12-01 RX ORDER — LIDOCAINE HYDROCHLORIDE 20 MG/ML
JELLY TOPICAL AS NEEDED
Qty: 30 ML | Refills: 0 | Status: SHIPPED | OUTPATIENT
Start: 2022-12-01 | End: 2022-12-01

## 2022-12-01 RX ORDER — LIDOCAINE AND PRILOCAINE 25; 25 MG/G; MG/G
CREAM TOPICAL AS NEEDED
Qty: 30 G | Refills: 0 | Status: SHIPPED | OUTPATIENT
Start: 2022-12-01 | End: 2022-12-06

## 2022-12-05 NOTE — PROGRESS NOTES
Assessment:  1  Left leg weakness    2  Fibromyalgia    3  Post herpetic neuralgia        Plan:  Orders Placed This Encounter   Procedures   • MRI lumbar spine without contrast     Standing Status:   Future     Standing Expiration Date:   12/6/2026     Scheduling Instructions: There is no preparation for this test  Please leave your jewelry and valuables at home, wedding rings are the exception  All patients will be required to change into a hospital gown and pants  Street clothes are not permitted in the MRI  Magnetic nail polish must be removed prior to arrival for your test  Please bring your insurance cards, a form of photo ID and a list of your medications with you  Arrive 15 minutes prior to your appointment time in order to register  Please bring any prior CT or MRI studies of this area that were not performed at a [facility]  To schedule this appointment, please contact Central Scheduling at [phone]  Prior to your appointment, please make sure you complete the MRI Screening Form when you e-Check in for your appointment  This will be available starting 7 days before your appointment in [address]  You may receive an e-mail with an activation code if you do not have a [employer] account  If you do not have access to a device, we will complete your screening at your appointment  Order Specific Question:   What is the patient's sedation requirement? Answer:   No Sedation     Order Specific Question:   Release to patient through [facility]as Borges [facility]     Answer:   Immediate     Order Specific Question:   Is order priority selected as STAT? Answer:   No     Order Specific Question:   Reason for Exam (FREE TEXT)     Answer:   left lower extremity pain and weakness, history of PHN but has notable focal weakness on exam       No orders of the defined types were placed in this encounter        My impressions and treatment recommendations were discussed in detail with the patient, who verbalized understanding and had no further questions  This is a 42-year-old female presents her office with chief complaint of left buttock pain radiating to the left anteromedial thigh and into the left shin  Her symptoms coincide with development of shingles last month with previously reported lesions in the same distribution  On exam today, no lesions noted on her back  She has notable decreased sensation to light touch in the L3 and L4 dermatomes and notable weakness with left hip flexion and left leg extension as well as diminished patellar reflex on the left  Although there is history of herpetic lesions in the aforementioned distribution, I would still like to obtain MRI of the lumbar spine given focal weakness and hyporeflexia  This would help to rule out compressive pathology  Discussed epidural steroid injection as a potential treatment option, however first I feel that her gabapentin can be escalated considerably  If this is helpful then injection will not need to be performed  She will increase gabapentin to 600 mg 3 times daily on a dose rotation schedule as explained to the patient today  When she is running low on 300 mg tablets, I will send 60 mg pills to her pharmacy  [place] Prescription Drug Monitoring Program report was reviewed and was appropriate     Complete risks and benefits including bleeding, infection, tissue reaction, nerve injury and allergic reaction were discussed  The approach was demonstrated using models and literature was provided  Verbal and written consent was obtained  Discharge instructions were provided  I personally saw and examined the patient and I agree with the above discussed plan of care  History of Present Illness:    Loyd Beltre is a 74 y o  female who presents to [facility] for initial evaluation of the above stated pain complaints   The patient has a past medical and chronic pain history as outlined in the assessment section  She was referred by Kartik Cisse, [address] [facility],  [place] [address]   She is here with chief complaint of left leg sciatica pain which she attributes to shingles  Resazeem to the ED for this issue on 11/7/2022  Physical exam there marked a circular rash on the right buttock and right anterior thigh wrapping around to the right medial thigh  She does report falling in early October, but her leg pain didn't start until about 1 week before the lesions appeared  Pain starts in left buttock, left lateral thigh, into anteromedial thigh  Over the past month, to see the pain has been severe  5 out of 10  Nearly constant  Worse at night  Described as burning, cramping, numbness, pins-and-needles, throbbing  PCP placed her on gabapentin 300 mg twice daily with some benefit  Patient also underwent 10 days of antiviral treatment  Pain is increased with sitting, bowel movement  No change with lying down, standing, bending, sitting, walking, exercise, relaxation, coughing, sneezing  Includes anxiety, depression, fibromyalgia, high cholesterol, hypertension, kidney disease  No relief with heat/ice therapy  Does not smoke tobacco or marijuana  Occasional alcohol use  Not on blood thinners  Not allergic to latex or contrast dye  In the past, codeine, oxycodone, tramadol have provided relief  Uses acetaminophen currently with some relief  In the past has used naproxen with some relief  Also has used ibuprofen, meloxicam, aspirin in the past   Currently also using Flexeril  Review of Systems:    Review of Systems   Constitutional: Negative for fever and unexpected weight change  Weight loss   HENT: Negative for trouble swallowing  Eyes: Negative for visual disturbance  Respiratory: Negative for shortness of breath and wheezing  Cardiovascular: Negative for chest pain and palpitations     Gastrointestinal: Negative for constipation, diarrhea, nausea and vomiting  Endocrine: Positive for polyphagia  Negative for cold intolerance, heat intolerance and polydipsia  Genitourinary: Negative for difficulty urinating and frequency  Musculoskeletal: Negative for arthralgias, gait problem, joint swelling and myalgias  Skin: Positive for rash  Neurological: Negative for dizziness, seizures, syncope, weakness and headaches  Hematological: Does not bruise/bleed easily  Psychiatric/Behavioral: Negative for dysphoric mood  All other systems reviewed and are negative            Past Medical History:   Diagnosis Date   • Basal cell carcinoma (BCC) of parietal region of scalp 06/13/2022    Left parietal scalp   • Bipolar 1 disorder, depressed ([facility])    • Bipolar 1 disorder, depressed ([facility] ) 01/24/1975   • Chronic kidney disease    • Depression    • H/O bladder infections    • Hyperlipidemia    • Hypertension    • Lichen sclerosus    • Microscopic colitis    • Skin disorder     lichens sclerosis   • Urinary tract infection        Past Surgical History:   Procedure Laterality Date   • COLONOSCOPY      2013    • MOHS SURGERY Left 06/28/2022    Left parietal scalp       Family History   Problem Relation Age of Onset   • Squamous cell carcinoma Mother    • Kidney disease Father    • Diabetic kidney disease Father    • No Known Problems Son    • No Known Problems Half-Sister        Social History     Occupational History   • Not on file   Tobacco Use   • Smoking status: Former   • Smokeless tobacco: Never   • Tobacco comments:     heavy smoker previously   Vaping Use   • Vaping Use: Never used   Substance and Sexual Activity   • Alcohol use: Yes     Comment: socially   • Drug use: Never   • Sexual activity: Not Currently     Partners: Male         Current Outpatient Medications:   •  atenolol (TENORMIN) 25 mg tablet, Take 2 tablets (50 mg total) by mouth daily, Disp: 90 tablet, Rfl: 0  •  atorvastatin (LIPITOR) 20 mg tablet, 10 mg Currently taking 10 mg, Disp: , Rfl:   •  betamethasone dipropionate 0 05 % lotion, Apply topically 2 (two) times a day, Disp: 60 mL, Rfl: 1  •  cholecalciferol (VITAMIN D3) 1,000 units tablet, Take 2,000 Units by mouth 2 (two) times a day, Disp: , Rfl:   •  clobetasol (TEMOVATE) 0 05 % cream, clobetasol 0 05 % topical cream, Disp: , Rfl:   •  clobetasol (TEMOVATE) 0 05 % external solution, Apply topically 2 (two) times a day, Disp: 50 mL, Rfl: 0  •  clonazePAM (KlonoPIN) 0 5 mg tablet, TAKE 1 TABLET BY MOUTH 2 TIMES A DAY   (Patient taking differently: Currently once daily, extra half if needed for depression), Disp: 60 tablet, Rfl: 0  •  gabapentin (Neurontin) 300 mg capsule, Take 1 capsule (300 mg total) by mouth 2 (two) times a day, Disp: 60 capsule, Rfl: 1  •  hydrochlorothiazide (HYDRODIURIL) 12 5 mg tablet, Take 2 tablets (25 mg total) by mouth daily, Disp: 60 tablet, Rfl: 2  •  loperamide (IMODIUM A-D) 2 MG tablet, Take 2 mg by mouth daily, Disp: , Rfl:   •  mesalamine (APRISO) 0 375 g 24 hr capsule, TAKE 2 CAPSULES BY MOUTH EVERY DAY, Disp: 60 capsule, Rfl: 0  •  Specialty Vitamins Products (VITAMINS FOR THE HAIR PO), Take by mouth SUGAR BEAR HAIR VITAMIN, Disp: , Rfl:   •  zolpidem (AMBIEN) 10 mg tablet, Take 1 tablet (10 mg total) by mouth daily at bedtime as needed (insomnia), Disp: 30 tablet, Rfl: 0  •  albuterol (Ventolin HFA) 90 mcg/act inhaler, Inhale 2 puffs every 6 (six) hours as needed for wheezing (Patient not taking: Reported on 10/3/2022), Disp: 18 g, Rfl: 5  •  cyclobenzaprine (FLEXERIL) 10 mg tablet, as needed   (Patient not taking: Reported on 12/6/2022), Disp: , Rfl:   •  estradiol (ESTRACE VAGINAL) 0 1 mg/g vaginal cream, Use pea size amount to the vaginal opening 2-3 times per week (Patient not taking: Reported on 12/6/2022), Disp: 42 5 g, Rfl: 1  •  lidocaine-prilocaine (EMLA) cream, Apply topically as needed for mild pain (Patient not taking: Reported on 12/6/2022), Disp: 30 g, Rfl: 0  •  valACYclovir (VALTREX) 500 mg tablet, TAKE 2 TABLETS (1,000 MG TOTAL) BY MOUTH 2 (TWO) TIMES A DAY FOR 10 DAYS , Disp: , Rfl:     Allergies   Allergen Reactions   • Levofloxacin Other (See Comments)   • Cephalexin Diarrhea   • Nitrofurantoin Fever   • Sulfa Antibiotics Fever   • Topiramate Other (See Comments)   • Bacitracin-Neomycin-Polymyxin Rash   • Sulfamethoxazole-Trimethoprim Diarrhea       Physical Exam:    /73 (BP Location: Left arm, Patient Position: Sitting, Cuff Size: Standard)   Pulse 55   Wt 70 5 kg (155 lb 6 4 oz)   BMI 26 67 kg/m²     Constitutional: normal, well developed, well nourished, alert, in no distress and non-toxic and no overt pain behavior    Eyes: anicteric  HEENT: grossly intact  Neck: supple, symmetric, trachea midline and no masses   Pulmonary:even and unlabored  Cardiovascular:No edema or pitting edema present  Skin:Normal without rashes or lesions and well hydrated  Psychiatric:Mood and affect appropriate  Neurologic:Cranial Nerves II-XII grossly intact  Musculoskeletal:normal     Lumbar Spine Exam    Appearance:  Normal lordosis  Palpation/Tenderness:  no tenderness or spasm  Sensory:  dimished light touch sensation, location: L3 and L4  Motor Strength:  Left hip flexion:  4/5  Left hip extension:  5/5  Right hip flexion:  5/5  Right hip extension:  5/5  Left knee flexion:  5/5  Left knee extension:  4/5  Right knee flexion:  5/5  Right knee extension:  5/5  Left foot dorsiflexion:  5/5  Left foot plantar flexion:  5/5  Right foot dorsiflexion:  5/5  Right foot plantar flexion:  5/5  Reflexes:  Left Patellar:  1+   Right Patellar:  2+   Left Achilles:  2+   Right Achilles:  2+     Imaging  MRI lumbar spine without contrast    (Results Pending)       Orders Placed This Encounter   Procedures   • MRI lumbar spine without contrast

## 2022-12-06 ENCOUNTER — CONSULT (OUTPATIENT)
Dept: PAIN MEDICINE | Facility: CLINIC | Age: 67
End: 2022-12-06

## 2022-12-06 VITALS
SYSTOLIC BLOOD PRESSURE: 109 MMHG | HEART RATE: 55 BPM | WEIGHT: 155.4 LBS | BODY MASS INDEX: 26.67 KG/M2 | DIASTOLIC BLOOD PRESSURE: 73 MMHG

## 2022-12-06 DIAGNOSIS — M79.7 FIBROMYALGIA: ICD-10-CM

## 2022-12-06 DIAGNOSIS — R29.898 LEFT LEG WEAKNESS: Primary | ICD-10-CM

## 2022-12-06 DIAGNOSIS — B02.29 POST HERPETIC NEURALGIA: ICD-10-CM

## 2022-12-06 DIAGNOSIS — B02.29 POST HERPETIC NEURALGIA: Primary | ICD-10-CM

## 2022-12-06 RX ORDER — METHYL SALICYLATE/MENTH/CAMPH 15-10-3.1%
1 GEL (GRAM) TOPICAL AS NEEDED
Qty: 78 G | Refills: 0 | Status: SHIPPED | OUTPATIENT
Start: 2022-12-06 | End: 2023-01-11

## 2022-12-06 NOTE — PATIENT INSTRUCTIONS
You will increase gabapentin to 300mg three times day  Then you will add 300mg to each dose every 2 days until you are taking 600MG three times a day  Epidural Steroid Injection   AMBULATORY CARE:   What you need to know about an epidural steroid injection (CIERRA):  An CIERRA is a procedure to inject steroid medicine into the epidural space  The epidural space is between your spinal cord and vertebrae  Steroids reduce inflammation and fluid buildup in your spine that may be causing pain  You may be given pain medicine along with the steroids  How to prepare for an CIERRA:  Your healthcare provider will talk to you about how to prepare for your procedure  He or she will tell you what medicines to take or not take on the day of your procedure  You may need to stop taking blood thinners or other medicines several days before your procedure  You may need to adjust any diabetes medicine you take on the day of your procedure  Steroid medicine can increase your blood sugar level  Arrange for someone to drive you home when you are discharged  What will happen during an CIERRA:   You will be given medicine to numb the procedure area  You will be awake for the procedure, but you will not feel pain  You may also be given medicine to help you relax  Contrast liquid will be used to help your healthcare provider see the area better  Tell the healthcare provider if you have ever had an allergic reaction to contrast liquid  Your healthcare provider may place the needle into your neck area, middle of your back, or tailbone area  He may inject the medicine next to the nerves that are causing your pain  He may instead inject the medicine into a larger area of the epidural space  This helps the medicine spread to more nerves  Your healthcare provider will use a fluoroscope to help guide the needle to the right place  A fluoroscope is a type of x-ray   After the procedure, a bandage will be placed over the injection site to prevent infection  What will happen after an CIERRA:  You will have a bandage over the injection site to prevent infection  Your healthcare provider will tell you when you can bathe and any activity guidelines  You will be able to go home  Risks of an CIERRA:  You may have temporary or permanent nerve damage or paralysis  You may have bleeding or develop a serious infection, such as meningitis (swelling of the brain coverings)  An abscess may also develop  An abscess is a pus-filled area under the skin  You may need surgery to fix the abscess  You may have a seizure, anxiety, or trouble sleeping  If you are a man, you may have temporary erectile dysfunction (not able to have an erection)  Call your local emergency number (911 in the [address] [facility]) if:   You have a seizure  You have trouble moving your legs  Seek care immediately if:   Blood soaks through your bandage  You have a fever or chills, severe back pain, and the procedure area is sensitive to the touch  You cannot control when you urinate or have a bowel movement  Call your doctor if:   You have weakness or numbness in your legs  Your wound is red, swollen, or draining pus  You have nausea or are vomiting  Your face or neck is red and you feel warm  You have more pain than you had before the procedure  You have swelling in your hands or feet  You have questions or concerns about your condition or care  Care for your wound as directed: You may remove the bandage before you go to bed the day of your procedure  You may take a shower, but do not take a bath for at least 24 hours  Self-care:   Do not drive,  use machines, or do strenuous activity for 24 hours after your procedure or as directed  Continue other treatments  as directed  Steroid injections alone will not control your pain  The injections are meant to be used with other treatments, such as physical therapy      Follow up with your doctor as directed:  Write down your questions so you remember to ask them during your visits  © Copyright [employer] 2022 Information is for End User's use only and may not be sold, redistributed or otherwise used for commercial purposes  All illustrations and images included in CareNotes® are the copyrighted property of A D A M [employer] Inc  or Momo Castillo  The above information is an  only  It is not intended as medical advice for individual conditions or treatments  Talk to your doctor, nurse or pharmacist before following any medical regimen to see if it is safe and effective for you

## 2022-12-13 DIAGNOSIS — B02.29 POSTHERPETIC NEURALGIA: ICD-10-CM

## 2022-12-13 RX ORDER — GABAPENTIN 600 MG/1
600 TABLET ORAL 3 TIMES DAILY
Qty: 90 TABLET | Refills: 2 | Status: SHIPPED | OUTPATIENT
Start: 2022-12-13 | End: 2023-04-18

## 2022-12-21 ENCOUNTER — HOSPITAL ENCOUNTER (OUTPATIENT)
Dept: BONE DENSITY | Facility: CLINIC | Age: 67
Discharge: HOME | End: 2022-12-21

## 2022-12-21 DIAGNOSIS — E21.3 HYPERPARATHYROIDISM (HCC): ICD-10-CM

## 2022-12-28 ENCOUNTER — HOSPITAL ENCOUNTER (OUTPATIENT)
Dept: MRI IMAGING | Facility: CLINIC | Age: 67
Discharge: HOME | End: 2022-12-28

## 2022-12-28 DIAGNOSIS — R29.898 LEFT LEG WEAKNESS: ICD-10-CM

## 2023-01-03 ENCOUNTER — TELEPHONE (OUTPATIENT)
Dept: RADIOLOGY | Facility: CLINIC | Age: 68
End: 2023-01-03

## 2023-01-03 DIAGNOSIS — K58.2 IRRITABLE BOWEL SYNDROME WITH BOTH CONSTIPATION AND DIARRHEA: ICD-10-CM

## 2023-01-03 NOTE — TELEPHONE ENCOUNTER
Yes she can decrease to 300mg midday  If still having midday drowsiness, can eliminate all together and see how she is doing

## 2023-01-03 NOTE — TELEPHONE ENCOUNTER
----- Message from Chayo Shields MD sent at 12/30/2022  4:24 PM EST -----  MRI does not show any significnat nerve compressino  Her symptoms are likely all from shingles   If no ongoing improvement with gabapentin, then can schedule for TFESI

## 2023-01-03 NOTE — TELEPHONE ENCOUNTER
S/W pt and advised of results and plan  States definitely improving with the 600mg Anjelica TID, but there are days she feels good enough that she forgets to take her midday dose, but does end up taking it, though a little later  Pt would like to know if it is ok to decrease that midday dose to 300mg as long as she does not get increased symptoms  She is unsure if the Lesleigh Chroman is making her feel better or the shingles are improving      Please advise

## 2023-01-04 RX ORDER — MESALAMINE 0.38 G/1
CAPSULE, EXTENDED RELEASE ORAL
Qty: 60 CAPSULE | Refills: 0 | Status: SHIPPED | OUTPATIENT
Start: 2023-01-04

## 2023-01-05 ENCOUNTER — OFFICE VISIT (OUTPATIENT)
Dept: PSYCHIATRY | Facility: CLINIC | Age: 68
End: 2023-01-05

## 2023-01-05 DIAGNOSIS — F31.9 BIPOLAR 1 DISORDER, DEPRESSED (HCC): Primary | ICD-10-CM

## 2023-01-05 NOTE — PSYCH
Regular Visit    Problem List Items Addressed This Visit    None  Visit Diagnoses     Bipolar 1 disorder, depressed ([address] )    -  Primary             Encounter provider RICK Damon    Provider located at    [address] [place] [address] E  [address] [facility] [id number] [phone]    Recent Visits  No visits were found meeting these conditions  Showing recent visits within past 7 days and meeting all other requirements  Today's Visits  Date Type Provider Dept   01/05/23 Office Visit RICK Damon  [facility]   Showing today's visits and meeting all other requirements  Future Appointments  No visits were found meeting these conditions  Showing future appointments within next 150 days and meeting all other requirements       HPI     Current Outpatient Medications   Medication Sig Dispense Refill   • albuterol (Ventolin HFA) 90 mcg/act inhaler Inhale 2 puffs every 6 (six) hours as needed for wheezing (Patient not taking: Reported on 10/3/2022) 18 g 5   • atenolol (TENORMIN) 25 mg tablet Take 2 tablets (50 mg total) by mouth daily 90 tablet 0   • atorvastatin (LIPITOR) 20 mg tablet 10 mg Currently taking 10 mg     • betamethasone dipropionate 0 05 % lotion Apply topically 2 (two) times a day 60 mL 1   • Camphor-Menthol-Methyl Sal (Salonpas DEEP Relieving) 3 1-10-15 % GEL Apply 1 application topically if needed (pain) 78 g 0   • cholecalciferol (VITAMIN D3) 1,000 units tablet Take 2,000 Units by mouth 2 (two) times a day     • clobetasol (TEMOVATE) 0 05 % cream clobetasol 0 05 % topical cream     • clobetasol (TEMOVATE) 0 05 % external solution Apply topically 2 (two) times a day 50 mL 0   • clonazePAM (KlonoPIN) 0 5 mg tablet TAKE 1 TABLET BY MOUTH 2 TIMES A DAY   (Patient taking differently: Currently once daily, extra half if needed for depression) 60 tablet 0   • gabapentin (Neurontin) 600 MG tablet Take 1 tablet (600 mg total) by mouth 3 (three) times a day 90 tablet 2   • hydrochlorothiazide (HYDRODIURIL) 12 5 mg tablet Take 2 tablets (25 mg total) by mouth daily 60 tablet 2   • loperamide (IMODIUM A-D) 2 MG tablet Take 2 mg by mouth daily     • mesalamine (APRISO) 0 375 g 24 hr capsule TAKE 2 CAPSULES BY MOUTH EVERY DAY 60 capsule 0   • Specialty Vitamins Products (VITAMINS FOR THE HAIR PO) Take by mouth SUGAR BEAR HAIR VITAMIN     • zolpidem (AMBIEN) 10 mg tablet Take 1 tablet (10 mg total) by mouth daily at bedtime as needed (insomnia) 30 tablet 0     No current facility-administered medications for this visit  Review of Systems        MEDICATION MANAGEMENT NOTE        [facility]    Name and Date of Birth:  Jona Clark 74 y o  1955 MRN: [id number]    Date of Visit: January 5, 2023    Allergies   Allergen Reactions   • Levofloxacin Other (See Comments)   • Cephalexin Diarrhea   • Nitrofurantoin Fever   • Sulfa Antibiotics Fever   • Topiramate Other (See Comments)   • Bacitracin-Neomycin-Polymyxin Rash   • Sulfamethoxazole-Trimethoprim Diarrhea     SUBJECTIVE:    Stuart Haywood is seen today for a follow up for Bipolar Disorder  She continues to do fairly well since the last visit  Patient is currently trying to process her father's deteriorating health, recently entered hospice and is working with her family to help care for him  Discusses how she has always been a strong independent woman and states she will get through this difficult time  Provider continues to encourage psychotherapy, patient states she had poor experiences in the past with therapist but will nonetheless consider  Supportive counseling provided today  Reports feeling depressed in the context of her sick father, otherwise reports depression symptoms have been minimal and manageable in her life    Reports 5/10 anxiety, utilizes as needed Klonopin for periods of high anxiety, uses appropriately and denied side effects  Hx of history of bipolar disorder, denies all manic and elevated mood symptoms, declines any need to initiate a mood stabilizing medication  Was previously on Lamictal for over 20 years and is proud of herself for weaning off medications successfully due to side effects of medication  Educated patient on symptoms that would potentially require a mood stabilizer and she verbalized understanding and will call provider if needed  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  Recently got over the shingles and describes how painful it was  Denies si  She denies any side effects from medications  PLAN:      -Continue Klonopin 0 5 mg BID prescribed and managed by PCP    Discussed with patient the risks of sedation, respiratory depression, impairment of ability to drive and potential for abuse and addiction related to treatment with benzodiazepine medications  The patient understands risk of treatment with benzodiazepine medications, agrees to not drive if feels impaired and agrees to take medications as prescribed   Patient was also informed of risks of being on or starting opioid medications due to drug interactions and potential for serious respiratory depression and death      -Continue Ambien 10mg as needed for insomnia           Aware of 24 hour and weekend coverage for urgent situations accessed by calling [facility] main practice number  Referral for individual psychotherapy    Diagnoses and all orders for this visit:    Bipolar 1 disorder, depressed ([facility] )        Current Outpatient Medications on File Prior to Visit   Medication Sig Dispense Refill   • albuterol (Ventolin HFA) 90 mcg/act inhaler Inhale 2 puffs every 6 (six) hours as needed for wheezing (Patient not taking: Reported on 10/3/2022) 18 g 5   • atenolol (TENORMIN) 25 mg tablet Take 2 tablets (50 mg total) by mouth daily 90 tablet 0   • atorvastatin (LIPITOR) 20 mg tablet 10 mg Currently taking 10 mg     • betamethasone dipropionate 0 05 % lotion Apply topically 2 (two) times a day 60 mL 1   • Camphor-Menthol-Methyl Sal (Salonpas DEEP Relieving) 3 1-10-15 % GEL Apply 1 application topically if needed (pain) 78 g 0   • cholecalciferol (VITAMIN D3) 1,000 units tablet Take 2,000 Units by mouth 2 (two) times a day     • clobetasol (TEMOVATE) 0 05 % cream clobetasol 0 05 % topical cream     • clobetasol (TEMOVATE) 0 05 % external solution Apply topically 2 (two) times a day 50 mL 0   • clonazePAM (KlonoPIN) 0 5 mg tablet TAKE 1 TABLET BY MOUTH 2 TIMES A DAY  (Patient taking differently: Currently once daily, extra half if needed for depression) 60 tablet 0   • gabapentin (Neurontin) 600 MG tablet Take 1 tablet (600 mg total) by mouth 3 (three) times a day 90 tablet 2   • hydrochlorothiazide (HYDRODIURIL) 12 5 mg tablet Take 2 tablets (25 mg total) by mouth daily 60 tablet 2   • loperamide (IMODIUM A-D) 2 MG tablet Take 2 mg by mouth daily     • mesalamine (APRISO) 0 375 g 24 hr capsule TAKE 2 CAPSULES BY MOUTH EVERY DAY 60 capsule 0   • Specialty Vitamins Products (VITAMINS FOR THE HAIR PO) Take by mouth SUGAR BEAR HAIR VITAMIN     • zolpidem (AMBIEN) 10 mg tablet Take 1 tablet (10 mg total) by mouth daily at bedtime as needed (insomnia) 30 tablet 0     No current facility-administered medications on file prior to visit  HPI ROS Appetite Changes and Sleep:     She reports normal sleep, adequate appetite, low energy   Denies homicidal ideation, denies suicidal ideation    Review Of Systems:      HPI ROS:               Medication Side Effects:  denies    Depression (10 worst): 4/10    Anxiety (10 worst): 4/10    Safety concerns (SI, HI, etc): Denies si and hi    Sleep: 6-8 hrs    Energy: fair    Appetite: fair    Weight Change: denies        Mental Status Evaluation:    Appearance Adequate hygiene and grooming   Behavior calm and cooperative   Mood anxious and depressed  Depression Scale - 4 of 10 (0 = No depression)  Anxiety Scale - 4 of 10 (0 = No anxiety)   Speech Normal rate and volume   Affect mood-congruent   Thought Processes Goal directed and coherent   Thought Content Does not verbalize delusional material   Associations Tightly connected   Perceptual Disturbances Denies hallucinations and does not appear to be responding to internal stimuli   Risk Potential Suicidal/Homicidal Ideation - No evidence of suicidal or homicidal ideation and patient does not verbalize suicidal or homicidal ideation  Risk of Violence - No evidence of risk for violence found on assessment  Risk of Self Mutilation - No evidence of risk for self mutilation found on assessment   Orientation oriented to person, place, time/date and situation   Memory recent and remote memory grossly intact   Consciousness alert and awake   Attention/Concentration attention span and concentration appear shorter than expected for age   Insight intact   Judgement intact   Muscle Strength and Gait normal muscle strength and normal muscle tone, normal gait/station and normal balance   Motor Activity no abnormal movements   Language no difficulty naming common objects, no difficulty repeating a phrase, no difficulty writing a sentence   Fund of Knowledge adequate knowledge of current events  adequate fund of knowledge regarding past history  adequate fund of knowledge regarding vocabulary      Past Psychiatric History Update:     Inpatient Psychiatric Admission Since Last Encounter:   no  Changes to Outpatient Psychiatric Treatment Team:    no  Suicide Attempt Or Self Mutilation Since Last Encounter:   no  Incidence of Violent Behavior Since Last Encounter:   no    Traumatic History Update:     New Onset of Abuse Since Last Encounter:   no  Traumatic Events Since Last Encounter:   no    Past Medical History:    Past Medical History:   Diagnosis Date   • Basal cell carcinoma (BCC) of parietal region of scalp 06/13/2022    Left parietal scalp   • Bipolar 1 disorder, depressed ([facility] )    • Bipolar 1 disorder, depressed ([facility] ) 01/24/1975   • Chronic kidney disease    • Depression    • H/O bladder infections    • Hyperlipidemia    • Hypertension    • Lichen sclerosus    • Microscopic colitis    • Skin disorder     lichens sclerosis   • Urinary tract infection         Past Surgical History:   Procedure Laterality Date   • COLONOSCOPY      2013    • MOHS SURGERY Left 06/28/2022    Left parietal scalp     Allergies   Allergen Reactions   • Levofloxacin Other (See Comments)   • Cephalexin Diarrhea   • Nitrofurantoin Fever   • Sulfa Antibiotics Fever   • Topiramate Other (See Comments)   • Bacitracin-Neomycin-Polymyxin Rash   • Sulfamethoxazole-Trimethoprim Diarrhea     Substance Abuse History:    Social History     Substance and Sexual Activity   Alcohol Use Yes    Comment: socially     Social History     Substance and Sexual Activity   Drug Use Never     Social History:    Social History     Socioeconomic History   • Marital status: Single     Spouse name: Not on file   • Number of children: Not on file   • Years of education: Not on file   • Highest education level: Not on file   Occupational History   • Not on file   Tobacco Use   • Smoking status: Former   • Smokeless tobacco: Never   • Tobacco comments:     heavy smoker previously   Vaping Use   • Vaping Use: Never used   Substance and Sexual Activity   • Alcohol use: Yes     Comment: socially   • Drug use: Never   • Sexual activity: Not Currently     Partners: Male   Other Topics Concern   • Not on file   Social History Narrative   • Not on file     Social Determinants of Health     Financial Resource Strain: Not on file   Food Insecurity: Not on file   Transportation Needs: Not on file   Physical Activity: Not on file   Stress: Not on file   Social Connections: Not on file   Intimate Partner Violence: Not on file   Housing Stability: Not on file     Family Psychiatric History:     Family History   Problem Relation Age of Onset   • Squamous cell carcinoma Mother    • Kidney disease Father    • Diabetic kidney disease Father    • No Known Problems Son    • No Known Problems Half-Sister      History Review: The following portions of the patient's history were reviewed and updated as appropriate: allergies, current medications, past family history, past medical history, past social history, past surgical history and problem list     OBJECTIVE:     Vital signs in last 24 hours: There were no vitals filed for this visit  Laboratory Results:   Recent Labs (last 2 months):   No visits with results within 2 Month(s) from this visit  Latest known visit with results is:   Hospital Outpatient Visit on 10/19/2022   Component Date Value   • Rest Nuclear Isotope Dose 10/19/2022 10 84    • Stress Nuclear Isotope D* 10/19/2022 30 90    • Baseline HR 10/19/2022 45    • Baseline BP 10/19/2022 156/86    • O2 sat rest 10/19/2022 99    • Stress peak HR 10/19/2022 136    • Post peak BP 10/19/2022 216    • Rate Pressure Product 10/19/2022 29,376 0    • O2 sat peak 10/19/2022 100    • Recovery HR 10/19/2022 75    • Recovery BP 10/19/2022 178/96    • O2 sat recovery 10/19/2022 100    • Max HR 10/19/2022 136    • Max HR Percent 10/19/2022 88    • Exercise duration (min) 10/19/2022 7    • Exercise duration (sec) 10/19/2022 30    • Estimated workload 10/19/2022 10 1    • Angina Index 10/19/2022 0    • Stress Stage Reached 10/19/2022 3 0    • Stress/rest perfusion ra* 10/19/2022 0 64    • End diastolic index (mL/* 33/86/2843 42 0    • EF (%) 10/19/2022 70    • End systolic index (mL/m* 91/54/6274 7 0    • Bernard Treadmill Score 10/19/2022 8    • Base ST Depresion (mm) 10/19/2022 0    • ST Depression (mm) 10/19/2022 0      I have personally reviewed all pertinent laboratory/tests results      Suicide/Homicide Risk Assessment:    Risk of Harm to Self:  Protective Factors: no current suicidal ideation, access to mental health treatment, compliant with medications, compliant with mental health treatment, good self-esteem, having a desire to be alive, having a sense of purpose or meaning in life  Based on today's assessment, Vic Rincon presents the following risk of harm to self: minimal    Risk of Harm to Others:  Based on today's assessment, Vic Rincon presents the following risk of harm to others: minimal    The following interventions are recommended: referral for psychotherapy    Medications Risks/Benefits:      Risks, Benefits And Possible Side Effects Of Medications:    Discussed risks and benefits of treatment with patient including risks of misuse, abuse or dependence, sedation and respiratory depression related to treatment with benzodiazepine medications, Risk of sedation, dizziness and CNS depression during treatment with Gabapentin and risk of impaired next-day mental alertness, complex sleep-related behavior and dependence related to treatment with hypnotic medications     Controlled Medication Discussion:     Vic Rincon has been filling controlled prescriptions on time as prescribed according to [address] [place] [address] Drive Monitoring Program  Discussed with Vic Rincon the risks of sedation, respiratory depression, impairment of ability to drive and potential for abuse and addiction related to treatment with benzodiazepine medications  She understands risk of treatment with benzodiazepine medications, agrees to not drive if feels impaired and agrees to take medications as prescribed  Discussed with Prince Cordero warning on concurrent use of benzodiazepines and opioid medications including sedation, respiratory depression, coma and death  She understands the risk of treatment with benzodiazepines in addition to opioids and wants to continue taking those medications  Discussed with Vic Rincon increased risk of impairment related to concurrent use of benzodiazepines and hypnotic medications including excessive sedation, psychomotor impairment and respiratory depression   She understands the risk of treatment with benzodiazepines in addition to hypnotic medications and wants to continue taking those medications    Treatment Plan:    Due for update/Updated:   yes  Last treatment plan done 4/4/22 by gagandeep martinez  Treatment Plan due on 10/4/22      Read RICK Templeton 01/05/23  Visit Time    Visit Start Time: 8047  Visit Stop Time: 9919  Total Visit Duration: 40 minutes

## 2023-01-09 ENCOUNTER — APPOINTMENT (OUTPATIENT)
Dept: LAB | Facility: CLINIC | Age: 68
End: 2023-01-09

## 2023-01-09 ENCOUNTER — NURSE TRIAGE (OUTPATIENT)
Dept: OTHER | Facility: OTHER | Age: 68
End: 2023-01-09

## 2023-01-09 DIAGNOSIS — R73.01 IMPAIRED FASTING GLUCOSE: ICD-10-CM

## 2023-01-09 DIAGNOSIS — N30.00 ACUTE CYSTITIS WITHOUT HEMATURIA: ICD-10-CM

## 2023-01-09 DIAGNOSIS — E55.9 VITAMIN D DEFICIENCY, UNSPECIFIED: ICD-10-CM

## 2023-01-09 DIAGNOSIS — E21.3 HYPERPARATHYROIDISM (HCC): ICD-10-CM

## 2023-01-09 DIAGNOSIS — N30.00 ACUTE CYSTITIS WITHOUT HEMATURIA: Primary | ICD-10-CM

## 2023-01-09 LAB
25(OH)D3 SERPL-MCNC: 29.7 NG/ML (ref 30–100)
ALBUMIN SERPL BCP-MCNC: 3.8 G/DL (ref 3.5–5)
ANION GAP SERPL CALCULATED.3IONS-SCNC: 4 MMOL/L (ref 4–13)
BACTERIA UR QL AUTO: ABNORMAL /HPF
BILIRUB UR QL STRIP: NEGATIVE
BUN SERPL-MCNC: 18 MG/DL (ref 5–25)
CA-I BLD-SCNC: 1.2 MMOL/L (ref 1.12–1.32)
CALCIUM SERPL-MCNC: 9.3 MG/DL (ref 8.3–10.1)
CHLORIDE SERPL-SCNC: 107 MMOL/L (ref 96–108)
CLARITY UR: ABNORMAL
CO2 SERPL-SCNC: 29 MMOL/L (ref 21–32)
COLOR UR: ABNORMAL
CREAT SERPL-MCNC: 0.98 MG/DL (ref 0.6–1.3)
EST. AVERAGE GLUCOSE BLD GHB EST-MCNC: 91 MG/DL
GFR SERPL CREATININE-BSD FRML MDRD: 60 ML/MIN/1.73SQ M
GLUCOSE P FAST SERPL-MCNC: 100 MG/DL (ref 65–99)
GLUCOSE UR STRIP-MCNC: NEGATIVE MG/DL
HBA1C MFR BLD: 4.8 %
HGB UR QL STRIP.AUTO: NEGATIVE
KETONES UR STRIP-MCNC: NEGATIVE MG/DL
LEUKOCYTE ESTERASE UR QL STRIP: ABNORMAL
NITRITE UR QL STRIP: NEGATIVE
NON-SQ EPI CELLS URNS QL MICRO: ABNORMAL /HPF
PH UR STRIP.AUTO: 6.5 [PH]
POTASSIUM SERPL-SCNC: 3.8 MMOL/L (ref 3.5–5.3)
PROT UR STRIP-MCNC: NEGATIVE MG/DL
PTH-INTACT SERPL-MCNC: 74.8 PG/ML (ref 18.4–80.1)
RBC #/AREA URNS AUTO: ABNORMAL /HPF
SODIUM SERPL-SCNC: 140 MMOL/L (ref 135–147)
SP GR UR STRIP.AUTO: 1.01 (ref 1–1.03)
UROBILINOGEN UR STRIP-ACNC: <2 MG/DL
WBC #/AREA URNS AUTO: ABNORMAL /HPF

## 2023-01-09 NOTE — TELEPHONE ENCOUNTER
Rx u/a placed in [facility]  Patient confirmed to obtain u/a specimen today

## 2023-01-09 NOTE — TELEPHONE ENCOUNTER
Reason for Disposition  • All other females with painful urination, or patient wants to be seen    Additional Information  • Pain or burning with passing urine (urination) and female    Answer Assessment - Initial Assessment Questions  1  SYMPTOM: "What's the main symptom you're concerned about?" (e g , frequency, incontinence)      Frequency, burning, urgency, feeling like unable to empty the bladder  Patient urinates frequently  2  ONSET: "When did the urinary symptoms start?"      2 weeks ago   3  PAIN: "Is there any pain?" If Yes, ask: "How bad is it?" (Scale: 1-10; mild, moderate, severe)      No abdominal pain   4  CAUSE: "What do you think is causing the symptoms?"      Possible UTI   5  OTHER SYMPTOMS: "Do you have any other symptoms?" (e g , fever, flank pain, blood in urine, pain with urination)      Burning with urination, foul smell of the urine and cloudy urine   6   PREGNANCY: "Is there any chance you are pregnant?" "When was your last menstrual period?"      No    Protocols used: URINATION PAIN - FEMALE-ADULT-[place], URINARY SYMPTOMS-ADULT-[place]

## 2023-01-09 NOTE — TELEPHONE ENCOUNTER
Regarding: pressure, burning and urgency with urination   ----- Message from Clara Gordon sent at 1/9/2023 10:38 AM EST -----  " I have pressure, burning and urgency with urination  Wei Triplett was supposed to put in a urine culture test in for me   I just went to the lab and it's not there "

## 2023-01-11 ENCOUNTER — TELEPHONE (OUTPATIENT)
Dept: UROLOGY | Facility: CLINIC | Age: 68
End: 2023-01-11

## 2023-01-11 DIAGNOSIS — N30.00 ACUTE CYSTITIS WITHOUT HEMATURIA: Primary | ICD-10-CM

## 2023-01-11 LAB
BACTERIA UR CULT: ABNORMAL
BACTERIA UR CULT: ABNORMAL

## 2023-01-11 RX ORDER — AMOXICILLIN AND CLAVULANATE POTASSIUM 500; 125 MG/1; MG/1
1 TABLET, FILM COATED ORAL EVERY 12 HOURS SCHEDULED
Qty: 14 TABLET | Refills: 0 | Status: SHIPPED | OUTPATIENT
Start: 2023-01-11 | End: 2023-01-18

## 2023-01-11 NOTE — TELEPHONE ENCOUNTER
Prelim urine culture positive  ABX sent to pharmacy  May need to adjust based on final culture    Notified Briana Boyd that Augmentin was sent to pharmacy and she should hydrate with water

## 2023-01-11 NOTE — TELEPHONE ENCOUNTER
Called and spoke to PT with [facility] recommendations and that medications was sent to PT pharmacy  Pt stated PT picked up the medications before this call  PT states having shingles in the past with a rash on the abdomin and in PT bladder  PT has had this medications before and says it worked in the past and that she has several allergies to certain medications  PT verbalized understanding and started medication 1/11/23     ----- Message from Maria Luz Oseguera PA-C sent at 1/11/2023  9:02 AM EST -----  Prelim urine culture positive  Abx sent to pharmacy   May need to adjust based on final culture

## 2023-01-12 ENCOUNTER — TELEPHONE (OUTPATIENT)
Dept: UROLOGY | Facility: AMBULATORY SURGERY CENTER | Age: 68
End: 2023-01-12

## 2023-01-12 DIAGNOSIS — N30.00 ACUTE CYSTITIS WITHOUT HEMATURIA: Primary | ICD-10-CM

## 2023-01-12 RX ORDER — DOXYCYCLINE HYCLATE 100 MG/1
100 CAPSULE ORAL EVERY 12 HOURS SCHEDULED
Qty: 14 CAPSULE | Refills: 0 | Status: SHIPPED | OUTPATIENT
Start: 2023-01-12 | End: 2023-01-19

## 2023-01-12 NOTE — TELEPHONE ENCOUNTER
----- Message from Wagner Cook PA-C sent at 1/12/2023  7:31 AM EST -----  Need to adjust abx based on final culture, d/c augmentin and start doxycycline

## 2023-01-13 ENCOUNTER — OFFICE VISIT (OUTPATIENT)
Dept: GASTROENTEROLOGY | Facility: CLINIC | Age: 68
End: 2023-01-13

## 2023-01-13 VITALS
DIASTOLIC BLOOD PRESSURE: 80 MMHG | OXYGEN SATURATION: 95 % | SYSTOLIC BLOOD PRESSURE: 120 MMHG | WEIGHT: 157.6 LBS | HEART RATE: 65 BPM | HEIGHT: 64 IN | BODY MASS INDEX: 26.91 KG/M2

## 2023-01-13 DIAGNOSIS — K52.832 LYMPHOCYTIC COLITIS: Primary | ICD-10-CM

## 2023-01-13 NOTE — PROGRESS NOTES
[facility] - Outpatient Follow-up Note  Mary Conception 74 y o  female MRN: [id number]  Encounter: [id number]          ASSESSMENT AND PLAN:      1  Lymphocytic colitis  She is back on Apriso 2 pills daily and 1 imodium daily  She is doing well  She offers no GI complaints today    ______________________________________________________________________    SUBJECTIVE: 73-year-old female with a history of microscopic colitis who presents for routine follow-up  At her last appointment she was reporting diarrhea after trying to stop her mesalamine  She is back on Apriso 2 pills daily and 1 Imodium daily and reports that she is having regular formed bowel movements  She denies any issues with abdominal pain, nausea or vomiting  Unfortunately, after her last appointment she developed a severe shingles infection  She continues with pain from postherpetic neuralgia but is otherwise recovering  REVIEW OF SYSTEMS IS OTHERWISE NEGATIVE        Historical Information   Past Medical History:   Diagnosis Date   • Basal cell carcinoma (BCC) of parietal region of scalp 06/13/2022    Left parietal scalp   • [facility] )    • Bipolar 1 disorder, depressed ([facility] ) 01/24/1975   • Chronic kidney disease    • Depression    • H/O bladder infections    • Hyperlipidemia    • Hypertension    • Lichen sclerosus    • Microscopic colitis    • Skin disorder     lichens sclerosis   • Urinary tract infection      Past Surgical History:   Procedure Laterality Date   • COLONOSCOPY      2013    • MOHS SURGERY Left 06/28/2022    Left parietal scalp     Social History   Social History     Substance and Sexual Activity   Alcohol Use Yes    Comment: socially     Social History     Substance and Sexual Activity   Drug Use Never     Social History     Tobacco Use   Smoking Status Former   Smokeless Tobacco Never   Tobacco Comments    heavy smoker previously     Family History   Problem Relation Age of Onset   • Squamous cell carcinoma Mother    • Kidney disease Father    • Diabetic kidney disease Father    • No Known Problems Son    • No Known Problems Half-Sister        Meds/Allergies       Current Outpatient Medications:   •  albuterol (Ventolin HFA) 90 mcg/act inhaler  •  amoxicillin-clavulanate (Augmentin) 500-125 mg per tablet  •  atenolol (TENORMIN) 25 mg tablet  •  atorvastatin (LIPITOR) 20 mg tablet  •  betamethasone dipropionate 0 05 % lotion  •  cholecalciferol (VITAMIN D3) 1,000 units tablet  •  clobetasol (TEMOVATE) 0 05 % cream  •  clobetasol (TEMOVATE) 0 05 % external solution  •  clonazePAM (KlonoPIN) 0 5 mg tablet  •  doxycycline hyclate (VIBRAMYCIN) 100 mg capsule  •  gabapentin (Neurontin) 600 MG tablet  •  hydrochlorothiazide (HYDRODIURIL) 12 5 mg tablet  •  loperamide (IMODIUM A-D) 2 MG tablet  •  mesalamine (APRISO) 0 375 g 24 hr capsule  •  Specialty Vitamins Products (VITAMINS FOR THE HAIR PO)  •  zolpidem (AMBIEN) 10 mg tablet    Allergies   Allergen Reactions   • Levofloxacin Other (See Comments)   • Cephalexin Diarrhea   • Nitrofurantoin Fever   • Sulfa Antibiotics Fever   • Topiramate Other (See Comments)   • Bacitracin-Neomycin-Polymyxin Rash   • Sulfamethoxazole-Trimethoprim Diarrhea           Objective     not currently breastfeeding  There is no height or weight on file to calculate BMI  PHYSICAL EXAM:      General Appearance:   Alert, cooperative, no distress   HEENT:   Normocephalic, atraumatic, anicteric      Neck:  Supple, symmetrical, trachea midline   Lungs:   Clear to auscultation bilaterally; no rales, rhonchi or wheezing; respirations unlabored    Heart[de-identified]   Regular rate and rhythm; no murmur, rub, or gallop     Abdomen:   Soft, non-tender, non-distended; normal bowel sounds; no masses, no organomegaly    Genitalia:   Deferred    Rectal:   Deferred    Extremities:  No cyanosis, clubbing or edema    Pulses:  2+ and symmetric    Skin:  No jaundice, rashes, or lesions  Lymph nodes:  No palpable cervical lymphadenopathy        Lab Results:   No visits with results within 1 Day(s) from this visit  Latest known visit with results is:   Nurse Triage on 01/09/2023   Component Date Value   • Color, UA 01/09/2023 Light Yellow    • Clarity, UA 01/09/2023 Turbid    • Specific Gravity, UA 01/09/2023 1 009    • pH, UA 01/09/2023 6 5    • Leukocytes, UA 01/09/2023 Large (A)    • Nitrite, UA 01/09/2023 Negative    • Protein, UA 01/09/2023 Negative    • Glucose, UA 01/09/2023 Negative    • Ketones, UA 01/09/2023 Negative    • Urobilinogen, UA 01/09/2023 <2 0    • Bilirubin, UA 01/09/2023 Negative    • Occult Blood, UA 01/09/2023 Negative    • RBC, UA 01/09/2023 30-50 (A)    • WBC, UA 01/09/2023 Innumerable (A)    • Epithelial Cells 01/09/2023 Occasional    • Bacteria, UA 01/09/2023 Occasional          Radiology Results:   MRI lumbar spine without contrast    Result Date: 12/30/2022  Narrative: MRI LUMBAR SPINE WITHOUT CONTRAST INDICATION: [id number]: Other symptoms and signs involving the musculoskeletal system  COMPARISON:  None  TECHNIQUE:  Multiplanar, multisequence imaging of the lumbar spine was performed     IMAGE QUALITY:  Diagnostic FINDINGS: VERTEBRAL BODIES:  There is a transitional lumbosacral junction  The L5 vertebral body is partially sacralized on the left  Vertebral bodies are numbered on series 4 for further reference  The S1 vertebral body does have somewhat of a lumbar configuration  Mildly exaggerated lumbar lordosis  No scoliosis  No compression fracture  Normal marrow signal is identified within the visualized bony structures  No discrete marrow lesion  SACRUM:  Normal signal within the sacrum  No evidence of insufficiency or stress fracture  DISTAL CORD AND CONUS:  Normal size and signal within the distal cord and conus  PARASPINAL SOFT TISSUES:  Paraspinal soft tissues are unremarkable   LOWER THORACIC DISC SPACES:  Normal disc height and signal   No disc herniation, canal stenosis or foraminal narrowing  LUMBAR DISC SPACES: L1-L2:  Normal  L2-L3:  Normal  L3-L4:  Normal disc height and signal   Bilateral facet degenerative change  No disc herniation, canal stenosis or foraminal narrowing  L4-L5:  Normal disc height and signal   Facet hypertrophic degenerative change  No disc herniation, canal stenosis or foraminal narrowing  L5-S1:  Normal  OTHER FINDINGS:  None  Impression: Transitional lumbosacral junction  The L5 vertebral body is partially sacralized on the left and the S1 vertebral body has a somewhat lumbar configuration  Vertebral bodies are numbered on sagittal imaging for further reference  No disc herniation, canal stenosis or foraminal narrowing  Facet hypertrophic degenerative change is moderate at the L3-4 and L4-5 levels  Workstation performed: [id number]     DXA bone density spine hip and pelvis    Result Date: 12/21/2022  Narrative: DXA SCAN CLINICAL HISTORY: 74 years postmenopausal female   OTHER RISK FACTORS:  None  PHARMACOLOGIC THERAPY FOR OSTEOPOROSIS:  None  TECHNIQUE: Bone densitometry was performed using a [employer] W  bone densitometer  Regions of interest appear properly placed  COMPARISON: There are no prior DXA studies performed on this unit for comparison  RESULTS: LUMBAR SPINE Level: L1-L3  (L4 vertebra excluded from analysis due to local structural abnormalities or artifact) : BMD:  1 010  gm/cm2 T-score: -0 1 LEFT  TOTAL HIP: BMD:  0 882  gm/cm2 T-score:  -0 5 LEFT  FEMORAL NECK: BMD:  0 752  gm/cm2 T score: -0 9 LEFT  FOREARM:  33% RADIUS BMD:  0 676  gm/cm2 T-score:  -0 3     Impression: 1  Normal bone density  2   The 10 year risk of hip fracture is 0 5% with the 10 year risk of major osteoporotic fracture being 7 8% as calculated by the [facility] fracture risk assessment tool (FRAX, which is based on data generated by the [facility] Bone Diseases)   3   The current NOF guidelines recommend treating patients with a T-score of -2 5 or less in the lumbar spine or hips, or in post-menopausal women and men over the age of 48 with low bone mass (osteopenia) and a FRAX 10 year risk score of >3% for hip fracture and/or >20% for major osteoporotic fracture  4   The NOF recommends follow-up DXA in 1-2 years after initiating therapy for osteoporosis and every 2 years thereafter  More frequent evaluation is appropriate for patients with conditions associated with rapid bone loss, such as glucocorticoid therapy  The interval between DXA screenings may be longer for individuals without major risk factors and initial T-score in the normal or upper low bone mass range  The FRAX algorithm has certain limitations: -FRAX has not been validated in patients currently or previously treated with pharmacotherapy for osteoporosis  In such patients, clinical judgment must be exercised in interpreting FRAX scores  -Prior hip, vertebral and humeral fragility fractures appear to confer greater risk of subsequent fracture than fractures at other sites (this is especially true for individuals with severe vertebral fractures), but quantification of this incremental risk is not possible with FRAX  -FRAX underestimates fracture risk in patients with history of multiple fragility fractures  -FRAX may underestimate fracture risk in patients with history of frequent falls  -It is not appropriate to use FRAX to monitor treatment response   WHO CLASSIFICATION: Normal (a T-score of -1 0 or higher) Low bone mineral density (a T-score of less than -1 0 but higher than -2 5) Osteoporosis (a T-score of -2 5 or less) Severe osteoporosis (a T-score of -2 5 or less with a fragility fracture) Workstation performed: [id number]   Answers for HPI/ROS submitted by the patient on 1/6/2023  Chronicity: chronic  Onset: more than 1 year ago  Onset quality: undetermined  Frequency: rarely  Progression since onset: resolved  Pain location: epigastric region  Pain - numeric: 5/10  Pain quality: cramping  Radiates to: periumbilical region  anorexia: No  arthralgias: Yes  belching: Yes  constipation: Yes  diarrhea: Yes  dysuria: Yes  fever: No  flatus: Yes  frequency: Yes  headaches: No  hematochezia: No  hematuria: No  melena: No  myalgias: No  nausea:  No  weight loss: No  vomiting: No  Aggravated by: nothing  Relieved by: nothing

## 2023-01-23 ENCOUNTER — TELEPHONE (OUTPATIENT)
Dept: FAMILY MEDICINE CLINIC | Facility: CLINIC | Age: 68
End: 2023-01-23

## 2023-01-23 NOTE — TELEPHONE ENCOUNTER
She was drinking a bottle of water that was in her car and had ice in it  When she got to the bottom there was a frozen mouse and the bottle had mouse feces in it  There was a large label on the bottle  As per Ch[place]ere she should go to the ER and fill out the exposure questionaire and get any necessary testing done    Patient states understanding

## 2023-01-26 ENCOUNTER — OFFICE VISIT (OUTPATIENT)
Dept: FAMILY MEDICINE CLINIC | Facility: CLINIC | Age: 68
End: 2023-01-26

## 2023-01-26 VITALS
HEIGHT: 64 IN | BODY MASS INDEX: 26.77 KG/M2 | HEART RATE: 58 BPM | OXYGEN SATURATION: 97 % | WEIGHT: 156.8 LBS | RESPIRATION RATE: 14 BRPM | TEMPERATURE: 98 F | SYSTOLIC BLOOD PRESSURE: 118 MMHG | DIASTOLIC BLOOD PRESSURE: 80 MMHG

## 2023-01-26 DIAGNOSIS — F39 MOOD DISORDER (HCC): ICD-10-CM

## 2023-01-26 DIAGNOSIS — K58.2 IRRITABLE BOWEL SYNDROME WITH BOTH CONSTIPATION AND DIARRHEA: ICD-10-CM

## 2023-01-26 DIAGNOSIS — Z00.00 MEDICARE ANNUAL WELLNESS VISIT, SUBSEQUENT: Primary | ICD-10-CM

## 2023-01-26 DIAGNOSIS — Z12.31 ENCOUNTER FOR SCREENING MAMMOGRAM FOR MALIGNANT NEOPLASM OF BREAST: ICD-10-CM

## 2023-01-26 DIAGNOSIS — B02.29 POSTHERPETIC NEURALGIA: ICD-10-CM

## 2023-01-26 DIAGNOSIS — K52.832 LYMPHOCYTIC COLITIS: ICD-10-CM

## 2023-01-26 DIAGNOSIS — Z23 NEED FOR PNEUMOCOCCAL VACCINATION: ICD-10-CM

## 2023-01-26 DIAGNOSIS — N18.31 CHRONIC RENAL FAILURE, STAGE 3A (HCC): ICD-10-CM

## 2023-01-26 DIAGNOSIS — I10 PRIMARY HYPERTENSION: ICD-10-CM

## 2023-01-26 RX ORDER — MESALAMINE 0.38 G/1
CAPSULE, EXTENDED RELEASE ORAL
Qty: 60 CAPSULE | Refills: 0 | Status: SHIPPED | OUTPATIENT
Start: 2023-01-26

## 2023-01-26 RX ORDER — GABAPENTIN 100 MG/1
200 CAPSULE ORAL 3 TIMES DAILY
Qty: 180 CAPSULE | Refills: 0 | Status: SHIPPED | OUTPATIENT
Start: 2023-01-26

## 2023-01-26 NOTE — ASSESSMENT & PLAN NOTE
Lab Results   Component Value Date    EGFR 60 01/09/2023    EGFR 53 09/23/2022    EGFR 54 07/08/2022    CREATININE 0 98 01/09/2023    CREATININE 1 08 09/23/2022    CREATININE 1 08 07/08/2022   Stable, continue to see nephrology

## 2023-01-26 NOTE — ASSESSMENT & PLAN NOTE
We will attempt to wean off of gabapentin  We will send 100 mg capsules in order to titrate the dosage

## 2023-01-26 NOTE — PROGRESS NOTES
Assessment and Plan:     Problem List Items Addressed This Visit        Digestive    Microscopic colitis     Continue to follow-up with GI            Cardiovascular and Mediastinum    Primary hypertension     Well-controlled, continue current medication regiment  Nervous and Auditory    Postherpetic neuralgia     We will attempt to wean off of gabapentin  We will send 100 mg capsules in order to titrate the dosage  Relevant Medications    gabapentin (Neurontin) 100 mg capsule       Genitourinary    Chronic renal failure, stage 3a ([facility] [address] )     Lab Results   Component Value Date    EGFR 60 01/09/2023    EGFR 53 09/23/2022    EGFR 54 07/08/2022    CREATININE 0 98 01/09/2023    CREATININE 1 08 09/23/2022    CREATININE 1 08 07/08/2022   Stable, continue to see nephrology  Other    Mood disorder ([facility] )     Stable on current medication regiment  Other Visit Diagnoses     Medicare annual wellness visit, subsequent    -  Primary    Encounter for screening mammogram for malignant neoplasm of breast        Relevant Orders    Mammo screening bilateral w 3d & cad    Need for pneumococcal vaccination        Relevant Orders    Pneumococcal Conjugate Vaccine 20-valent (Pcv20) (Completed)        BMI Counseling: Body mass index is 26 91 kg/m²  The BMI is above normal  Nutrition recommendations include decreasing portion sizes, encouraging healthy choices of fruits and vegetables, decreasing fast food intake, consuming healthier snacks, limiting drinks that contain sugar, moderation in carbohydrate intake, increasing intake of lean protein, reducing intake of saturated and trans fat and reducing intake of cholesterol  Exercise recommendations include moderate physical activity 150 minutes/week and exercising 3-5 times per week  Rationale for BMI follow-up plan is due to patient being overweight or obese       Depression Screening and Follow-up Plan: Patient was screened for depression during today's encounter  They screened negative with a PHQ-2 score of 2  Preventive health issues were discussed with patient, and age appropriate screening tests were ordered as noted in patient's After Visit Summary  Personalized health advice and appropriate referrals for health education or preventive services given if needed, as noted in patient's After Visit Summary  History of Present Illness:     Patient presents for a Medicare Wellness Visit    HPI   Patient Care Team:  Melani Lloyd as PCP - General (Nurse Practitioner)  Thressa Fabry, DO as PCP - PCP-[employer] (RTE)  Cherie Moseley PA-C as Physician Assistant (Physician Assistant)  Teofilo Becerra PA-C as Physician Assistant (Gastroenterology)     Review of Systems:     Review of Systems   Constitutional: Negative for chills and fever  Respiratory: Negative for cough and shortness of breath  Cardiovascular: Negative for chest pain  Gastrointestinal: Negative for abdominal pain and vomiting  Genitourinary: Negative for dysuria  Musculoskeletal: Positive for arthralgias  Negative for back pain  Skin: Negative for rash  Neurological: Positive for numbness  Negative for headaches  Psychiatric/Behavioral: Positive for sleep disturbance  All other systems reviewed and are negative         Problem List:     Patient Active Problem List   Diagnosis   • Microscopic colitis   • Fibromyalgia   • Chronic renal failure, stage 3a ([facility] [address] )   • Primary hypertension   • Lichen sclerosus   • Articular cartilage disorder of hand   • Acquired trigger finger of right ring finger   • Osteoarthritis of carpometacarpal (CMC) joint of both thumbs   • Mood disorder (HCC)   • Unspecified primary angle-closure glaucoma, stage unspecified   • Thoracic outlet syndrome associated with cervical rib   • Meniere disease   • Postherpetic neuralgia      Past Medical and Surgical History:     Past Medical History:   Diagnosis Date   • Basal cell carcinoma (BCC) of parietal region of scalp 06/13/2022    Left parietal scalp   • Bipolar 1 disorder, depressed ([facility])    • Bipolar 1 disorder, depressed ([facility] ) 01/24/1975   • Chronic kidney disease    • Depression    • H/O bladder infections    • Hyperlipidemia    • Hypertension    • Lichen sclerosus    • Microscopic colitis    • Skin disorder     lichens sclerosis   • Urinary tract infection      Past Surgical History:   Procedure Laterality Date   • COLONOSCOPY      2013    • MOHS SURGERY Left 06/28/2022    Left parietal scalp      Family History:     Family History   Problem Relation Age of Onset   • Squamous cell carcinoma Mother    • Kidney disease Father    • Diabetic kidney disease Father    • No Known Problems Son    • No Known Problems Half-Sister       Social History:     Social History     Socioeconomic History   • Marital status: Single     Spouse name: None   • Number of children: None   • Years of education: None   • Highest education level: None   Occupational History   • None   Tobacco Use   • Smoking status: Former   • Smokeless tobacco: Never   • Tobacco comments:     heavy smoker previously   Vaping Use   • Vaping Use: Never used   Substance and Sexual Activity   • Alcohol use: Yes     Comment: socially   • Drug use: Never   • Sexual activity: Not Currently     Partners: Male   Other Topics Concern   • None   Social History Narrative   • None     Social Determinants of Health     Financial Resource Strain: Low Risk    • Difficulty of Paying Living Expenses: Not very hard   Food Insecurity: Not on file   Transportation Needs: No Transportation Needs   • Lack of Transportation (Medical): No   • Lack of Transportation (Non-Medical):  No   Physical Activity: Not on file   Stress: Not on file   Social Connections: Not on file   Intimate Partner Violence: Not on file   Housing Stability: Not on file      Medications and Allergies:     Current Outpatient Medications   Medication Sig Dispense Refill   • atenolol (TENORMIN) 25 mg tablet Take 2 tablets (50 mg total) by mouth daily (Patient taking differently: Take 25 mg by mouth daily) 90 tablet 0   • atorvastatin (LIPITOR) 20 mg tablet 10 mg Currently taking 10 mg     • cholecalciferol (VITAMIN D3) 1,000 units tablet Take 2,000 Units by mouth 2 (two) times a day     • clobetasol (TEMOVATE) 0 05 % external solution Apply topically 2 (two) times a day 50 mL 0   • clonazePAM (KlonoPIN) 0 5 mg tablet TAKE 1 TABLET BY MOUTH 2 TIMES A DAY  (Patient taking differently: Currently once daily, extra half if needed for depression) 60 tablet 0   • gabapentin (Neurontin) 100 mg capsule Take 2 capsules (200 mg total) by mouth 3 (three) times a day 180 capsule 0   • gabapentin (Neurontin) 600 MG tablet Take 1 tablet (600 mg total) by mouth 3 (three) times a day 90 tablet 2   • hydrochlorothiazide (HYDRODIURIL) 12 5 mg tablet Take 2 tablets (25 mg total) by mouth daily (Patient taking differently: Take 12 5 mg by mouth daily Take 1 tablet by mouth daily) 60 tablet 2   • mesalamine (APRISO) 0 375 g 24 hr capsule TAKE 2 CAPSULES BY MOUTH EVERY DAY 60 capsule 0   • Specialty Vitamins Products (VITAMINS FOR THE HAIR PO) Take by mouth SUGAR BEAR HAIR VITAMIN     • zolpidem (AMBIEN) 10 mg tablet Take 1 tablet (10 mg total) by mouth daily at bedtime as needed (insomnia) 30 tablet 0   • albuterol (Ventolin HFA) 90 mcg/act inhaler Inhale 2 puffs every 6 (six) hours as needed for wheezing (Patient not taking: Reported on 10/3/2022) 18 g 5   • betamethasone dipropionate 0 05 % lotion Apply topically 2 (two) times a day 60 mL 1   • clobetasol (TEMOVATE) 0 05 % cream clobetasol 0 05 % topical cream     • loperamide (IMODIUM A-D) 2 MG tablet Take 2 mg by mouth daily       No current facility-administered medications for this visit       Allergies   Allergen Reactions   • Levofloxacin Other (See Comments)   • Cephalexin Diarrhea   • Nitrofurantoin Fever   • Sulfa Antibiotics Fever   • Topiramate Other (See Comments)   • Bacitracin-Neomycin-Polymyxin Rash   • Sulfamethoxazole-Trimethoprim Diarrhea      Immunizations:     Immunization History   Administered Date(s) Administered   • Influenza, injectable, quadrivalent, preservative free 0 5 mL 09/19/2020   • Pneumococcal Conjugate Vaccine 20-valent (Pcv20), Polysace 01/26/2023      Health Maintenance:         Topic Date Due   • HIV Screening  Never done   • Colorectal Cancer Screening  02/14/2023   • Breast Cancer Screening: Mammogram  02/28/2023   • Hepatitis C Screening  11/09/2023 (Originally 1955)         Topic Date Due   • COVID-19 Vaccine (1) Never done   • Influenza Vaccine (1) 09/01/2022      Medicare Screening Tests and Risk Assessments:     Tina Staples is here for her Welcome to Medicare visit  Health Risk Assessment:   Patient rates overall health as good  Patient feels that their physical health rating is same  Patient is dissatisfied with their life  Eyesight was rated as same  Hearing was rated as same  Patient feels that their emotional and mental health rating is slightly better  Patients states they are never, rarely angry  Patient states they are always unusually tired/fatigued  Pain experienced in the last 7 days has been some  Patient's pain rating has been 5/10  Patient states that she has experienced weight loss or gain in last 6 months  During bad shingles outbreak lost weight  Coupdnt ear  Gained back    Depression Screening:   PHQ-2 Score: 2      Fall Risk Screening: In the past year, patient has experienced: history of falling in past year      Urinary Incontinence Screening:   Patient has not leaked urine accidently in the last six months  Home Safety:  Patient does not have trouble with stairs inside or outside of their home  Patient has working smoke alarms and has working carbon monoxide detector  Home safety hazards include: none  Nutrition:   Current diet is No Added Salt       Medications:   Patient is currently taking over-the-counter supplements  OTC medications include: Vit d3; l-lysine; hair vitamins  Patient is able to manage medications  Activities of Daily Living (ADLs)/Instrumental Activities of Daily Living (IADLs):   Walk and transfer into and out of bed and chair?: Yes  Dress and groom yourself?: Yes    Bathe or shower yourself?: Yes    Feed yourself? Yes  Do your laundry/housekeeping?: Yes  Manage your money, pay your bills and track your expenses?: Yes  Make your own meals?: Yes    Do your own shopping?: Yes    Previous Hospitalizations:   Any hospitalizations or ED visits within the last 12 months?: Yes    How many hospitalizations have you had in the last year?: 1-2    Hospitalization Comments: None - just an ER visit    Advance Care Planning:   Living will: No    Durable POA for healthcare: No    Advanced directive: No    Advanced directive counseling given: Yes      PREVENTIVE SCREENINGS      Cardiovascular Screening:    General: Screening Not Indicated and History Lipid Disorder      Diabetes Screening:     General: Screening Current      Colorectal Cancer Screening:     General: Screening Current      Breast Cancer Screening:     General: Screening Current      Cervical Cancer Screening:    General: Screening Not Indicated      Lung Cancer Screening:     General: Screening Not Indicated      Hepatitis C Screening:    General: Screening Current    Screening, Brief Intervention, and Referral to Treatment (SBIRT)    Screening  Typical number of drinks in a day: 0  Typical number of drinks in a week: 1  Interpretation: Low risk drinking behavior      AUDIT-C Screenin) How often did you have a drink containing alcohol in the past year? monthly or less  2) How many drinks did you have on a typical day when you were drinking in the past year? 0  3) How often did you have 6 or more drinks on one occasion in the past year? never    AUDIT-C Score: 1  Interpretation: Score 0-2 (female): Negative screen for alcohol misuse    Single Item Drug Screening:  How often have you used an illegal drug (including marijuana) or a prescription medication for non-medical reasons in the past year? never    Single Item Drug Screen Score: 0  Interpretation: Negative screen for possible drug use disorder    Other Counseling Topics:   Calcium and vitamin D intake and regular weightbearing exercise  No results found  Physical Exam:     /80   Pulse 58   Temp 98 °F (36 7 °C)   Resp 14   Ht 5' 4" (1 626 m)   Wt 71 1 kg (156 lb 12 8 oz)   SpO2 97%   BMI 26 91 kg/m²     Physical Exam  Vitals and nursing note reviewed  Constitutional:       General: She is not in acute distress  Appearance: Normal appearance  She is well-developed  She is not ill-appearing  HENT:      Right Ear: Tympanic membrane, ear canal and external ear normal       Left Ear: Tympanic membrane, ear canal and external ear normal    Cardiovascular:      Rate and Rhythm: Normal rate and regular rhythm  Pulses: Normal pulses  Heart sounds: Normal heart sounds  No murmur heard  Pulmonary:      Effort: Pulmonary effort is normal  No respiratory distress  Breath sounds: Normal breath sounds  Musculoskeletal:         General: No swelling  Normal range of motion  Right lower leg: No edema  Left lower leg: No edema  Skin:     General: Skin is warm and dry  Capillary Refill: Capillary refill takes less than 2 seconds  Neurological:      Mental Status: She is alert and oriented to person, place, and time  Motor: No weakness  Gait: Gait normal    Psychiatric:         Mood and Affect: Mood normal          Behavior: Behavior normal          Thought Content:  Thought content normal          Judgment: Judgment normal           RICK Cannon

## 2023-01-31 DIAGNOSIS — R11.2 NAUSEA AND VOMITING, UNSPECIFIED VOMITING TYPE: Primary | ICD-10-CM

## 2023-01-31 RX ORDER — ONDANSETRON 4 MG/1
4 TABLET, FILM COATED ORAL EVERY 8 HOURS PRN
Qty: 20 TABLET | Refills: 0 | Status: SHIPPED | OUTPATIENT
Start: 2023-01-31 | End: 2023-07-10 | Stop reason: ALTCHOICE

## 2023-02-01 ENCOUNTER — TELEPHONE (OUTPATIENT)
Dept: GASTROENTEROLOGY | Facility: AMBULARY SURGERY CENTER | Age: 68
End: 2023-02-01

## 2023-02-01 NOTE — TELEPHONE ENCOUNTER
"Spoke with patient. History of Lymphocytic colitis    Patient c/o vomiting, nausea, and excessive diarrhea since last week after drinking contaminated water with a dead mouse. Patients father also passed away on the same day, and voiced stressors at work. She states \"I'm dehydrated, weak, and I can't keep anything in my body\". Patient will go to ED for evaluation.   "

## 2023-02-02 ENCOUNTER — TELEPHONE (OUTPATIENT)
Dept: FAMILY MEDICINE CLINIC | Facility: CLINIC | Age: 68
End: 2023-02-02

## 2023-02-02 NOTE — TELEPHONE ENCOUNTER
Patient called in and stated that all of her medications at all since she hasn't been able to keep anything down  She also went to the ER yesterday  She stated that since she knows that the Gabapentin you cannot get off of suddenly  The pain management office ask that she contact her primary doctor  She needs to know what she needs to do  Do she stop taking it altogether or continue with taking lower dose of a few hundred mg  She said she  is available to talk to you if you like

## 2023-02-02 NOTE — TELEPHONE ENCOUNTER
Please let pt know that is the pain is bad she can go back on the lower dose, if she is doing okay without it, she does not necessarily have to take it again  Usually it takes from 12h to 7 days for the withdrawal symptoms to appear    Also she does not have history of seizures and that would be the main thing that we would be be worried about TY

## 2023-02-09 ENCOUNTER — EVALUATION (OUTPATIENT)
Dept: PHYSICAL THERAPY | Facility: CLINIC | Age: 68
End: 2023-02-09

## 2023-02-09 DIAGNOSIS — B02.29 POSTHERPETIC NEURALGIA: ICD-10-CM

## 2023-02-09 NOTE — PROGRESS NOTES
PT Evaluation     Today's date: 2023  Patient name: Micaela Perry  : 1955  MRN: [id number]  Referring provider: RICK Lim Chi  Dx:   Encounter Diagnosis     ICD-10-CM    1  Postherpetic neuralgia  B02 29 Ambulatory Referral to Physical Therapy                     Assessment  Assessment details: Pt is a 73 y/o female who presents to physical therapy with peripheral neuropathic pain associated with shingles along the L3/4 nerve root  However, recent bed rest left her with a large reduction in strength that she now reports is her major concern  Pt does not present with any red flag symptoms at this time  Pt presents with reduced strength in both UE and LE, reduced overall activity tolerance, and reduced LE power  Educated on POC and HEP, pt verbalized understanding  Discussed concept of progressive overload and to start slow with exercise but to build over the next week until next follow-up  Pt would benefit from skilled physical therapy in order to decrease deficits and return to prior level of function  Impairments: abnormal gait, abnormal or restricted ROM, activity intolerance and impaired physical strength    Symptom irritability: moderateUnderstanding of Dx/Px/POC: goodPrognosis details: Positive Prognostic Factors: Positive attitude toward therapy    Negative Prognostic Factors: chronicity of symptoms    Goals  STG( 4 weeks):  Pt will be independent with basic HEP  Pt will demonstrate increase in MMT grade by 1/3 for hip abd and knee ext  LTG (8 weeks): FOTO will be expected outcome  Pt will demonstrate <12s 5x STS  Pt will demonstrate MMT grade >4/5 in both LE  Pt will be independent with extensive HEP        Plan  Patient would benefit from: skilled physical therapy  Planned modality interventions: cryotherapy  Planned therapy interventions: manual therapy, neuromuscular re-education, patient education, self care, strengthening, stretching, therapeutic activities, therapeutic exercise and home exercise program  Frequency: 1x week  Duration in weeks: 8  Treatment plan discussed with: patient        Subjective Evaluation    History of Present Illness  Mechanism of injury: Chief Complaint: Pt reports developing shingles that ran from her low back around her left hip and into the anterior thigh/medial knee  She had significant pain, and was given gabapentin, that was ramped up to 1800mg  A few weeks ago she developed a stomach flu and therefore stopped gabapentin fully  Prior to this, her symptoms had resolved  However, when she had to stop she then developed numbness/tingling again, but no pain  She has ramped back up to 300mg  Largely, she reports having weakness from her bed rest with the stomach flu, and would like to work on strengthening         24 hour Pattern:  HPI: Long history of L "sciatica" with sometimes noting weakness in her L LE    Severity: moderate  Irritability: constant  Nature: peripheral neuropathic  Stage: chronic  Stability: no change    P1: L low back around the lat hip into the groin and ant thigh to the medial knee, can sometimes run down the medial lower leg and into the arch of the foot  Aggs/eases: none    Physical Activity: wants to return to gym, was not doing prior    Occupation: retired  GHS: No recent onset issues  Patient Goals  Patient goals for therapy: increased strength  Patient goal: return to exercise        Objective     Strength/Myotome Testing     Left Hip   Planes of Motion   Flexion: 4-  Extension: 4  Abduction: 4-    Right Hip   Planes of Motion   Flexion: 4-  Extension: 4-  Abduction: 3+    Left Knee   Flexion: 4  Extension: 4-    Right Knee   Flexion: 4  Extension: 4    Left Ankle/Foot   Dorsiflexion: 5  Eversion: 5  Great toe extension: 5    Right Ankle/Foot   Dorsiflexion: 5  Eversion: 5  Great toe extension: 5    Additional Strength Details  :  L: 24 4#  R: 33 5#    UE: grossly 4/5    General Comments:      Lumbar Comments  5x STS: 15 65s (a little trembling following)  TUG:             Precautions: None      Manuals 2/9                                                                Neuro Re-Ed             TB ext             TB row                                                                 Pt edu RAIMNUDO            Ther Ex             bridge HEP            sidelying hip abd HEP            SLR HEP            Shoulder press             Wall push-up                                       bike             Ther Activity             STS HEP            Mckeon's walk             Gait Training                                       Modalities

## 2023-02-16 ENCOUNTER — OFFICE VISIT (OUTPATIENT)
Dept: PHYSICAL THERAPY | Facility: CLINIC | Age: 68
End: 2023-02-16

## 2023-02-16 ENCOUNTER — OFFICE VISIT (OUTPATIENT)
Dept: PSYCHIATRY | Facility: CLINIC | Age: 68
End: 2023-02-16

## 2023-02-16 DIAGNOSIS — F31.9 BIPOLAR 1 DISORDER, DEPRESSED (HCC): Primary | ICD-10-CM

## 2023-02-16 DIAGNOSIS — B02.29 POSTHERPETIC NEURALGIA: Primary | ICD-10-CM

## 2023-02-16 NOTE — PSYCH
Regular Visit    Problem List Items Addressed This Visit    None  Visit Diagnoses     Bipolar 1 disorder, depressed ([facility] [address] )    -  Primary             Encounter provider RICK Rivers    Provider located at    [address] [facility] [address]  [address] [facility] [id number] [phone]    Recent Visits  No visits were found meeting these conditions  Showing recent visits within past 7 days and meeting all other requirements  Today's Visits  Date Type Provider Dept   02/16/23 Office Visit RICK Rivers  [facility]   Showing today's visits and meeting all other requirements  Future Appointments  No visits were found meeting these conditions  Showing future appointments within next 150 days and meeting all other requirements       HPI     Current Outpatient Medications   Medication Sig Dispense Refill   • albuterol (Ventolin HFA) 90 mcg/act inhaler Inhale 2 puffs every 6 (six) hours as needed for wheezing (Patient not taking: Reported on 10/3/2022) 18 g 5   • atenolol (TENORMIN) 25 mg tablet Take 2 tablets (50 mg total) by mouth daily (Patient taking differently: Take 25 mg by mouth daily) 90 tablet 0   • atorvastatin (LIPITOR) 20 mg tablet 10 mg Currently taking 10 mg     • betamethasone dipropionate 0 05 % lotion Apply topically 2 (two) times a day 60 mL 1   • cholecalciferol (VITAMIN D3) 1,000 units tablet Take 2,000 Units by mouth 2 (two) times a day     • clobetasol (TEMOVATE) 0 05 % cream clobetasol 0 05 % topical cream     • clobetasol (TEMOVATE) 0 05 % external solution Apply topically 2 (two) times a day 50 mL 0   • clonazePAM (KlonoPIN) 0 5 mg tablet TAKE 1 TABLET BY MOUTH 2 TIMES A DAY   (Patient taking differently: Currently once daily, extra half if needed for depression) 60 tablet 0   • gabapentin (Neurontin) 100 mg capsule Take 2 capsules (200 mg total) by mouth 3 (three) times a day 180 capsule 0   • gabapentin (Neurontin) 600 MG tablet Take 1 tablet (600 mg total) by mouth 3 (three) times a day 90 tablet 2   • hydrochlorothiazide (HYDRODIURIL) 12 5 mg tablet Take 2 tablets (25 mg total) by mouth daily (Patient taking differently: Take 12 5 mg by mouth daily Take 1 tablet by mouth daily) 60 tablet 2   • loperamide (IMODIUM A-D) 2 MG tablet Take 2 mg by mouth daily     • mesalamine (APRISO) 0 375 g 24 hr capsule TAKE 2 CAPSULES BY MOUTH EVERY DAY 60 capsule 0   • ondansetron (ZOFRAN) 4 mg tablet Take 1 tablet (4 mg total) by mouth every 8 (eight) hours as needed for nausea or vomiting 20 tablet 0   • Specialty Vitamins Products (VITAMINS FOR THE HAIR PO) Take by mouth SUGAR BEAR HAIR VITAMIN     • zolpidem (AMBIEN) 10 mg tablet Take 1 tablet (10 mg total) by mouth daily at bedtime as needed (insomnia) 30 tablet 0     No current facility-administered medications for this visit  Review of Systems        MEDICATION MANAGEMENT NOTE        [facility]    Name and Date of Birth:  Brayan Ramirez 74 y o  1955 MRN: [id number]    Date of Visit: February 16, 2023    Allergies   Allergen Reactions   • Levofloxacin Other (See Comments)   • Cephalexin Diarrhea   • Nitrofurantoin Fever   • Sulfa Antibiotics Fever   • Topiramate Other (See Comments)   • Bacitracin-Neomycin-Polymyxin Rash   • Sulfamethoxazole-Trimethoprim Diarrhea     SUBJECTIVE:    Neris Santacruz is seen today for a follow up for Bipolar Disorder  She continues to do relatively well since the last visit  Patient's father who was previously in hospice passed away earlier this year, supportive counseling provided and patient appears to be handling the stressor adequately  Offered therapy, patient declines at this time but has a list of therapists if needed    States she had a few medical issues in the past few months in terms of severe nausea and vomiting, feels she is on the mend and looks forward to continue improvement health  She discusses her difficult family members and how she feels they have always scapegoated her all through her life  She is considering decreasing her time spent with family as it often makes her frustrated and feel tense  Continue to encourage psychotherapy, patient states she had poor experiences in the past with therapist but will consider  Reports current 3/10 anxiety + depression related to familial stressors symptoms of dysphoria, sadness  Continues to decline any need for mood stabilizer, previously on Lamictal for over 20 years and is proud of herself for weaning off medications successfully due to long-term side effects of dizziness, sensitivity to light  Very hesitant to initiate any other medication  Denies grandiosity, passivity, risk behaviors, severe mood swings, energy fluctuations, hypertalkativeness  Mood appears appropriate and euthymic during today's counter  Continues to use Klonopin 0 5 mg as needed for periods of high anxiety and reports effectiveness with medications  Continues to sleep adequately with Ambien 10 mg at bedtime as needed for insomnia  Uses medication appropriately, fills her scripts infrequently and appropriately  Denies SI  She denies any side effects from medications  PLAN:      -Continue Klonopin 0 5 mg BID prescribed and managed by PCP    Discussed with patient the risks of sedation, respiratory depression, impairment of ability to drive and potential for abuse and addiction related to treatment with benzodiazepine medications  The patient understands risk of treatment with benzodiazepine medications, agrees to not drive if feels impaired and agrees to take medications as prescribed   Patient was also informed of risks of being on or starting opioid medications due to drug interactions and potential for serious respiratory depression and death       -Continue Ambien 10mg as needed for insomnia managed by PCP            Aware of 24 hour and weekend coverage for urgent situations accessed by calling [facility] main practice number  Referral for individual psychotherapy    Diagnoses and all orders for this visit:    Bipolar 1 disorder, depressed ([address] )        Current Outpatient Medications on File Prior to Visit   Medication Sig Dispense Refill   • albuterol (Ventolin HFA) 90 mcg/act inhaler Inhale 2 puffs every 6 (six) hours as needed for wheezing (Patient not taking: Reported on 10/3/2022) 18 g 5   • atenolol (TENORMIN) 25 mg tablet Take 2 tablets (50 mg total) by mouth daily (Patient taking differently: Take 25 mg by mouth daily) 90 tablet 0   • atorvastatin (LIPITOR) 20 mg tablet 10 mg Currently taking 10 mg     • betamethasone dipropionate 0 05 % lotion Apply topically 2 (two) times a day 60 mL 1   • cholecalciferol (VITAMIN D3) 1,000 units tablet Take 2,000 Units by mouth 2 (two) times a day     • clobetasol (TEMOVATE) 0 05 % cream clobetasol 0 05 % topical cream     • clobetasol (TEMOVATE) 0 05 % external solution Apply topically 2 (two) times a day 50 mL 0   • clonazePAM (KlonoPIN) 0 5 mg tablet TAKE 1 TABLET BY MOUTH 2 TIMES A DAY   (Patient taking differently: Currently once daily, extra half if needed for depression) 60 tablet 0   • gabapentin (Neurontin) 100 mg capsule Take 2 capsules (200 mg total) by mouth 3 (three) times a day 180 capsule 0   • gabapentin (Neurontin) 600 MG tablet Take 1 tablet (600 mg total) by mouth 3 (three) times a day 90 tablet 2   • hydrochlorothiazide (HYDRODIURIL) 12 5 mg tablet Take 2 tablets (25 mg total) by mouth daily (Patient taking differently: Take 12 5 mg by mouth daily Take 1 tablet by mouth daily) 60 tablet 2   • loperamide (IMODIUM A-D) 2 MG tablet Take 2 mg by mouth daily     • mesalamine (APRISO) 0 375 g 24 hr capsule TAKE 2 CAPSULES BY MOUTH EVERY DAY 60 capsule 0   • ondansetron (ZOFRAN) 4 mg tablet Take 1 tablet (4 mg total) by mouth every 8 (eight) hours as needed for nausea or vomiting 20 tablet 0   • Specialty Vitamins Products (VITAMINS FOR THE HAIR PO) Take by mouth SUGAR BEAR HAIR VITAMIN     • zolpidem (AMBIEN) 10 mg tablet Take 1 tablet (10 mg total) by mouth daily at bedtime as needed (insomnia) 30 tablet 0     No current facility-administered medications on file prior to visit  HPI ROS Appetite Changes and Sleep:     She reports normal sleep, adequate appetite, adequate energy level   Denies homicidal ideation, denies suicidal ideation    Review Of Systems:      HPI ROS:               Medication Side Effects:  denies    Depression (10 worst): 3/10    Anxiety (10 worst): 3/10    Safety concerns (SI, HI, etc): Denies si and hi    Sleep: 7 hrs    Energy: fair    Appetite: 2-3 meals    Weight Change: denies        Mental Status Evaluation:    Appearance Adequate hygiene and grooming   Behavior calm and cooperative   Mood anxious and depressed  Depression Scale - 3 of 10 (0 = No depression)  Anxiety Scale - 3 of 10 (0 = No anxiety)   Speech Normal rate and volume   Affect constricted   Thought Processes Goal directed and coherent   Thought Content Does not verbalize delusional material   Associations Tightly connected   Perceptual Disturbances Denies hallucinations and does not appear to be responding to internal stimuli   Risk Potential Suicidal/Homicidal Ideation - No evidence of suicidal or homicidal ideation and patient does not verbalize suicidal or homicidal ideation  Risk of Violence - No evidence of risk for violence found on assessment  Risk of Self Mutilation - No evidence of risk for self mutilation found on assessment   Orientation oriented to person, place, time/date and situation   Memory recent and remote memory grossly intact   Consciousness alert and awake   Attention/Concentration attention span and concentration are age appropriate   Insight intact   Judgement intact   Muscle Strength and Gait normal muscle strength and normal muscle tone, normal gait/station and normal balance   Motor Activity no abnormal movements   Language no difficulty naming common objects, no difficulty repeating a phrase, no difficulty writing a sentence   Fund of Knowledge adequate knowledge of current events  adequate fund of knowledge regarding past history  adequate fund of knowledge regarding vocabulary      Past Psychiatric History Update:     Inpatient Psychiatric Admission Since Last Encounter:   no  Changes to Outpatient Psychiatric Treatment Team:    no  Suicide Attempt Or Self Mutilation Since Last Encounter:   no  Incidence of Violent Behavior Since Last Encounter:   no    Traumatic History Update:     New Onset of Abuse Since Last Encounter:   no  Traumatic Events Since Last Encounter:   no    Past Medical History:    Past Medical History:   Diagnosis Date   • Basal cell carcinoma (BCC) of parietal region of scalp 06/13/2022    Left parietal scalp   • Bipolar 1 disorder, depressed ([facility] [address] )    • Bipolar 1 disorder, depressed ([facility] ) 01/24/1975   • Chronic kidney disease    • Depression    • H/O bladder infections    • Hyperlipidemia    • Hypertension    • Lichen sclerosus    • Microscopic colitis    • Skin disorder     lichens sclerosis   • Urinary tract infection         Past Surgical History:   Procedure Laterality Date   • COLONOSCOPY      2013    • MOHS SURGERY Left 06/28/2022    Left parietal scalp     Allergies   Allergen Reactions   • Levofloxacin Other (See Comments)   • Cephalexin Diarrhea   • Nitrofurantoin Fever   • Sulfa Antibiotics Fever   • Topiramate Other (See Comments)   • Bacitracin-Neomycin-Polymyxin Rash   • Sulfamethoxazole-Trimethoprim Diarrhea     Substance Abuse History:    Social History     Substance and Sexual Activity   Alcohol Use Yes    Comment: socially     Social History     Substance and Sexual Activity   Drug Use Never     Social History:    Social History     Socioeconomic History   • Marital status: Single     Spouse name: Not on file   • Number of children: Not on file   • Years of education: Not on file   • Highest education level: Not on file   Occupational History   • Not on file   Tobacco Use   • Smoking status: Former   • Smokeless tobacco: Never   • Tobacco comments:     heavy smoker previously   Vaping Use   • Vaping Use: Never used   Substance and Sexual Activity   • Alcohol use: Yes     Comment: socially   • Drug use: Never   • Sexual activity: Not Currently     Partners: Male   Other Topics Concern   • Not on file   Social History Narrative   • Not on file     Social Determinants of Health     Financial Resource Strain: Low Risk    • Difficulty of Paying Living Expenses: Not very hard   Food Insecurity: Not on file   Transportation Needs: No Transportation Needs   • Lack of Transportation (Medical): No   • Lack of Transportation (Non-Medical): No   Physical Activity: Not on file   Stress: Not on file   Social Connections: Not on file   Intimate Partner Violence: Not on file   Housing Stability: Not on file     Family Psychiatric History:     Family History   Problem Relation Age of Onset   • Squamous cell carcinoma Mother    • Kidney disease Father    • Diabetic kidney disease Father    • No Known Problems Son    • No Known Problems Half-Sister      History Review: The following portions of the patient's history were reviewed and updated as appropriate: allergies, current medications, past family history, past medical history, past social history, past surgical history and problem list     OBJECTIVE:     Vital signs in last 24 hours: There were no vitals filed for this visit    Laboratory Results:   Recent Labs (last 2 months):   Nurse Triage on 01/09/2023   Component Date Value   • Color, UA 01/09/2023 Light Yellow    • Clarity, UA 01/09/2023 Turbid    • Specific Gravity, UA 01/09/2023 1 009    • pH, UA 01/09/2023 6 5    • Leukocytes, UA 01/09/2023 Large (A)    • Nitrite, UA 01/09/2023 Negative    • Protein, UA 01/09/2023 Negative    • Glucose, UA 01/09/2023 Negative    • Ketones, UA 01/09/2023 Negative    • Urobilinogen, UA 01/09/2023 <2 0    • Bilirubin, UA 01/09/2023 Negative    • Occult Blood, UA 01/09/2023 Negative    • RBC, UA 01/09/2023 30-50 (A)    • WBC, UA 01/09/2023 Innumerable (A)    • Epithelial Cells 01/09/2023 Occasional    • Bacteria, UA 01/09/2023 Occasional    Appointment on 01/09/2023   Component Date Value   • PTH 01/09/2023 74 8    • Sodium 01/09/2023 140    • Potassium 01/09/2023 3 8    • Chloride 01/09/2023 107    • CO2 01/09/2023 29    • ANION GAP 01/09/2023 4    • BUN 01/09/2023 18    • Creatinine 01/09/2023 0 98    • Glucose, Fasting 01/09/2023 100 (H)    • Calcium 01/09/2023 9 3    • eGFR 01/09/2023 60    • Albumin 01/09/2023 3 8    • Vit D, 25-Hydroxy 01/09/2023 29 7 (L)    • Calcium, Ionized 01/09/2023 1 20    • Hemoglobin A1C 01/09/2023 4 8    • EAG 01/09/2023 91    • Urine Culture 01/09/2023 20,000-29,000 cfu/ml Escherichia coli (A)    • Urine Culture 01/09/2023 20,000-29,000 cfu/ml Corynebacterium amycolatum (A)      I have personally reviewed all pertinent laboratory/tests results      Suicide/Homicide Risk Assessment:    Risk of Harm to Self:  Protective Factors: no current suicidal ideation, access to mental health treatment, compliant with medications, compliant with mental health treatment, effective problem solving skills, good health, good self-esteem, having a desire to be alive  Based on today's assessment, Aiden Santiago presents the following risk of harm to self: minimal    Risk of Harm to Others:  Based on today's assessment, Aiden Santiago presents the following risk of harm to others: minimal    The following interventions are recommended: no intervention changes needed    Medications Risks/Benefits:      Risks, Benefits And Possible Side Effects Of Medications:    Discussed risks and benefits of treatment with patient including risks of misuse, abuse or dependence, sedation and respiratory depression related to treatment with benzodiazepine medications and risk of impaired next-day mental alertness, complex sleep-related behavior and dependence related to treatment with hypnotic medications     Controlled Medication Discussion:     Neris Santacruz has been filling controlled prescriptions on time as prescribed according to [facility]  Discussed with Neris Santacruz the risks of sedation, respiratory depression, impairment of ability to drive and potential for abuse and addiction related to treatment with benzodiazepine medications  She understands risk of treatment with benzodiazepine medications, agrees to not drive if feels impaired and agrees to take medications as prescribed  Discussed with Cat [employer] warning on concurrent use of benzodiazepines and opioid medications including sedation, respiratory depression, coma and death  She understands the risk of treatment with benzodiazepines in addition to opioids and wants to continue taking those medications  Discussed with Neris Santacruz increased risk of impairment related to concurrent use of benzodiazepines and hypnotic medications including excessive sedation, psychomotor impairment and respiratory depression  She understands the risk of treatment with benzodiazepines in addition to hypnotic medications and wants to continue taking those medications    Treatment Plan:    Due for update/Updated:   yes  Last treatment plan done 4/4/22 by gagandeep martinez  Treatment Plan due on 10/4/22      RICK Colindres 02/16/23  Visit Time    Visit Start Time: 3427  Visit Stop Time: 12  Total Visit Duration: 30 minutes

## 2023-02-16 NOTE — PROGRESS NOTES
Daily Note     Today's date: 2023  Patient name: Pete Lovett  : 1955  MRN: [id number]  Referring provider: RICK Su  Dx:   Encounter Diagnosis     ICD-10-CM    1  Postherpetic neuralgia  B02 29                      Subjective: Pt reports that she is feeling better, stronger  Completing Hep each day except for one since IE  Objective: See treatment diary below      Assessment: Tolerated treatment well  Due to fatigue reduced amount of exercise today  Long discussion about returning to gym  Motivational interviewing used to encourage pt to return  Pt will be returning on Saturday this week  Pt also discussed her goal of getting up from the floor without using her hands  Assessed getting up from the floor, noted weakness in quads  Therefore initiated lunge for HEP  Patient would benefit from continued PT      Plan: Continue per plan of care  Progress treatment as tolerated         Precautions: None      Manuals                                                                Neuro Re-Ed             TB ext  GTB 1x10           TB row  GTB 1x10                                                               Pt edu RAIMUNDO            Ther Ex             bridge HEP 2x10           sidelying hip abd HEP 1x10; 1x5           SLR HEP 1x6           Shoulder press             Wall push-up                                       bike  5'           Ther Activity             STS HEP 1x10           Farmer's walk             Gait Training                                       Modalities

## 2023-02-23 ENCOUNTER — APPOINTMENT (OUTPATIENT)
Dept: PHYSICAL THERAPY | Facility: CLINIC | Age: 68
End: 2023-02-23

## 2023-02-23 DIAGNOSIS — I10 PRIMARY HYPERTENSION: ICD-10-CM

## 2023-02-23 RX ORDER — ATENOLOL 25 MG/1
50 TABLET ORAL DAILY
Qty: 90 TABLET | Refills: 0 | Status: SHIPPED | OUTPATIENT
Start: 2023-02-23

## 2023-02-25 DIAGNOSIS — K58.2 IRRITABLE BOWEL SYNDROME WITH BOTH CONSTIPATION AND DIARRHEA: ICD-10-CM

## 2023-02-25 RX ORDER — MESALAMINE 0.38 G/1
CAPSULE, EXTENDED RELEASE ORAL
Qty: 60 CAPSULE | Refills: 0 | Status: SHIPPED | OUTPATIENT
Start: 2023-02-25

## 2023-03-01 ENCOUNTER — OFFICE VISIT (OUTPATIENT)
Dept: PHYSICAL THERAPY | Facility: CLINIC | Age: 68
End: 2023-03-01

## 2023-03-01 DIAGNOSIS — B02.29 POSTHERPETIC NEURALGIA: Primary | ICD-10-CM

## 2023-03-01 NOTE — PROGRESS NOTES
PT Discharge     Today's date: 2023  Patient name: Alee Whitman  : 1955  MRN: [id number]  Referring provider: RICK Boyd  Dx:   Encounter Diagnosis     ICD-10-CM    1  Postherpetic neuralgia  B02 29 Ambulatory Referral to Physical Therapy                     Assessment  Assessment details: Pt is a 75 y/o female who presents to physical therapy with peripheral neuropathic pain associated with shingles along the L3/4 nerve root  Despite pt reporting she has not returned to the gym and reports inconsistency with HEP, she is reporting improvement in symptoms  She demonstrates competence with HEP  Skilled physical therapy is no longer indicated  PT and pt discussed d/c today, pt agreeable  Goals  STG( 4 weeks): - MET  Pt will be independent with basic HEP  Pt will demonstrate increase in MMT grade by 1/3 for hip abd and knee ext  LTG (8 weeks): FOTO will be expected outcome  Never taken  Pt will demonstrate <12s 5x STS  Not retested on 3/1  Pt will demonstrate MMT grade >4/5 in both LE  - MET  Pt will be independent with extensive HEP  - MET      Plan: d/c      Subjective Evaluation    History of Present Illness  Mechanism of injury: Chief Complaint: Pt reports she has not been back to the gym  She also reports she has not done any of the HEP except for the arm exercises 1x  However, she is reporting that she continues to make improvements         24 hour Pattern:  HPI: Long history of L "sciatica" with sometimes noting weakness in her L LE    Severity: moderate  Irritability: constant  Nature: peripheral neuropathic  Stage: chronic  Stability: no change    P1: L low back around the lat hip into the groin and ant thigh to the medial knee, can sometimes run down the medial lower leg and into the arch of the foot  Aggs/eases: none    Physical Activity: wants to return to gym, was not doing prior    Occupation: retired  GHS: No recent onset issues  Patient Goals  Patient goals for therapy: increased strength  Patient goal: return to exercise        Objective     Strength/Myotome Testing     Left Hip   Planes of Motion   Flexion: 4  Extension: 4  Abduction: 4    Right Hip   Planes of Motion   Flexion: 4  Extension: 4  Abduction: 4    Left Knee   Flexion: 4  Extension: 4+    Right Knee   Flexion: 4  Extension: 4+    Left Ankle/Foot   Dorsiflexion: 5  Eversion: 5  Great toe extension: 5    Right Ankle/Foot   Dorsiflexion: 5  Eversion: 5  Great toe extension: 5    Additional Strength Details  :  L: 24 4#  R: 33 5#    UE: grossly 4/5    General Comments:      Lumbar Comments  5x STS: 15 65s (a little trembling following);  Not retested 3/1  TUG:             Precautions: None         Manuals 2/9 2/16 3/1                                                              Neuro Re-Ed             TB ext  GTB 1x10           TB row  GTB 1x10                                                               Pt edu RAIMUNDO  RAIMUNDO          Ther Ex             bridge HEP 2x10 2x10          sidelying hip abd HEP 1x10; 1x5 2x10          SLR HEP 1x6 2x10          Shoulder press             Wall push-up                                       bike  5'           Ther Activity             STS HEP 1x10 2x10          Farmer's walk             Gait Training                                       Modalities

## 2023-03-02 ENCOUNTER — TELEMEDICINE (OUTPATIENT)
Dept: NEUROLOGY | Facility: CLINIC | Age: 68
End: 2023-03-02

## 2023-03-02 DIAGNOSIS — R41.3 MEMORY DIFFICULTY: Primary | ICD-10-CM

## 2023-03-02 NOTE — PROGRESS NOTES
Virtual Regular Visit    Verification of patient location:    Patient is located in the following state in which I hold an active license [place]      Assessment/Plan:  1  Memory difficulty        Differential diagnosis discussed with the patient, her MRI scan of the brain did not show evidence of any neurodegeneration, I do not think patient has a true cognitive impairment it is possible that she has some difficulty with her attention and concentration on top of that her medications could be causing some of her symptoms, she is going to discuss with the pain specialist and see if she can wean her off of the gabapentin, also she will follow-up with her psychiatrist regarding her difficulty in attention and concentration and have the neuropsych testing  Problem List Items Addressed This Visit    None           Reason for visit is   Chief Complaint   Patient presents with   • Virtual Regular Visit        Encounter provider Crissy Mooney MD    Provider located at [address] [address]      Recent Visits  No visits were found meeting these conditions  Showing recent visits within past 7 days and meeting all other requirements  Future Appointments  No visits were found meeting these conditions  Showing future appointments within next 150 days and meeting all other requirements       The patient was identified by name and date of birth  Beny Mckay was informed that this is a telemedicine visit and that the visit is being conducted through the [employer]  She agrees to proceed     My office door was closed  No one else was in the room  She acknowledged consent and understanding of privacy and security of the video platform  The patient has agreed to participate and understands they can discontinue the visit at any time  Patient is aware this is a billable service  Subjective  Beny Mckay is a 74 y o  female in follow-up for regarding attention and concentration and memory, she has had the symptoms for more than a year she was on Lamictal Wellbutrin and Klonopin for almost 20 years for her mood disorder and has gone off the medication and since then she has slight difficulty in attention and concentration and focusing she is able to do her ADLs she is able to manage her finances, she recently had postherpetic neuralgia and was in follow-up with the pain specialist and was being treated with gabapentin almost 1800 mg a day and that has been slowly wean down to 200 mg a day and she also feels that the gabapentin is almost causing to her to have some of the symptoms, no other complaints            Past Medical History:   Diagnosis Date   • Basal cell carcinoma (BCC) of parietal region of scalp 06/13/2022    Left parietal scalp   • [facility] )    • Bipolar 1 disorder, depressed ([facility] ) 01/24/1975   • Chronic kidney disease    • Depression    • H/O bladder infections    • Hyperlipidemia    • Hypertension    • Lichen sclerosus    • Microscopic colitis    • Skin disorder     lichens sclerosis   • Urinary tract infection        Past Surgical History:   Procedure Laterality Date   • COLONOSCOPY      2013    • MOHS SURGERY Left 06/28/2022    Left parietal scalp       Current Outpatient Medications   Medication Sig Dispense Refill   • albuterol (Ventolin HFA) 90 mcg/act inhaler Inhale 2 puffs every 6 (six) hours as needed for wheezing (Patient not taking: Reported on 10/3/2022) 18 g 5   • atenolol (TENORMIN) 25 mg tablet Take 2 tablets (50 mg total) by mouth daily 90 tablet 0   • atorvastatin (LIPITOR) 20 mg tablet 10 mg Currently taking 10 mg     • betamethasone dipropionate 0 05 % lotion Apply topically 2 (two) times a day 60 mL 1   • cholecalciferol (VITAMIN D3) 1,000 units tablet Take 2,000 Units by mouth 2 (two) times a day     • clobetasol (TEMOVATE) 0 05 % cream clobetasol 0 05 % topical cream • clobetasol (TEMOVATE) 0 05 % external solution Apply topically 2 (two) times a day 50 mL 0   • clonazePAM (KlonoPIN) 0 5 mg tablet TAKE 1 TABLET BY MOUTH 2 TIMES A DAY  (Patient taking differently: Currently once daily, extra half if needed for depression) 60 tablet 0   • gabapentin (Neurontin) 100 mg capsule Take 2 capsules (200 mg total) by mouth 3 (three) times a day 180 capsule 0   • gabapentin (Neurontin) 600 MG tablet Take 1 tablet (600 mg total) by mouth 3 (three) times a day 90 tablet 2   • hydrochlorothiazide (HYDRODIURIL) 12 5 mg tablet Take 2 tablets (25 mg total) by mouth daily (Patient taking differently: Take 12 5 mg by mouth daily Take 1 tablet by mouth daily) 60 tablet 2   • loperamide (IMODIUM A-D) 2 MG tablet Take 2 mg by mouth daily     • mesalamine (APRISO) 0 375 g 24 hr capsule TAKE 2 CAPSULES BY MOUTH EVERY DAY 60 capsule 0   • ondansetron (ZOFRAN) 4 mg tablet Take 1 tablet (4 mg total) by mouth every 8 (eight) hours as needed for nausea or vomiting 20 tablet 0   • Specialty Vitamins Products (VITAMINS FOR THE HAIR PO) Take by mouth SUGAR BEAR HAIR VITAMIN     • zolpidem (AMBIEN) 10 mg tablet Take 1 tablet (10 mg total) by mouth daily at bedtime as needed (insomnia) 30 tablet 0     No current facility-administered medications for this visit  Allergies   Allergen Reactions   • Levofloxacin Other (See Comments)   • Cephalexin Diarrhea   • Nitrofurantoin Fever   • Sulfa Antibiotics Fever   • Topiramate Other (See Comments)   • Bacitracin-Neomycin-Polymyxin Rash   • Sulfamethoxazole-Trimethoprim Diarrhea       Review of Systems   Constitutional: Negative  Negative for appetite change and fever  HENT: Negative  Negative for hearing loss, tinnitus, trouble swallowing and voice change  Eyes: Negative  Negative for photophobia, pain and visual disturbance  Respiratory: Negative  Negative for shortness of breath  Cardiovascular: Negative  Negative for palpitations  Gastrointestinal: Negative  Negative for nausea and vomiting  Endocrine: Negative  Negative for cold intolerance  Genitourinary: Negative  Negative for dysuria, frequency and urgency  Musculoskeletal: Negative  Negative for gait problem, myalgias and neck pain  Skin: Negative  Negative for rash  Allergic/Immunologic: Negative  Neurological: Negative  Negative for dizziness, tremors, seizures, syncope, facial asymmetry, speech difficulty, weakness, light-headedness, numbness and headaches  Hematological: Negative  Does not bruise/bleed easily  Psychiatric/Behavioral: Positive for confusion  Negative for hallucinations and sleep disturbance  Patient states she is experiencing "brain freeze"      I have reviewed the past medical history, surgical history, social and family history, current medications, allergies vitals, review of systems, and updated this information as appropriate today  Video Exam    There were no vitals filed for this visit  Physical Exam   Patient is alert awake oriented times 3  He was able to tell me the month year president immediate recall was 3/3 delayed recall was 3/3 she is able to tell me all the months of the year backwards to forward  Speech is fluent  Extraocular muscles are intact, visual fields examination was limited but seemed intact, no facial asymmetry, tongue is midline moves side to side, hearing is intact bilaterally,  Motor examination moves all extremities    Gait is unremarkable  I spent 10 minutes directly with the patient during this visit

## 2023-03-07 ENCOUNTER — TELEPHONE (OUTPATIENT)
Dept: PSYCHIATRY | Facility: CLINIC | Age: 68
End: 2023-03-07

## 2023-03-07 NOTE — TELEPHONE ENCOUNTER
Returned pts call, she is explaining how her mother gave her a book of patients entire medical records throughout her years  Patient reports this caused a lot of emotions  She noted increased difficulty managing her emotions and irritability  We have discussed mood stabilizers in the recent past as she declines to start any despite her bipolar diagnosis  We discussed possibly initiating Vraylar 1 5 mg for mood stabilization, patient decided she will look into it  more and call provider back when she has an answer  No further concerns this time, denies SI and HI

## 2023-03-07 NOTE — TELEPHONE ENCOUNTER
Spoke to Shaila  She will obtain an updated referral from Dr Briseyda Em as the current one in file will  before we would be able to schedule her

## 2023-03-07 NOTE — TELEPHONE ENCOUNTER
Patient called in requesting a call back from provider as she is struggling today  She recently had shingles and is coming off of the medication for that and stated that she had been weaned off of her psychiatric medications but is not well at the moment  Patient stated that she is not suicidal or needing to be hospitalized, simply needs to connect with provider as to going back on medication or not  Please reach out at earliest convenience  Call was also transferred to providers office

## 2023-03-20 ENCOUNTER — TELEPHONE (OUTPATIENT)
Dept: NEPHROLOGY | Facility: CLINIC | Age: 68
End: 2023-03-20

## 2023-03-20 NOTE — TELEPHONE ENCOUNTER
At patient request from a [employer] message, patient [id number] was terminated as of 3/20/2023

## 2023-03-27 ENCOUNTER — TELEPHONE (OUTPATIENT)
Dept: NEPHROLOGY | Facility: CLINIC | Age: 68
End: 2023-03-27

## 2023-03-27 NOTE — TELEPHONE ENCOUNTER
Called spoke with patient advised patient to get labs done prior to 04/03 fu appt with Dr Deluca  patient understood and is okay with it

## 2023-04-01 DIAGNOSIS — I10 PRIMARY HYPERTENSION: ICD-10-CM

## 2023-04-03 RX ORDER — ATENOLOL 25 MG/1
TABLET ORAL
Qty: 90 TABLET | Refills: 0 | Status: SHIPPED | OUTPATIENT
Start: 2023-04-03

## 2023-04-06 ENCOUNTER — TELEPHONE (OUTPATIENT)
Dept: NEPHROLOGY | Facility: CLINIC | Age: 68
End: 2023-04-06

## 2023-04-06 DIAGNOSIS — R05.9 COUGH, UNSPECIFIED TYPE: Primary | ICD-10-CM

## 2023-04-06 RX ORDER — MONTELUKAST SODIUM 10 MG/1
10 TABLET ORAL
Qty: 30 TABLET | Refills: 3 | Status: SHIPPED | OUTPATIENT
Start: 2023-04-06 | End: 2023-07-10 | Stop reason: ALTCHOICE

## 2023-04-06 NOTE — TELEPHONE ENCOUNTER
I called and left a message on machine for patient to return our call about going over her insurance, because the patient sent a message through her [address]  to terminate her [address], but the patient is eligible as of 9/1/2022

## 2023-04-06 NOTE — TELEPHONE ENCOUNTER
I called [employer] @ [phone] and spoke to [place] and Dhiraj () to check eligible for the patient and as of  4/6/2023 the patient has current active coverage as of 9/1/2023  The reference number for this call is: [id number]   Marni Monzon,

## 2023-04-06 NOTE — TELEPHONE ENCOUNTER
I called [employer] @ [phone] and spoke to Heather Wong () to check eligible for the patient and as of 4/6/2023 the patient has current active coverage as of   11/1/2022 and according to Heather Wong () this plan is a Medicare Supplement Plan G  The reference number for this call is: [id number]   Thu Rush,

## 2023-04-18 PROBLEM — M89.9 CHRONIC KIDNEY DISEASE-MINERAL AND BONE DISORDER: Status: ACTIVE | Noted: 2023-04-18

## 2023-04-18 PROBLEM — E83.9 CHRONIC KIDNEY DISEASE-MINERAL AND BONE DISORDER: Status: ACTIVE | Noted: 2023-04-18

## 2023-04-18 PROBLEM — N18.9 CHRONIC KIDNEY DISEASE-MINERAL AND BONE DISORDER: Status: ACTIVE | Noted: 2023-04-18

## 2023-04-29 DIAGNOSIS — K58.2 IRRITABLE BOWEL SYNDROME WITH BOTH CONSTIPATION AND DIARRHEA: ICD-10-CM

## 2023-05-02 RX ORDER — MESALAMINE 0.38 G/1
CAPSULE, EXTENDED RELEASE ORAL
Qty: 60 CAPSULE | Refills: 0 | Status: SHIPPED | OUTPATIENT
Start: 2023-05-02

## 2023-05-25 DIAGNOSIS — K58.2 IRRITABLE BOWEL SYNDROME WITH BOTH CONSTIPATION AND DIARRHEA: ICD-10-CM

## 2023-05-25 RX ORDER — MESALAMINE 0.38 G/1
CAPSULE, EXTENDED RELEASE ORAL
Qty: 60 CAPSULE | Refills: 0 | Status: SHIPPED | OUTPATIENT
Start: 2023-05-25

## 2023-06-13 ENCOUNTER — TELEPHONE (OUTPATIENT)
Dept: GASTROENTEROLOGY | Facility: CLINIC | Age: 68
End: 2023-06-13

## 2023-06-14 ENCOUNTER — OFFICE VISIT (OUTPATIENT)
Dept: DERMATOLOGY | Facility: CLINIC | Age: 68
End: 2023-06-14
Payer: MEDICARE

## 2023-06-14 VITALS — WEIGHT: 150 LBS | HEIGHT: 63 IN | BODY MASS INDEX: 26.58 KG/M2

## 2023-06-14 DIAGNOSIS — Z85.828 HISTORY OF BASAL CELL CARCINOMA: ICD-10-CM

## 2023-06-14 DIAGNOSIS — D22.70 MULTIPLE BENIGN MELANOCYTIC NEVI OF UPPER AND LOWER EXTREMITIES AND TRUNK: Primary | ICD-10-CM

## 2023-06-14 DIAGNOSIS — L90.0 LICHEN SCLEROSUS: ICD-10-CM

## 2023-06-14 DIAGNOSIS — D22.60 MULTIPLE BENIGN MELANOCYTIC NEVI OF UPPER AND LOWER EXTREMITIES AND TRUNK: Primary | ICD-10-CM

## 2023-06-14 DIAGNOSIS — L72.0 MILIUM: ICD-10-CM

## 2023-06-14 DIAGNOSIS — L57.0 ACTINIC KERATOSIS: ICD-10-CM

## 2023-06-14 DIAGNOSIS — D22.5 MULTIPLE BENIGN MELANOCYTIC NEVI OF UPPER AND LOWER EXTREMITIES AND TRUNK: Primary | ICD-10-CM

## 2023-06-14 DIAGNOSIS — L82.1 SEBORRHEIC KERATOSES: ICD-10-CM

## 2023-06-14 DIAGNOSIS — L81.4 LENTIGO: ICD-10-CM

## 2023-06-14 DIAGNOSIS — L85.3 XEROSIS OF SKIN: ICD-10-CM

## 2023-06-14 DIAGNOSIS — D18.01 CHERRY ANGIOMA: ICD-10-CM

## 2023-06-14 PROCEDURE — 99214 OFFICE O/P EST MOD 30 MIN: CPT | Performed by: DERMATOLOGY

## 2023-06-14 RX ORDER — TACROLIMUS 1 MG/G
OINTMENT TOPICAL 2 TIMES DAILY
Qty: 60 G | Refills: 2 | Status: SHIPPED | OUTPATIENT
Start: 2023-06-14

## 2023-06-14 NOTE — PATIENT INSTRUCTIONS
"1  MELANOCYTIC NEVI (\"Moles\")  Assessment and Plan:  Based on a thorough discussion of this condition and the management approach to it (including a comprehensive discussion of the known risks, side effects and potential benefits of treatment), the patient (family) agrees to implement the following specific plan:  When outside we recommend using a wide brim hat, sunglasses, long sleeve and pants, sunscreen with SPF 69+ with reapplication every 2 hours, or SPF specific clothing   Benign, reassured  Annual skin check     Melanocytic Nevi  Melanocytic nevi (\"moles\") are tan or brown, raised or flat areas of the skin which have an increased number of melanocytes  Melanocytes are the cells in our body which make pigment and account for skin color  Some moles are present at birth (I e , \"congenital nevi\"), while others come up later in life (i e , \"acquired nevi\")  The sun can stimulate the body to make more moles  Sunburns are not the only thing that triggers more moles  Chronic sun exposure can do it too  Clinically distinguishing a healthy mole from melanoma may be difficult, even for experienced dermatologists  The \"ABCDE's\" of moles have been suggested as a means of helping to alert a person to a suspicious mole and the possible increased risk of melanoma  The suggestions for raising alert are as follows:    Asymmetry: Healthy moles tend to be symmetric, while melanomas are often asymmetric  Asymmetry means if you draw a line through the mole, the two halves do not match in color, size, shape, or surface texture  Asymmetry can be a result of rapid enlargement of a mole, the development of a raised area on a previously flat lesion, scaling, ulceration, bleeding or scabbing within the mole  Any mole that starts to demonstrate \"asymmetry\" should be examined promptly by a board certified dermatologist      Border: Healthy moles tend to have discrete, even borders    The border of a melanoma often blends into " "the normal skin and does not sharply delineate the mole from normal skin  Any mole that starts to demonstrate \"uneven borders\" should be examined promptly by a board certified dermatologist      Color: Healthy moles tend to be one color throughout  Melanomas tend to be made up of different colors ranging from dark black, blue, white, or red  Any mole that demonstrates a color change should be examined promptly by a board certified dermatologist      Diameter: Healthy moles tend to be smaller than 0 6 cm in size; an exception are \"congenital nevi\" that can be larger  Melanomas tend to grow and can often be greater than 0 6 cm (1/4 of an inch, or the size of a pencil eraser)  This is only a guideline, and many normal moles may be larger than 0 6 cm without being unhealthy  Any mole that starts to change in size (small to bigger or bigger to smaller) should be examined promptly by a board certified dermatologist      Evolving: Healthy moles tend to \"stay the same  \"  Melanomas may often show signs of change or evolution such as a change in size, shape, color, or elevation  Any mole that starts to itch, bleed, crust, burn, hurt, or ulcerate or demonstrate a change or evolution should be examined promptly by a board certified dermatologist         2  LENTIGO  Assessment and Plan:  Based on a thorough discussion of this condition and the management approach to it (including a comprehensive discussion of the known risks, side effects and potential benefits of treatment), the patient (family) agrees to implement the following specific plan:  When outside we recommend using a wide brim hat, sunglasses, long sleeve and pants, sunscreen with SPF 04+ with reapplication every 2 hours, or SPF specific clothing       What is a lentigo? A lentigo is a pigmented flat or slightly raised lesion with a clearly defined edge  Unlike an ephelis (freckle), it does not fade in the winter months   There are several kinds of " lentigo  The name lentigo originally referred to its appearance resembling a small lentil  The plural of lentigo is lentigines, although “lentigos” is also in common use  Who gets lentigines? Lentigines can affect males and females of all ages and races  Solar lentigines are especially prevalent in fair skinned adults  Lentigines associated with syndromes are present at birth or arise during childhood  What causes lentigines? Common forms of lentigo are due to exposure to ultraviolet radiation:  Sun damage including sunburn   Indoor tanning   Phototherapy, especially photochemotherapy (PUVA)    Ionizing radiation, eg radiation therapy, can also cause lentigines  Several familial syndromes associated with widespread lentigines originate from mutations in Hi-MAP kinase, mTOR signaling and PTEN pathways  What is the treatment for lentigines? Most lentigines are left alone  Attempts to lighten them may not be successful  The following approaches are used:  SPF 50+ broad-spectrum sunscreen   Hydroquinone bleaching cream   Alpha hydroxy acids   Vitamin C   Retinoids   Azelaic acid   Chemical peels  Individual lesions can be permanently removed using:  Cryotherapy   Intense pulsed light   Pigment lasers    How can lentigines be prevented? Lentigines associated with exposure ultraviolet radiation can be prevented by very careful sun protection  Clothing is more successful at preventing new lentigines than are sunscreens  What is the outlook for lentigines? Lentigines usually persist  They may increase in number with age and sun exposure  Some in sun-protected sites may fade and disappear      3  CLEARY ANGIOMAS  Assessment and Plan:  Based on a thorough discussion of this condition and the management approach to it (including a comprehensive discussion of the known risks, side effects and potential benefits of treatment), the patient (family) agrees to implement the following specific plan:  Monitor for "changes  Benign, reassured    Assessment and Plan:    Cherry angioma, also known as [employer] spots, are benign vascular skin lesions  A \"cherry angioma\" is a firm red, blue or purple papule, 0 1-1 cm in diameter  When thrombosed, they can appear black in colour until evaluated with a dermatoscope when the red or purple colour is more easily seen  Cherry angioma may develop on any part of the body but most often appear on the scalp, face, lips and trunk  An angioma is due to proliferating endothelial cells; these are the cells that line the inside of a blood vessel  Angiomas can arise in early life or later in life; the most common type of angioma is a cherry angioma  Cherry angiomas are very common in males and females of any age or race  They are more noticeable in white skin than in skin of colour  They markedly increase in number from about the age of 36  There may be a family history of similar lesions  Eruptive cherry angiomas have been rarely reported to be associated with internal malignancy  The cause of angiomas is unknown  Genetic analysis of cherry angiomas has shown that they frequently carry specific somatic missense mutations in the GNAQ and GNA11 (Q209H) genes, which are involved in other vascular and melanocytic proliferations  4  SEBORRHEIC KERATOSIS; NON-INFLAMED  Assessment and Plan:  Based on a thorough discussion of this condition and the management approach to it (including a comprehensive discussion of the known risks, side effects and potential benefits of treatment), the patient (family) agrees to implement the following specific plan:  Monitor for changes  Benign, reassured    Seborrheic Keratosis  A seborrheic keratosis is a harmless warty spot that appears during adult life as a common sign of skin aging  Seborrheic keratoses can arise on any area of skin, covered or uncovered, with the usual exception of the palms and soles  They do not arise from mucous membranes   " "Seborrheic keratoses can have highly variable appearance  Seborrheic keratoses are extremely common  It has been estimated that over 90% of adults over the age of 61 years have one or more of them  They occur in males and females of all races, typically beginning to erupt in the 35s or 45s  They are uncommon under the age of 21 years  The precise cause of seborrhoeic keratoses is not known  Seborrhoeic keratoses are considered degenerative in nature  As time goes by, seborrheic keratoses tend to become more numerous  Some people inherit a tendency to develop a very large number of them; some people may have hundreds of them  There is no easy way to remove multiple lesions on a single occasion  Unless a specific lesion is \"inflamed\" and is causing pain or stinging/burning or is bleeding, most insurance companies do not authorize treatment  5  XEROSIS (\"DRY SKIN\")  Assessment and Plan:  Based on a thorough discussion of this condition and the management approach to it (including a comprehensive discussion of the known risks, side effects and potential benefits of treatment), the patient (family) agrees to implement the following specific plan:  Use moisturizer like Eucerin,Cerave or Aveeno Cream 3 times a day for the dry skin            Dry skin refers to skin that feels dry to touch  Dry skin has a dull surface with a rough, scaly quality  The skin is less pliable and cracked  When dryness is severe, the skin may become inflamed and fissured  Although any body site can be dry, dry skin tends to affect the shins more than any other site  Dry skin is lacking moisture in the outer horny cell layer (stratum corneum) and this results in cracks in the skin surface  Dry skin is also called xerosis, xeroderma or asteatosis (lack of fat)  It can affect males and females of all ages  There is some racial variability in water and lipid content of the skin    Dry skin that starts in early childhood may be " one of about 20 types of ichthyosis (fish-scale skin)  There is often a family history of dry skin  Dry skin is commonly seen in people with atopic dermatitis  Nearly everyone > 60 years has dry skin  Dry skin that begins later may be seen in people with certain diseases and conditions  Postmenopausal women  Hypothyroidism  Chronic renal disease   Malnutrition and weight loss   Subclinical dermatitis   Treatment with certain drugs such as oral retinoids, diuretics and epidermal growth factor receptor inhibitors      What is the treatment for dry skin? The mainstay of treatment of dry skin and ichthyosis is moisturisers/emollients  They should be applied liberally and often enough to:  Reduce itch   Improve the barrier function   Prevent entry of irritants, bacteria   Reduce transepidermal water loss  How can dry skin be prevented? Eliminate aggravating factors:  Reduce the frequency of bathing  A humidifier in winter and air conditioner in summer   Compare having a short shower with a prolonged soak in a bath  Use lukewarm, not hot, water  Replace standard soap with a substitute such as a synthetic detergent cleanser, water-miscible emollient, bath oil, anti-pruritic tar oil, colloidal oatmeal etc    Apply an emollient liberally and often, particularly shortly after bathing, and when itchy  The drier the skin, the thicker this should be, especially on the hands  What is the outlook for dry skin? A tendency to dry skin may persist life-long, or it may improve once contributing factors are controlled      6  HISTORY OF BASAL CELL CARCINOMA  Assessment and Plan:  Based on a thorough discussion of this condition and the management approach to it (including a comprehensive discussion of the known risks, side effects and potential benefits of treatment), the patient (family) agrees to implement the following specific plan:  Continue yearly skin exams     How can basal cell carcinoma be prevented? The most important way to prevent BCC is to avoid sunburn  This is especially important in childhood and early life  Fair skinned individuals and those with a personal or family history of BCC should protect their skin from sun exposure daily, year-round and lifelong  Stay indoors or under the shade in the middle of the day   Wear covering clothing   Apply high protection factor SPF50+ broad-spectrum sunscreens generously to exposed skin if outdoors   Avoid indoor tanning (sun beds, solaria)  Oral nicotinamide (vitamin B3) in a dose of 500 mg twice daily may reduce the number and severity of BCCs  What is the outlook for basal cell carcinoma? Most BCCs are cured by treatment  Cure is most likely if treatment is undertaken when the lesion is small  About 50% of people with BCC develop a second one within 3 years of the first  They are also at increased risk of other skin cancers, especially melanoma  Regular self-skin examinations and long-term annual skin checks by an experienced health professional are recommended  7  MILIUM   Assessment and Plan:  Based on a thorough discussion of this condition and the management approach to it (including a comprehensive discussion of the known risks, side effects and potential benefits of treatment), the patient (family) agrees to implement the following specific plan:  $150 to treat up to 10 lesions, and if over 10 lesions, then additional $10/lesion thereafter  Assessment and Plan  A milium is a small cyst containing keratin (the skin protein); they are usually multiple and are then known as milia  These harmless cysts present as tiny pearly-white bumps just under the surface of the skin  Milia are common in all ages and both sexes  They most often arise on the face and are particularly prominent on the eyelids and cheeks, but they may occur elsewhere  There are various kinds of milia     milia: Affect 40-50% of  babies, few to numerous lesions, often seen on the nose, but may also arise inside the mouth on the mucosa (Matilde pearls) or palate (Khadijah nodules) or more widely on the scalp, face and upper trunk, Heal spontaneously within a few weeks of birth  Primary milia in children and adults: found around eyelids, cheeks, forehead and genitalia, in young children, a row of milia may appear along the nasal crease, may clear in a few weeks or persist for months or longer  Juvenile milia: associated with Rombo syndrome, basal cell naevus syndrome, Ymhlz-Cleal-Zibjfdue syndrome, pachyonychia congenita, Luna syndrome and other genetic disorders, may be congenital (present at birth) or appear later in life  Milia en plaque: multiple milia appear on within an inflamed plaque up to several centimeters in diameter, usually found on an eyelid, behind the ear, on a cheek or jaw  3  Affect children and adults, especially middle-aged women  4  Sometimes associated with another skin disease including pseudoxanthoma elasticum, discoid lupus erythematosus, lichen planus  Multiple eruptive milia: crops of numerous milia appear over a few weeks to months, lesions may be asymptomatic or itchy, most often affect the face, upper arms and upper trunk  Traumatic milia: occur at the site of injury as skin heals, arise from eccrine sweat ducts, examples include thermal burns, dermabrasion, blistering rashes such as bullous pemphigoid, often seen on the back of hands and fingers in porphyria cutanea tarda, a milia-like calcified nodule may develop after  heel stick blood test    Milia associated with drugs: may rarely follow the use of topical medication, such as phenols, hydroquinone, 5-fluorouracil cream, and a corticosteroid  Milia have a characteristic appearance  However, on occasion, a skin biopsy may be performed  This shows a small epidermoid cyst coming from a vellus hair follicle    Milia should be distinguished from other types of cyst, comedones, xanthelasma and syringomas  Colloid milia are peoples coloured bumps on cheeks and temples associated with excessive exposure to sunlight  They should also be distinguished from milia-like cysts noted on dermoscopy in seborrhoeic keratoses, papillomatous moles and some basal cell carcinomas  Milia do not need to be treated unless they are a cause for concern for the patient  They often clear up by themselves within a few months  Where possible, further trauma should be minimised to reduce the development of new lesions  The lesion may be de-roofed using a sterile needle or blade and the contents squeezed or pricked out  They may be destroyed using diathermy and curettage, or cryotherapy  For widespread lesions, topical retinoids may be helpful  Chemical peels, dermabrasion and laser ablation have been reported to be effective when used for very extensive milia  Milia en plaque may improve with minocycline (a tetracycline antibiotic)  8  ACTINIC KERATOSIS  Assessment and Plan:  Based on a thorough discussion of this condition and the management approach to it (including a comprehensive discussion of the known risks, side effects and potential benefits of treatment), the patient (family) agrees to implement the following specific plan:  When outside we recommend using a wide brim hat, sunglasses, long sleeve and pants, sunscreen with SPF 81+ with reapplication every 2 hours, or SPF specific clothing   monitor    9   LICHEN SCLEROSUS ATROPHICUS  Assessment and Plan:  Based on a thorough discussion of this condition and the management approach to it (including a comprehensive discussion of the known risks, side effects and potential benefits of treatment), the patient (family) agrees to implement the following specific plan:  Try higher underwear   Clobetasol two times a day for a week for flares   Tacrolimus 0 1 % ointment twice a day for maintenance     Lichen sclerosus is a chronic inflammatory "skin disorder that most often affects the genital and perianal areas  It is more common in women before puberty or after menopause  It is rare in males, but when present is called \"balanitis xerotica obliterans  \"  Lichen sclerosus can start at any age, although it is most often diagnosed in women over 48  Pre-pubertal children can also be affected  Lichen sclerosis is 10 times more common in women than in men  15% of patients know of a family member with lichen sclerosis  It may follow or co-exist with another skin condition, most often lichen simplex, psoriasis, erosive lichen planus, vitiligo or morphea  People with lichen sclerosus often have a personal or family history of other autoimmune disease such as thyroid disease (about 20% of patients), pernicious anaemia, or alopecia areata  What causes lichen sclerosus? The cause of lichen sclerosus is not fully understood, and may include genetic, hormonal, irritant, traumatic and infectious components  Lichen sclerosis is often classified as an autoimmune disease  Autoimmune disorders arise when the body's natural defenses against “foreign” or invading organisms (e g , antibodies) begin to attack healthy tissue for unknown reasons  Antibodies to other unknown proteins may account for other cases, explaining differing presentations of lichen sclerosis and response to treatment  However, these antibodies could be epigenetic, ie, the results of disease rather than the cause of disease  Some scientists believe that a genetic predisposition to lichen sclerosus exists  A genetic predisposition means that a person may carry a gene for a disease but it may not be expressed unless something in the environment triggers the disease  Other researchers believe that hormonal, irritant and/or infectious factors (or a combination of these) cause this skin condition   Cases where lichen sclerosus appears on skin after it has been damaged (from an injury or trauma) have " been reported  What are the clinical features of lichen sclerosus? Lichen sclerosus usually affects the external genitalia (vulva or penis) and/or the area around the anus (perianal region)  Sometimes, it is accompanied by intense (intractable) itching, burning, and pain  If the disease is severe, even minor abrasions or chaffing can cause bleeding, tearing, and blistering  The scarring that results from untreated lichen sclerosis produces problems with urination, defecation, and intercourse  The presence of thin, easily irritated, and torn skin affects physical activity and clothing choice  For children with lichen sclerosus affecting the perianal region, constipation may be among the first signs of the presence of the disease  Lichen sclerosus is much more likely to affect males that have not been circumcised than males that have been  Rarely, lichen sclerosus can also affect other areas of the skin such as the breast, wrists, shoulder, neck, back, thigh, and the mouth  Skin tissue often becomes thin, shiny, wrinkled and parchment-like  Fissures, cracks, and purplish patches (ecchymoses) appear frequently  The earliest areas of lichen sclerosis exhibit a porcelain white appearing center surrounded by redness  This grows together to form larger areas of lichen sclerosus  The areas that are prone to rubbing and friction can develop blisters or bruising  The long term result of lichen sclerosus are areas of shiny, thin skin that has a tendency to be dry, crack, or bleed  This also produces loss of the normal parts of the external genitals, narrowing of the opening of the urethra/vagina/anus, and phimosis (inability to retract the foreskin) in men  The presence of non-healing ulcers or raised ulcerated areas in the external genitalia of women raises suspicion for the development of squamous cell carcinoma      In males, lichen sclerosus most commonly affects the foreskin of the penis, although it may affect other areas of the body  The opening at the end of the foreskin may become narrow and scarred  Discoloration and skin changes may also occur  Symptoms also include itching, soreness, and painful erections  In men, involvement in the perineal area is rare  In some rare cases, skin lesions may also develop in the mouth  The lesions consist of bluish-white flat irregular patchy areas on the inside of the cheeks and/or palate  The tongue, lips, and gums may also be involved  How do we diagnose lichen sclerosus atrophicus? Lichen sclerosus is diagnosed by looking at the skin affected  All those affected require a thorough clinical evaluation, identification of characteristic physical features, and a detailed patient history  A biopsy may be needed to confirm diagnosis  Biopsies may also be performed if squamous cell carcinoma is suspected  How do we treat lichen sclerosis? General measures for genital lichen sclerosis  Wash gently once or twice daily  Use a non-soap cleanser, if any  Try to avoid tight clothing, rubbing and scratching  Activities such as riding a bicycle or horse may aggravate symptoms  If incontinent, seek medical advice and treatment  Apply emollients to relieve dryness and itching, and as a barrier to protect sensitive skin in genital and anal areas from contact with urine and feces  Topical steroids are the main treatment for lichen sclerosus  An ultrapotent topical steroid is often prescribed, eg clobetasol propionate 0 05%  A potent topical steroid, eg mometasone furoate 0 1% ointment, may also be used in mild disease or when symptoms are controlled  An ointment base is less likely than cream to sting or to cause contact dermatitis  A thin smear should be precisely applied to the white plaques and rubbed in gently  Most patients will be told to apply the steroid ointment once a day   After one to three months (depending on the severity of the disease), the ointment can be used less often  Topical steroid may need to be continued once or twice a week to control symptoms or to prevent lichen sclerosis recurring  Itch often settles within a few days but it may take weeks to months for the skin to return to normal (if at all)  One 30-g tube of topical steroid should last 3 to 6 months or longer  It is most important to follow instructions carefully and to attend follow-up appointments regularly  Other topical treatments used in patients with lichen sclerosis include:  Intravaginal estrogen cream or pessaries in postmenopausal women  These reduce symptoms due to atrophic vulvovaginitis (dry, thin, fissured and sensitive vulval and vaginal tissues due to hormonal deficiency)  Topical calcineurin inhibitors tacrolimus ointment and pimecrolimus cream instead of or in addition to topical steroids  They tend to cause burning discomfort (at least for the first few days)  Early concern that these medications may have the potential to accelerate cancer growth in the presence of oncogenic human papilloma virus (the cause of genital warts) appears unfounded  Topical retinoid (eg tretinoin cream) is not well tolerated on genital skin but may be applied to other sites affected by lichen sclerosis  It reduces scaling and dryness  Oral medications: when severe, acute, and not responding to topical therapy, systemic treatment may rarely be prescribed  Options include:  Intralesional or systemic corticosteroids   Oral retinoids: acitretin, isotretinoin  Methotrexate  Ciclosporin    Surgery: is essential for high-grade squamous intraepithelial lesions or cancer  In males, circumcision is effective in lichen sclerosus affecting prepuce and glans of the penis  It is best done early if initial topical steroids have not controlled symptoms and signs  If the urethra is stenosed or scarred, reconstructive surgery may be necessary    Unfortunately, lichen sclerosus sometimes closes up the vaginal opening again after surgery has initially appeared successful  It can be repeated  Other reported treatments for lichen sclerosus are considered experimental at this time    CO2 laser ablation of hyperkeratotic plaques  Phototherapy  Photodynamic therapy  Fat injections  Stem cell and platelet-rich plasma injections

## 2023-06-14 NOTE — PROGRESS NOTES
"[facility] Note     Patient Name: Marcin Mullen  Encounter Date: 06/14/2023     Have you been cared for by a [facility] in the last 3 years and, if so, which description applies to you? Yes  I have been here within the last 3 years, and my medical history has NOT changed since that time  I am FEMALE/of child-bearing potential     REVIEW OF SYSTEMS:  Have you recently had or currently have any of the following? · No changes in my recent health  PAST MEDICAL HISTORY:  Have you personally ever had or currently have any of the following? If \"YES,\" then please provide more detail  · No changes in my medical history  FAMILY HISTORY:  Any \"first degree relatives\" (parent, brother, sister, or child) with the following? • No changes in my family's known health  PATIENT EXPERIENCE:    • Do you want the Dermatologist to perform a COMPLETE skin exam today including a clinical examination under the \"bra and underwear\" areas? Yes  • If necessary, do we have your permission to call and leave a detailed message on your Preferred Phone number that includes your specific medical information?   Yes      Allergies   Allergen Reactions   • Levofloxacin Other (See Comments)   • Cephalexin Diarrhea   • Nitrofurantoin Fever   • Sulfa Antibiotics Fever   • Topiramate Other (See Comments)   • Bacitracin-Neomycin-Polymyxin Rash   • Sulfamethoxazole-Trimethoprim Diarrhea      Current Outpatient Medications:   •  albuterol (Ventolin HFA) 90 mcg/act inhaler, Inhale 2 puffs every 6 (six) hours as needed for wheezing, Disp: 18 g, Rfl: 5  •  atenolol (TENORMIN) 25 mg tablet, TAKE 2 TABLETS BY MOUTH EVERY DAY, Disp: 90 tablet, Rfl: 0  •  atorvastatin (LIPITOR) 20 mg tablet, 10 mg Currently taking 10 mg, Disp: , Rfl:   •  cholecalciferol (VITAMIN D3) 1,000 units tablet, Take 2,000 Units by mouth 2 (two) times a day, Disp: , Rfl:   •  clobetasol (TEMOVATE) 0 05 % cream, clobetasol 0 05 % topical cream, " "Disp: , Rfl:   •  clobetasol (TEMOVATE) 0 05 % external solution, Apply topically 2 (two) times a day, Disp: 50 mL, Rfl: 0  •  clonazePAM (KlonoPIN) 0 5 mg tablet, TAKE 1 TABLET BY MOUTH 2 TIMES A DAY  (Patient taking differently: Currently once daily, extra half if needed for depression), Disp: 60 tablet, Rfl: 0  •  hydrochlorothiazide (HYDRODIURIL) 12 5 mg tablet, Take 2 tablets (25 mg total) by mouth daily (Patient not taking: Reported on 4/18/2023), Disp: 60 tablet, Rfl: 2  •  loperamide (IMODIUM A-D) 2 MG tablet, Take 2 mg by mouth daily, Disp: , Rfl:   •  mesalamine (APRISO) 0 375 g 24 hr capsule, TAKE 2 CAPSULES BY MOUTH EVERY DAY, Disp: 60 capsule, Rfl: 0  •  montelukast (SINGULAIR) 10 mg tablet, Take 1 tablet (10 mg total) by mouth daily at bedtime, Disp: 30 tablet, Rfl: 3  •  ondansetron (ZOFRAN) 4 mg tablet, Take 1 tablet (4 mg total) by mouth every 8 (eight) hours as needed for nausea or vomiting (Patient not taking: Reported on 3/2/2023), Disp: 20 tablet, Rfl: 0  •  Specialty Vitamins Products (VITAMINS FOR THE HAIR PO), Take by mouth SUGAR BEAR HAIR VITAMIN (Patient not taking: Reported on 4/18/2023), Disp: , Rfl:   •  zolpidem (AMBIEN) 10 mg tablet, Take 1 tablet (10 mg total) by mouth daily at bedtime as needed (insomnia), Disp: 30 tablet, Rfl: 0          • Whom besides the patient is providing clinical information about today's encounter?   o NO ADDITIONAL HISTORIAN (patient alone provided history)    Physical Exam and Assessment/Plan by Diagnosis:    1   MELANOCYTIC NEVI (\"Moles\")    Physical Exam:  • Anatomic Location Affected:   Mostly on sun-exposed areas of the trunk and extremities  • Morphological Description:  Scattered, 1-4mm round to ovoid, symmetrical-appearing, even bordered, skin colored to dark brown macules/papules, mostly in sun-exposed areas  • Pertinent Positives:  • Pertinent Negatives:      Assessment and Plan:  Based on a thorough discussion of this condition and the management " "approach to it (including a comprehensive discussion of the known risks, side effects and potential benefits of treatment), the patient (family) agrees to implement the following specific plan:  • When outside we recommend using a wide brim hat, sunglasses, long sleeve and pants, sunscreen with SPF 21+ with reapplication every 2 hours, or SPF specific clothing   • Benign, reassured  • Annual skin check     Melanocytic Nevi  Melanocytic nevi (\"moles\") are tan or brown, raised or flat areas of the skin which have an increased number of melanocytes  Melanocytes are the cells in our body which make pigment and account for skin color  Some moles are present at birth (I e , \"congenital nevi\"), while others come up later in life (i e , \"acquired nevi\")  The sun can stimulate the body to make more moles  Sunburns are not the only thing that triggers more moles  Chronic sun exposure can do it too  Clinically distinguishing a healthy mole from melanoma may be difficult, even for experienced dermatologists  The \"ABCDE's\" of moles have been suggested as a means of helping to alert a person to a suspicious mole and the possible increased risk of melanoma  The suggestions for raising alert are as follows:    Asymmetry: Healthy moles tend to be symmetric, while melanomas are often asymmetric  Asymmetry means if you draw a line through the mole, the two halves do not match in color, size, shape, or surface texture  Asymmetry can be a result of rapid enlargement of a mole, the development of a raised area on a previously flat lesion, scaling, ulceration, bleeding or scabbing within the mole  Any mole that starts to demonstrate \"asymmetry\" should be examined promptly by a board certified dermatologist      Border: Healthy moles tend to have discrete, even borders  The border of a melanoma often blends into the normal skin and does not sharply delineate the mole from normal skin    Any mole that starts to demonstrate " "Morna Bre borders\" should be examined promptly by a board certified dermatologist      Color: Healthy moles tend to be one color throughout  Melanomas tend to be made up of different colors ranging from dark black, blue, white, or red  Any mole that demonstrates a color change should be examined promptly by a board certified dermatologist      Diameter: Healthy moles tend to be smaller than 0 6 cm in size; an exception are \"congenital nevi\" that can be larger  Melanomas tend to grow and can often be greater than 0 6 cm (1/4 of an inch, or the size of a pencil eraser)  This is only a guideline, and many normal moles may be larger than 0 6 cm without being unhealthy  Any mole that starts to change in size (small to bigger or bigger to smaller) should be examined promptly by a board certified dermatologist      Evolving: Healthy moles tend to \"stay the same  \"  Melanomas may often show signs of change or evolution such as a change in size, shape, color, or elevation  Any mole that starts to itch, bleed, crust, burn, hurt, or ulcerate or demonstrate a change or evolution should be examined promptly by a board certified dermatologist         2  LENTIGO    Physical Exam:  • Anatomic Location Affected:  trunk, arms  • Morphological Description:  Light brown macules  • Pertinent Positives:  • Pertinent Negatives:      Assessment and Plan:  Based on a thorough discussion of this condition and the management approach to it (including a comprehensive discussion of the known risks, side effects and potential benefits of treatment), the patient (family) agrees to implement the following specific plan:  • When outside we recommend using a wide brim hat, sunglasses, long sleeve and pants, sunscreen with SPF 20+ with reapplication every 2 hours, or SPF specific clothing       What is a lentigo? A lentigo is a pigmented flat or slightly raised lesion with a clearly defined edge   Unlike an ephelis (freckle), it does not fade in " the winter months  There are several kinds of lentigo  The name lentigo originally referred to its appearance resembling a small lentil  The plural of lentigo is lentigines, although “lentigos” is also in common use  Who gets lentigines? Lentigines can affect males and females of all ages and races  Solar lentigines are especially prevalent in fair skinned adults  Lentigines associated with syndromes are present at birth or arise during childhood  What causes lentigines? Common forms of lentigo are due to exposure to ultraviolet radiation:  • Sun damage including sunburn   • Indoor tanning   • Phototherapy, especially photochemotherapy (PUVA)    Ionizing radiation, eg radiation therapy, can also cause lentigines  Several familial syndromes associated with widespread lentigines originate from mutations in Hi-MAP kinase, mTOR signaling and PTEN pathways  What is the treatment for lentigines? Most lentigines are left alone  Attempts to lighten them may not be successful  The following approaches are used:  • SPF 50+ broad-spectrum sunscreen   • Hydroquinone bleaching cream   • Alpha hydroxy acids   • Vitamin C   • Retinoids   • Azelaic acid   • Chemical peels  Individual lesions can be permanently removed using:  • Cryotherapy   • Intense pulsed light   • Pigment lasers    How can lentigines be prevented? Lentigines associated with exposure ultraviolet radiation can be prevented by very careful sun protection  Clothing is more successful at preventing new lentigines than are sunscreens  What is the outlook for lentigines? Lentigines usually persist  They may increase in number with age and sun exposure  Some in sun-protected sites may fade and disappear  3  CLEARY ANGIOMAS    Physical Exam:  • Anatomic Location Affected:  trunk  • Morphological Description:  Scattered cherry red, 1-4 mm papules    • Pertinent Positives:  • Pertinent Negatives:    Assessment and Plan:  Based on a thorough "discussion of this condition and the management approach to it (including a comprehensive discussion of the known risks, side effects and potential benefits of treatment), the patient (family) agrees to implement the following specific plan:  • Monitor for changes  • Benign, reassured    Assessment and Plan:    Cherry angioma, also known as Sae [place] spots, are benign vascular skin lesions  A \"cherry angioma\" is a firm red, blue or purple papule, 0 1-1 cm in diameter  When thrombosed, they can appear black in colour until evaluated with a dermatoscope when the red or purple colour is more easily seen  Cherry angioma may develop on any part of the body but most often appear on the scalp, face, lips and trunk  An angioma is due to proliferating endothelial cells; these are the cells that line the inside of a blood vessel  Angiomas can arise in early life or later in life; the most common type of angioma is a cherry angioma  Cherry angiomas are very common in males and females of any age or race  They are more noticeable in white skin than in skin of colour  They markedly increase in number from about the age of 36  There may be a family history of similar lesions  Eruptive cherry angiomas have been rarely reported to be associated with internal malignancy  The cause of angiomas is unknown  Genetic analysis of cherry angiomas has shown that they frequently carry specific somatic missense mutations in the GNAQ and GNA11 (Q209H) genes, which are involved in other vascular and melanocytic proliferations  4  SEBORRHEIC KERATOSIS; NON-INFLAMED    Physical Exam:  • Anatomic Location Affected:  trunk  • Morphological Description:  Flat and raised, waxy, smooth to warty textured, yellow to brownish-grey to dark brown to blackish, discrete, \"stuck-on\" appearing papules    • Pertinent Positives:  • Pertinent Negatives:      Assessment and Plan:  Based on a thorough discussion of this condition and the " "management approach to it (including a comprehensive discussion of the known risks, side effects and potential benefits of treatment), the patient (family) agrees to implement the following specific plan:  • Monitor for changes  • Benign, reassured    Seborrheic Keratosis  A seborrheic keratosis is a harmless warty spot that appears during adult life as a common sign of skin aging  Seborrheic keratoses can arise on any area of skin, covered or uncovered, with the usual exception of the palms and soles  They do not arise from mucous membranes  Seborrheic keratoses can have highly variable appearance  Seborrheic keratoses are extremely common  It has been estimated that over 90% of adults over the age of 61 years have one or more of them  They occur in males and females of all races, typically beginning to erupt in the 35s or 45s  They are uncommon under the age of 21 years  The precise cause of seborrhoeic keratoses is not known  Seborrhoeic keratoses are considered degenerative in nature  As time goes by, seborrheic keratoses tend to become more numerous  Some people inherit a tendency to develop a very large number of them; some people may have hundreds of them  There is no easy way to remove multiple lesions on a single occasion  Unless a specific lesion is \"inflamed\" and is causing pain or stinging/burning or is bleeding, most insurance companies do not authorize treatment      5  XEROSIS (\"DRY SKIN\")    Physical Exam:  • Anatomic Location Affected:  diffuse  • Morphological Description:  xerosis  • Pertinent Positives:  • Pertinent Negatives:    Assessment and Plan:  Based on a thorough discussion of this condition and the management approach to it (including a comprehensive discussion of the known risks, side effects and potential benefits of treatment), the patient (family) agrees to implement the following specific plan:  • Use moisturizer like Eucerin,Cerave or Aveeno Cream 3 times a day for the " dry skin   •   •    •     Dry skin refers to skin that feels dry to touch  Dry skin has a dull surface with a rough, scaly quality  The skin is less pliable and cracked  When dryness is severe, the skin may become inflamed and fissured  Although any body site can be dry, dry skin tends to affect the shins more than any other site  Dry skin is lacking moisture in the outer horny cell layer (stratum corneum) and this results in cracks in the skin surface  Dry skin is also called xerosis, xeroderma or asteatosis (lack of fat)  It can affect males and females of all ages  There is some racial variability in water and lipid content of the skin  • Dry skin that starts in early childhood may be one of about 20 types of ichthyosis (fish-scale skin)  There is often a family history of dry skin  • Dry skin is commonly seen in people with atopic dermatitis  • Nearly everyone > 60 years has dry skin  Dry skin that begins later may be seen in people with certain diseases and conditions  • Postmenopausal women  • Hypothyroidism  • Chronic renal disease   • Malnutrition and weight loss   • Subclinical dermatitis   • Treatment with certain drugs such as oral retinoids, diuretics and epidermal growth factor receptor inhibitors      What is the treatment for dry skin? The mainstay of treatment of dry skin and ichthyosis is moisturisers/emollients  They should be applied liberally and often enough to:  • Reduce itch   • Improve the barrier function   • Prevent entry of irritants, bacteria   • Reduce transepidermal water loss  How can dry skin be prevented? Eliminate aggravating factors:  • Reduce the frequency of bathing  • A humidifier in winter and air conditioner in summer   • Compare having a short shower with a prolonged soak in a bath  • Use lukewarm, not hot, water     • Replace standard soap with a substitute such as a synthetic detergent cleanser, water-miscible emollient, bath oil, anti-pruritic tar oil, colloidal oatmeal etc    • Apply an emollient liberally and often, particularly shortly after bathing, and when itchy  The drier the skin, the thicker this should be, especially on the hands  What is the outlook for dry skin? A tendency to dry skin may persist life-long, or it may improve once contributing factors are controlled  6  HISTORY OF BASAL CELL CARCINOMA    Physical Exam:  • Anatomic Location Affected:  Left parietal scalp  • Morphological Description of scar:  Well healed   • Suspected Recurrence: Yes  • Pertinent Positives:  • Pertinent Negatives: Additional History of Present Condition:  History of basal cell carcinoma with no sign of recurrence    Assessment and Plan:  Based on a thorough discussion of this condition and the management approach to it (including a comprehensive discussion of the known risks, side effects and potential benefits of treatment), the patient (family) agrees to implement the following specific plan:  • Continue yearly skin exams     How can basal cell carcinoma be prevented? The most important way to prevent BCC is to avoid sunburn  This is especially important in childhood and early life  Fair skinned individuals and those with a personal or family history of BCC should protect their skin from sun exposure daily, year-round and lifelong  • Stay indoors or under the shade in the middle of the day   • Wear covering clothing   • Apply high protection factor SPF50+ broad-spectrum sunscreens generously to exposed skin if outdoors   • Avoid indoor tanning (sun beds, solaria)  • Oral nicotinamide (vitamin B3) in a dose of 500 mg twice daily may reduce the number and severity of BCCs  What is the outlook for basal cell carcinoma? Most BCCs are cured by treatment  Cure is most likely if treatment is undertaken when the lesion is small    About 50% of people with BCC develop a second one within 3 years of the first  They are also at increased risk of other skin cancers, especially melanoma  Regular self-skin examinations and long-term annual skin checks by an experienced health professional are recommended  7  MILIUM     Physical Exam:  • Anatomic Location Affected:  chin  • Morphological Description:  White papules   • Pertinent Positives:  • Pertinent Negatives:    Assessment and Plan:  Based on a thorough discussion of this condition and the management approach to it (including a comprehensive discussion of the known risks, side effects and potential benefits of treatment), the patient (family) agrees to implement the following specific plan:  • $150 to treat up to 10 lesions, and if over 10 lesions, then additional $10/lesion thereafter  Assessment and Plan  A milium is a small cyst containing keratin (the skin protein); they are usually multiple and are then known as milia  These harmless cysts present as tiny pearly-white bumps just under the surface of the skin  Milia are common in all ages and both sexes  They most often arise on the face and are particularly prominent on the eyelids and cheeks, but they may occur elsewhere  There are various kinds of milia  •  milia: Affect 40-50% of  babies, few to numerous lesions, often seen on the nose, but may also arise inside the mouth on the mucosa (Matilde pearls) or palate (Khadijah nodules) or more widely on the scalp, face and upper trunk, Heal spontaneously within a few weeks of birth  • Primary milia in children and adults: found around eyelids, cheeks, forehead and genitalia, in young children, a row of milia may appear along the nasal crease, may clear in a few weeks or persist for months or longer  • Juvenile milia: associated with Rombo syndrome, basal cell naevus syndrome, Aempg-Sxyvw-Gseshlgx syndrome, pachyonychia congenita, Luna syndrome and other genetic disorders, may be congenital (present at birth) or appear later in life    • Milia en plaque: multiple milia appear on within an inflamed plaque up to several centimeters in diameter, usually found on an eyelid, behind the ear, on a cheek or jaw  3  Affect children and adults, especially middle-aged women  4  Sometimes associated with another skin disease including pseudoxanthoma elasticum, discoid lupus erythematosus, lichen planus  • Multiple eruptive milia: crops of numerous milia appear over a few weeks to months, lesions may be asymptomatic or itchy, most often affect the face, upper arms and upper trunk  • Traumatic milia: occur at the site of injury as skin heals, arise from eccrine sweat ducts, examples include thermal burns, dermabrasion, blistering rashes such as bullous pemphigoid, often seen on the back of hands and fingers in porphyria cutanea tarda, a milia-like calcified nodule may develop after  heel stick blood test    • Milia associated with drugs: may rarely follow the use of topical medication, such as phenols, hydroquinone, 5-fluorouracil cream, and a corticosteroid  Milia have a characteristic appearance  However, on occasion, a skin biopsy may be performed  This shows a small epidermoid cyst coming from a vellus hair follicle  Milia should be distinguished from other types of cyst, comedones, xanthelasma and syringomas  Colloid milia are peoples coloured bumps on cheeks and temples associated with excessive exposure to sunlight  They should also be distinguished from milia-like cysts noted on dermoscopy in seborrhoeic keratoses, papillomatous moles and some basal cell carcinomas  Milia do not need to be treated unless they are a cause for concern for the patient  They often clear up by themselves within a few months  Where possible, further trauma should be minimised to reduce the development of new lesions  • The lesion may be de-roofed using a sterile needle or blade and the contents squeezed or pricked out  • They may be destroyed using diathermy and curettage, or cryotherapy    • For widespread "lesions, topical retinoids may be helpful  • Chemical peels, dermabrasion and laser ablation have been reported to be effective when used for very extensive milia  • Milia en plaque may improve with minocycline (a tetracycline antibiotic)  8  ACTINIC KERATOSIS    Physical Exam:  • Anatomic Location Affected:  Left nose   • Morphological Description:  Scaly pink papules  • Pertinent Positives:  • Pertinent Negatives:      Assessment and Plan:  Based on a thorough discussion of this condition and the management approach to it (including a comprehensive discussion of the known risks, side effects and potential benefits of treatment), the patient (family) agrees to implement the following specific plan:  • When outside we recommend using a wide brim hat, sunglasses, long sleeve and pants, sunscreen with SPF 77+ with reapplication every 2 hours, or SPF specific clothing   • monitor    9  LICHEN SCLEROSUS ATROPHICUS    Physical Exam:  • Anatomic Location Affected:  Abdomen, buttock, groin, and left thigh   • Morphological Description:  porcelain white atrophic patches   • Pertinent Positives:  • Pertinent Negatives:    Assessment and Plan:  Based on a thorough discussion of this condition and the management approach to it (including a comprehensive discussion of the known risks, side effects and potential benefits of treatment), the patient (family) agrees to implement the following specific plan:  • Try higher underwear   • Clobetasol two times a day for a week for flares   • Tacrolimus 0 1 % ointment twice a day for maintenance     Lichen sclerosus is a chronic inflammatory skin disorder that most often affects the genital and perianal areas  It is more common in women before puberty or after menopause  It is rare in males, but when present is called \"balanitis xerotica obliterans  \"  Lichen sclerosus can start at any age, although it is most often diagnosed in women over 48   Pre-pubertal children can also be " affected  • Lichen sclerosis is 10 times more common in women than in men  • 15% of patients know of a family member with lichen sclerosis  • It may follow or co-exist with another skin condition, most often lichen simplex, psoriasis, erosive lichen planus, vitiligo or morphea  • People with lichen sclerosus often have a personal or family history of other autoimmune disease such as thyroid disease (about 20% of patients), pernicious anaemia, or alopecia areata  What causes lichen sclerosus? The cause of lichen sclerosus is not fully understood, and may include genetic, hormonal, irritant, traumatic and infectious components  Lichen sclerosis is often classified as an autoimmune disease  Autoimmune disorders arise when the body's natural defenses against “foreign” or invading organisms (e g , antibodies) begin to attack healthy tissue for unknown reasons  • Antibodies to other unknown proteins may account for other cases, explaining differing presentations of lichen sclerosis and response to treatment  • However, these antibodies could be epigenetic, ie, the results of disease rather than the cause of disease  • Some scientists believe that a genetic predisposition to lichen sclerosus exists  A genetic predisposition means that a person may carry a gene for a disease but it may not be expressed unless something in the environment triggers the disease  Other researchers believe that hormonal, irritant and/or infectious factors (or a combination of these) cause this skin condition  Cases where lichen sclerosus appears on skin after it has been damaged (from an injury or trauma) have been reported  What are the clinical features of lichen sclerosus? Lichen sclerosus usually affects the external genitalia (vulva or penis) and/or the area around the anus (perianal region)  Sometimes, it is accompanied by intense (intractable) itching, burning, and pain   If the disease is severe, even minor abrasions or chaffing can cause bleeding, tearing, and blistering  The scarring that results from untreated lichen sclerosis produces problems with urination, defecation, and intercourse  The presence of thin, easily irritated, and torn skin affects physical activity and clothing choice  For children with lichen sclerosus affecting the perianal region, constipation may be among the first signs of the presence of the disease  Lichen sclerosus is much more likely to affect males that have not been circumcised than males that have been  Rarely, lichen sclerosus can also affect other areas of the skin such as the breast, wrists, shoulder, neck, back, thigh, and the mouth  Skin tissue often becomes thin, shiny, wrinkled and parchment-like  Fissures, cracks, and purplish patches (ecchymoses) appear frequently  The earliest areas of lichen sclerosis exhibit a porcelain white appearing center surrounded by redness  This grows together to form larger areas of lichen sclerosus  The areas that are prone to rubbing and friction can develop blisters or bruising  The long term result of lichen sclerosus are areas of shiny, thin skin that has a tendency to be dry, crack, or bleed  This also produces loss of the normal parts of the external genitals, narrowing of the opening of the urethra/vagina/anus, and phimosis (inability to retract the foreskin) in men  The presence of non-healing ulcers or raised ulcerated areas in the external genitalia of women raises suspicion for the development of squamous cell carcinoma  In males, lichen sclerosus most commonly affects the foreskin of the penis, although it may affect other areas of the body  The opening at the end of the foreskin may become narrow and scarred  Discoloration and skin changes may also occur  Symptoms also include itching, soreness, and painful erections  In men, involvement in the perineal area is rare  In some rare cases, skin lesions may also develop in the mouth   The lesions consist of bluish-white flat irregular patchy areas on the inside of the cheeks and/or palate  The tongue, lips, and gums may also be involved  How do we diagnose lichen sclerosus atrophicus? Lichen sclerosus is diagnosed by looking at the skin affected  All those affected require a thorough clinical evaluation, identification of characteristic physical features, and a detailed patient history  A biopsy may be needed to confirm diagnosis  Biopsies may also be performed if squamous cell carcinoma is suspected  How do we treat lichen sclerosis? General measures for genital lichen sclerosis  • Wash gently once or twice daily  • Use a non-soap cleanser, if any  • Try to avoid tight clothing, rubbing and scratching  • Activities such as riding a bicycle or horse may aggravate symptoms  • If incontinent, seek medical advice and treatment  • Apply emollients to relieve dryness and itching, and as a barrier to protect sensitive skin in genital and anal areas from contact with urine and feces  Topical steroids are the main treatment for lichen sclerosus  An ultrapotent topical steroid is often prescribed, eg clobetasol propionate 0 05%  A potent topical steroid, eg mometasone furoate 0 1% ointment, may also be used in mild disease or when symptoms are controlled  • An ointment base is less likely than cream to sting or to cause contact dermatitis  • A thin smear should be precisely applied to the white plaques and rubbed in gently  • Most patients will be told to apply the steroid ointment once a day  After one to three months (depending on the severity of the disease), the ointment can be used less often  • Topical steroid may need to be continued once or twice a week to control symptoms or to prevent lichen sclerosis recurring  • Itch often settles within a few days but it may take weeks to months for the skin to return to normal (if at all)    • One 30-g tube of topical steroid should last 3 to 6 months or longer  It is most important to follow instructions carefully and to attend follow-up appointments regularly  Other topical treatments used in patients with lichen sclerosis include:  • Intravaginal estrogen cream or pessaries in postmenopausal women  These reduce symptoms due to atrophic vulvovaginitis (dry, thin, fissured and sensitive vulval and vaginal tissues due to hormonal deficiency)  • Topical calcineurin inhibitors tacrolimus ointment and pimecrolimus cream instead of or in addition to topical steroids  They tend to cause burning discomfort (at least for the first few days)  Early concern that these medications may have the potential to accelerate cancer growth in the presence of oncogenic human papilloma virus (the cause of genital warts) appears unfounded  • Topical retinoid (eg tretinoin cream) is not well tolerated on genital skin but may be applied to other sites affected by lichen sclerosis  It reduces scaling and dryness  Oral medications: when severe, acute, and not responding to topical therapy, systemic treatment may rarely be prescribed  Options include:  • Intralesional or systemic corticosteroids   • Oral retinoids: acitretin, isotretinoin  • Methotrexate  • Ciclosporin    Surgery: is essential for high-grade squamous intraepithelial lesions or cancer  In males, circumcision is effective in lichen sclerosus affecting prepuce and glans of the penis  It is best done early if initial topical steroids have not controlled symptoms and signs  If the urethra is stenosed or scarred, reconstructive surgery may be necessary  Unfortunately, lichen sclerosus sometimes closes up the vaginal opening again after surgery has initially appeared successful  It can be repeated  Other reported treatments for lichen sclerosus are considered experimental at this time    • CO2 laser ablation of hyperkeratotic plaques  • Phototherapy  • Photodynamic therapy  • Fat injections  • Stem cell and platelet-rich plasma injections    Scribe Attestation    I,:  Ramo Mcdonald MA am acting as a scribe while in the presence of the attending physician :       I,:  Gin Avitia MD personally performed the services described in this documentation    as scribed in my presence :

## 2023-06-24 DIAGNOSIS — K58.2 IRRITABLE BOWEL SYNDROME WITH BOTH CONSTIPATION AND DIARRHEA: ICD-10-CM

## 2023-06-26 RX ORDER — MESALAMINE 0.38 G/1
CAPSULE, EXTENDED RELEASE ORAL
Qty: 60 CAPSULE | Refills: 0 | Status: SHIPPED | OUTPATIENT
Start: 2023-06-26

## 2023-07-05 ENCOUNTER — TELEPHONE (OUTPATIENT)
Dept: PSYCHIATRY | Facility: CLINIC | Age: 68
End: 2023-07-05

## 2023-07-05 NOTE — TELEPHONE ENCOUNTER
Patient contacted the [facility] office wishing to schedule a follow up appointment with her medication management provider. Writer transferred the call to the [facility] office to schedule with Raghav Rodriges.

## 2023-07-06 ENCOUNTER — TELEMEDICINE (OUTPATIENT)
Dept: PSYCHIATRY | Facility: CLINIC | Age: 68
End: 2023-07-06
Payer: MEDICARE

## 2023-07-06 DIAGNOSIS — F31.9 BIPOLAR 1 DISORDER, DEPRESSED (HCC): Primary | ICD-10-CM

## 2023-07-06 PROCEDURE — 99214 OFFICE O/P EST MOD 30 MIN: CPT

## 2023-07-06 NOTE — PSYCH
Virtual Regular Visit    Verification of patient location:    Patient is located in the following state in which I hold an active license [place]    Problem List Items Addressed This Visit    None  Visit Diagnoses     Bipolar 1 disorder, depressed ([address])    -  Primary    Relevant Medications    cariprazine (Vraylar) 1.5 MG capsule             Encounter provider RICK Rico    Provider located at    [address] [address] [facility] [id number] [phone]    Recent Visits  Date Type Provider Dept   07/05/23 Telephone [place], [address] Sekou Johnson recent visits within past 7 days and meeting all other requirements  Today's Visits  Date Type Provider Dept   07/06/23 Telemedicine RICK Rico  [facility]   Showing today's visits and meeting all other requirements  Future Appointments  No visits were found meeting these conditions. Showing future appointments within next 150 days and meeting all other requirements         The patient was identified by name and date of birth. Leyla Dempsey was informed that this is a telemedicine visit and [facility] She agrees to proceed. .  My office door was closed. No one else was in the room. She acknowledged consent and understanding of privacy and security of the video platform. The patient has agreed to participate and understands they can discontinue the visit at any time. Patient is aware this is a billable service.      HPI     Current Outpatient Medications   Medication Sig Dispense Refill   • albuterol (Ventolin HFA) 90 mcg/act inhaler Inhale 2 puffs every 6 (six) hours as needed for wheezing 18 g 5   • atorvastatin (LIPITOR) 20 mg tablet 10 mg Currently taking 10 mg     • cariprazine (Vraylar) 1.5 MG capsule Take 1 capsule (1.5 mg total) by mouth daily 30 capsule 1   • cholecalciferol (VITAMIN D3) 1,000 units tablet Take 2,000 Units by mouth 2 (two) times a day     • hydrochlorothiazide (HYDRODIURIL) 12.5 mg tablet Take 2 tablets (25 mg total) by mouth daily 60 tablet 2   • loperamide (IMODIUM A-D) 2 MG tablet Take 2 mg by mouth daily     • tacrolimus (PROTOPIC) 0.1 % ointment Apply topically 2 (two) times a day For maintenance 60 g 2   • zolpidem (AMBIEN) 10 mg tablet Take 1 tablet (10 mg total) by mouth daily at bedtime as needed (insomnia) 30 tablet 0   • atenolol (TENORMIN) 25 mg tablet TAKE 2 TABLETS BY MOUTH EVERY DAY 90 tablet 0   • clonazePAM (KlonoPIN) 0.5 mg tablet TAKE 1 TABLET BY MOUTH 2 TIMES A DAY. (Patient taking differently: Currently once daily, extra half if needed for depression) 60 tablet 0   • mesalamine (APRISO) 0.375 g 24 hr capsule TAKE 2 CAPSULES BY MOUTH EVERY DAY 60 capsule 0   • montelukast (SINGULAIR) 10 mg tablet Take 1 tablet (10 mg total) by mouth daily at bedtime 30 tablet 3   • ondansetron (ZOFRAN) 4 mg tablet Take 1 tablet (4 mg total) by mouth every 8 (eight) hours as needed for nausea or vomiting (Patient not taking: Reported on 3/2/2023) 20 tablet 0   • Specialty Vitamins Products (VITAMINS FOR THE HAIR PO) Take by mouth SUGAR BEAR HAIR VITAMIN (Patient not taking: Reported on 4/18/2023)       No current facility-administered medications for this visit. Review of Systems  Video Exam    There were no vitals filed for this visit. Physical Exam   As a result of this visit, I have referred the patient for further respiratory evaluation. No      VIRTUAL VISIT DISCLAIMER    Williams Louis acknowledges that she has consented to an online visit or consultation. She understands that the online visit is based solely on information provided by her, and that, in the absence of a face-to-face physical evaluation by the physician, the diagnosis she receives is both limited and provisional in terms of accuracy and completeness.  This is not intended to replace a full medical face-to-face evaluation by the physician. Obdulio Coburn understands and accepts these terms. MEDICATION MANAGEMENT NOTE        [facility]    Name and Date of Birth:  Obdulio Coburn 74 y.o. 1955 MRN: [id number]    Date of Visit: July 6, 2023    Allergies   Allergen Reactions   • Levofloxacin Other (See Comments)   • Cephalexin Diarrhea   • Nitrofurantoin Fever   • Sulfa Antibiotics Fever   • Topiramate Other (See Comments)   • Bacitracin-Neomycin-Polymyxin Rash   • Sulfamethoxazole-Trimethoprim Diarrhea     SUBJECTIVE:    Ines Cordova is seen today for a follow up for Bipolar Disorder. She reports that she continues to experience on and off symptoms since the last visit. Ines Cordova is more open to medications due to persistent bipolar depression. Patient has considered the previous recommendation of Vraylar 1.5 mg daily for mood stabilization and dysphoric symptoms, she would like to trial medication and Vraylar 1.5 mg sent in to pharmacy. Reports 7/10 depression with symptoms of dysphoria, lack of energy and motivation to be productive in her daily life, periods of helplessness, lack concentration. Having a hard time getting up and going to work at local [address] [place] [address]. Side effects of Vraylar including tardive dyskinesia, weight gain, restlessness, headache, sedation explained to patient verbalized understanding. She was somewhat hesitant to start a mood stabilizer as she reported long-term side effects of prior mood stabilizer she has been on for over 40 years with Lamictal.  She is hopeful Roselee Yulissa can be helpful for depression, mood stabilization without causing any side effects. Continues to be managed on Klonopin 0.5 mg twice daily and Ambien 10 mg by PCP, using medication appropriately and patient PDMP, denies side effects. Continues to deny all symptoms of an elevated mood-denies grandiosity, impulsivity, insomnia, hyper talkativeness. Hx Bipolar 1 in chart. Mood appears constricted and depressed. Denies suicidal ideation. PLAN:    -Initiate Vraylar 1.5mg daily for mood stabilization, irritability. Side effects explained and pt verbalized understanding.    -Continue Klonopin 0.5 mg BID prescribed and managed by PCP    Discussed with patient the risks of sedation, respiratory depression, impairment of ability to drive and potential for abuse and addiction related to treatment with benzodiazepine medications. The patient understands risk of treatment with benzodiazepine medications, agrees to not drive if feels impaired and agrees to take medications as prescribed. Patient was also informed of risks of being on or starting opioid medications due to drug interactions and potential for serious respiratory depression and death.      -Continue Ambien 10mg as needed for insomnia managed by PCP           Aware of 24 hour and weekend coverage for urgent situations accessed by calling [facility] main practice number  Referral for individual psychotherapy    Diagnoses and all orders for this visit:    Bipolar 1 disorder, depressed ([address])  -     cariprazine (Vraylar) 1.5 MG capsule;  Take 1 capsule (1.5 mg total) by mouth daily        Current Outpatient Medications on File Prior to Visit   Medication Sig Dispense Refill   • albuterol (Ventolin HFA) 90 mcg/act inhaler Inhale 2 puffs every 6 (six) hours as needed for wheezing 18 g 5   • atorvastatin (LIPITOR) 20 mg tablet 10 mg Currently taking 10 mg     • cholecalciferol (VITAMIN D3) 1,000 units tablet Take 2,000 Units by mouth 2 (two) times a day     • hydrochlorothiazide (HYDRODIURIL) 12.5 mg tablet Take 2 tablets (25 mg total) by mouth daily 60 tablet 2   • loperamide (IMODIUM A-D) 2 MG tablet Take 2 mg by mouth daily     • tacrolimus (PROTOPIC) 0.1 % ointment Apply topically 2 (two) times a day For maintenance 60 g 2   • zolpidem (AMBIEN) 10 mg tablet Take 1 tablet (10 mg total) by mouth daily at bedtime as needed (insomnia) 30 tablet 0   • atenolol (TENORMIN) 25 mg tablet TAKE 2 TABLETS BY MOUTH EVERY DAY 90 tablet 0   • clonazePAM (KlonoPIN) 0.5 mg tablet TAKE 1 TABLET BY MOUTH 2 TIMES A DAY. (Patient taking differently: Currently once daily, extra half if needed for depression) 60 tablet 0   • mesalamine (APRISO) 0.375 g 24 hr capsule TAKE 2 CAPSULES BY MOUTH EVERY DAY 60 capsule 0   • montelukast (SINGULAIR) 10 mg tablet Take 1 tablet (10 mg total) by mouth daily at bedtime 30 tablet 3   • ondansetron (ZOFRAN) 4 mg tablet Take 1 tablet (4 mg total) by mouth every 8 (eight) hours as needed for nausea or vomiting (Patient not taking: Reported on 3/2/2023) 20 tablet 0   • Specialty Vitamins Products (VITAMINS FOR THE HAIR PO) Take by mouth SUGAR BEAR HAIR VITAMIN (Patient not taking: Reported on 4/18/2023)     • [DISCONTINUED] clobetasol (TEMOVATE) 0.05 % external solution Apply topically 2 (two) times a day 50 mL 0     No current facility-administered medications on file prior to visit. Psychotherapy Provided:     Individual psychotherapy provided: Yes  Counseling was provided during the session today for 16 minutes. Supportive counseling provided. Medication changes discussed with Wm Hoff. Medication education provided to Wm Hoff. HPI ROS Appetite Changes and Sleep:     She reports normal sleep, adequate appetite, low energy.  Denies homicidal ideation, denies suicidal ideation    Review Of Systems:      HPI ROS:               Medication Side Effects:  denies    Depression (10 worst): 7/10    Anxiety (10 worst): 3/10    Safety concerns (SI, HI, etc): Denies si and hi    Sleep: 7 hrs    Energy: fair    Appetite: 2-3 meals    Weight Change: denies        Mental Status Evaluation:    Appearance Adequate hygiene and grooming   Behavior restless and fidgety   Mood anxious and depressed  Depression Scale - 7 of 10 (0 = No depression)  Anxiety Scale - 3 of 10 (0 = No anxiety) Speech Normal rate and volume   Affect constricted   Thought Processes Goal directed and coherent   Thought Content Does not verbalize delusional material   Associations Tightly connected   Perceptual Disturbances Denies hallucinations and does not appear to be responding to internal stimuli   Risk Potential Suicidal/Homicidal Ideation - No evidence of suicidal or homicidal ideation and patient does not verbalize suicidal or homicidal ideation  Risk of Violence - No evidence of risk for violence found on assessment  Risk of Self Mutilation - No evidence of risk for self mutilation found on assessment   Orientation oriented to person, place, time/date and situation   Memory recent and remote memory grossly intact   Consciousness alert and awake   Attention/Concentration attention span and concentration appear shorter than expected for age   Insight intact   Judgement intact   Muscle Strength and Gait normal muscle strength and normal muscle tone, normal gait/station and normal balance   Motor Activity no abnormal movements   Language no difficulty naming common objects, no difficulty repeating a phrase, no difficulty writing a sentence   Fund of Knowledge adequate knowledge of current events  adequate fund of knowledge regarding past history  adequate fund of knowledge regarding vocabulary          Past Medical History:    Past Medical History:   Diagnosis Date   • Basal cell carcinoma (BCC) of parietal region of scalp 06/13/2022    Left parietal scalp   • Bipolar 1 disorder, depressed ([facility])    • Bipolar 1 disorder, depressed ([facility]) 01/24/1975   • Chronic kidney disease    • Depression    • H/O bladder infections    • Hyperlipidemia    • Hypertension    • Lichen sclerosus    • Microscopic colitis    • Skin disorder     lichens sclerosis   • Urinary tract infection         Past Surgical History:   Procedure Laterality Date   • COLONOSCOPY      2013    • MOHS SURGERY Left 06/28/2022    Left parietal scalp Allergies   Allergen Reactions   • Levofloxacin Other (See Comments)   • Cephalexin Diarrhea   • Nitrofurantoin Fever   • Sulfa Antibiotics Fever   • Topiramate Other (See Comments)   • Bacitracin-Neomycin-Polymyxin Rash   • Sulfamethoxazole-Trimethoprim Diarrhea     Substance Abuse History:    Social History     Substance and Sexual Activity   Alcohol Use Yes    Comment: socially     Social History     Substance and Sexual Activity   Drug Use Never     Social History:    Social History     Socioeconomic History   • Marital status: Single     Spouse name: Not on file   • Number of children: Not on file   • Years of education: Not on file   • Highest education level: Not on file   Occupational History   • Not on file   Tobacco Use   • Smoking status: Former   • Smokeless tobacco: Never   • Tobacco comments:     heavy smoker previously   Vaping Use   • Vaping Use: Never used   Substance and Sexual Activity   • Alcohol use: Yes     Comment: socially   • Drug use: Never   • Sexual activity: Not Currently     Partners: Male   Other Topics Concern   • Not on file   Social History Narrative   • Not on file     Social Determinants of Health     Financial Resource Strain: Low Risk  (1/26/2023)    Overall Financial Resource Strain (CARDIA)    • Difficulty of Paying Living Expenses: Not very hard   Food Insecurity: Not on file   Transportation Needs: No Transportation Needs (1/26/2023)    PRAPARE - Transportation    • Lack of Transportation (Medical): No    • Lack of Transportation (Non-Medical): No   Physical Activity: Not on file   Stress: Not on file   Social Connections: Not on file   Intimate Partner Violence: Not on file   Housing Stability: Not on file     Family Psychiatric History:     Family History   Problem Relation Age of Onset   • Squamous cell carcinoma Mother    • Kidney disease Father    • Diabetic kidney disease Father    • No Known Problems Son    • No Known Problems Half-Sister      History Review: The following portions of the patient's history were reviewed and updated as appropriate: allergies, current medications, past family history, past medical history, past social history, past surgical history and problem list     OBJECTIVE:     Vital signs in last 24 hours: There were no vitals filed for this visit. Laboratory Results:   Recent Labs (last 2 months):   No visits with results within 2 Month(s) from this visit.    Latest known visit with results is:   Appointment on 04/17/2023   Component Date Value   • Sodium 04/17/2023 139    • Potassium 04/17/2023 4.8    • Chloride 04/17/2023 109 (H)    • CO2 04/17/2023 28    • ANION GAP 04/17/2023 2 (L)    • BUN 04/17/2023 16    • Creatinine 04/17/2023 0.92    • Glucose, Fasting 04/17/2023 99    • Calcium 04/17/2023 9.2    • eGFR 04/17/2023 65    • WBC 04/17/2023 6.30    • RBC 04/17/2023 4.08    • Hemoglobin 04/17/2023 12.0    • Hematocrit 04/17/2023 36.5    • MCV 04/17/2023 90    • MCH 04/17/2023 29.4    • MCHC 04/17/2023 32.9    • RDW 04/17/2023 13.3    • MPV 04/17/2023 10.5    • Platelets 70/15/8010 253    • nRBC 04/17/2023 0    • Neutrophils Relative 04/17/2023 55    • Immat GRANS % 04/17/2023 0    • Lymphocytes Relative 04/17/2023 36    • Monocytes Relative 04/17/2023 8    • Eosinophils Relative 04/17/2023 0    • Basophils Relative 04/17/2023 1    • Neutrophils Absolute 04/17/2023 3.45    • Immature Grans Absolute 04/17/2023 0.01    • Lymphocytes Absolute 04/17/2023 2.28    • Monocytes Absolute 04/17/2023 0.53    • Eosinophils Absolute 04/17/2023 0.00    • Basophils Absolute 04/17/2023 0.03    • Creatinine, Ur 04/17/2023 83.1    • Protein Urine Random 04/17/2023 7    • Prot/Creat Ratio, Ur 04/17/2023 0.08    • Color, UA 04/17/2023 Light Yellow    • Clarity, UA 04/17/2023 Clear    • Specific Gravity, UA 04/17/2023 1.014    • pH, UA 04/17/2023 6.0    • Leukocytes, UA 04/17/2023 Moderate (A)    • Nitrite, UA 04/17/2023 Negative    • Protein, UA 04/17/2023 Negative • Glucose, UA 04/17/2023 Negative    • Ketones, UA 04/17/2023 Negative    • Urobilinogen, UA 04/17/2023 <2.0    • Bilirubin, UA 04/17/2023 Negative    • Occult Blood, UA 04/17/2023 Negative    • RBC, UA 04/17/2023 1-2    • WBC, UA 04/17/2023 4-10 (A)    • Epithelial Cells 04/17/2023 Occasional    • Bacteria, UA 04/17/2023 None Seen    • MUCUS THREADS 04/17/2023 Occasional (A)      I have personally reviewed all pertinent laboratory/tests results. Suicide/Homicide Risk Assessment:    Risk of Harm to Self:  Protective Factors: no current suicidal ideation, access to mental health treatment, compliant with medications, compliant with mental health treatment, connection to community, having pets, resiliency  Based on today's assessment, Rissa Russo presents the following risk of harm to self: minimal    Risk of Harm to Others:  Based on today's assessment, Rissa Russo presents the following risk of harm to others: minimal    The following interventions are recommended: referral for psychotherapy    Medications Risks/Benefits:      Risks, Benefits And Possible Side Effects Of Medications:    Discussed risks and benefits of treatment with patient including risks of misuse, abuse or dependence, sedation and respiratory depression related to treatment with benzodiazepine medications and risk of impaired next-day mental alertness, complex sleep-related behavior and dependence related to treatment with hypnotic medications     Controlled Medication Discussion:     Rissa Russo has been filling controlled prescriptions on time as prescribed according to [facility] Monitoring Program  Discussed with Rissa Russo the risks of sedation, respiratory depression, impairment of ability to drive and potential for abuse and addiction related to treatment with benzodiazepine medications.  She understands risk of treatment with benzodiazepine medications, agrees to not drive if feels impaired and agrees to take medications as prescribed  Discussed with Noe cintron on concurrent use of benzodiazepines and opioid medications including sedation, respiratory depression, coma and death. She understands the risk of treatment with benzodiazepines in addition to opioids and wants to continue taking those medications  Discussed with Mira Nguyen increased risk of impairment related to concurrent use of benzodiazepines and hypnotic medications including excessive sedation, psychomotor impairment and respiratory depression. She understands the risk of treatment with benzodiazepines in addition to hypnotic medications and wants to continue taking those medications    Treatment Plan:    Due for update/Updated:   yes  Last treatment plan done 7/6/23 . Treatment Plan due on 1/6/24. RICK Lemon 07/06/23    This note was shared with patient.       Visit Time    Visit Start Time: 9  Visit Stop Time: 725  Total Visit Duration: 25 minutes

## 2023-07-06 NOTE — TELEPHONE ENCOUNTER
Patient called the [address] [place] [address] office, patient is scheduled today 07/06/2023.

## 2023-07-10 ENCOUNTER — OFFICE VISIT (OUTPATIENT)
Dept: OBGYN CLINIC | Facility: CLINIC | Age: 68
End: 2023-07-10
Payer: MEDICARE

## 2023-07-10 VITALS
SYSTOLIC BLOOD PRESSURE: 122 MMHG | WEIGHT: 154 LBS | HEIGHT: 63 IN | BODY MASS INDEX: 27.29 KG/M2 | DIASTOLIC BLOOD PRESSURE: 82 MMHG

## 2023-07-10 DIAGNOSIS — I10 PRIMARY HYPERTENSION: ICD-10-CM

## 2023-07-10 DIAGNOSIS — L90.0 LICHEN SCLEROSUS: Primary | ICD-10-CM

## 2023-07-10 DIAGNOSIS — N89.8 VAGINAL DRYNESS: ICD-10-CM

## 2023-07-10 PROCEDURE — 99213 OFFICE O/P EST LOW 20 MIN: CPT | Performed by: NURSE PRACTITIONER

## 2023-07-10 RX ORDER — ESTRADIOL 0.1 MG/G
CREAM VAGINAL
Qty: 42.5 G | Refills: 1 | Status: SHIPPED | OUTPATIENT
Start: 2023-07-10

## 2023-07-10 RX ORDER — NIACINAMIDE 500 MG
TABLET ORAL
COMMUNITY
Start: 2023-06-29

## 2023-07-10 RX ORDER — ATENOLOL 25 MG/1
TABLET ORAL
Qty: 90 TABLET | Refills: 0 | Status: SHIPPED | OUTPATIENT
Start: 2023-07-10

## 2023-07-10 RX ORDER — ESTRADIOL 10 UG/1
1 INSERT VAGINAL 2 TIMES WEEKLY
Qty: 8 TABLET | Refills: 3 | Status: SHIPPED | OUTPATIENT
Start: 2023-07-10 | End: 2023-07-10 | Stop reason: ALTCHOICE

## 2023-07-10 NOTE — PROGRESS NOTES
Diagnoses and all orders for this visit:    Lichen sclerosus  -     Discontinue: estradiol (VAGIFEM, YUVAFEM) 10 MCG TABS vaginal tablet; Insert 1 tablet (10 mcg total) into the vagina 2 (two) times a week  -     estradiol (ESTRACE VAGINAL) 0.1 mg/g vaginal cream; Use pea size amount to the vaginal opening 2-3 times per week    Vaginal dryness    Other orders        -     Continue to f/u with Dermatology. Continue with all her ointments that feel good. Continue with Vagifem BIW. Call if no symptom improvement, all questions answered, return for annual 3/2024. Pleasant 74 y.o. here for follow up of severe Lichen Sclerosis. SHE HAS LS WHICH HAS NOW PROGRESSED TO HER ABDOMEN AND SCALP. This was confirmed by skin biopsy (Abdomen). She sees Dr. Krystyna Alvarado is considering finding an LS Specialist. She had increased her Vagifem to 3 times per week and it has provided some relief BUT she would like to try cream now d/t expense. It also is preventing her UTIs. She does use Emu oil, Vaseline, Desitin equivalent and coconut oil prn. She is also on a [employer] LS Group and will be trying to find a LS Specialist. She denies fever or pelvic pain. She denies new sexual partners but would like to be active in the future. She was told this might be too much trauma for her but she is not losing hope. Mammogram 04/10/2023 and DXA 12/21/2022 both normal. Annual due in March 2023.         Past Medical History:   Diagnosis Date   • Abnormal Pap smear of cervix 1/6/22   • Basal cell carcinoma (BCC) of parietal region of scalp 06/13/2022    Left parietal scalp   • Bipolar 1 disorder, depressed ([facility])    • Bipolar 1 disorder, depressed ([facility]) 01/24/1975   • Chronic kidney disease    • Depression    • H/O bladder infections    • Hyperlipidemia    • Hypertension    • Lichen sclerosus    • Microscopic colitis    • Skin disorder     lichens sclerosis   • Urinary tract infection      Past Surgical History:   Procedure Laterality Date   • COLONOSCOPY          • MOHS SURGERY Left 2022    Left parietal scalp     Social History     Tobacco Use   • Smoking status: Former     Packs/day: 2.00     Years: 20.00     Total pack years: 40.00     Types: Cigarettes     Quit date: 1995     Years since quittin.5   • Smokeless tobacco: Never   • Tobacco comments:     heavy smoker previously   Vaping Use   • Vaping Use: Never used   Substance Use Topics   • Alcohol use: Yes     Alcohol/week: 1.0 standard drink of alcohol     Types: 1 Standard drinks or equivalent per week     Comment: socially   • Drug use: Never     Family History   Problem Relation Age of Onset   • Squamous cell carcinoma Mother    • Kidney disease Father    • Diabetic kidney disease Father    • No Known Problems Son    • No Known Problems Half-Sister        Current Outpatient Medications:   •  atenolol (TENORMIN) 25 mg tablet, TAKE 2 TABLETS BY MOUTH EVERY DAY, Disp: 90 tablet, Rfl: 0  •  atorvastatin (LIPITOR) 20 mg tablet, 10 mg Currently taking 10 mg, Disp: , Rfl:   •  cholecalciferol (VITAMIN D3) 1,000 units tablet, Take 2,000 Units by mouth 2 (two) times a day, Disp: , Rfl:   •  clonazePAM (KlonoPIN) 0.5 mg tablet, TAKE 1 TABLET BY MOUTH 2 TIMES A DAY.  (Patient taking differently: Currently once daily, extra half if needed for depression), Disp: 60 tablet, Rfl: 0  •  estradiol (ESTRACE VAGINAL) 0.1 mg/g vaginal cream, Use pea size amount to the vaginal opening 2-3 times per week, Disp: 42.5 g, Rfl: 1  •  hydrochlorothiazide (HYDRODIURIL) 12.5 mg tablet, Take 2 tablets (25 mg total) by mouth daily, Disp: 60 tablet, Rfl: 2  •  loperamide (IMODIUM A-D) 2 MG tablet, Take 2 mg by mouth daily, Disp: , Rfl:   •  mesalamine (APRISO) 0.375 g 24 hr capsule, TAKE 2 CAPSULES BY MOUTH EVERY DAY, Disp: 60 capsule, Rfl: 0  •  niacinamide 500 mg tablet, , Disp: , Rfl:   •  tacrolimus (PROTOPIC) 0.1 % ointment, Apply topically 2 (two) times a day For maintenance, Disp: 60 g, Rfl: 2  •  zolpidem (AMBIEN) 10 mg tablet, Take 1 tablet (10 mg total) by mouth daily at bedtime as needed (insomnia), Disp: 30 tablet, Rfl: 0    Allergies   Allergen Reactions   • Levofloxacin Other (See Comments)   • Cephalexin Diarrhea   • Nitrofurantoin Fever   • Sulfa Antibiotics Fever   • Topiramate Other (See Comments)   • Bacitracin-Neomycin-Polymyxin Rash   • Sulfamethoxazole-Trimethoprim Diarrhea     OB History    Para Term  AB Living   1 1 1     1   SAB IAB Ectopic Multiple Live Births                  # Outcome Date GA Lbr Johnathan/2nd Weight Sex Delivery Anes PTL Lv   1 Term               Obstetric Comments   1st menses 10years old        Vitals:    07/10/23 1109   BP: 122/82   BP Location: Left arm   Patient Position: Sitting   Cuff Size: Standard   Weight: 69.9 kg (154 lb)   Height: 5' 3" (1.6 m)     Body mass index is 27.28 kg/m². No LMP recorded. Patient is postmenopausal.    Review of Systems   Constitutional: Negative for chills, fatigue, fever and unexpected weight change. Respiratory: Negative for shortness of breath. Gastrointestinal: Negative for anal bleeding, blood in stool, constipation and diarrhea. Genitourinary: Negative for difficulty urinating, dysuria and hematuria. Physical Exam   Constitutional: She appears well-developed and well-nourished. No distress. Alert and oriented. HENT: atraumatic  Head: Normocephalic. Neck: Normal range of motion. Neck supple. Pulmonary: Effort normal.  Abdominal: Soft. Pelvic exam was performed with patient supine. No labial fusion. There is no rash, tenderness, lesion or injury on the right labia. There is no rash, tenderness, lesion or injury on the left labia. Urethral meatus does not show any tenderness, inflammation or discharge. Palpation of midline bladder without pain or discomfort. UTERUS AND CERVIX ABSENT. Right adnexum displays no mass, no tenderness and no fullness.  Left adnexum displays no mass, no tenderness and no fullness. No erythema or tenderness in the vagina. No foreign body in the vagina. No signs of injury around the vagina. NO Vaginal discharge found. Perineum and anus without areas of injury. No lesions noted or swelling. Vagina with hypopigmented areas c/w LSA, as well as surrounding erythema noted on external vulva, very atrophic vagina. Loss of labia minor noted and clitoral and labial fusion. VIRGINAL SPEC used. LAS on abdomen and thighs noted. Agglutination noted as well. Lymphadenopathy:        Right: No inguinal adenopathy present. Left: No inguinal adenopathy present.

## 2023-07-10 NOTE — PATIENT INSTRUCTIONS
Lichen Sclerosus   AMBULATORY CARE:   Lichen sclerosus  is a skin condition that most often affects the vulva, penis, or skin around the anus. Lichen sclerosus occurs most often in females. The cause of lichen sclerosus may not be known. Common symptoms include the following: You may not have any symptoms, or you may have any of the following:  Pain, itching, or burning    Areas of skin that are thin, wrinkled, and white    Blisters    Bleeding, bruises, or tears on affected skin    Contact your healthcare provider if:   Your symptoms do not get better with treatment. You have questions or concerns about your condition or care. Treatment and management:  You do not need treatment if you do not have any symptoms. Your healthcare provider may recommend a steroid cream or ointment. Your provider may also recommend a topical medicine that prevents your immune system from attacking your skin. If you are female, your healthcare provider may recommend that you do not take bubble baths, or use scented soaps and scented detergents. This may help decrease irritation of the vulva. Rarely, adults may need surgery to repair scarring that can occur over time. Follow up with your doctor as directed:  Write down your questions so you remember to ask them during your visits. © Copyright Leigh Ann Bansal 2022 Information is for End User's use only and may not be sold, redistributed or otherwise used for commercial purposes. The above information is an  only. It is not intended as medical advice for individual conditions or treatments. Talk to your doctor, nurse or pharmacist before following any medical regimen to see if it is safe and effective for you.

## 2023-07-18 ENCOUNTER — TELEPHONE (OUTPATIENT)
Dept: GASTROENTEROLOGY | Facility: CLINIC | Age: 68
End: 2023-07-18

## 2023-07-18 NOTE — TELEPHONE ENCOUNTER
Cherelle pt  Left message for patient to call to schedule follow-up appointment to see [facility]  6 mon f/u

## 2023-07-29 DIAGNOSIS — K58.2 IRRITABLE BOWEL SYNDROME WITH BOTH CONSTIPATION AND DIARRHEA: ICD-10-CM

## 2023-07-31 RX ORDER — MESALAMINE 0.38 G/1
CAPSULE, EXTENDED RELEASE ORAL
Qty: 60 CAPSULE | Refills: 0 | Status: SHIPPED | OUTPATIENT
Start: 2023-07-31

## 2023-08-02 ENCOUNTER — OFFICE VISIT (OUTPATIENT)
Dept: GASTROENTEROLOGY | Facility: CLINIC | Age: 68
End: 2023-08-02
Payer: MEDICARE

## 2023-08-02 VITALS
DIASTOLIC BLOOD PRESSURE: 81 MMHG | SYSTOLIC BLOOD PRESSURE: 139 MMHG | HEIGHT: 63 IN | HEART RATE: 60 BPM | OXYGEN SATURATION: 98 % | WEIGHT: 153 LBS | BODY MASS INDEX: 27.11 KG/M2

## 2023-08-02 DIAGNOSIS — K58.2 IRRITABLE BOWEL SYNDROME WITH BOTH CONSTIPATION AND DIARRHEA: ICD-10-CM

## 2023-08-02 DIAGNOSIS — K52.832 LYMPHOCYTIC COLITIS: Primary | ICD-10-CM

## 2023-08-02 DIAGNOSIS — Q44.1 GALLBLADDER ANOMALY: ICD-10-CM

## 2023-08-02 PROCEDURE — 99213 OFFICE O/P EST LOW 20 MIN: CPT | Performed by: PHYSICIAN ASSISTANT

## 2023-08-02 RX ORDER — FLUTICASONE PROPIONATE 50 MCG
SPRAY, SUSPENSION (ML) NASAL
COMMUNITY
Start: 2023-07-19

## 2023-08-02 NOTE — PROGRESS NOTES
[facility] - Outpatient Follow-up Note  Valery Urbano 74 y.o. female MRN: [id number]  Encounter: [id number]          ASSESSMENT AND PLAN:      1. Lymphocytic colitis  2. Irritable bowel syndrome with both constipation and diarrhea  She is better on Apriso 2 pills daily but symptoms are not resolved  She still has episodes of abdominal pain and diarrhea which she relates to certain foods  She has looked into low FODMAP in the past but does not think that this would fit her restrictions    Advise switching probiotic to align  Continue Apriso  Consider low FODMAP or gluten-free diet    Her last colonoscopy was in 2019 with Dr. Babs Coates with lymphocytic colitis but otherwise normal exam    3. Gallbladder anomaly  3 subcentimeter polyps on the gallbladder  F/U US  If stable no further imaging is needed    ______________________________________________________________________    SUBJECTIVE: 68-year-old female with a history of lymphocytic colitis and irritable bowel syndrome who presents for routine follow-up. Overall her symptoms do remain improved on Apriso 2 pills daily however she still reports days and episodes of symptoms. Recently she had a severe diarrhea episodes for which she took Imodium. Since that time she has been constipated. She relates her episodes of diarrhea to certain food intolerances. She knows that she cannot have soy or beef. She does try to avoid these. She has looked into a low FODMAP diet but does not feel that this completely fits her needs food intolerances. She notes that in the distant past she has been on a gluten-free diet for other reasons and she felt well while doing this. She is considering trying this again. Her last colonoscopy was in 2019 with Dr. Babs Coates with a diagnosis of lymphocytic colitis but was otherwise normal.      REVIEW OF SYSTEMS IS OTHERWISE NEGATIVE.       Historical Information   Past Medical History:   Diagnosis Date   • Abnormal Pap smear of cervix 22   • Basal cell carcinoma (BCC) of parietal region of scalp 2022    Left parietal scalp   • Bipolar 1 disorder, depressed ([facility])    • Bipolar 1 disorder, depressed ([address] [facility]) 1975   • Chronic kidney disease    • Depression    • H/O bladder infections    • Hyperlipidemia    • Hypertension    • Lichen sclerosus    • Microscopic colitis    • Skin disorder     lichens sclerosis   • Urinary tract infection      Past Surgical History:   Procedure Laterality Date   • COLONOSCOPY          • MOHS SURGERY Left 2022    Left parietal scalp     Social History   Social History     Substance and Sexual Activity   Alcohol Use Yes   • Alcohol/week: 1.0 standard drink of alcohol   • Types: 1 Standard drinks or equivalent per week    Comment: socially     Social History     Substance and Sexual Activity   Drug Use Never     Social History     Tobacco Use   Smoking Status Former   • Packs/day: 2.00   • Years: 20.00   • Total pack years: 40.00   • Types: Cigarettes   • Quit date: 1995   • Years since quittin.6   Smokeless Tobacco Never   Tobacco Comments    heavy smoker previously     Family History   Problem Relation Age of Onset   • Squamous cell carcinoma Mother    • Kidney disease Father    • Diabetic kidney disease Father    • No Known Problems Son    • No Known Problems Half-Sister        Meds/Allergies       Current Outpatient Medications:   •  atenolol (TENORMIN) 25 mg tablet  •  atorvastatin (LIPITOR) 20 mg tablet  •  cholecalciferol (VITAMIN D3) 1,000 units tablet  •  clonazePAM (KlonoPIN) 0.5 mg tablet  •  estradiol (ESTRACE VAGINAL) 0.1 mg/g vaginal cream  •  fluticasone (FLONASE) 50 mcg/act nasal spray  •  hydrochlorothiazide (HYDRODIURIL) 12.5 mg tablet  •  loperamide (IMODIUM A-D) 2 MG tablet  •  mesalamine (APRISO) 0.375 g 24 hr capsule  •  niacinamide 500 mg tablet  •  tacrolimus (PROTOPIC) 0.1 % ointment  •  zolpidem (AMBIEN) 10 mg tablet    Allergies Allergen Reactions   • Levofloxacin Other (See Comments)   • Sulfa Antibiotics Fever and Itching   • Cephalexin Diarrhea   • Nitrofurantoin Fever   • Topiramate Other (See Comments)   • Bacitracin-Neomycin-Polymyxin Rash   • Sulfamethoxazole-Trimethoprim Diarrhea           Objective     Blood pressure 139/81, pulse 60, height 5' 3" (1.6 m), weight 69.4 kg (153 lb), SpO2 98 %, not currently breastfeeding. Body mass index is 27.1 kg/m². PHYSICAL EXAM:      General Appearance:   Alert, cooperative, no distress   HEENT:   Normocephalic, atraumatic, anicteric.     Neck:  Supple, symmetrical, trachea midline   Lungs:   Clear to auscultation bilaterally; no rales, rhonchi or wheezing; respirations unlabored    Heart[de-identified]   Regular rate and rhythm; no murmur, rub, or gallop. Abdomen:   Soft, non-tender, non-distended; normal bowel sounds; no masses, no organomegaly    Genitalia:   Deferred    Rectal:   Deferred    Extremities:  No cyanosis, clubbing or edema    Pulses:  2+ and symmetric    Skin:  No jaundice, rashes, or lesions    Lymph nodes:  No palpable cervical lymphadenopathy        Lab Results:   No visits with results within 1 Day(s) from this visit.    Latest known visit with results is:   Appointment on 04/17/2023   Component Date Value   • Sodium 04/17/2023 139    • Potassium 04/17/2023 4.8    • Chloride 04/17/2023 109 (H)    • CO2 04/17/2023 28    • ANION GAP 04/17/2023 2 (L)    • BUN 04/17/2023 16    • Creatinine 04/17/2023 0.92    • Glucose, Fasting 04/17/2023 99    • Calcium 04/17/2023 9.2    • eGFR 04/17/2023 65    • WBC 04/17/2023 6.30    • RBC 04/17/2023 4.08    • Hemoglobin 04/17/2023 12.0    • Hematocrit 04/17/2023 36.5    • MCV 04/17/2023 90    • MCH 04/17/2023 29.4    • MCHC 04/17/2023 32.9    • RDW 04/17/2023 13.3    • MPV 04/17/2023 10.5    • Platelets 46/33/0362 253    • nRBC 04/17/2023 0    • Neutrophils Relative 04/17/2023 55    • Immat GRANS % 04/17/2023 0    • Lymphocytes Relative 04/17/2023 36    • Monocytes Relative 04/17/2023 8    • Eosinophils Relative 04/17/2023 0    • Basophils Relative 04/17/2023 1    • Neutrophils Absolute 04/17/2023 3.45    • Immature Grans Absolute 04/17/2023 0.01    • Lymphocytes Absolute 04/17/2023 2.28    • Monocytes Absolute 04/17/2023 0.53    • Eosinophils Absolute 04/17/2023 0.00    • Basophils Absolute 04/17/2023 0.03    • Creatinine, Ur 04/17/2023 83.1    • Protein Urine Random 04/17/2023 7    • Prot/Creat Ratio, Ur 04/17/2023 0.08    • Color, UA 04/17/2023 Light Yellow    • Clarity, UA 04/17/2023 Clear    • Specific Gravity, UA 04/17/2023 1.014    • pH, UA 04/17/2023 6.0    • Leukocytes, UA 04/17/2023 Moderate (A)    • Nitrite, UA 04/17/2023 Negative    • Protein, UA 04/17/2023 Negative    • Glucose, UA 04/17/2023 Negative    • Ketones, UA 04/17/2023 Negative    • Urobilinogen, UA 04/17/2023 <2.0    • Bilirubin, UA 04/17/2023 Negative    • Occult Blood, UA 04/17/2023 Negative    • RBC, UA 04/17/2023 1-2    • WBC, UA 04/17/2023 4-10 (A)    • Epithelial Cells 04/17/2023 Occasional    • Bacteria, UA 04/17/2023 None Seen    • MUCUS THREADS 04/17/2023 Occasional (A)          Radiology Results:   No results found.

## 2023-08-19 DIAGNOSIS — I10 PRIMARY HYPERTENSION: ICD-10-CM

## 2023-08-21 RX ORDER — ATENOLOL 25 MG/1
TABLET ORAL
Qty: 90 TABLET | Refills: 0 | Status: SHIPPED | OUTPATIENT
Start: 2023-08-21

## 2023-08-22 ENCOUNTER — TELEMEDICINE (OUTPATIENT)
Dept: PSYCHIATRY | Facility: CLINIC | Age: 68
End: 2023-08-22
Payer: MEDICARE

## 2023-08-22 DIAGNOSIS — F31.9 BIPOLAR 1 DISORDER, DEPRESSED (HCC): Primary | ICD-10-CM

## 2023-08-22 DIAGNOSIS — I10 PRIMARY HYPERTENSION: ICD-10-CM

## 2023-08-22 PROCEDURE — 99213 OFFICE O/P EST LOW 20 MIN: CPT

## 2023-08-22 RX ORDER — HYDROCHLOROTHIAZIDE 12.5 MG/1
25 TABLET ORAL DAILY
Qty: 60 TABLET | Refills: 0 | Status: SHIPPED | OUTPATIENT
Start: 2023-08-22

## 2023-08-22 NOTE — PSYCH
Virtual Regular Visit    Verification of patient location:    Patient is located in the following state in which I hold an active license [place]    Problem List Items Addressed This Visit    None  Visit Diagnoses     Bipolar 1 disorder, depressed ([address])    -  Primary             Encounter provider RICK Horn    Provider located at    [address] [address] [address] [phone]    Recent Visits  No visits were found meeting these conditions. Showing recent visits within past 7 days and meeting all other requirements  Today's Visits  Date Type Provider Dept   08/22/23 Telemedicine RICK Horn [facility]   Showing today's visits and meeting all other requirements  Future Appointments  No visits were found meeting these conditions. Showing future appointments within next 150 days and meeting all other requirements         The patient was identified by name and date of birth. Sina Trevino was informed that this is a telemedicine visit and [facility] agrees to proceed. .  My office door was closed. No one else was in the room. She acknowledged consent and understanding of privacy and security of the video platform. The patient has agreed to participate and understands they can discontinue the visit at any time. Patient is aware this is a billable service.      HPI     Current Outpatient Medications   Medication Sig Dispense Refill   • atenolol (TENORMIN) 25 mg tablet TAKE 2 TABLETS BY MOUTH EVERY DAY 90 tablet 0   • atorvastatin (LIPITOR) 20 mg tablet 10 mg Currently taking 10 mg     • cholecalciferol (VITAMIN D3) 1,000 units tablet Take 2,000 Units by mouth 2 (two) times a day     • estradiol (ESTRACE VAGINAL) 0.1 mg/g vaginal cream Use pea size amount to the vaginal opening 2-3 times per week 42.5 g 1   • fluticasone (FLONASE) 50 mcg/act nasal spray SPRAY 1 SPRAY INTO EACH NOSTRIL TWICE A DAY     • loperamide (IMODIUM A-D) 2 MG tablet Take 2 mg by mouth daily     • mesalamine (APRISO) 0.375 g 24 hr capsule TAKE 2 CAPSULES BY MOUTH EVERY DAY 60 capsule 0   • niacinamide 500 mg tablet      • tacrolimus (PROTOPIC) 0.1 % ointment Apply topically 2 (two) times a day For maintenance 60 g 2   • zolpidem (AMBIEN) 10 mg tablet Take 1 tablet (10 mg total) by mouth daily at bedtime as needed (insomnia) 30 tablet 0   • clonazePAM (KlonoPIN) 0.5 mg tablet TAKE 1 TABLET BY MOUTH 2 TIMES A DAY. (Patient taking differently: Currently once daily, extra half if needed for depression) 60 tablet 0   • hydrochlorothiazide (HYDRODIURIL) 12.5 mg tablet Take 2 tablets (25 mg total) by mouth daily 60 tablet 2     No current facility-administered medications for this visit. Review of Systems  Video Exam    There were no vitals filed for this visit. Physical Exam   As a result of this visit, I have referred the patient for further respiratory evaluation. No      VIRTUAL VISIT DISCLAIMER    Lee Lane acknowledges that she has consented to an online visit or consultation. She understands that the online visit is based solely on information provided by her, and that, in the absence of a face-to-face physical evaluation by the physician, the diagnosis she receives is both limited and provisional in terms of accuracy and completeness. This is not intended to replace a full medical face-to-face evaluation by the physician. Lee Lane understands and accepts these terms.     MEDICATION MANAGEMENT NOTE        [facility]    Name and Date of Birth:  Lee Lane 74 y.o. 1955 MRN: [id number]    Date of Visit: August 22, 2023    Allergies   Allergen Reactions   • Levofloxacin Other (See Comments)   • Sulfa Antibiotics Fever and Itching   • Cephalexin Diarrhea   • Nitrofurantoin Fever   • Topiramate Other (See Comments)   • Bacitracin-Neomycin-Polymyxin Rash   • Sulfamethoxazole-Trimethoprim Diarrhea     SUBJECTIVE:    Brandy Rainey is seen today for a follow up for Bipolar Disorder. She reports that she continues to experience on and off symptoms since the last visit. Brandy Rainey reports being unable to  Vraylar 1.5 mg daily for mood stabilization and dysphoria related to insurance issues. Provider sent message to see if prior auth can be completed today we are hopeful to get resolution soon. She remains interested in medication due to 6/10 depression with symptoms of dysphoria, poor energy motivation, periods of helplessness and hopelessness, poor concentration. History of bipolar 1 disorder, continues to deny all symptoms of mood elevation including grandiosity, impulsivity, insomnia, hypertalkativeness. Does report history of feeling irritable. We are optimistic and hopeful Vraylar can improve symptoms and hopefully can get approved by insurance. If not, we discussed Abilify as a potential backup medication. Continues to be managed on Klonopin 0.5 mg twice daily and Ambien 10 mg by PCP, using medication appropriately and patient PDMP, denies side effects. Mood is constricted, flat. Denies SI. Will reach out to patient later this week after hearing back from insurance. PLAN:     -Waiting on insurance to initiate- Vraylar 1.5mg daily for mood stabilization, irritability. Side effects explained and pt verbalized understanding.    -Continue Klonopin 0.5 mg BID prescribed and managed by PCP    Discussed with patient the risks of sedation, respiratory depression, impairment of ability to drive and potential for abuse and addiction related to treatment with benzodiazepine medications. The patient understands risk of treatment with benzodiazepine medications, agrees to not drive if feels impaired and agrees to take medications as prescribed.  Patient was also informed of risks of being on or starting opioid medications due to drug interactions and potential for serious respiratory depression and death.      -Continue Ambien 10mg as needed for insomnia managed by PCP           Aware of 24 hour and weekend coverage for urgent situations accessed by calling [facility] main practice number  Referral for individual psychotherapy    Diagnoses and all orders for this visit:    Bipolar 1 disorder, depressed ([address])        Current Outpatient Medications on File Prior to Visit   Medication Sig Dispense Refill   • atenolol (TENORMIN) 25 mg tablet TAKE 2 TABLETS BY MOUTH EVERY DAY 90 tablet 0   • atorvastatin (LIPITOR) 20 mg tablet 10 mg Currently taking 10 mg     • cholecalciferol (VITAMIN D3) 1,000 units tablet Take 2,000 Units by mouth 2 (two) times a day     • estradiol (ESTRACE VAGINAL) 0.1 mg/g vaginal cream Use pea size amount to the vaginal opening 2-3 times per week 42.5 g 1   • fluticasone (FLONASE) 50 mcg/act nasal spray SPRAY 1 SPRAY INTO EACH NOSTRIL TWICE A DAY     • loperamide (IMODIUM A-D) 2 MG tablet Take 2 mg by mouth daily     • mesalamine (APRISO) 0.375 g 24 hr capsule TAKE 2 CAPSULES BY MOUTH EVERY DAY 60 capsule 0   • niacinamide 500 mg tablet      • tacrolimus (PROTOPIC) 0.1 % ointment Apply topically 2 (two) times a day For maintenance 60 g 2   • zolpidem (AMBIEN) 10 mg tablet Take 1 tablet (10 mg total) by mouth daily at bedtime as needed (insomnia) 30 tablet 0   • clonazePAM (KlonoPIN) 0.5 mg tablet TAKE 1 TABLET BY MOUTH 2 TIMES A DAY. (Patient taking differently: Currently once daily, extra half if needed for depression) 60 tablet 0   • hydrochlorothiazide (HYDRODIURIL) 12.5 mg tablet Take 2 tablets (25 mg total) by mouth daily 60 tablet 2   • [DISCONTINUED] clobetasol (TEMOVATE) 0.05 % external solution Apply topically 2 (two) times a day 50 mL 0     No current facility-administered medications on file prior to visit.        Psychotherapy Provided:     Individual psychotherapy provided: Yes  Counseling was provided during the session today for 10 minutes. Supportive counseling provided. Medication changes discussed with Edy Vazquez. Medication education provided to Edycecilia Vazquez. HPI ROS Appetite Changes and Sleep:     She reports normal sleep, adequate appetite, low energy.  Denies homicidal ideation, denies suicidal ideation    Review Of Systems:      HPI ROS:               Medication Side Effects:  denies    Depression (10 worst): 6/10    Anxiety (10 worst): 3/10    Safety concerns (SI, HI, etc): Denies si and hi    Sleep: 7 hrs    Energy: fair    Appetite: 2-3 meals    Weight Change: denies        Mental Status Evaluation:    Appearance Adequate hygiene and grooming   Behavior restless and fidgety   Mood anxious and depressed  Depression Scale - 6 of 10 (0 = No depression)  Anxiety Scale - 3 of 10 (0 = No anxiety)   Speech Normal rate and volume   Affect constricted   Thought Processes Goal directed and coherent   Thought Content Does not verbalize delusional material   Associations Tightly connected   Perceptual Disturbances Denies hallucinations and does not appear to be responding to internal stimuli   Risk Potential Suicidal/Homicidal Ideation - No evidence of suicidal or homicidal ideation and patient does not verbalize suicidal or homicidal ideation  Risk of Violence - No evidence of risk for violence found on assessment  Risk of Self Mutilation - No evidence of risk for self mutilation found on assessment   Orientation oriented to person, place, time/date and situation   Memory recent and remote memory grossly intact   Consciousness alert and awake   Attention/Concentration attention span and concentration appear shorter than expected for age   Insight intact   Judgement intact   Muscle Strength and Gait normal muscle strength and normal muscle tone, normal gait/station and normal balance   Motor Activity no abnormal movements   Language no difficulty naming common objects, no difficulty repeating a phrase, no difficulty writing a sentence   Fund of Knowledge adequate knowledge of current events  adequate fund of knowledge regarding past history  adequate fund of knowledge regarding vocabulary          Past Medical History:    Past Medical History:   Diagnosis Date   • Abnormal Pap smear of cervix 22   • Basal cell carcinoma (BCC) of parietal region of scalp 2022    Left parietal scalp   • Bipolar 1 disorder, depressed ([address])    • Bipolar 1 disorder, depressed ([address]) 1975   • Chronic kidney disease    • Depression    • H/O bladder infections    • Hyperlipidemia    • Hypertension    • Lichen sclerosus    • Microscopic colitis    • Skin disorder     lichens sclerosis   • Urinary tract infection         Past Surgical History:   Procedure Laterality Date   • COLONOSCOPY          • MOHS SURGERY Left 2022    Left parietal scalp     Allergies   Allergen Reactions   • Levofloxacin Other (See Comments)   • Sulfa Antibiotics Fever and Itching   • Cephalexin Diarrhea   • Nitrofurantoin Fever   • Topiramate Other (See Comments)   • Bacitracin-Neomycin-Polymyxin Rash   • Sulfamethoxazole-Trimethoprim Diarrhea     Substance Abuse History:    Social History     Substance and Sexual Activity   Alcohol Use Yes   • Alcohol/week: 1.0 standard drink of alcohol   • Types: 1 Standard drinks or equivalent per week    Comment: socially     Social History     Substance and Sexual Activity   Drug Use Never     Social History:    Social History     Socioeconomic History   • Marital status: Single     Spouse name: Not on file   • Number of children: Not on file   • Years of education: Not on file   • Highest education level: Not on file   Occupational History   • Not on file   Tobacco Use   • Smoking status: Former     Packs/day: 2.00     Years: 20.00     Total pack years: 40.00     Types: Cigarettes     Quit date: 1995     Years since quittin.6   • Smokeless tobacco: Never   • Tobacco comments:     heavy smoker previously   Vaping Use   • Vaping Use: Never used   Substance and Sexual Activity   • Alcohol use: Yes     Alcohol/week: 1.0 standard drink of alcohol     Types: 1 Standard drinks or equivalent per week     Comment: socially   • Drug use: Never   • Sexual activity: Not Currently     Partners: Male     Birth control/protection: Post-menopausal     Comment: Partial hysterectomy 2000? Other Topics Concern   • Not on file   Social History Narrative   • Not on file     Social Determinants of Health     Financial Resource Strain: Low Risk  (1/26/2023)    Overall Financial Resource Strain (CARDIA)    • Difficulty of Paying Living Expenses: Not very hard   Food Insecurity: Not on file   Transportation Needs: No Transportation Needs (1/26/2023)    PRAPARE - Transportation    • Lack of Transportation (Medical): No    • Lack of Transportation (Non-Medical): No   Physical Activity: Not on file   Stress: Not on file   Social Connections: Not on file   Intimate Partner Violence: Not on file   Housing Stability: Not on file     Family Psychiatric History:     Family History   Problem Relation Age of Onset   • Squamous cell carcinoma Mother    • Kidney disease Father    • Diabetic kidney disease Father    • No Known Problems Son    • No Known Problems Half-Sister      History Review: The following portions of the patient's history were reviewed and updated as appropriate: allergies, current medications, past family history, past medical history, past social history, past surgical history and problem list     OBJECTIVE:     Vital signs in last 24 hours: There were no vitals filed for this visit. Laboratory Results:   Recent Labs (last 2 months):   No visits with results within 2 Month(s) from this visit.    Latest known visit with results is:   Appointment on 04/17/2023   Component Date Value   • Sodium 04/17/2023 139    • Potassium 04/17/2023 4.8    • Chloride 04/17/2023 109 (H)    • CO2 04/17/2023 28    • ANION GAP 04/17/2023 2 (L)    • BUN 04/17/2023 16    • Creatinine 04/17/2023 0.92    • Glucose, Fasting 04/17/2023 99    • Calcium 04/17/2023 9.2    • eGFR 04/17/2023 65    • WBC 04/17/2023 6.30    • RBC 04/17/2023 4.08    • Hemoglobin 04/17/2023 12.0    • Hematocrit 04/17/2023 36.5    • MCV 04/17/2023 90    • MCH 04/17/2023 29.4    • MCHC 04/17/2023 32.9    • RDW 04/17/2023 13.3    • MPV 04/17/2023 10.5    • Platelets 77/08/3667 253    • nRBC 04/17/2023 0    • Neutrophils Relative 04/17/2023 55    • Immat GRANS % 04/17/2023 0    • Lymphocytes Relative 04/17/2023 36    • Monocytes Relative 04/17/2023 8    • Eosinophils Relative 04/17/2023 0    • Basophils Relative 04/17/2023 1    • Neutrophils Absolute 04/17/2023 3.45    • Immature Grans Absolute 04/17/2023 0.01    • Lymphocytes Absolute 04/17/2023 2.28    • Monocytes Absolute 04/17/2023 0.53    • Eosinophils Absolute 04/17/2023 0.00    • Basophils Absolute 04/17/2023 0.03    • Creatinine, Ur 04/17/2023 83.1    • Protein Urine Random 04/17/2023 7    • Prot/Creat Ratio, Ur 04/17/2023 0.08    • Color, UA 04/17/2023 Light Yellow    • Clarity, UA 04/17/2023 Clear    • Specific Gravity, UA 04/17/2023 1.014    • pH, UA 04/17/2023 6.0    • Leukocytes, UA 04/17/2023 Moderate (A)    • Nitrite, UA 04/17/2023 Negative    • Protein, UA 04/17/2023 Negative    • Glucose, UA 04/17/2023 Negative    • Ketones, UA 04/17/2023 Negative    • Urobilinogen, UA 04/17/2023 <2.0    • Bilirubin, UA 04/17/2023 Negative    • Occult Blood, UA 04/17/2023 Negative    • RBC, UA 04/17/2023 1-2    • WBC, UA 04/17/2023 4-10 (A)    • Epithelial Cells 04/17/2023 Occasional    • Bacteria, UA 04/17/2023 None Seen    • MUCUS THREADS 04/17/2023 Occasional (A)      I have personally reviewed all pertinent laboratory/tests results.     Suicide/Homicide Risk Assessment:    Risk of Harm to Self:  Protective Factors: no current suicidal ideation, access to mental health treatment, compliant with medications, compliant with mental health treatment, connection to community, having pets, resiliency  Based on today's assessment, Segundo Raymundo presents the following risk of harm to self: minimal    Risk of Harm to Others:  Based on today's assessment, Segundo Raymundo presents the following risk of harm to others: minimal    The following interventions are recommended: referral for psychotherapy    Medications Risks/Benefits:      Risks, Benefits And Possible Side Effects Of Medications:    Discussed risks and benefits of treatment with patient including risks of misuse, abuse or dependence, sedation and respiratory depression related to treatment with benzodiazepine medications and risk of impaired next-day mental alertness, complex sleep-related behavior and dependence related to treatment with hypnotic medications     Controlled Medication Discussion:     Segundo Raymundo has been filling controlled prescriptions on time as prescribed according to [facility]  Discussed with Segundo Raymundo the risks of sedation, respiratory depression, impairment of ability to drive and potential for abuse and addiction related to treatment with benzodiazepine medications. She understands risk of treatment with benzodiazepine medications, agrees to not drive if feels impaired and agrees to take medications as prescribed  Discussed with Sia Cordero warning on concurrent use of benzodiazepines and opioid medications including sedation, respiratory depression, coma and death. She understands the risk of treatment with benzodiazepines in addition to opioids and wants to continue taking those medications  Discussed with Segundo Raymundo increased risk of impairment related to concurrent use of benzodiazepines and hypnotic medications including excessive sedation, psychomotor impairment and respiratory depression.  She understands the risk of treatment with benzodiazepines in addition to hypnotic medications and wants to continue taking those medications    Treatment Plan:    Due for update/Updated:   yes  Last treatment plan done 7/6/23 . Treatment Plan due on 1/6/24. RICK Mcarthur 08/22/23    This note was shared with patient.       Visit Time    Visit Start Time: 913  Visit Stop Time: 243  Total Visit Duration: 25 minutes

## 2023-08-22 NOTE — TELEPHONE ENCOUNTER
hydrochlorothiazide (HYDRODIURIL) 12.5 mg tablet         Sig: Take 2 tablets (25 mg total) by mouth daily    Disp:  60 tablet    Refills:  0    Start: 8/22/2023    Class: Normal    Non-formulary For: Primary hypertension    Last ordered: 9 months ago (11/15/2022) by Maria Guadalupe Babcock MD    Patient comment: I only take 1 12.5mg daily. Took last one this morning    Cardiovascular: Diuretics - Thiazide Failed 08/22/2023 12:52 PM   Protocol Details  Valid encounter within last 6 months    BP completed in the last 6 months    Ca in normal range and within 360 days    K in normal range and within 360 days    Na in normal range and within 360 days    eGFR is 60 or above and within 360 days      To be filled at: [facility] [address]   Please review and refill if possible  Thanks! Statement Selected

## 2023-08-23 ENCOUNTER — TELEPHONE (OUTPATIENT)
Dept: PSYCHIATRY | Facility: CLINIC | Age: 68
End: 2023-08-23

## 2023-08-23 DIAGNOSIS — F31.9 BIPOLAR 1 DISORDER, DEPRESSED (HCC): Primary | ICD-10-CM

## 2023-08-23 RX ORDER — QUETIAPINE FUMARATE 50 MG/1
50 TABLET, FILM COATED ORAL
Qty: 30 TABLET | Refills: 0 | Status: SHIPPED | OUTPATIENT
Start: 2023-08-23

## 2023-08-23 NOTE — TELEPHONE ENCOUNTER
Spoke to patient, initiate Seroquel 50 mg at bedtime for mood stabilization and depressive symptoms. Side effects explained and patient verbalized understanding. Will reconsider Onur Richmond in the future if needed.

## 2023-08-23 NOTE — TELEPHONE ENCOUNTER
Called pharmacy for [employer]. Was provided with [facility] [facility] ID # [id number], BIN: [id number], PCN: [id number], GRP: . Prior Authorization for Vraylar 1.5 MG capsule faxed to [facility] [employer] [facility] [address] [facility]. Decision pending.

## 2023-08-23 NOTE — TELEPHONE ENCOUNTER
Fax from [place] with [facility] for Vraylar 1.5 MG capsule. "We cover this drug when our criteria are met. The unmet criteria are: has tried or cannot use quetiapine."    Will refer to Gorge Esparza for review.

## 2023-08-24 DIAGNOSIS — K58.2 IRRITABLE BOWEL SYNDROME WITH BOTH CONSTIPATION AND DIARRHEA: ICD-10-CM

## 2023-08-25 ENCOUNTER — HOSPITAL ENCOUNTER (OUTPATIENT)
Dept: ULTRASOUND IMAGING | Facility: CLINIC | Age: 68
Discharge: HOME | End: 2023-08-25
Payer: MEDICARE

## 2023-08-25 DIAGNOSIS — Q44.1 GALLBLADDER ANOMALY: ICD-10-CM

## 2023-08-25 PROCEDURE — 76700 US EXAM ABDOM COMPLETE: CPT

## 2023-08-29 DIAGNOSIS — E83.9 CHRONIC KIDNEY DISEASE-MINERAL AND BONE DISORDER: ICD-10-CM

## 2023-08-29 DIAGNOSIS — M89.9 CHRONIC KIDNEY DISEASE-MINERAL AND BONE DISORDER: ICD-10-CM

## 2023-08-29 DIAGNOSIS — Z13.6 SCREENING FOR CARDIOVASCULAR CONDITION: ICD-10-CM

## 2023-08-29 DIAGNOSIS — N18.9 CHRONIC KIDNEY DISEASE-MINERAL AND BONE DISORDER: ICD-10-CM

## 2023-08-29 DIAGNOSIS — I10 PRIMARY HYPERTENSION: Primary | ICD-10-CM

## 2023-08-29 RX ORDER — MESALAMINE 0.38 G/1
CAPSULE, EXTENDED RELEASE ORAL
Qty: 60 CAPSULE | Refills: 0 | OUTPATIENT
Start: 2023-08-29

## 2023-08-29 NOTE — TELEPHONE ENCOUNTER
Please let patient know that that in order for her to take her to continue on this medicine she needs to obtain some blood work.   Thank you

## 2023-08-31 ENCOUNTER — OFFICE VISIT (OUTPATIENT)
Dept: CARDIOLOGY CLINIC | Facility: CLINIC | Age: 68
End: 2023-08-31
Payer: MEDICARE

## 2023-08-31 VITALS
SYSTOLIC BLOOD PRESSURE: 130 MMHG | BODY MASS INDEX: 27.11 KG/M2 | OXYGEN SATURATION: 98 % | DIASTOLIC BLOOD PRESSURE: 82 MMHG | WEIGHT: 153 LBS | RESPIRATION RATE: 20 BRPM | HEART RATE: 53 BPM | HEIGHT: 63 IN

## 2023-08-31 DIAGNOSIS — R00.1 BRADYCARDIA: ICD-10-CM

## 2023-08-31 DIAGNOSIS — K58.2 IRRITABLE BOWEL SYNDROME WITH BOTH CONSTIPATION AND DIARRHEA: ICD-10-CM

## 2023-08-31 DIAGNOSIS — I10 PRIMARY HYPERTENSION: ICD-10-CM

## 2023-08-31 DIAGNOSIS — I10 PRIMARY HYPERTENSION: Primary | ICD-10-CM

## 2023-08-31 PROCEDURE — 99214 OFFICE O/P EST MOD 30 MIN: CPT | Performed by: INTERNAL MEDICINE

## 2023-08-31 RX ORDER — EZETIMIBE 10 MG/1
10 TABLET ORAL DAILY
Qty: 90 TABLET | Refills: 2 | Status: SHIPPED | OUTPATIENT
Start: 2023-08-31

## 2023-08-31 NOTE — PROGRESS NOTES
Cardiology Consultation     Maureen Bryan  [id number]  1955  [facility] [address] [facility] [place] [address]    She is referred  from her primary care doctor primary care doc 24 for bradycardia which was noted on physical exam.  She has a past medical history of hypertension well treated with atenolol and hctz, seasonal,  allergies,  colitis, depression even requiring ECT in the past,  fibromyalgia and lichen sclerosis. She feels fairly well. No chest pains. She has 'thick' breathing due to allergies. She requests change of atorvastatin due to lichen planus. She has not had any syncope. She thinks she may have had an have had an abnormal EKG  after receiving  a shock for depression, ECT, electroconvulsive therapy. Her father  her father  at 35 of CKD at 35 of CKD and mother has no heart disease. Otherwise she is feeling well. Lexiscan nuclear stress test 2022 revealed normal left ventricular perfusion and normal LV function, EF 70%. Echocardiogram 2022 revealed normal LV function, EF 60%, mild MAC. O 14-day cardiac monitor 2022: Minimum heart rate 35, average 59, rare SVT, PVCs and PACs. No block nor pauses.           1. Primary hypertension        2 Bradycardia  3 Abdominal aortic atherosclerosis    Patient Active Problem List   Diagnosis   • Microscopic colitis   • Fibromyalgia   • Chronic renal failure, stage 3a ([address])   • Primary hypertension   • Lichen sclerosus   • Articular cartilage disorder of hand   • Acquired trigger finger of right ring finger   • Osteoarthritis of carpometacarpal (CMC) joint of both thumbs   • Mood disorder (HCC)   • Unspecified primary angle-closure glaucoma, stage unspecified   • Thoracic outlet syndrome associated with cervical rib   • Meniere disease   • Postherpetic neuralgia   • Memory difficulty   • Chronic kidney disease-mineral and bone disorder     Past Medical History:   Diagnosis Date   • Abnormal Pap smear of cervix 22   • Basal cell carcinoma (BCC) of parietal region of scalp 2022    Left parietal scalp   • Bipolar 1 disorder, depressed (HCC)    • Bipolar 1 disorder, depressed ([address] 1975   • Chronic kidney disease    • Depression    • H/O bladder infections    • Hyperlipidemia    • Hypertension    • Lichen sclerosus    • Microscopic colitis    • Skin disorder     lichens sclerosis   • Urinary tract infection      Social History     Socioeconomic History   • Marital status: Single     Spouse name: Not on file   • Number of children: Not on file   • Years of education: Not on file   • Highest education level: Not on file   Occupational History   • Not on file   Tobacco Use   • Smoking status: Former     Packs/day: 2.00     Years: 20.00     Total pack years: 40.00     Types: Cigarettes     Quit date: 1995     Years since quittin.6   • Smokeless tobacco: Never   • Tobacco comments:     heavy smoker previously   Vaping Use   • Vaping Use: Never used   Substance and Sexual Activity   • Alcohol use: Yes     Alcohol/week: 1.0 standard drink of alcohol     Types: 1 Standard drinks or equivalent per week     Comment: socially   • Drug use: Never   • Sexual activity: Not Currently     Partners: Male     Birth control/protection: Post-menopausal     Comment: Partial hysterectomy ? Other Topics Concern   • Not on file   Social History Narrative   • Not on file     Social Determinants of Health     Financial Resource Strain: Low Risk  (2023)    Overall Financial Resource Strain (CARDIA)    • Difficulty of Paying Living Expenses: Not very hard   Food Insecurity: Not on file   Transportation Needs: No Transportation Needs (2023)    PRAPARE - Transportation    • Lack of Transportation (Medical): No    • Lack of Transportation (Non-Medical):  No   Physical Activity: Not on file   Stress: Not on file   Social Connections: Not on file   Intimate Partner Violence: Not on file   Housing Stability: Not on file      Family History   Problem Relation Age of Onset   • Squamous cell carcinoma Mother    • Kidney disease Father    • Diabetic kidney disease Father    • No Known Problems Son    • No Known Problems Half-Sister      Past Surgical History:   Procedure Laterality Date   • COLONOSCOPY      2013    • MOHS SURGERY Left 06/28/2022    Left parietal scalp       Current Outpatient Medications:   •  atenolol (TENORMIN) 25 mg tablet, TAKE 2 TABLETS BY MOUTH EVERY DAY, Disp: 90 tablet, Rfl: 0  •  atorvastatin (LIPITOR) 20 mg tablet, 10 mg Currently taking 10 mg, Disp: , Rfl:   •  cholecalciferol (VITAMIN D3) 1,000 units tablet, Take 2,000 Units by mouth 2 (two) times a day, Disp: , Rfl:   •  clonazePAM (KlonoPIN) 0.5 mg tablet, TAKE 1 TABLET BY MOUTH 2 TIMES A DAY.  (Patient taking differently: Currently once daily, extra half if needed for depression), Disp: 60 tablet, Rfl: 0  •  estradiol (ESTRACE VAGINAL) 0.1 mg/g vaginal cream, Use pea size amount to the vaginal opening 2-3 times per week, Disp: 42.5 g, Rfl: 1  •  fluticasone (FLONASE) 50 mcg/act nasal spray, SPRAY 1 SPRAY INTO EACH NOSTRIL TWICE A DAY, Disp: , Rfl:   •  hydrochlorothiazide (HYDRODIURIL) 12.5 mg tablet, Take 2 tablets (25 mg total) by mouth daily (Patient taking differently: Take 25 mg by mouth daily Patient takes 1 tablet in the morning), Disp: 60 tablet, Rfl: 0  •  loperamide (IMODIUM A-D) 2 MG tablet, Take 2 mg by mouth daily, Disp: , Rfl:   •  mesalamine (APRISO) 0.375 g 24 hr capsule, TAKE 2 CAPSULES BY MOUTH EVERY DAY, Disp: 60 capsule, Rfl: 0  •  niacinamide 500 mg tablet, , Disp: , Rfl:   •  QUEtiapine (SEROquel) 50 mg tablet, Take 1 tablet (50 mg total) by mouth daily at bedtime, Disp: 30 tablet, Rfl: 0  •  zolpidem (AMBIEN) 10 mg tablet, Take 1 tablet (10 mg total) by mouth daily at bedtime as needed (insomnia), Disp: 30 tablet, Rfl: 0  Allergies   Allergen Reactions   • Levofloxacin Other (See Comments)   • Sulfa Antibiotics Fever and Itching   • Cephalexin Diarrhea   • Nitrofurantoin Fever   • Topiramate Other (See Comments)   • Bacitracin-Neomycin-Polymyxin Rash   • Sulfamethoxazole-Trimethoprim Diarrhea     Vitals:    08/31/23 1042   BP: 130/82   BP Location: Left arm   Patient Position: Sitting   Cuff Size: Standard   Pulse: (!) 53   Resp: 20   SpO2: 98%   Weight: 69.4 kg (153 lb)   Height: 5' 3" (1.6 m)       Labs:  No visits with results within 2 Month(s) from this visit.    Latest known visit with results is:   Appointment on 04/17/2023   Component Date Value   • Sodium 04/17/2023 139    • Potassium 04/17/2023 4.8    • Chloride 04/17/2023 109 (H)    • CO2 04/17/2023 28    • ANION GAP 04/17/2023 2 (L)    • BUN 04/17/2023 16    • Creatinine 04/17/2023 0.92    • Glucose, Fasting 04/17/2023 99    • Calcium 04/17/2023 9.2    • eGFR 04/17/2023 65    • WBC 04/17/2023 6.30    • RBC 04/17/2023 4.08    • Hemoglobin 04/17/2023 12.0    • Hematocrit 04/17/2023 36.5    • MCV 04/17/2023 90    • MCH 04/17/2023 29.4    • MCHC 04/17/2023 32.9    • RDW 04/17/2023 13.3    • MPV 04/17/2023 10.5    • Platelets 90/09/5829 253    • nRBC 04/17/2023 0    • Neutrophils Relative 04/17/2023 55    • Immat GRANS % 04/17/2023 0    • Lymphocytes Relative 04/17/2023 36    • Monocytes Relative 04/17/2023 8    • Eosinophils Relative 04/17/2023 0    • Basophils Relative 04/17/2023 1    • Neutrophils Absolute 04/17/2023 3.45    • Immature Grans Absolute 04/17/2023 0.01    • Lymphocytes Absolute 04/17/2023 2.28    • Monocytes Absolute 04/17/2023 0.53    • Eosinophils Absolute 04/17/2023 0.00    • Basophils Absolute 04/17/2023 0.03    • Creatinine, Ur 04/17/2023 83.1    • Protein Urine Random 04/17/2023 7    • Prot/Creat Ratio, Ur 04/17/2023 0.08    • Color, UA 04/17/2023 Light Yellow    • Clarity, UA 04/17/2023 Clear    • Specific Morse, UA 04/17/2023 1.014    • pH, UA 04/17/2023 6.0    • Leukocytes, UA 04/17/2023 Moderate (A)    • Nitrite, UA 04/17/2023 Negative    • Protein, UA 04/17/2023 Negative    • Glucose, UA 04/17/2023 Negative    • Ketones, UA 04/17/2023 Negative    • Urobilinogen, UA 04/17/2023 <2.0    • Bilirubin, UA 04/17/2023 Negative    • Occult Blood, UA 04/17/2023 Negative    • RBC, UA 04/17/2023 1-2    • WBC, UA 04/17/2023 4-10 (A)    • Epithelial Cells 04/17/2023 Occasional    • Bacteria, UA 04/17/2023 None Seen    • MUCUS THREADS 04/17/2023 Occasional (A)      Imaging: No results found. Review of Systems:  Review of Systems   Constitutional:  No fever or chills  Cardiac:  No chest pain, positive shortness of breath. Respiratory:  . No coughing, hemoptysis, wheezing. Abdominal:  No nausea, vomiting, abdominal discomfort. urinary:  No hematuria  Extremities:  No swelling nor edema. Physical Exam:  Physical Exam   HEENT:  Unremarkable. No JVD. Lungs:  Clear  Cardiac:  Regular rate and rhythm with no murmurs rubs nor gallops. Abdomen:  Soft, nontender, no rebound, normal bowel sounds. Extremities:  No clubbing cyanosis nor edema. Neuro:  Grossly nonfocal.  Psych:  Alert and oriented x3      Discussion/Summary:  She is stable from a cv standpoint. She is found to be bradycardic in the 50s today on atenolol. She has an abnormal EKG with anterior T-wave inversions. We reviewed and discussed her normal cardiac testing. She requests stopping atorvastatin due to link to lichen sclerososis. DC atorvastatin and check LDL at PCPs. She is considering zetia. CT [facility] 2/1/23 revealed moderate to extensive atherosclerotic changes in abdominal aorta. She blames her shingles on the stress test.   All questions answered. Recommend aggressive risk factor modification and therapeutic lifestyle changes. Low-salt, low-calorie, low-fat, low-cholesterol diet with regular exercise and to optimize weight.   I will defer the ordering and monitoring of necessity lab studies to you, but I am available and happy to review and manage any of the data at your request in the future. Discussed concepts of atherosclerosis, including signs and symptoms of cardiac disease. Previous studies were reviewed. Safety measures were reviewed. All questions were entertained and answered. Patient was advised to report any problems requiring medical attention. Follow-up with PCP and appropriate specialist and lab work as discussed. Return for follow up visit as scheduled or earlier, if needed. Thank you for allowing me to participate in the care and evaluation of your patient. Should you have any questions, please feel free to contact me.

## 2023-09-01 RX ORDER — MESALAMINE 0.38 G/1
0.75 CAPSULE, EXTENDED RELEASE ORAL DAILY
Qty: 60 CAPSULE | Refills: 0 | Status: SHIPPED | OUTPATIENT
Start: 2023-09-01

## 2023-09-01 RX ORDER — EZETIMIBE 10 MG/1
10 TABLET ORAL DAILY
Qty: 90 TABLET | Refills: 0 | OUTPATIENT
Start: 2023-09-01

## 2023-09-05 ENCOUNTER — TELEPHONE (OUTPATIENT)
Dept: GASTROENTEROLOGY | Facility: CLINIC | Age: 68
End: 2023-09-05

## 2023-09-05 DIAGNOSIS — Q44.1 OTHER CONGENITAL MALFORMATIONS OF GALLBLADDER: Primary | ICD-10-CM

## 2023-09-05 NOTE — TELEPHONE ENCOUNTER
----- [facility] at 9/5/2023  9:19 AM EDT -----  Patient needs a repeat US in 1 year

## 2023-09-18 DIAGNOSIS — I10 PRIMARY HYPERTENSION: ICD-10-CM

## 2023-09-19 RX ORDER — HYDROCHLOROTHIAZIDE 25 MG/1
25 TABLET ORAL DAILY
Qty: 90 TABLET | Refills: 3 | Status: SHIPPED | OUTPATIENT
Start: 2023-09-19 | End: 2023-09-20

## 2023-09-20 DIAGNOSIS — I10 PRIMARY HYPERTENSION: ICD-10-CM

## 2023-09-20 RX ORDER — HYDROCHLOROTHIAZIDE 25 MG/1
12.5 TABLET ORAL DAILY
Qty: 45 TABLET | Refills: 3
Start: 2023-09-20

## 2023-09-20 NOTE — TELEPHONE ENCOUNTER
----- Message from Eran Ring sent at 9/19/2023  4:41 PM EDT -----  Regarding: Anthony   Contact: [phone]  Is there a reason you doubled my dose from 12.5mg to 25mg?   I have kidney failure and I believe that is who originally decreased it to 12.5    T

## 2023-09-20 NOTE — TELEPHONE ENCOUNTER
Patient states she only going to take 12.5 of hydrochlorothizide she always been taking half of 25 .     med list up date     FYI

## 2023-10-17 ENCOUNTER — TELEMEDICINE (OUTPATIENT)
Dept: PSYCHIATRY | Facility: CLINIC | Age: 68
End: 2023-10-17
Payer: MEDICARE

## 2023-10-17 DIAGNOSIS — F31.9 BIPOLAR 1 DISORDER, DEPRESSED (HCC): Primary | ICD-10-CM

## 2023-10-17 DIAGNOSIS — I10 PRIMARY HYPERTENSION: ICD-10-CM

## 2023-10-17 PROCEDURE — 99214 OFFICE O/P EST MOD 30 MIN: CPT

## 2023-10-17 RX ORDER — ATENOLOL 25 MG/1
TABLET ORAL
Qty: 90 TABLET | Refills: 0 | Status: SHIPPED | OUTPATIENT
Start: 2023-10-17

## 2023-10-17 RX ORDER — PANTOPRAZOLE SODIUM 40 MG/1
40 TABLET, DELAYED RELEASE ORAL EVERY MORNING
COMMUNITY
Start: 2023-09-18

## 2023-10-17 NOTE — PSYCH
Virtual Regular Visit    Verification of patient location:    Patient is located in the following state in which I hold an active license [place]    Problem List Items Addressed This Visit    None  Visit Diagnoses       Bipolar 1 disorder, depressed ([address])    -  Primary               Encounter provider RICK Hammond    Provider located at    [id number] [facility] [id number] [phone]    Recent Visits  No visits were found meeting these conditions. Showing recent visits within past 7 days and meeting all other requirements  Today's Visits  Date Type Provider Dept   10/17/23 Telemedicine RICK Hammond  [facility]   Showing today's visits and meeting all other requirements  Future Appointments  No visits were found meeting these conditions. Showing future appointments within next 150 days and meeting all other requirements         The patient was identified by name and date of birth. Ade Washington was informed that this is a telemedicine visit and that [facility]. She agrees to proceed. .  My office door was closed. No one else was in the room. She acknowledged consent and understanding of privacy and security of the video platform. The patient has agreed to participate and understands they can discontinue the visit at any time. Patient is aware this is a billable service. HPI     Current Outpatient Medications   Medication Sig Dispense Refill    pantoprazole (PROTONIX) 40 mg tablet Take 40 mg by mouth every morning      atenolol (TENORMIN) 25 mg tablet TAKE 2 TABLETS BY MOUTH EVERY DAY 90 tablet 0    cholecalciferol (VITAMIN D3) 1,000 units tablet Take 2,000 Units by mouth 2 (two) times a day      clonazePAM (KlonoPIN) 0.5 mg tablet TAKE 1 TABLET BY MOUTH 2 TIMES A DAY.  (Patient taking differently: Currently once daily, extra half if needed for depression) 60 tablet 0    estradiol (ESTRACE VAGINAL) 0.1 mg/g vaginal cream Use pea size amount to the vaginal opening 2-3 times per week 42.5 g 1    ezetimibe (ZETIA) 10 mg tablet Take 1 tablet (10 mg total) by mouth daily 90 tablet 2    fluticasone (FLONASE) 50 mcg/act nasal spray SPRAY 1 SPRAY INTO EACH NOSTRIL TWICE A DAY      hydrochlorothiazide (HYDRODIURIL) 25 mg tablet Take 0.5 tablets (12.5 mg total) by mouth daily 45 tablet 3    loperamide (IMODIUM A-D) 2 MG tablet Take 2 mg by mouth daily      mesalamine (APRISO) 0.375 g 24 hr capsule TAKE 2 CAPSULES (0.75 G TOTAL) BY MOUTH DAILY 60 capsule 0    niacinamide 500 mg tablet       zolpidem (AMBIEN) 10 mg tablet Take 1 tablet (10 mg total) by mouth daily at bedtime as needed (insomnia) 30 tablet 0     No current facility-administered medications for this visit. Review of Systems  Video Exam    There were no vitals filed for this visit. Physical Exam   As a result of this visit, I have referred the patient for further respiratory evaluation. No      VIRTUAL VISIT DISCLAIMER    Milton Souza acknowledges that she has consented to an online visit or consultation. She understands that the online visit is based solely on information provided by her, and that, in the absence of a face-to-face physical evaluation by the physician, the diagnosis she receives is both limited and provisional in terms of accuracy and completeness. This is not intended to replace a full medical face-to-face evaluation by the physician. Milton Souza understands and accepts these terms.     MEDICATION MANAGEMENT NOTE         [facility]    Name and Date of Birth:  Milton Souza 79 y.o. 1955 MRN: [id number]    Date of Visit: October 17, 2023    Allergies   Allergen Reactions    Levofloxacin Other (See Comments)    Sulfa Antibiotics Fever and Itching    Cephalexin Diarrhea    Nitrofurantoin Fever    Topiramate Other (See Comments)    Bacitracin-Neomycin-Polymyxin Rash    Sulfamethoxazole-Trimethoprim Diarrhea     SUBJECTIVE:    Nia Shirley is seen today for a follow up for Bipolar Disorder. She reports that she continues to experience on and off symptoms since the last visit. Nia Shirley reports going through her old records fron Dr. Ham Gregg in [place], states she was in fact prescribed Seroquel in the past and experienced intolerable headaches. We recently initiated Seroquel as Curly Handing was denied by insurance, insurance required Seroquel failure before trying Curly Handing. Hopeful insurance will reconsider Curly Handing with new information about patient's past history of Seroquel. She remains very hesitant to initiate medications for history of bipolar disorder, was on Lamictal for over 30 years with many long-term side effects. Declines any medications that may cause moderate sedation or weight gain. Patlucíaia Florian will be approved for symptoms of  increased energy, hypertalkativeness which occasionally gets her into trouble, racing thoughts which are exhausting her mentally and physically. Denies grandiosity, impulsive behaviors, risky behaviors. Continues to prefer to be on no medication but is willing to give Curly Handing a chance, side effects explained, patient verbalized understanding. Reevaluate in 1 month. Denies SI. PLAN:     -Insurance previously denied , Re-trial Vraylar 1.5mg daily for mood stabilization,racing thoughts, mood swings,  irritability. Side effects explained and pt verbalized understanding. Pt found in her old records, Dr. Ham Gregg in [place], previously prescribed Seroquel 50 mg which caused un-tolerable headaches- will present to insurance.     -Continue Klonopin 0.5 mg BID prescribed and managed by PCP    Discussed with patient the risks of sedation, respiratory depression, impairment of ability to drive and potential for abuse and addiction related to treatment with benzodiazepine medications.  The patient understands risk of treatment with benzodiazepine medications, agrees to not drive if feels impaired and agrees to take medications as prescribed. Patient was also informed of risks of being on or starting opioid medications due to drug interactions and potential for serious respiratory depression and death.      -Continue Ambien 10mg as needed for insomnia managed by PCP       Aware of 24 hour and weekend coverage for urgent situations accessed by calling [facility] main practice number  Referral for individual psychotherapy    Diagnoses and all orders for this visit:    Bipolar 1 disorder, depressed ([address])    Other orders  -     pantoprazole (PROTONIX) 40 mg tablet; Take 40 mg by mouth every morning        Current Outpatient Medications on File Prior to Visit   Medication Sig Dispense Refill    pantoprazole (PROTONIX) 40 mg tablet Take 40 mg by mouth every morning      atenolol (TENORMIN) 25 mg tablet TAKE 2 TABLETS BY MOUTH EVERY DAY 90 tablet 0    cholecalciferol (VITAMIN D3) 1,000 units tablet Take 2,000 Units by mouth 2 (two) times a day      clonazePAM (KlonoPIN) 0.5 mg tablet TAKE 1 TABLET BY MOUTH 2 TIMES A DAY.  (Patient taking differently: Currently once daily, extra half if needed for depression) 60 tablet 0    estradiol (ESTRACE VAGINAL) 0.1 mg/g vaginal cream Use pea size amount to the vaginal opening 2-3 times per week 42.5 g 1    ezetimibe (ZETIA) 10 mg tablet Take 1 tablet (10 mg total) by mouth daily 90 tablet 2    fluticasone (FLONASE) 50 mcg/act nasal spray SPRAY 1 SPRAY INTO EACH NOSTRIL TWICE A DAY      hydrochlorothiazide (HYDRODIURIL) 25 mg tablet Take 0.5 tablets (12.5 mg total) by mouth daily 45 tablet 3    loperamide (IMODIUM A-D) 2 MG tablet Take 2 mg by mouth daily      mesalamine (APRISO) 0.375 g 24 hr capsule TAKE 2 CAPSULES (0.75 G TOTAL) BY MOUTH DAILY 60 capsule 0    niacinamide 500 mg tablet       zolpidem (AMBIEN) 10 mg tablet Take 1 tablet (10 mg total) by mouth daily at bedtime as needed (insomnia) 30 tablet 0    [DISCONTINUED] clobetasol (TEMOVATE) 0.05 % external solution Apply topically 2 (two) times a day 50 mL 0    [DISCONTINUED] QUEtiapine (SEROquel) 50 mg tablet Take 1 tablet (50 mg total) by mouth daily at bedtime 30 tablet 0     No current facility-administered medications on file prior to visit. Psychotherapy Provided:     Individual psychotherapy provided: Yes  Counseling was provided during the session today for 10 minutes. Supportive counseling provided. Medication changes discussed with Mary Kate Fallon. Medication education provided to Mary Kate Fallon. HPI ROS Appetite Changes and Sleep:     She reports normal sleep, adequate appetite, low energy.  Denies homicidal ideation, denies suicidal ideation    Review Of Systems:      HPI ROS:               Medication Side Effects:  denies    Depression (10 worst): 5/10    Anxiety (10 worst): 3/10    Safety concerns (SI, HI, etc): Denies si and hi    Sleep: 7 hrs    Energy: fair    Appetite: 2-3 meals    Weight Change: denies        Mental Status Evaluation:    Appearance Adequate hygiene and grooming   Behavior restless and fidgety   Mood anxious and depressed  Depression Scale - 5 of 10 (0 = No depression)  Anxiety Scale - 3 of 10 (0 = No anxiety)   Speech Normal rate and volume   Affect constricted   Thought Processes Goal directed and coherent   Thought Content Does not verbalize delusional material   Associations Tightly connected   Perceptual Disturbances Denies hallucinations and does not appear to be responding to internal stimuli   Risk Potential Suicidal/Homicidal Ideation - No evidence of suicidal or homicidal ideation and patient does not verbalize suicidal or homicidal ideation  Risk of Violence - No evidence of risk for violence found on assessment  Risk of Self Mutilation - No evidence of risk for self mutilation found on assessment   Orientation oriented to person, place, time/date and situation   Memory recent and remote memory grossly intact   Consciousness alert and awake   Attention/Concentration attention span and concentration appear shorter than expected for age   Insight intact   Judgement intact   Muscle Strength and Gait normal muscle strength and normal muscle tone, normal gait/station and normal balance   Motor Activity no abnormal movements   Language no difficulty naming common objects, no difficulty repeating a phrase, no difficulty writing a sentence   Fund of Knowledge adequate knowledge of current events  adequate fund of knowledge regarding past history  adequate fund of knowledge regarding vocabulary          Past Medical History:    Past Medical History:   Diagnosis Date    Abnormal Pap smear of cervix 1/6/22    Basal cell carcinoma (BCC) of parietal region of scalp 06/13/2022    Left parietal scalp    Bipolar 1 disorder, depressed ([address])     Bipolar 1 disorder, depressed ([address]) 01/24/1975    Chronic kidney disease     Depression     H/O bladder infections     Hyperlipidemia     Hypertension     Lichen sclerosus     Microscopic colitis     Skin disorder     lichens sclerosis    Urinary tract infection         Past Surgical History:   Procedure Laterality Date    COLONOSCOPY      2013     MOHS SURGERY Left 06/28/2022    Left parietal scalp     Allergies   Allergen Reactions    Levofloxacin Other (See Comments)    Sulfa Antibiotics Fever and Itching    Cephalexin Diarrhea    Nitrofurantoin Fever    Topiramate Other (See Comments)    Bacitracin-Neomycin-Polymyxin Rash    Sulfamethoxazole-Trimethoprim Diarrhea     Substance Abuse History:    Social History     Substance and Sexual Activity   Alcohol Use Yes    Alcohol/week: 1.0 standard drink of alcohol    Types: 1 Standard drinks or equivalent per week    Comment: socially     Social History     Substance and Sexual Activity   Drug Use Never     Social History:    Social History     Socioeconomic History    Marital status: Single     Spouse name: Not on file Number of children: Not on file    Years of education: Not on file    Highest education level: Not on file   Occupational History    Not on file   Tobacco Use    Smoking status: Former     Packs/day: 2.00     Years: 20.00     Total pack years: 40.00     Types: Cigarettes     Quit date: 1995     Years since quittin.8    Smokeless tobacco: Never    Tobacco comments:     heavy smoker previously   Vaping Use    Vaping Use: Never used   Substance and Sexual Activity    Alcohol use: Yes     Alcohol/week: 1.0 standard drink of alcohol     Types: 1 Standard drinks or equivalent per week     Comment: socially    Drug use: Never    Sexual activity: Not Currently     Partners: Male     Birth control/protection: Post-menopausal     Comment: Partial hysterectomy ? Other Topics Concern    Not on file   Social History Narrative    Not on file     Social Determinants of Health     Financial Resource Strain: Low Risk  (2023)    Overall Financial Resource Strain (CARDIA)     Difficulty of Paying Living Expenses: Not very hard   Food Insecurity: Not on file   Transportation Needs: No Transportation Needs (2023)    PRAPARE - Transportation     Lack of Transportation (Medical): No     Lack of Transportation (Non-Medical): No   Physical Activity: Not on file   Stress: Not on file   Social Connections: Not on file   Intimate Partner Violence: Not on file   Housing Stability: Not on file     Family Psychiatric History:     Family History   Problem Relation Age of Onset    Squamous cell carcinoma Mother     Kidney disease Father     Diabetic kidney disease Father     No Known Problems Son     No Known Problems Half-Sister      History Review: The following portions of the patient's history were reviewed and updated as appropriate: allergies, current medications, past family history, past medical history, past social history, past surgical history and problem list     OBJECTIVE:     Vital signs in last 24 hours:    There were no vitals filed for this visit. Laboratory Results:   Recent Labs (last 2 months):   No visits with results within 2 Month(s) from this visit.    Latest known visit with results is:   Appointment on 04/17/2023   Component Date Value    Sodium 04/17/2023 139     Potassium 04/17/2023 4.8     Chloride 04/17/2023 109 (H)     CO2 04/17/2023 28     ANION GAP 04/17/2023 2 (L)     BUN 04/17/2023 16     Creatinine 04/17/2023 0.92     Glucose, Fasting 04/17/2023 99     Calcium 04/17/2023 9.2     eGFR 04/17/2023 65     WBC 04/17/2023 6.30     RBC 04/17/2023 4.08     Hemoglobin 04/17/2023 12.0     Hematocrit 04/17/2023 36.5     MCV 04/17/2023 90     MCH 04/17/2023 29.4     MCHC 04/17/2023 32.9     RDW 04/17/2023 13.3     MPV 04/17/2023 10.5     Platelets 50/38/3294 253     nRBC 04/17/2023 0     Neutrophils Relative 04/17/2023 55     Immat GRANS % 04/17/2023 0     Lymphocytes Relative 04/17/2023 36     Monocytes Relative 04/17/2023 8     Eosinophils Relative 04/17/2023 0     Basophils Relative 04/17/2023 1     Neutrophils Absolute 04/17/2023 3.45     Immature Grans Absolute 04/17/2023 0.01     Lymphocytes Absolute 04/17/2023 2.28     Monocytes Absolute 04/17/2023 0.53     Eosinophils Absolute 04/17/2023 0.00     Basophils Absolute 04/17/2023 0.03     Creatinine, Ur 04/17/2023 83.1     Protein Urine Random 04/17/2023 7     Prot/Creat Ratio, Ur 04/17/2023 0.08     Color, UA 04/17/2023 Light Yellow     Clarity, UA 04/17/2023 Clear     Specific Gravity, UA 04/17/2023 1.014     pH, UA 04/17/2023 6.0     Leukocytes, UA 04/17/2023 Moderate (A)     Nitrite, UA 04/17/2023 Negative     Protein, UA 04/17/2023 Negative     Glucose, UA 04/17/2023 Negative     Ketones, UA 04/17/2023 Negative     Urobilinogen, UA 04/17/2023 <2.0     Bilirubin, UA 04/17/2023 Negative     Occult Blood, UA 04/17/2023 Negative     RBC, UA 04/17/2023 1-2     WBC, UA 04/17/2023 4-10 (A)     Epithelial Cells 04/17/2023 Occasional     Bacteria, UA 04/17/2023 None Seen     MUCUS THREADS 04/17/2023 Occasional (A)      I have personally reviewed all pertinent laboratory/tests results. Suicide/Homicide Risk Assessment:    Risk of Harm to Self:  Protective Factors: no current suicidal ideation, access to mental health treatment, compliant with medications, compliant with mental health treatment, connection to community, having pets, resiliency  Based on today's assessment, Carrie Sharpe presents the following risk of harm to self: minimal    Risk of Harm to Others:  Based on today's assessment, Carrie Sharpe presents the following risk of harm to others: minimal    The following interventions are recommended: referral for psychotherapy    Medications Risks/Benefits:      Risks, Benefits And Possible Side Effects Of Medications:    Discussed risks and benefits of treatment with patient including risks of misuse, abuse or dependence, sedation and respiratory depression related to treatment with benzodiazepine medications and risk of impaired next-day mental alertness, complex sleep-related behavior and dependence related to treatment with hypnotic medications     Controlled Medication Discussion:     Carrie Sharpe has been filling controlled prescriptions on time as prescribed according to [facility] Monitoring Program  Discussed with Carrie Sharpe the risks of sedation, respiratory depression, impairment of ability to drive and potential for abuse and addiction related to treatment with benzodiazepine medications. She understands risk of treatment with benzodiazepine medications, agrees to not drive if feels impaired and agrees to take medications as prescribed  Discussed with [facility] Box warning on concurrent use of benzodiazepines and opioid medications including sedation, respiratory depression, coma and death.  She understands the risk of treatment with benzodiazepines in addition to opioids and wants to continue taking those medications  Discussed with Carrie Sharpe increased risk of impairment related to concurrent use of benzodiazepines and hypnotic medications including excessive sedation, psychomotor impairment and respiratory depression. She understands the risk of treatment with benzodiazepines in addition to hypnotic medications and wants to continue taking those medications    Treatment Plan:    Due for update/Updated:   yes  Last treatment plan done 7/6/23 . Treatment Plan due on 1/6/24. RICK Malcolm 10/17/23    This note was shared with patient.       Visit Time    Visit Start Time: 10  Visit Stop Time: 10:20  Total Visit Duration:  20 minutes

## 2023-10-18 ENCOUNTER — TELEPHONE (OUTPATIENT)
Dept: PSYCHIATRY | Facility: CLINIC | Age: 68
End: 2023-10-18

## 2023-10-18 NOTE — TELEPHONE ENCOUNTER
Fax from [facility] [employer] with Appeal approval for Vraylar 1.5 MG capsule effective 1/1/23 - 12/31/24. LM on pharmacy VM with approval information. LM  on Yue's VM informing her of approval.  Nursing number provided for call back if she has any questions.

## 2023-10-18 NOTE — TELEPHONE ENCOUNTER
Attempted Prior Authorization to [facility] [employer] [facility] via [address] for Vraylar 1.5 MG capsule. Response received "There was an EOC  that was clinically denied within the last 60 days. This request needs to be sent to the Grievance and [employer] at [facility]. Appeals must come from the member or the provider."    Request for Appeal faxed to [facility] Dept at [phone]. Decision pending.

## 2023-10-28 DIAGNOSIS — K58.2 IRRITABLE BOWEL SYNDROME WITH BOTH CONSTIPATION AND DIARRHEA: ICD-10-CM

## 2023-10-30 RX ORDER — MESALAMINE 0.38 G/1
0.75 CAPSULE, EXTENDED RELEASE ORAL DAILY
Qty: 60 CAPSULE | Refills: 0 | Status: SHIPPED | OUTPATIENT
Start: 2023-10-30

## 2023-11-25 DIAGNOSIS — K58.2 IRRITABLE BOWEL SYNDROME WITH BOTH CONSTIPATION AND DIARRHEA: ICD-10-CM

## 2023-11-27 RX ORDER — MESALAMINE 0.38 G/1
0.75 CAPSULE, EXTENDED RELEASE ORAL DAILY
Qty: 180 CAPSULE | Refills: 1 | Status: SHIPPED | OUTPATIENT
Start: 2023-11-27

## 2023-11-30 DIAGNOSIS — I10 PRIMARY HYPERTENSION: ICD-10-CM

## 2023-12-01 RX ORDER — ATENOLOL 25 MG/1
TABLET ORAL
Qty: 90 TABLET | Refills: 3 | Status: SHIPPED | OUTPATIENT
Start: 2023-12-01

## 2023-12-12 ENCOUNTER — APPOINTMENT (OUTPATIENT)
Dept: LAB | Facility: CLINIC | Age: 68
End: 2023-12-12
Payer: MEDICARE

## 2023-12-12 DIAGNOSIS — F33.1 MAJOR DEPRESSIVE DISORDER, RECURRENT EPISODE, MODERATE (HCC): ICD-10-CM

## 2023-12-12 DIAGNOSIS — I10 ESSENTIAL HYPERTENSION, MALIGNANT: ICD-10-CM

## 2023-12-12 LAB
ALBUMIN SERPL BCP-MCNC: 4.4 G/DL (ref 3.5–5)
ALP SERPL-CCNC: 56 U/L (ref 34–104)
ALT SERPL W P-5'-P-CCNC: 20 U/L (ref 7–52)
ANION GAP SERPL CALCULATED.3IONS-SCNC: 8 MMOL/L
AST SERPL W P-5'-P-CCNC: 19 U/L (ref 13–39)
BASOPHILS # BLD AUTO: 0.06 THOUSANDS/ÂΜL (ref 0–0.1)
BASOPHILS NFR BLD AUTO: 1 % (ref 0–1)
BILIRUB SERPL-MCNC: 0.5 MG/DL (ref 0.2–1)
BUN SERPL-MCNC: 23 MG/DL (ref 5–25)
CALCIUM SERPL-MCNC: 9.6 MG/DL (ref 8.4–10.2)
CHLORIDE SERPL-SCNC: 102 MMOL/L (ref 96–108)
CHOLEST SERPL-MCNC: 231 MG/DL
CO2 SERPL-SCNC: 30 MMOL/L (ref 21–32)
CREAT SERPL-MCNC: 0.97 MG/DL (ref 0.6–1.3)
EOSINOPHIL # BLD AUTO: 0 THOUSAND/ÂΜL (ref 0–0.61)
EOSINOPHIL NFR BLD AUTO: 0 % (ref 0–6)
ERYTHROCYTE [DISTWIDTH] IN BLOOD BY AUTOMATED COUNT: 12.3 % (ref 11.6–15.1)
GFR SERPL CREATININE-BSD FRML MDRD: 61 ML/MIN/1.73SQ M
GLUCOSE P FAST SERPL-MCNC: 89 MG/DL (ref 65–99)
HCT VFR BLD AUTO: 41 % (ref 34.8–46.1)
HDLC SERPL-MCNC: 77 MG/DL
HGB BLD-MCNC: 14.1 G/DL (ref 11.5–15.4)
IMM GRANULOCYTES # BLD AUTO: 0.01 THOUSAND/UL (ref 0–0.2)
IMM GRANULOCYTES NFR BLD AUTO: 0 % (ref 0–2)
LDLC SERPL CALC-MCNC: 129 MG/DL (ref 0–100)
LYMPHOCYTES # BLD AUTO: 3.32 THOUSANDS/ÂΜL (ref 0.6–4.47)
LYMPHOCYTES NFR BLD AUTO: 45 % (ref 14–44)
MCH RBC QN AUTO: 31 PG (ref 26.8–34.3)
MCHC RBC AUTO-ENTMCNC: 34.4 G/DL (ref 31.4–37.4)
MCV RBC AUTO: 90 FL (ref 82–98)
MONOCYTES # BLD AUTO: 0.52 THOUSAND/ÂΜL (ref 0.17–1.22)
MONOCYTES NFR BLD AUTO: 7 % (ref 4–12)
NEUTROPHILS # BLD AUTO: 3.54 THOUSANDS/ÂΜL (ref 1.85–7.62)
NEUTS SEG NFR BLD AUTO: 47 % (ref 43–75)
NONHDLC SERPL-MCNC: 154 MG/DL
NRBC BLD AUTO-RTO: 0 /100 WBCS
PLATELET # BLD AUTO: 251 THOUSANDS/UL (ref 149–390)
PMV BLD AUTO: 10.1 FL (ref 8.9–12.7)
POTASSIUM SERPL-SCNC: 4 MMOL/L (ref 3.5–5.3)
PROT SERPL-MCNC: 6.8 G/DL (ref 6.4–8.4)
RBC # BLD AUTO: 4.55 MILLION/UL (ref 3.81–5.12)
SODIUM SERPL-SCNC: 140 MMOL/L (ref 135–147)
TRIGL SERPL-MCNC: 127 MG/DL
TSH SERPL DL<=0.05 MIU/L-ACNC: 1.67 UIU/ML (ref 0.45–4.5)
WBC # BLD AUTO: 7.45 THOUSAND/UL (ref 4.31–10.16)

## 2023-12-12 PROCEDURE — 84443 ASSAY THYROID STIM HORMONE: CPT

## 2023-12-12 PROCEDURE — 85025 COMPLETE CBC W/AUTO DIFF WBC: CPT

## 2023-12-12 PROCEDURE — 36415 COLL VENOUS BLD VENIPUNCTURE: CPT

## 2023-12-12 PROCEDURE — 80061 LIPID PANEL: CPT

## 2023-12-12 PROCEDURE — 80053 COMPREHEN METABOLIC PANEL: CPT

## 2023-12-12 NOTE — PROGRESS NOTES
Assessment and Plan:   Ms. Oliver Buchanan is a 53-year-old female with history significant for microscopic colitis and lichen sclerosis who presents for a follow-up of a positive HLA-B27 antigen. Tamara Pack presents today for a follow-up of a positive HLA-B27 antigen that was detected after I last saw her in February 2022 when she presented with upper extremity symmetric arthralgias which was not suspected to be inflammatory in nature. The workup done for the joint pains showed a positive HLA-B27 with otherwise unremarkable serological testing. As she is not reporting any progression of joint pains since I last saw her I am less concerned about a spondyloarthritis but to further evaluate I will obtain a baseline x-ray of the sacroiliac joints. The chronic low back pain she experiences does not necessarily appear inflammatory in nature. - She will be establishing with a new dermatologist in the near future for an evaluation and management of the lichen sclerosus. I requested she discuss with them if a repeat biopsy is indicated to ensure the rash is not representative of psoriasis since this can be associated with the HLA-B27 positivity. She will continue follow-up with gastroenterology for treatment of the microscopic colitis which may also be seen within the spectrum. Plan:  Diagnoses and all orders for this visit:    HLA B27 positive  -     XR sacroiliac joints 3+ views; Future  -     C-reactive protein; Future  -     Sedimentation rate, automated; Future    Primary generalized (osteo)arthritis    Fibromyalgia    Microscopic colitis, unspecified microscopic colitis type    Lichen sclerosus    Other orders  -     cyclobenzaprine (FLEXERIL) 10 mg tablet  -     loratadine (CLARITIN REDITABS) 10 MG dissolvable tablet; Take 10 mg by mouth daily  -     Probiotic Product (PROBIOTIC 10 ULTRA STRENGTH PO);  Take by mouth      I have personally reviewed pertinent films in PACS of the CT abdomen which does not show osseous disease. Activities as tolerated. Exercise: try to maintain a low impact exercise regimen as much as possible. Walk for 30 minutes a day for at least 3 days a week. Continue other medications as prescribed by PCP and other specialists. RTC PRN. HPI    INITIAL VISIT NOTE (2/2022):  Ms. Flora Pena is a 72-year-old female with history significant for microscopic colitis and lichen sclerosis, who presents for further evaluation of joint pains. She is referred by RICK Pérez for a rheumatology consult. Patient reports over the past 5-6 months she has felt "tendon like pain" in her bilateral shoulders and elbows. She is able to raise her arms above her head but has difficulty with flexing at her elbows and pushing with her upper arms. She also reports knee pain and has previously been diagnosed with osteoarthritis. She reports pain at the bases of her bilateral thumbs and occasionally in her left wrist.  She denies any significant pain throughout her hands otherwise, at the right wrist, hips, ankles or feet. She has noticed swelling of her left wrist and after sustaining a fall and injury to her left knee a few weeks ago also feels like her left knee has been swollen but has improved in appearance. She does not experience any morning stiffness of her joints. She does not take any over-the-counter pain medications for her symptoms. She reports dry eyes. She has a skin rash on her lower abdomen which has been diagnosed as lichen sclerosus. She has a second opinion scheduled with Dr. Jann Cyr in June. She has a history of microscopic colitis and is scheduled to see Gastroenterology. Her symptoms have been controlled with mesalamine. She reports a history of 1 miscarriage.   She denies fevers, unintentional weight loss, alopecia, dry mouth, inflammatory eye disease, other types of skin rash, photosensitivity, mouth/nose ulcers, swollen glands, pleuritic chest pain, blood clots, miscarriages or a family history of autoimmune disease. In the past through her primary care physician she has been checked for autoimmune diseases such as lupus and rheumatoid arthritis and reports that the testing was normal.      12/13/2023:  Patient presents for a follow-up visit. I reviewed the workup done after the last visit with mild osteoarthritic changes noted on an x-ray of her left knee and left wrist.  Labs showed a positive HLA-B27 antigen. Sjogren's antibodies, rheumatoid factor, anti-CCP antibody, ESR and CRP were normal.    She reports since the last visit her joint pains have overall been stable and she is not reporting any progressively worsening pain, swelling or stiffness. She does experience chronic low back pain and is known to have mild lumbar degenerative arthritis. She reports the back pain never wakes her up from sleep at night and is not associated with morning stiffness but she does have some pain when she first wakes up that resolves quickly. She reports sitting for durations of time causes an aggravation in the low back pain and she feels better with standing and activities. She will be establishing with a dermatologist in [facility] for an evaluation of the lichen sclerosus. This is present on her lower abdomen and vaginal region. She has only had 1 biopsy done and she reports following this anytime she has had the rash it has been attributed to lichen sclerosis. She follows with gastroenterology for management of microscopic colitis and is currently on a probiotic as well as mesalamine. The following portions of the patient's history were reviewed and updated as appropriate: allergies, current medications, past family history, past medical history, past social history, past surgical history and problem list.      Review of Systems  Constitutional: Negative for fevers, chills, night sweats, fatigue.   ENT/Mouth: Negative for hearing changes, ear pain, nasal congestion, sinus pain, hoarseness, sore throat, rhinorrhea, swallowing difficulty. Eyes: Negative for pain, redness, discharge, vision changes. Cardiovascular: Negative for chest pain, SOB, palpitations. Respiratory: Negative for cough, sputum, wheezing, dyspnea. Gastrointestinal: Negative for nausea, vomiting, constipation, pain, heartburn. Positive for intermittent diarrhea. Genitourinary: Negative for dysuria, urinary frequency, hematuria. Musculoskeletal: As per HPI. Skin: Negative for color changes. Positive for skin rash. Neuro: Negative for weakness, numbness, tingling, loss of consciousness. Psych: Negative for anxiety. Heme/Lymph: Negative for easy bruising, bleeding, lymphadenopathy.         Past Medical History:   Diagnosis Date    Abnormal Pap smear of cervix 22    Basal cell carcinoma (BCC) of parietal region of scalp 2022    Left parietal scalp    Bipolar 1 disorder, depressed ([facility])     Bipolar 1 disorder, depressed ([facility]) 1975    Chronic kidney disease     Depression     H/O bladder infections     Hyperlipidemia     Hypertension     Lichen sclerosus     Microscopic colitis     Skin disorder     lichens sclerosis    Urinary tract infection        Past Surgical History:   Procedure Laterality Date    COLONOSCOPY      2013     MOHS SURGERY Left 2022    Left parietal scalp       Social History     Socioeconomic History    Marital status: Single     Spouse name: Not on file    Number of children: Not on file    Years of education: Not on file    Highest education level: Not on file   Occupational History    Not on file   Tobacco Use    Smoking status: Former     Current packs/day: 0.00     Average packs/day: 2.0 packs/day for 20.0 years (40.0 ttl pk-yrs)     Types: Cigarettes     Start date: 1975     Quit date: 1995     Years since quittin.9    Smokeless tobacco: Never    Tobacco comments:     heavy smoker previously   Vaping Use    Vaping status: Never Used   Substance and Sexual Activity    Alcohol use: Yes     Alcohol/week: 1.0 standard drink of alcohol     Types: 1 Standard drinks or equivalent per week     Comment: socially    Drug use: Never    Sexual activity: Not Currently     Partners: Male     Birth control/protection: Post-menopausal     Comment: Partial hysterectomy 2000? Other Topics Concern    Not on file   Social History Narrative    Not on file     Social Determinants of Health     Financial Resource Strain: Low Risk  (1/26/2023)    Overall Financial Resource Strain (CARDIA)     Difficulty of Paying Living Expenses: Not very hard   Food Insecurity: Not on file   Transportation Needs: No Transportation Needs (1/26/2023)    PRAPARE - Transportation     Lack of Transportation (Medical): No     Lack of Transportation (Non-Medical): No   Physical Activity: Not on file   Stress: Not on file   Social Connections: Not on file   Intimate Partner Violence: Not on file   Housing Stability: Not on file       Family History   Problem Relation Age of Onset    Squamous cell carcinoma Mother     Kidney disease Father     Diabetic kidney disease Father     No Known Problems Son     No Known Problems Half-Sister        Allergies   Allergen Reactions    Levofloxacin Other (See Comments)    Sulfa Antibiotics Fever and Itching    Cephalexin Diarrhea    Nitrofurantoin Fever    Soy Allergy - Food Allergy Diarrhea and GI Intolerance    Topiramate Other (See Comments)    Bacitracin-Neomycin-Polymyxin Rash    Sulfamethoxazole-Trimethoprim Diarrhea       Current Outpatient Medications:     atenolol (TENORMIN) 25 mg tablet, TAKE 2 TABLETS BY MOUTH EVERY DAY (Patient taking differently: 25 mg), Disp: 90 tablet, Rfl: 3    cholecalciferol (VITAMIN D3) 1,000 units tablet, Take 2,000 Units by mouth 2 (two) times a day, Disp: , Rfl:     clonazePAM (KlonoPIN) 0.5 mg tablet, TAKE 1 TABLET BY MOUTH 2 TIMES A DAY.  (Patient taking differently: Currently once daily, extra half if needed for depression), Disp: 60 tablet, Rfl: 0    cyclobenzaprine (FLEXERIL) 10 mg tablet, , Disp: , Rfl:     fluticasone (FLONASE) 50 mcg/act nasal spray, SPRAY 1 SPRAY INTO EACH NOSTRIL TWICE A DAY, Disp: , Rfl:     hydrochlorothiazide (HYDRODIURIL) 25 mg tablet, Take 0.5 tablets (12.5 mg total) by mouth daily, Disp: 45 tablet, Rfl: 3    loperamide (IMODIUM A-D) 2 MG tablet, Take 2 mg by mouth daily, Disp: , Rfl:     loratadine (CLARITIN REDITABS) 10 MG dissolvable tablet, Take 10 mg by mouth daily, Disp: , Rfl:     mesalamine (APRISO) 0.375 g 24 hr capsule, TAKE 2 CAPSULES BY MOUTH DAILY, Disp: 180 capsule, Rfl: 1    niacinamide 500 mg tablet, , Disp: , Rfl:     Probiotic Product (PROBIOTIC 10 ULTRA STRENGTH PO), Take by mouth, Disp: , Rfl:     zolpidem (AMBIEN) 10 mg tablet, Take 1 tablet (10 mg total) by mouth daily at bedtime as needed (insomnia), Disp: 30 tablet, Rfl: 0    estradiol (ESTRACE VAGINAL) 0.1 mg/g vaginal cream, Use pea size amount to the vaginal opening 2-3 times per week (Patient not taking: Reported on 12/13/2023), Disp: 42.5 g, Rfl: 1    ezetimibe (ZETIA) 10 mg tablet, Take 1 tablet (10 mg total) by mouth daily (Patient not taking: Reported on 12/13/2023), Disp: 90 tablet, Rfl: 2    pantoprazole (PROTONIX) 40 mg tablet, Take 40 mg by mouth every morning (Patient not taking: Reported on 12/13/2023), Disp: , Rfl:       Objective:    Vitals:    12/13/23 0937   Weight: 73.8 kg (162 lb 9.6 oz)   Height: 5' 3" (1.6 m)         Physical Exam  General: Well appearing, well nourished, in no distress. Oriented x 3, normal mood and affect. Ambulating without difficulty. Skin: Good turgor, no unusual bruising or prominent lesions. She has scaly white plaque like lesions on her abdomen that was diagnosed as lichen sclerosus. Hair: Normal texture and distribution. HEENT:  Head: Normocephalic, atraumatic. Eyes: Conjunctiva clear, sclera non-icteric, EOM intact.   Extremities: No amputations or deformities, cyanosis, edema. Musculoskeletal (prior):   Hands - bilateral CMC mild squaring noted. Her bilateral PIP and MCP joints are unremarkable. Wrists - I do not appreciate any significant soft tissue swelling or tenderness bilaterally. Elbows - lateral epicondyle tenderness noted. Otherwise the bilateral elbows are unremarkable. Shoulders - unremarkable. Knees - mild soft tissue swelling noted at the left knee without tenderness. The right knee is unremarkable. Ankles and feet - unremarkable. Around 14/18 fibromyalgia tender points positive. Neurologic: Alert and oriented. No focal neurological deficits appreciated. Psychiatric: Normal mood and affect. Marlene Valenzuela M.D.   Rheumatology

## 2023-12-13 ENCOUNTER — OFFICE VISIT (OUTPATIENT)
Dept: RHEUMATOLOGY | Facility: CLINIC | Age: 68
End: 2023-12-13
Payer: MEDICARE

## 2023-12-13 VITALS — WEIGHT: 162.6 LBS | HEIGHT: 63 IN | BODY MASS INDEX: 28.81 KG/M2

## 2023-12-13 DIAGNOSIS — L90.0 LICHEN SCLEROSUS: ICD-10-CM

## 2023-12-13 DIAGNOSIS — Z15.89 HLA B27 POSITIVE: Primary | ICD-10-CM

## 2023-12-13 DIAGNOSIS — K52.839 MICROSCOPIC COLITIS, UNSPECIFIED MICROSCOPIC COLITIS TYPE: ICD-10-CM

## 2023-12-13 DIAGNOSIS — M15.0 PRIMARY GENERALIZED (OSTEO)ARTHRITIS: ICD-10-CM

## 2023-12-13 DIAGNOSIS — M79.7 FIBROMYALGIA: ICD-10-CM

## 2023-12-13 PROCEDURE — 99214 OFFICE O/P EST MOD 30 MIN: CPT | Performed by: INTERNAL MEDICINE

## 2023-12-13 RX ORDER — CYCLOBENZAPRINE HCL 10 MG
TABLET ORAL
COMMUNITY
Start: 2023-11-29

## 2023-12-13 RX ORDER — LORATADINE 10 MG/1
10 TABLET, ORALLY DISINTEGRATING ORAL DAILY
COMMUNITY

## 2024-01-22 NOTE — TELEPHONE ENCOUNTER
Please schedule her for a 1 year follow up  I sent her a [employer] message regarding this, thanks  PERRL/EOMI/conjunctiva clear/normal

## 2024-02-02 ENCOUNTER — OFFICE VISIT (OUTPATIENT)
Dept: GASTROENTEROLOGY | Facility: CLINIC | Age: 69
End: 2024-02-02
Payer: MEDICARE

## 2024-02-02 VITALS
OXYGEN SATURATION: 98 % | DIASTOLIC BLOOD PRESSURE: 80 MMHG | SYSTOLIC BLOOD PRESSURE: 118 MMHG | BODY MASS INDEX: 28.63 KG/M2 | HEIGHT: 63 IN | WEIGHT: 161.6 LBS | HEART RATE: 58 BPM

## 2024-02-02 DIAGNOSIS — K58.0 IRRITABLE BOWEL SYNDROME WITH DIARRHEA: ICD-10-CM

## 2024-02-02 DIAGNOSIS — Q44.1 GALLBLADDER ANOMALY: ICD-10-CM

## 2024-02-02 DIAGNOSIS — K52.832 LYMPHOCYTIC COLITIS: Primary | ICD-10-CM

## 2024-02-02 PROCEDURE — 99213 OFFICE O/P EST LOW 20 MIN: CPT | Performed by: PHYSICIAN ASSISTANT

## 2024-02-02 RX ORDER — CHOLESTYRAMINE 4 G/9G
1 POWDER, FOR SUSPENSION ORAL
Qty: 60 PACKET | Refills: 3 | Status: SHIPPED | OUTPATIENT
Start: 2024-02-02

## 2024-02-02 RX ORDER — DIPHENOXYLATE HYDROCHLORIDE AND ATROPINE SULFATE 2.5; .025 MG/1; MG/1
1 TABLET ORAL 4 TIMES DAILY PRN
Qty: 60 TABLET | Refills: 3 | Status: SHIPPED | OUTPATIENT
Start: 2024-02-02

## 2024-02-02 RX ORDER — DICYCLOMINE HCL 20 MG
20 TABLET ORAL EVERY 6 HOURS PRN
Qty: 60 TABLET | Refills: 3 | Status: SHIPPED | OUTPATIENT
Start: 2024-02-02

## 2024-02-02 RX ORDER — PREDNISOLONE ACETATE 10 MG/ML
SUSPENSION/ DROPS OPHTHALMIC
COMMUNITY
Start: 2024-01-26

## 2024-02-02 NOTE — PATIENT INSTRUCTIONS
Scheduled date of colonoscopy (as of today):2/13/24  Physician performing colonoscopy:Kemar  Location of colonoscopy:[place]  Bowel prep reviewed with patient:Carly/Miralax  Instructions reviewed with patient by:Trever blood  Clearances:  none

## 2024-02-02 NOTE — PROGRESS NOTES
[facility] - Outpatient Follow-up Note  Yue Zepeda 68 y.o. female MRN: [id number]  Encounter: [id number]          ASSESSMENT AND PLAN:      1. Lymphocytic colitis  Apriso 2 pills daily  Probiotic  Repeat colonoscopy and rebiopsy     2. IBS-D  She is taking a probiotic - Align  Add Metamucil  She notes flairs of diarrhea which are severe and with incontinence - she takes 6-8 imodium when this occurs and then has bloat and constipation  - Stop Imodium  - Start Questran BID  - Use Lomotil and Dicyclomine PRN  - Previously failed a Xifaxan course  - She notes she is gluten intolerance and thinks she has an issue with soy as well    3. Gallbladder anomaly  US from September shows multiple polyps - largest measuring 8mm  F/U advised in 1 year    ______________________________________________________________________    SUBJECTIVE: 68-year-old female with a history of microscopic colitis, hypertension, chronic kidney disease, fibromyalgia, hyperlipidemia who presents for routine follow-up.  She is not doing well.  She reports that her flares of diarrhea are becoming more frequent and more severe.  She notes that when the diarrhea occurs it is severe to the point of incontinence.  When this happens it typically occurs in the middle of the night.  She will take 6-8 Imodium and attempt to stop the symptoms.  After this she will have bloating and constipation for several days before sometimes going back to having normal bowel movements for a time before the whole process repeats.  She remains on Apriso 2 pills daily for her microscopic colitis.  She reports abdominal cramping but no severe pain.  She has no rectal bleeding.  She was recently diagnosed with uveitis.  She notes that she takes an Imodium every day.  She was prescribed a Xifaxan course in 2021 which she completed without any improvement in symptoms.  Her last colonoscopy was in 2019 with Dr. Savage which is the point at which she  was diagnosed with lymphocytic colitis.  After her last appointment she had an ultrasound which documented multiple gallbladder polyps the largest of which measured 8 mm.  Radiology advised to repeat ultrasound in 1 year.      REVIEW OF SYSTEMS IS OTHERWISE NEGATIVE.      Historical Information   Past Medical History:   Diagnosis Date    Abnormal Pap smear of cervix 22    Basal cell carcinoma (BCC) of parietal region of scalp 2022    Left parietal scalp    Bipolar 1 disorder, depressed (HCC)     Bipolar 1 disorder, depressed (HCC) 1975    Chronic kidney disease     Depression     H/O bladder infections     Hyperlipidemia     Hypertension     Lichen sclerosus     Microscopic colitis     Skin disorder     lichens sclerosis    Urinary tract infection      Past Surgical History:   Procedure Laterality Date    COLONOSCOPY      2013     MOHS SURGERY Left 2022    Left parietal scalp     Social History   Social History     Substance and Sexual Activity   Alcohol Use Yes    Alcohol/week: 1.0 standard drink of alcohol    Types: 1 Standard drinks or equivalent per week    Comment: socially     Social History     Substance and Sexual Activity   Drug Use Never     Social History     Tobacco Use   Smoking Status Former    Current packs/day: 0.00    Average packs/day: 2.0 packs/day for 20.0 years (40.0 ttl pk-yrs)    Types: Cigarettes    Start date: 1975    Quit date: 1995    Years since quittin.1   Smokeless Tobacco Never   Tobacco Comments    heavy smoker previously     Family History   Problem Relation Age of Onset    Squamous cell carcinoma Mother     Kidney disease Father     Diabetic kidney disease Father     No Known Problems Son     No Known Problems Half-Sister        Meds/Allergies       Current Outpatient Medications:     atenolol (TENORMIN) 25 mg tablet    cholecalciferol (VITAMIN D3) 1,000 units tablet    clonazePAM (KlonoPIN) 0.5 mg tablet    cyclobenzaprine (FLEXERIL) 10 mg  tablet    estradiol (ESTRACE VAGINAL) 0.1 mg/g vaginal cream    fluticasone (FLONASE) 50 mcg/act nasal spray    hydrochlorothiazide (HYDRODIURIL) 25 mg tablet    loperamide (IMODIUM A-D) 2 MG tablet    loratadine (CLARITIN REDITABS) 10 MG dissolvable tablet    mesalamine (APRISO) 0.375 g 24 hr capsule    niacinamide 500 mg tablet    prednisoLONE acetate (PRED FORTE) 1 % ophthalmic suspension    Probiotic Product (PROBIOTIC 10 ULTRA STRENGTH PO)    zolpidem (AMBIEN) 10 mg tablet    ezetimibe (ZETIA) 10 mg tablet    Allergies   Allergen Reactions    Levofloxacin Other (See Comments)    Sulfa Antibiotics Fever and Itching    Cephalexin Diarrhea    Nitrofurantoin Fever    Soy Allergy - Food Allergy Diarrhea and GI Intolerance    Topiramate Other (See Comments)    Bacitracin-Neomycin-Polymyxin Rash    Sulfamethoxazole-Trimethoprim Diarrhea           Objective     not currently breastfeeding. There is no height or weight on file to calculate BMI.      PHYSICAL EXAM:      General Appearance:   Alert, cooperative, no distress   HEENT:   Normocephalic, atraumatic, anicteric.     Neck:  Supple, symmetrical, trachea midline   Lungs:   Clear to auscultation bilaterally; no rales, rhonchi or wheezing; respirations unlabored    Heart::   Regular rate and rhythm; no murmur, rub, or gallop.   Abdomen:   Soft, non-tender, non-distended; normal bowel sounds; no masses, no organomegaly    Genitalia:   Deferred    Rectal:   Deferred    Extremities:  No cyanosis, clubbing or edema    Pulses:  2+ and symmetric    Skin:  No jaundice, rashes, or lesions    Lymph nodes:  No palpable cervical lymphadenopathy        Lab Results:   No visits with results within 1 Day(s) from this visit.   Latest known visit with results is:   Appointment on 12/12/2023   Component Date Value    TSH 3RD GENERATON 12/12/2023 1.672     WBC 12/12/2023 7.45     RBC 12/12/2023 4.55     Hemoglobin 12/12/2023 14.1     Hematocrit 12/12/2023 41.0     MCV 12/12/2023 90      MCH 12/12/2023 31.0     MCHC 12/12/2023 34.4     RDW 12/12/2023 12.3     MPV 12/12/2023 10.1     Platelets 12/12/2023 251     nRBC 12/12/2023 0     Neutrophils Relative 12/12/2023 47     Immat GRANS % 12/12/2023 0     Lymphocytes Relative 12/12/2023 45 (H)     Monocytes Relative 12/12/2023 7     Eosinophils Relative 12/12/2023 0     Basophils Relative 12/12/2023 1     Neutrophils Absolute 12/12/2023 3.54     Immature Grans Absolute 12/12/2023 0.01     Lymphocytes Absolute 12/12/2023 3.32     Monocytes Absolute 12/12/2023 0.52     Eosinophils Absolute 12/12/2023 0.00     Basophils Absolute 12/12/2023 0.06     Sodium 12/12/2023 140     Potassium 12/12/2023 4.0     Chloride 12/12/2023 102     CO2 12/12/2023 30     ANION GAP 12/12/2023 8     BUN 12/12/2023 23     Creatinine 12/12/2023 0.97     Glucose, Fasting 12/12/2023 89     Calcium 12/12/2023 9.6     AST 12/12/2023 19     ALT 12/12/2023 20     Alkaline Phosphatase 12/12/2023 56     Total Protein 12/12/2023 6.8     Albumin 12/12/2023 4.4     Total Bilirubin 12/12/2023 0.50     eGFR 12/12/2023 61     Cholesterol 12/12/2023 231 (H)     Triglycerides 12/12/2023 127     HDL, Direct 12/12/2023 77     LDL Calculated 12/12/2023 129 (H)     Non-HDL-Chol (CHOL-HDL) 12/12/2023 154          Radiology Results:   No results found.  Answers submitted by the patient for this visit:  Abdominal Pain Questionnaire (Submitted on 1/26/2024)  Chief Complaint: Abdominal pain  belching: Yes  constipation: Yes  diarrhea: Yes  flatus: Yes

## 2024-02-09 ENCOUNTER — TELEPHONE (OUTPATIENT)
Age: 69
End: 2024-02-09

## 2024-02-29 ENCOUNTER — ANESTHESIA (OUTPATIENT)
Dept: GASTROENTEROLOGY | Facility: HOSPITAL | Age: 69
End: 2024-02-29

## 2024-02-29 ENCOUNTER — HOSPITAL ENCOUNTER (OUTPATIENT)
Dept: GASTROENTEROLOGY | Facility: HOSPITAL | Age: 69
Setting detail: OUTPATIENT SURGERY
End: 2024-02-29
Payer: MEDICARE

## 2024-02-29 ENCOUNTER — ANESTHESIA EVENT (OUTPATIENT)
Dept: GASTROENTEROLOGY | Facility: HOSPITAL | Age: 69
End: 2024-02-29

## 2024-02-29 VITALS
WEIGHT: 156.53 LBS | OXYGEN SATURATION: 99 % | BODY MASS INDEX: 27.73 KG/M2 | HEIGHT: 63 IN | TEMPERATURE: 97.2 F | SYSTOLIC BLOOD PRESSURE: 159 MMHG | HEART RATE: 46 BPM | DIASTOLIC BLOOD PRESSURE: 83 MMHG | RESPIRATION RATE: 16 BRPM

## 2024-02-29 DIAGNOSIS — K58.0 IRRITABLE BOWEL SYNDROME WITH DIARRHEA: ICD-10-CM

## 2024-02-29 DIAGNOSIS — K52.832 LYMPHOCYTIC COLITIS: ICD-10-CM

## 2024-02-29 PROCEDURE — 88305 TISSUE EXAM BY PATHOLOGIST: CPT | Performed by: PATHOLOGY

## 2024-02-29 PROCEDURE — 88342 IMHCHEM/IMCYTCHM 1ST ANTB: CPT | Performed by: PATHOLOGY

## 2024-02-29 PROCEDURE — 45380 COLONOSCOPY AND BIOPSY: CPT | Performed by: INTERNAL MEDICINE

## 2024-02-29 PROCEDURE — 88313 SPECIAL STAINS GROUP 2: CPT | Performed by: PATHOLOGY

## 2024-02-29 RX ORDER — SODIUM CHLORIDE, SODIUM LACTATE, POTASSIUM CHLORIDE, CALCIUM CHLORIDE 600; 310; 30; 20 MG/100ML; MG/100ML; MG/100ML; MG/100ML
INJECTION, SOLUTION INTRAVENOUS CONTINUOUS PRN
Status: DISCONTINUED | OUTPATIENT
Start: 2024-02-29 | End: 2024-02-29

## 2024-02-29 RX ORDER — PROPOFOL 10 MG/ML
INJECTION, EMULSION INTRAVENOUS AS NEEDED
Status: DISCONTINUED | OUTPATIENT
Start: 2024-02-29 | End: 2024-02-29

## 2024-02-29 RX ORDER — SODIUM CHLORIDE, SODIUM LACTATE, POTASSIUM CHLORIDE, CALCIUM CHLORIDE 600; 310; 30; 20 MG/100ML; MG/100ML; MG/100ML; MG/100ML
50 INJECTION, SOLUTION INTRAVENOUS CONTINUOUS
Status: ACTIVE | OUTPATIENT
Start: 2024-02-29

## 2024-02-29 RX ORDER — LIDOCAINE HYDROCHLORIDE 20 MG/ML
INJECTION, SOLUTION EPIDURAL; INFILTRATION; INTRACAUDAL; PERINEURAL AS NEEDED
Status: DISCONTINUED | OUTPATIENT
Start: 2024-02-29 | End: 2024-02-29

## 2024-02-29 RX ADMIN — LIDOCAINE HYDROCHLORIDE 80 MG: 20 INJECTION, SOLUTION EPIDURAL; INFILTRATION; INTRACAUDAL at 13:15

## 2024-02-29 RX ADMIN — PROPOFOL 50 MG: 10 INJECTION, EMULSION INTRAVENOUS at 13:22

## 2024-02-29 RX ADMIN — PROPOFOL 50 MG: 10 INJECTION, EMULSION INTRAVENOUS at 13:26

## 2024-02-29 RX ADMIN — PROPOFOL 50 MG: 10 INJECTION, EMULSION INTRAVENOUS at 13:19

## 2024-02-29 RX ADMIN — SODIUM CHLORIDE, SODIUM LACTATE, POTASSIUM CHLORIDE, AND CALCIUM CHLORIDE: .6; .31; .03; .02 INJECTION, SOLUTION INTRAVENOUS at 11:40

## 2024-02-29 RX ADMIN — PROPOFOL 120 MG: 10 INJECTION, EMULSION INTRAVENOUS at 13:15

## 2024-02-29 RX ADMIN — PROPOFOL 40 MG: 10 INJECTION, EMULSION INTRAVENOUS at 13:30

## 2024-02-29 NOTE — ANESTHESIA PREPROCEDURE EVALUATION
Procedure:  COLONOSCOPY    Relevant Problems   CARDIO   (+) Primary hypertension      /RENAL   (+) Chronic kidney disease-mineral and bone disorder   (+) Chronic renal failure, stage 3a (HCC)      MUSCULOSKELETAL   (+) Fibromyalgia   (+) Osteoarthritis of carpometacarpal (CMC) joint of both thumbs      NEURO/PSYCH   (+) Fibromyalgia        Physical Exam    Airway      TM Distance: <3 FB  Neck ROM: full     Dental   No notable dental hx     Cardiovascular      Pulmonary      Other Findings  post-pubertal.      Anesthesia Plan  ASA Score- 3     Anesthesia Type- IV sedation with anesthesia with ASA Monitors.         Additional Monitors:     Airway Plan:            Plan Factors-    Chart reviewed.    Patient summary reviewed.                  Induction- intravenous.    Postoperative Plan-     Informed Consent- Anesthetic plan and risks discussed with patient.  I personally reviewed this patient with the CRNA. Discussed and agreed on the Anesthesia Plan with the CRNA..

## 2024-02-29 NOTE — H&P
History and Physical -  [facility]  Yue Zepeda 68 y.o. female MRN: [id number]      HPI: Yue Zepeda is a 68 y.o. year old female who presents for history of IBS-D and lymphocytic colitis      REVIEW OF SYSTEMS: Per the HPI, and otherwise unremarkable.    Historical Information   Past Medical History:   Diagnosis Date    Abnormal Pap smear of cervix 22    Basal cell carcinoma (BCC) of parietal region of scalp 2022    Left parietal scalp    Bipolar 1 disorder, depressed (HCC)     Bipolar 1 disorder, depressed (HCC) 1975    Chronic kidney disease     Depression     H/O bladder infections     Hyperlipidemia     Hypertension     Lichen sclerosus     Microscopic colitis     Skin disorder     lichens sclerosis    Urinary tract infection      Past Surgical History:   Procedure Laterality Date    COLONOSCOPY           HYSTERECTOMY      MOHS SURGERY Left 2022    Left parietal scalp     Social History   Social History     Substance and Sexual Activity   Alcohol Use Yes    Alcohol/week: 1.0 standard drink of alcohol    Types: 1 Standard drinks or equivalent per week    Comment: socially     Social History     Substance and Sexual Activity   Drug Use Never     Social History     Tobacco Use   Smoking Status Former    Current packs/day: 0.00    Average packs/day: 2.0 packs/day for 20.0 years (40.0 ttl pk-yrs)    Types: Cigarettes    Start date: 1975    Quit date: 1995    Years since quittin.1   Smokeless Tobacco Never   Tobacco Comments    heavy smoker previously     Family History   Problem Relation Age of Onset    Squamous cell carcinoma Mother     Kidney disease Father     Diabetic kidney disease Father     No Known Problems Son     No Known Problems Half-Sister        Meds/Allergies     (Not in a hospital admission)      Allergies   Allergen Reactions    Levofloxacin Other (See Comments)    Sulfa Antibiotics Fever and Itching    Cephalexin Diarrhea     "Nitrofurantoin Fever    Soy Allergy - Food Allergy Diarrhea and GI Intolerance    Topiramate Other (See Comments)    Bacitracin-Neomycin-Polymyxin Rash    Sulfamethoxazole-Trimethoprim Diarrhea       Objective     Blood pressure 135/77, pulse 59, temperature (!) 97 °F (36.1 °C), temperature source Temporal, resp. rate 17, height 5' 3\" (1.6 m), weight 71 kg (156 lb 8.4 oz), SpO2 96%, not currently breastfeeding.      PHYSICAL EXAM    Gen: NAD  CV: RRR  CHEST: Clear  ABD: soft, NT/ND  EXT: no edema      ASSESSMENT/PLAN:  This is a 68 y.o. year old female here for colonoscopy with biopsies, and she is stable and optimized for her procedure.          "

## 2024-02-29 NOTE — ANESTHESIA POSTPROCEDURE EVALUATION
Post-Op Assessment Note    CV Status:  Stable  Pain Score: 0    Pain management: adequate       Mental Status:  Alert and awake   Hydration Status:  Stable   PONV Controlled:  None   Airway Patency:  Patent     Post Op Vitals Reviewed: Yes    No anethesia notable event occurred.    Staff: CRNA               /77 (02/29/24 1337)    Temp (!) 97.2 °F (36.2 °C) (02/29/24 1337)    Pulse 62 (02/29/24 1337)   Resp 16 (02/29/24 1337)    SpO2 98 % (02/29/24 1337)

## 2024-03-01 ENCOUNTER — NURSE TRIAGE (OUTPATIENT)
Age: 69
End: 2024-03-01

## 2024-03-01 NOTE — TELEPHONE ENCOUNTER
"Colonoscopy- 2/29/24     Pt calling in, reports hx of microscopic colitis in the past and was doing well until she did bowel prep for colonoscopy yesterday. Pt still have loose stools with some urgency/ incontinence. I advised her from her clean out and bowel prep this is normal and to go back on metamucil and questran TID as ordered. She understood. And will call back on Monday if no relief.   She will stay hydrated/ BRAT diet and continue medications     Denies: pain, fevers, blood in stool or alarm symptoms.     Reason for Disposition   Colonoscopy, questions about    Answer Assessment - Initial Assessment Questions  1. DATE/TIME: \"When did you have your colonoscopy?\"       Yesterday   2. MAIN CONCERN: \"What is your main concern right now?\" \"What questions do you have?\"      Loose stools   3. ABDOMEN PAIN: \"Are you having any abdomen (belly or stomach) pain?\" If Yes, ask: \"How bad is it?\" (e.g., Scale 1-10; mild, moderate, severe).     - MILD (1-3): doesn't interfere with normal activities, abdomen soft and not tender to touch      - MODERATE (4-7): interferes with normal activities or awakens from sleep, tender to touch      - SEVERE (8-10): excruciating pain, doubled over, unable to do any normal activities        Moderate   4. OTHER SYMPTOMS: \"What other symptoms are you having?\" (e.g., rectal bleeding, bloating or feeling gassy, passing gas, vomiting, dizziness, fever).     No   5. ONSET: \"When did your symptoms start?\"      Yesterday   6. PATTERN: \"Is the symptom(s) constant or does it come and go?\" \"Is your symptom(s) getting worse, better, or staying the same?\"    Protocols used: Colonoscopy Symptoms and Questions-ADULT-    "

## 2024-03-06 PROCEDURE — 88342 IMHCHEM/IMCYTCHM 1ST ANTB: CPT | Performed by: PATHOLOGY

## 2024-03-06 PROCEDURE — 88305 TISSUE EXAM BY PATHOLOGIST: CPT | Performed by: PATHOLOGY

## 2024-03-06 PROCEDURE — 88313 SPECIAL STAINS GROUP 2: CPT | Performed by: PATHOLOGY

## 2024-03-13 ENCOUNTER — TELEPHONE (OUTPATIENT)
Dept: GASTROENTEROLOGY | Facility: CLINIC | Age: 69
End: 2024-03-13

## 2024-03-13 NOTE — TELEPHONE ENCOUNTER
Spoke to Nicole at [facility] to verify insurance coverage, patient has active coverage as of 11/1/2022

## 2024-03-14 ENCOUNTER — OFFICE VISIT (OUTPATIENT)
Dept: GASTROENTEROLOGY | Facility: CLINIC | Age: 69
End: 2024-03-14
Payer: MEDICARE

## 2024-03-14 VITALS
WEIGHT: 159 LBS | OXYGEN SATURATION: 98 % | HEIGHT: 63 IN | SYSTOLIC BLOOD PRESSURE: 132 MMHG | BODY MASS INDEX: 28.17 KG/M2 | HEART RATE: 58 BPM | DIASTOLIC BLOOD PRESSURE: 86 MMHG | TEMPERATURE: 97.7 F

## 2024-03-14 DIAGNOSIS — K52.832 LYMPHOCYTIC COLITIS: ICD-10-CM

## 2024-03-14 DIAGNOSIS — Q44.1 GALLBLADDER ANOMALY: ICD-10-CM

## 2024-03-14 DIAGNOSIS — K58.0 IRRITABLE BOWEL SYNDROME WITH DIARRHEA: Primary | ICD-10-CM

## 2024-03-14 PROCEDURE — 99213 OFFICE O/P EST LOW 20 MIN: CPT | Performed by: PHYSICIAN ASSISTANT

## 2024-03-14 RX ORDER — AMITRIPTYLINE HYDROCHLORIDE 10 MG/1
10 TABLET, FILM COATED ORAL
Qty: 30 TABLET | Refills: 3 | Status: SHIPPED | OUTPATIENT
Start: 2024-03-14

## 2024-03-14 NOTE — PROGRESS NOTES
[facility] - Outpatient Follow-up Note  Yue Zepeda 68 y.o. female MRN: [id number]  Encounter: [id number]          ASSESSMENT AND PLAN:      1. Irritable bowel syndrome with diarrhea  2. Lymphocytic colitis  Colonoscopy with random biopsies was negative for microscopic colitis  This was diagnosed with biopsies in 2019  At present she is at least in remission and suffering from IBS-D  She is using Questran 1 packet BID and Metamucil chewables TID  This is helping  She has Lomotil and Dicyclomine to use as needed    She remains interested in trying Amitriptyline given a variety of issues she is having including abdominal discomfort - 10mg to take at bedtime called in     She is seeing a new dermatologist who wants to prescribed Humira or Rinvoq for Lichen sclerosis - she is on the fence about this    3. Gallbladder anomaly  Polyps  US due in September    ______________________________________________________________________    SUBJECTIVE: 68-year-old female with a history of irritable bowel syndrome, microscopic colitis, hyperlipidemia, hypertension, lichen sclerosis who presents for follow-up after colonoscopy.  At her last appointment she was reporting worsening symptoms with multiple watery stools a day.  She was also having significant abdominal pain.  She has maintained on Apriso since her microscopic colitis diagnosis in 2019.  At her last appointment we added Questran to take 1 packet twice daily.  I asked also encouraged her to start a fiber supplement which she has been using Metamucil 3 times a day.  She reports that this has been helping.  She has prescriptions for both Lomotil and dicyclomine at home but admits she has not had to use them.  She reports that her symptoms were improving significantly until her prep for her colonoscopy.  This caused severe diarrhea for several days which is finally getting back under control.  She does truly believe that the new medicine  regimen is helping.  Her colonoscopy performed at the end of February was a normal exam.  Random biopsies were taken and were noted to be negative for microscopic colitis.  She is somewhat confused by this.  She denies any new symptoms such as rectal bleeding.  She does suffer from severe lichen sclerosis.  She has cysts on her abdomen as well as on her vulva.  She saw a new dermatologist recently who wants to start her on Humira or Rinvoq.  She is somewhat hesitant but is willing to consider.  She has a follow-up to see him in several weeks.      REVIEW OF SYSTEMS IS OTHERWISE NEGATIVE.      Historical Information   Past Medical History:   Diagnosis Date    Abnormal Pap smear of cervix 24    Basal cell carcinoma (BCC) of parietal region of scalp 2022    Left parietal scalp    Bipolar 1 disorder, depressed (HCC)     Bipolar 1 disorder, depressed (HCC) 1975    Chronic kidney disease     Depression     H/O bladder infections     Hyperlipidemia     Hypertension     Lichen sclerosus     Microscopic colitis     Skin disorder     lichens sclerosis    Urinary tract infection      Past Surgical History:   Procedure Laterality Date    COLONOSCOPY      2013     HYSTERECTOMY      MOHS SURGERY Left 2022    Left parietal scalp     Social History   Social History     Substance and Sexual Activity   Alcohol Use Yes    Alcohol/week: 1.0 standard drink of alcohol    Types: 1 Standard drinks or equivalent per week    Comment: socially     Social History     Substance and Sexual Activity   Drug Use Never     Social History     Tobacco Use   Smoking Status Former    Current packs/day: 0.00    Average packs/day: 2.0 packs/day for 20.0 years (40.0 ttl pk-yrs)    Types: Cigarettes    Start date: 1975    Quit date: 1995    Years since quittin.2   Smokeless Tobacco Never   Tobacco Comments    heavy smoker previously     Family History   Problem Relation Age of Onset    Squamous cell carcinoma Mother      "Kidney disease Father     Diabetic kidney disease Father     No Known Problems Son     No Known Problems Half-Sister        Meds/Allergies       Current Outpatient Medications:     amitriptyline (ELAVIL) 10 mg tablet    atenolol (TENORMIN) 25 mg tablet    cholecalciferol (VITAMIN D3) 1,000 units tablet    cholestyramine (QUESTRAN) 4 g packet    clonazePAM (KlonoPIN) 0.5 mg tablet    cyclobenzaprine (FLEXERIL) 10 mg tablet    dicyclomine (BENTYL) 20 mg tablet    diphenoxylate-atropine (LOMOTIL) 2.5-0.025 mg per tablet    estradiol (ESTRACE VAGINAL) 0.1 mg/g vaginal cream    fluticasone (FLONASE) 50 mcg/act nasal spray    hydrochlorothiazide (HYDRODIURIL) 25 mg tablet    loratadine (CLARITIN REDITABS) 10 MG dissolvable tablet    mesalamine (APRISO) 0.375 g 24 hr capsule    niacinamide 500 mg tablet    Probiotic Product (PROBIOTIC 10 ULTRA STRENGTH PO)    zolpidem (AMBIEN) 10 mg tablet    Allergies   Allergen Reactions    Levofloxacin Other (See Comments)    Sulfa Antibiotics Fever and Itching    Cephalexin Diarrhea    Nitrofurantoin Fever    Soy Allergy - Food Allergy Diarrhea and GI Intolerance    Topiramate Other (See Comments)    Bacitracin-Neomycin-Polymyxin Rash    Sulfamethoxazole-Trimethoprim Diarrhea           Objective     Blood pressure 132/86, pulse 58, temperature 97.7 °F (36.5 °C), temperature source Temporal, height 5' 3\" (1.6 m), weight 72.1 kg (159 lb), SpO2 98%, not currently breastfeeding. Body mass index is 28.17 kg/m².      PHYSICAL EXAM:      General Appearance:   Alert, cooperative, no distress   HEENT:   Normocephalic, atraumatic, anicteric.     Neck:  Supple, symmetrical, trachea midline   Lungs:   Clear to auscultation bilaterally; no rales, rhonchi or wheezing; respirations unlabored    Heart::   Regular rate and rhythm; no murmur, rub, or gallop.   Abdomen:   Soft, non-tender, non-distended; normal bowel sounds; no masses, no organomegaly    Genitalia:   Deferred    Rectal:   Deferred  "   Extremities:  No cyanosis, clubbing or edema    Pulses:  2+ and symmetric    Skin:  No jaundice, rashes, or lesions    Lymph nodes:  No palpable cervical lymphadenopathy        Lab Results:   No visits with results within 1 Day(s) from this visit.   Latest known visit with results is:   Hospital Outpatient Visit on 02/29/2024   Component Date Value    Case Report 02/29/2024                      Value:Surgical Pathology Report                         Case: [id number]                                  Authorizing Provider:  Arcenio Reinoso DO          Collected:           02/29/2024 1328              Ordering Location:      [facility]       Received:            02/29/2024 1624                                     [facility]                                                             Pathologist:           Paresh Lui MD                                                          Specimen:    Colon                                                                                      Final Diagnosis 02/29/2024                      Value:This result contains rich text formatting which cannot be displayed here.    Additional Information 02/29/2024                      Value:This result contains rich text formatting which cannot be displayed here.    Gross Description 02/29/2024                      Value:This result contains rich text formatting which cannot be displayed here.    Clinical Information 02/29/2024                      Value:Cold bx r/o micro colitis         Radiology Results:   Colonoscopy    Result Date: 2/29/2024  Narrative: Table formatting from the original result was not included.  [facility] [address] [facility] [id number] [phone] DATE OF SERVICE: 2/29/24 PHYSICIAN(S): Attending: Arcenio Reinoso DO Fellow: No Staff Documented INDICATION: Lymphocytic colitis, Irritable bowel syndrome with diarrhea POST-OP DIAGNOSIS: See the impression below. HISTORY:  Prior colonoscopy: 5 years ago. BOWEL PREPARATION: Miralax/Dulcolax PREPROCEDURE: Informed consent was obtained for the procedure, including sedation. Risks including but not limited to bleeding, infection, perforation, adverse drug reaction and aspiration were explained in detail. Also explained about less than 100% sensitivity with the exam and other alternatives. The patient was placed in the left lateral decubitus position. Procedure: Colonoscopy DETAILS OF PROCEDURE: Patient was taken to the procedure room where a time out was performed to confirm correct patient and correct procedure. The patient underwent monitored anesthesia care, which was administered by an anesthesia professional. The patient's blood pressure, heart rate, level of consciousness, oxygen, respirations and ECG were monitored throughout the procedure. A digital rectal exam was performed. A perianal exam was performed. The scope was introduced through the anus and advanced to the cecum. Retroflexion was performed in the rectum. The quality of bowel preparation was evaluated using the [place] Bowel Preparation Scale with scores of: right colon = 2, transverse colon = 2, left colon = 2. The total BBPS score was 6. Bowel prep was adequate. The patient's estimated blood loss was minimal (<5 mL). The procedure was not difficult. The patient tolerated the procedure well. There were no apparent adverse events. ANESTHESIA INFORMATION: ASA: III Anesthesia Type: Anesthesia type not filed in the log. MEDICATIONS: No administrations occurring from 1310 to 1335 on 02/29/24 FINDINGS: Multiple small diverticula of moderate severity with no inflammation in the proximal sigmoid colon, mid sigmoid colon and distal sigmoid colon; no bleeding was identified The cecum, ascending colon, hepatic flexure, transverse colon, splenic flexure, descending colon, rectosigmoid and rectum appeared normal. Performed 14 forceps biopsies in the ascending colon, transverse colon  and sigmoid colon to rule out colitis EVENTS: Procedure Events Event Event Time ENDO CECUM REACHED 2/29/2024  1:28 PM ENDO SCOPE OUT TIME 2/29/2024  1:34 PM SPECIMENS: ID Type Source Tests Collected by Time Destination 1 :  Tissue Colon TISSUE EXAM Arcenio Reinoso DO 2/29/2024  1:28 PM  EQUIPMENT: Colonoscope -[id number]     Impression: Diverticulosis of moderate severity in the proximal sigmoid colon, mid sigmoid colon and distal sigmoid colon The cecum, ascending colon, hepatic flexure, transverse colon, splenic flexure, descending colon, rectosigmoid and rectum appeared normal. Performed forceps biopsies in the ascending colon, transverse colon and sigmoid colon to rule out colitis RECOMMENDATION:  Repeat screening colonoscopy in 10 years  High-fiber diet to include fruits, vegetables, and whole grain foods, daily Will contact the patient in 1 to 2 weeks with results of the biopsies  Arcenio Reinoso DO FACC, FACP  Answers submitted by the patient for this visit:  Abdominal Pain Questionnaire (Submitted on 3/7/2024)  Chief Complaint: Abdominal pain  Diagnostic workup: lower endoscopy

## 2024-03-28 ENCOUNTER — PATIENT MESSAGE (OUTPATIENT)
Dept: OBGYN CLINIC | Facility: CLINIC | Age: 69
End: 2024-03-28

## 2024-03-28 DIAGNOSIS — L90.0 LICHEN SCLEROSUS: Primary | ICD-10-CM

## 2024-03-29 RX ORDER — CLOBETASOL PROPIONATE 0.5 MG/G
CREAM TOPICAL 2 TIMES DAILY
Qty: 45 G | Refills: 0 | Status: SHIPPED | OUTPATIENT
Start: 2024-03-29

## 2024-04-05 ENCOUNTER — APPOINTMENT (OUTPATIENT)
Age: 69
End: 2024-04-05
Payer: MEDICARE

## 2024-04-05 ENCOUNTER — LAB (OUTPATIENT)
Age: 69
End: 2024-04-05
Payer: MEDICARE

## 2024-04-05 DIAGNOSIS — E83.9 CHRONIC KIDNEY DISEASE-MINERAL AND BONE DISORDER: ICD-10-CM

## 2024-04-05 DIAGNOSIS — Z15.89 HLA B27 POSITIVE: ICD-10-CM

## 2024-04-05 DIAGNOSIS — N18.9 CHRONIC KIDNEY DISEASE-MINERAL AND BONE DISORDER: ICD-10-CM

## 2024-04-05 DIAGNOSIS — I10 PRIMARY HYPERTENSION: ICD-10-CM

## 2024-04-05 DIAGNOSIS — Z13.6 SCREENING FOR CARDIOVASCULAR CONDITION: ICD-10-CM

## 2024-04-05 DIAGNOSIS — M89.9 CHRONIC KIDNEY DISEASE-MINERAL AND BONE DISORDER: ICD-10-CM

## 2024-04-05 LAB
ALBUMIN SERPL BCP-MCNC: 4.3 G/DL (ref 3.5–5)
ALP SERPL-CCNC: 67 U/L (ref 34–104)
ALT SERPL W P-5'-P-CCNC: 24 U/L (ref 7–52)
ANION GAP SERPL CALCULATED.3IONS-SCNC: 8 MMOL/L (ref 4–13)
AST SERPL W P-5'-P-CCNC: 19 U/L (ref 13–39)
BASOPHILS # BLD AUTO: 0.05 THOUSANDS/ÂΜL (ref 0–0.1)
BASOPHILS NFR BLD AUTO: 1 % (ref 0–1)
BILIRUB SERPL-MCNC: 0.4 MG/DL (ref 0.2–1)
BUN SERPL-MCNC: 15 MG/DL (ref 5–25)
CALCIUM SERPL-MCNC: 9.1 MG/DL (ref 8.4–10.2)
CHLORIDE SERPL-SCNC: 102 MMOL/L (ref 96–108)
CHOLEST SERPL-MCNC: 222 MG/DL
CO2 SERPL-SCNC: 30 MMOL/L (ref 21–32)
CREAT SERPL-MCNC: 0.96 MG/DL (ref 0.6–1.3)
CRP SERPL QL: 2.7 MG/L
EOSINOPHIL # BLD AUTO: 0 THOUSAND/ÂΜL (ref 0–0.61)
EOSINOPHIL NFR BLD AUTO: 0 % (ref 0–6)
ERYTHROCYTE [DISTWIDTH] IN BLOOD BY AUTOMATED COUNT: 12.6 % (ref 11.6–15.1)
ERYTHROCYTE [SEDIMENTATION RATE] IN BLOOD: 4 MM/HOUR (ref 0–29)
GFR SERPL CREATININE-BSD FRML MDRD: 61 ML/MIN/1.73SQ M
GLUCOSE P FAST SERPL-MCNC: 93 MG/DL (ref 65–99)
HCT VFR BLD AUTO: 41.2 % (ref 34.8–46.1)
HDLC SERPL-MCNC: 76 MG/DL
HGB BLD-MCNC: 14 G/DL (ref 11.5–15.4)
IMM GRANULOCYTES # BLD AUTO: 0.02 THOUSAND/UL (ref 0–0.2)
IMM GRANULOCYTES NFR BLD AUTO: 0 % (ref 0–2)
LDLC SERPL CALC-MCNC: 120 MG/DL (ref 0–100)
LYMPHOCYTES # BLD AUTO: 3.56 THOUSANDS/ÂΜL (ref 0.6–4.47)
LYMPHOCYTES NFR BLD AUTO: 45 % (ref 14–44)
MCH RBC QN AUTO: 30.2 PG (ref 26.8–34.3)
MCHC RBC AUTO-ENTMCNC: 34 G/DL (ref 31.4–37.4)
MCV RBC AUTO: 89 FL (ref 82–98)
MONOCYTES # BLD AUTO: 0.53 THOUSAND/ÂΜL (ref 0.17–1.22)
MONOCYTES NFR BLD AUTO: 7 % (ref 4–12)
NEUTROPHILS # BLD AUTO: 3.76 THOUSANDS/ÂΜL (ref 1.85–7.62)
NEUTS SEG NFR BLD AUTO: 47 % (ref 43–75)
NRBC BLD AUTO-RTO: 0 /100 WBCS
PLATELET # BLD AUTO: 264 THOUSANDS/UL (ref 149–390)
PMV BLD AUTO: 9.7 FL (ref 8.9–12.7)
POTASSIUM SERPL-SCNC: 3.6 MMOL/L (ref 3.5–5.3)
PROT SERPL-MCNC: 6.8 G/DL (ref 6.4–8.4)
RBC # BLD AUTO: 4.63 MILLION/UL (ref 3.81–5.12)
SODIUM SERPL-SCNC: 140 MMOL/L (ref 135–147)
TRIGL SERPL-MCNC: 129 MG/DL
WBC # BLD AUTO: 7.92 THOUSAND/UL (ref 4.31–10.16)

## 2024-04-05 PROCEDURE — 85652 RBC SED RATE AUTOMATED: CPT

## 2024-04-05 PROCEDURE — 80061 LIPID PANEL: CPT

## 2024-04-05 PROCEDURE — 85025 COMPLETE CBC W/AUTO DIFF WBC: CPT

## 2024-04-05 PROCEDURE — 80053 COMPREHEN METABOLIC PANEL: CPT

## 2024-04-05 PROCEDURE — 86140 C-REACTIVE PROTEIN: CPT

## 2024-04-05 PROCEDURE — 36415 COLL VENOUS BLD VENIPUNCTURE: CPT

## 2024-04-05 PROCEDURE — 72202 X-RAY EXAM SI JOINTS 3/> VWS: CPT

## 2024-04-05 NOTE — TELEPHONE ENCOUNTER
hydroCHLOROthiazide 25 mg tablet         Sig: Take 0.5 tablets (12.5 mg total) by mouth daily    Disp: 45 tablet    Refills: 0    Start: 4/5/2024    Class: Normal    For: Primary hypertension    Last ordered: 6 months ago (9/20/2023) by Damien Funes MD    Patient comment: Please place order with [facility] pharmacy    Cardiovascular: Diuretics - Thiazide [id number]/05/2024 12:54 PM   Protocol Details Valid encounter within last 6 months    BP completed in the last 6 months    Ca in normal range and within 360 days    K in normal range and within 360 days    Na in normal range and within 360 days    eGFR is 60 or above and within 360 days      To be filled at: [facility]/pharmacy #[id number] - [place], [place] - [address]

## 2024-04-09 RX ORDER — HYDROCHLOROTHIAZIDE 25 MG/1
12.5 TABLET ORAL DAILY
Qty: 45 TABLET | Refills: 0 | Status: SHIPPED | OUTPATIENT
Start: 2024-04-09

## 2024-04-10 ENCOUNTER — TELEPHONE (OUTPATIENT)
Dept: CARDIOLOGY CLINIC | Facility: CLINIC | Age: 69
End: 2024-04-10

## 2024-04-10 ENCOUNTER — TELEPHONE (OUTPATIENT)
Age: 69
End: 2024-04-10

## 2024-04-10 NOTE — TELEPHONE ENCOUNTER
Yue called back, said she is reluctant to restart the Atorvastatin due to it aggravating her Lichens schlerosis which she said is severe. She is taking Questran as prescribed by her GI doctor but not every day. She asked if she took the Questran daily, if it would help lower the LDL.    Please call the patient.

## 2024-04-10 NOTE — TELEPHONE ENCOUNTER
----- Message from Damien Funes MD sent at 4/10/2024  8:50 AM EDT -----  Please call the patient.  Her LDL is still elevated at 120 and recommend she should go back to being on atorvastatin.

## 2024-04-11 NOTE — TELEPHONE ENCOUNTER
Spoke with pt and Dr. Funes's message was relayed. Pt verbally understood and said that she will also address her meds question with her other Doctors.

## 2024-04-18 ENCOUNTER — ANNUAL EXAM (OUTPATIENT)
Age: 69
End: 2024-04-18
Payer: MEDICARE

## 2024-04-18 ENCOUNTER — TELEPHONE (OUTPATIENT)
Dept: PSYCHIATRY | Facility: CLINIC | Age: 69
End: 2024-04-18

## 2024-04-18 VITALS
WEIGHT: 160 LBS | HEIGHT: 63 IN | BODY MASS INDEX: 28.35 KG/M2 | SYSTOLIC BLOOD PRESSURE: 118 MMHG | DIASTOLIC BLOOD PRESSURE: 70 MMHG

## 2024-04-18 DIAGNOSIS — Z12.39 ENCOUNTER FOR OTHER SCREENING FOR MALIGNANT NEOPLASM OF BREAST: ICD-10-CM

## 2024-04-18 DIAGNOSIS — Z78.0 ASYMPTOMATIC POSTMENOPAUSAL STATUS: ICD-10-CM

## 2024-04-18 DIAGNOSIS — N89.8 VAGINAL DRYNESS: ICD-10-CM

## 2024-04-18 DIAGNOSIS — N95.2 ATROPHIC VAGINITIS: ICD-10-CM

## 2024-04-18 DIAGNOSIS — N89.8 ITCHING IN THE VAGINAL AREA: ICD-10-CM

## 2024-04-18 DIAGNOSIS — Z01.419 ENCOUNTER FOR GYNECOLOGICAL EXAMINATION WITHOUT ABNORMAL FINDING: Primary | ICD-10-CM

## 2024-04-18 DIAGNOSIS — L90.0 LICHEN SCLEROSUS: ICD-10-CM

## 2024-04-18 DIAGNOSIS — F33.1 MODERATE EPISODE OF RECURRENT MAJOR DEPRESSIVE DISORDER (HCC): ICD-10-CM

## 2024-04-18 PROBLEM — H20.9 UVEITIS: Status: ACTIVE | Noted: 2024-01-24

## 2024-04-18 PROCEDURE — G0101 CA SCREEN;PELVIC/BREAST EXAM: HCPCS | Performed by: NURSE PRACTITIONER

## 2024-04-18 RX ORDER — FLUCONAZOLE 150 MG/1
150 TABLET ORAL ONCE
Qty: 2 TABLET | Refills: 0 | Status: SHIPPED | OUTPATIENT
Start: 2024-04-18 | End: 2024-04-18

## 2024-04-18 RX ORDER — ESTRADIOL 10 UG/1
1 INSERT VAGINAL 2 TIMES WEEKLY
Qty: 8 TABLET | Refills: 6 | Status: SHIPPED | OUTPATIENT
Start: 2024-04-18

## 2024-04-18 RX ORDER — ESTRADIOL 10 UG/1
1 INSERT VAGINAL 2 TIMES WEEKLY
Qty: 8 TABLET | Refills: 6 | Status: SHIPPED | OUTPATIENT
Start: 2024-04-18 | End: 2024-04-18 | Stop reason: SDUPTHER

## 2024-04-18 NOTE — PROGRESS NOTES
Diagnoses and all orders for this visit:    Encounter for gynecological examination  -     Ambulatory referral to Obstetrics / Gynecology    Asymptomatic postmenopausal status    Vaginal dryness/Lichen Sclerosis  -     estradiol (VAGIFEM, YUVAFEM) 10 MCG TABS vaginal tablet; Insert 1 tablet (10 mcg total) into the vagina 3 (three) times a week  -     Given Good RX info    Depression  -     Ambulatory referral to [facility]    Itching in the vaginal area        -     Diflucan x 2    Other orders  -     Vaseline prn  -     Coconut oil prn    Call as needed, encouraged calcium/vit D in her diet, call with any PMB, all questions answered.           Pleasant 68 y.o. postmenopausal female here for annual exam. She denies postmenopausal bleeding.  She had a Total hysterectomy many years ago for menorrhagia.  She was diagnosed with Lichen sclerosus 14 years ago via biopsy and has been on Vagifem with good relief but stopped it d/t non-coverage. She may go back on the cream if Vagifem is not affordable with a Gliph coupon which she will shop around for. She is also on clobetasol prn. Denies history of abnormal pap smears, last Pap 01/06/2022 was ASCUS HPV neg in [place], no pap done today. She agrees to ASCCP guidelines and not do any further Paps. Previous Pap was in 2020 and neg. She denies vaginal issues. She denies pelvic pain. She denies postmenopausal issues. Not sexually active in 5 years. Last colonoscopy done 02/29/2024, due again in 10 years. Last mammogram 04/10/2023 BR 1. She would like to see a therapist for longstanding depression.    Past Medical History:   Diagnosis Date    Abnormal Pap smear of cervix 1/6/22    Basal cell carcinoma (BCC) of parietal region of scalp 06/13/2022    Left parietal scalp    Bipolar 1 disorder, depressed (HCC)     Bipolar 1 disorder, depressed (HCC) 01/24/1975    Chronic kidney disease     Depression     H/O bladder infections     Hyperlipidemia     Hypertension     Lichen  sclerosus     Microscopic colitis     Skin disorder     lichens sclerosis    Urinary tract infection      Past Surgical History:   Procedure Laterality Date    COLONOSCOPY      2013     HYSTERECTOMY      MOHS SURGERY Left 2022    Left parietal scalp     Family History   Problem Relation Age of Onset    Squamous cell carcinoma Mother     Kidney disease Father     Diabetic kidney disease Father     No Known Problems Son     No Known Problems Half-Sister      Social History     Tobacco Use    Smoking status: Former     Current packs/day: 0.00     Average packs/day: 2.0 packs/day for 20.0 years (40.0 ttl pk-yrs)     Types: Cigarettes     Start date: 1975     Quit date: 1995     Years since quittin.3    Smokeless tobacco: Never    Tobacco comments:     heavy smoker previously   Vaping Use    Vaping status: Some Days    Substances: CBD   Substance Use Topics    Alcohol use: Yes     Alcohol/week: 1.0 standard drink of alcohol     Types: 1 Standard drinks or equivalent per week     Comment: socially    Drug use: Yes     Comment: CBD       Current Outpatient Medications:     amitriptyline (ELAVIL) 10 mg tablet, Take 1 tablet (10 mg total) by mouth daily at bedtime, Disp: 30 tablet, Rfl: 3    atenolol (TENORMIN) 25 mg tablet, TAKE 2 TABLETS BY MOUTH EVERY DAY (Patient taking differently: 25 mg), Disp: 90 tablet, Rfl: 3    cholestyramine (QUESTRAN) 4 g packet, Take 1 packet (4 g total) by mouth 3 (three) times a day with meals, Disp: 60 packet, Rfl: 3    clobetasol (TEMOVATE) 0.05 % cream, Apply topically 2 (two) times a day, Disp: 45 g, Rfl: 0    clonazePAM (KlonoPIN) 0.5 mg tablet, TAKE 1 TABLET BY MOUTH 2 TIMES A DAY. (Patient taking differently: Currently once daily, extra half if needed for depression), Disp: 60 tablet, Rfl: 0    cyclobenzaprine (FLEXERIL) 10 mg tablet, , Disp: , Rfl:     dicyclomine (BENTYL) 20 mg tablet, Take 1 tablet (20 mg total) by mouth every 6 (six) hours as needed (abdominal),  Disp: 60 tablet, Rfl: 3    fluticasone (FLONASE) 50 mcg/act nasal spray, SPRAY 1 SPRAY INTO EACH NOSTRIL TWICE A DAY, Disp: , Rfl:     hydroCHLOROthiazide 25 mg tablet, Take 0.5 tablets (12.5 mg total) by mouth daily, Disp: 45 tablet, Rfl: 0    loratadine (CLARITIN REDITABS) 10 MG dissolvable tablet, Take 10 mg by mouth daily, Disp: , Rfl:     Probiotic Product (PROBIOTIC 10 ULTRA STRENGTH PO), Take by mouth, Disp: , Rfl:     zolpidem (AMBIEN) 10 mg tablet, Take 1 tablet (10 mg total) by mouth daily at bedtime as needed (insomnia), Disp: 30 tablet, Rfl: 0    cholecalciferol (VITAMIN D3) 1,000 units tablet, Take 2,000 Units by mouth 2 (two) times a day (Patient not taking: Reported on 4/18/2024), Disp: , Rfl:     diphenoxylate-atropine (LOMOTIL) 2.5-0.025 mg per tablet, Take 1 tablet by mouth 4 (four) times a day as needed for diarrhea (Patient not taking: Reported on 4/18/2024), Disp: 60 tablet, Rfl: 3    estradiol (ESTRACE VAGINAL) 0.1 mg/g vaginal cream, Use pea size amount to the vaginal opening 2-3 times per week (Patient not taking: Reported on 4/18/2024), Disp: 42.5 g, Rfl: 1    mesalamine (APRISO) 0.375 g 24 hr capsule, TAKE 2 CAPSULES BY MOUTH DAILY (Patient not taking: Reported on 4/18/2024), Disp: 180 capsule, Rfl: 1    niacinamide 500 mg tablet, , Disp: , Rfl:   Patient Active Problem List    Diagnosis Date Noted    Chronic kidney disease-mineral and bone disorder 04/18/2023    Memory difficulty 03/02/2023    Postherpetic neuralgia 01/26/2023    Unspecified primary angle-closure glaucoma, stage unspecified 09/14/2022    Thoracic outlet syndrome associated with cervical rib 09/14/2022    Meniere disease 09/14/2022    Mood disorder (HCC) 04/19/2022    Chronic renal failure, stage 3a (HCC) 01/25/2022    Primary hypertension 01/25/2022    Fibromyalgia 01/24/2022    Articular cartilage disorder of hand 03/09/2020    Acquired trigger finger of right ring finger 03/09/2020    Osteoarthritis of carpometacarpal  "(CMC) joint of both thumbs 2020    Microscopic colitis 2019    Lichen sclerosus 2004       Allergies   Allergen Reactions    Levofloxacin Other (See Comments)    Sulfa Antibiotics Fever and Itching    Cephalexin Diarrhea    Nitrofurantoin Fever    Soy Allergy - Food Allergy Diarrhea and GI Intolerance    Topiramate Other (See Comments)    Bacitracin-Neomycin-Polymyxin Rash    Sulfamethoxazole-Trimethoprim Diarrhea       OB History    Para Term  AB Living   1 1 1     1   SAB IAB Ectopic Multiple Live Births                  # Outcome Date GA Lbr Johnathan/2nd Weight Sex Delivery Anes PTL Lv   1 Term               Obstetric Comments   1st menses 12 years old          Vitals:    24 1037   BP: 118/70   BP Location: Left arm   Patient Position: Sitting   Cuff Size: Standard   Weight: 72.6 kg (160 lb)   Height: 5' 3\" (1.6 m)     Body mass index is 28.34 kg/m².    Review of Systems   Constitutional: Negative for chills, fatigue, fever and unexpected weight change.   Respiratory: Negative for shortness of breath.    Gastrointestinal: Negative for anal bleeding, blood in stool and constipation, IBS with diarrhea.   Genitourinary: Negative for difficulty urinating, dysuria and hematuria.     Physical Exam   Constitutional: She appears well-developed and well-nourished. No distress.   HENT: atraumatic, EOMI  Head: Normocephalic.   Neck: Normal range of motion. Neck supple.   Pulmonary: Effort normal.  Breasts: bilateral without masses, skin changes or nipple discharge. Bilaterally soft and warm to touch. No areas of erythema or pain.  Abdominal: Soft.   Pelvic exam was performed with patient supine. No labial fusion. There is no rash, tenderness, lesion or injury on the right labia. There is no rash, tenderness, lesion or injury on the left labia. Urethral meatus does not show any tenderness, inflammation or discharge. Palpation of midline bladder without pain or discomfort. UTERUS/CERVIX " ABSENT. Right adnexum displays no mass, no tenderness and no fullness. Left adnexum displays no mass, no tenderness and no fullness. No erythema or tenderness in the vagina. No foreign body in the vagina. No signs of injury around the vagina or anus. Perineum without lesions, signs of injury, erythema or swelling. No vaginal discharge found. Vagina with hypopigmented areas c/w LSA, as well as surrounding erythema noted on external vulva and perianal and perineal areas, very atrophic vagina. Loss of labia minor noted.  Lymphadenopathy:        Right: No inguinal adenopathy present.        Left: No inguinal adenopathy present.

## 2024-04-18 NOTE — PATIENT INSTRUCTIONS
Lichen Sclerosus   AMBULATORY CARE:   Lichen sclerosus  is a skin condition that most often affects the vulva, penis, or skin around the anus. Lichen sclerosus occurs most often in females. The cause of lichen sclerosus may not be known.   Common symptoms include the following:  You may not have any symptoms, or you may have any of the following:  Pain, itching, or burning    Areas of skin that are thin, wrinkled, and white    Blisters    Bleeding, bruises, or tears on affected skin    Contact your healthcare provider if:   Your symptoms do not get better with treatment.     You have questions or concerns about your condition or care.    Treatment and management:  You do not need treatment if you do not have any symptoms. Your healthcare provider may recommend a steroid cream or ointment. Your provider may also recommend a topical medicine that prevents your immune system from attacking your skin. If you are female, your healthcare provider may recommend that you do not take bubble baths, or use scented soaps and scented detergents. This may help decrease irritation of the vulva. Rarely, adults may need surgery to repair scarring that can occur over time.   Follow up with your doctor as directed:  Write down your questions so you remember to ask them during your visits.  © Copyright [employer] 2023 Information is for End User's use only and may not be sold, redistributed or otherwise used for commercial purposes.  The above information is an  only. It is not intended as medical advice for individual conditions or treatments. Talk to your doctor, nurse or pharmacist before following any medical regimen to see if it is safe and effective for you.  Breast Self Exam for Women   AMBULATORY CARE:   A breast self-exam (BSE)  is a way to check your breasts for lumps and other changes. Regular BSEs can help you know how your breasts normally look and feel. Most breast lumps or changes are not cancer, but you  should always have them checked by a healthcare provider.  Why you should do a BSE:  Breast cancer is the most common type of cancer in women. Even if you have mammograms, you may still want to do a BSE regularly. If you know how your breasts normally feel and look, it may help you know when to contact your healthcare provider. Mammograms can miss some cancers. You may find a lump during a BSE that did not show up on a mammogram.  When you should do a BSE:  If you have periods, you may want to do your BSE 1 week after your period ends. This is the time when your breasts may be the least swollen, lumpy, or tender. You can do regular BSEs even if you are breastfeeding or have breast implants.  Call your doctor if:   You find any lumps or changes in your breasts.    You have breast pain or fluid coming from your nipples.    You have questions or concerns about your condition or care.    How to do a BSE:       Look at your breasts in a mirror.  Look at the size and shape of each breast and nipple. Check for swelling, lumps, dimpling, scaly skin, or other skin changes. Look for nipple changes, such as a nipple that is painful or beginning to pull inward. Gently squeeze both nipples and check to see if fluid (that is not breast milk) comes out of them. If you find any of these or other breast changes, contact your healthcare provider. Check your breasts while you sit or  the following 3 positions:    Hang your arms down at your sides.    Raise your hands and join them behind your head.    Put firm pressure with your hands on your hips. Bend slightly forward while you look at your breasts in the mirror.    Lie down and feel your breasts.  When you lie down, your breast tissue spreads out evenly over your chest. This makes it easier for you to feel for lumps and anything that may not be normal for your breasts. Do a BSE on one breast at a time.    Place a small pillow or towel under your left shoulder.  Put your  left arm behind your head.    Use the 3 middle fingers of your right hand.  Use your fingertip pads, on the top of your fingers. Your fingertip pad is the most sensitive part of your finger.    Use small circles to feel your breast tissue.  Use your fingertip pads to make dime-sized, overlapping circles on your breast and armpits. Use light, medium, and firm pressure. First, press lightly. Second, press with medium pressure to feel a little deeper into the breast. Last, use firm pressure to feel deep within your breast.    Examine your entire breast area.  Examine the breast area from above the breast to below the breast where you feel only ribs. Make small circles with your fingertips, starting in the middle of your armpit. Make circles going up and down the breast area. Continue toward your breast and all the way across it. Examine the area from your armpit all the way over to the middle of your chest (breastbone). Stop at the middle of your chest.    Move the pillow or towel to your right shoulder, and put your right arm behind your head.  Use the 3 fingertip pads of your left hand, and repeat the above steps to do a BSE on your right breast.  What else you can do to check for breast problems or cancer:  Talk to your healthcare provider about mammograms. A mammogram is an x-ray of your breasts to screen for breast cancer or other problems. Your provider can tell you the benefits and risks of mammograms. The first mammogram is usually at age 45 or 50. Your provider may recommend you start at 40 or younger if your risk for breast cancer is high. Mammograms usually continue every 1 to 2 years until age 74.       Follow up with your doctor as directed:  Write down your questions so you remember to ask them during your visits.  © Copyright [employer] 2023 Information is for End User's use only and may not be sold, redistributed or otherwise used for commercial purposes.  The above information is an  only.  It is not intended as medical advice for individual conditions or treatments. Talk to your doctor, nurse or pharmacist before following any medical regimen to see if it is safe and effective for you.

## 2024-04-19 ENCOUNTER — TELEPHONE (OUTPATIENT)
Dept: BEHAVIORAL/MENTAL HEALTH CLINIC | Facility: CLINIC | Age: 69
End: 2024-04-19

## 2024-04-19 NOTE — TELEPHONE ENCOUNTER
Writer reached out to schedule with Sammie at [facility].  Unable to LVM mailbox full.

## 2024-04-24 ENCOUNTER — OFFICE VISIT (OUTPATIENT)
Age: 69
End: 2024-04-24
Payer: MEDICARE

## 2024-04-24 ENCOUNTER — TELEPHONE (OUTPATIENT)
Dept: BEHAVIORAL/MENTAL HEALTH CLINIC | Facility: CLINIC | Age: 69
End: 2024-04-24

## 2024-04-24 VITALS
RESPIRATION RATE: 18 BRPM | WEIGHT: 161 LBS | SYSTOLIC BLOOD PRESSURE: 140 MMHG | TEMPERATURE: 97.4 F | OXYGEN SATURATION: 96 % | BODY MASS INDEX: 28.52 KG/M2 | DIASTOLIC BLOOD PRESSURE: 84 MMHG | HEART RATE: 52 BPM

## 2024-04-24 DIAGNOSIS — T78.40XA ALLERGIC REACTION, INITIAL ENCOUNTER: Primary | ICD-10-CM

## 2024-04-24 PROCEDURE — 99213 OFFICE O/P EST LOW 20 MIN: CPT | Performed by: PHYSICIAN ASSISTANT

## 2024-04-24 PROCEDURE — G0463 HOSPITAL OUTPT CLINIC VISIT: HCPCS | Performed by: PHYSICIAN ASSISTANT

## 2024-04-24 RX ORDER — LORATADINE 10 MG/1
10 TABLET ORAL DAILY
Qty: 10 TABLET | Refills: 0 | Status: SHIPPED | OUTPATIENT
Start: 2024-04-24

## 2024-04-24 RX ORDER — DIPHENHYDRAMINE HCL 25 MG
25 TABLET ORAL
Qty: 30 TABLET | Refills: 0 | Status: SHIPPED | OUTPATIENT
Start: 2024-04-24

## 2024-04-24 RX ORDER — ATORVASTATIN CALCIUM 10 MG/1
10 TABLET, FILM COATED ORAL DAILY
COMMUNITY

## 2024-04-24 RX ORDER — LORATADINE 10 MG/1
10 TABLET ORAL DAILY
COMMUNITY
Start: 2024-04-05 | End: 2024-04-24

## 2024-04-24 NOTE — PROGRESS NOTES
[facility]        NAME: Yue Zepeda is a 68 y.o. female  : 1955    MRN: [id number]  DATE: 2024  TIME: 11:47 AM    Assessment and Plan   Allergic reaction, initial encounter [T78.40XA]  1. Allergic reaction, initial encounter  loratadine (CLARITIN) 10 mg tablet    diphenhydrAMINE (BENADRYL) 25 mg tablet    hydrocortisone 2.5 % cream            Patient Instructions     Difficult to determine whether it is caused by statin versus possible contact with sumac versus history of usual rash patient has been followed for.     Return to your Dermatologist  Keep with Loratadine  Benadryl 25 mg 1-2 at bedtime  Cool compresses  Hydrocortisone 2.5 % cream apply thin film to affected areas twice daily for 1 week      Follow up with PCP in 3-5 days.  Proceed to  ER if symptoms worsen.    If tests are performed, our office will contact you with results only if changes need to made to the care plan discussed with you at the visit. [facility]    Chief Complaint     Chief Complaint   Patient presents with    Rash     Pt states she has a rash on b/l arms and her abdomen that is itchy for about a week .  patient restarted statins.          History of Present Illness       68-year-old female who is presenting today with plaint rash that is developed over the distal upper extremities.  A few are in her abdomen as well.  However the patient does have a history of rashes in which is being followed.  On 1 occasion she was recommended to consider Biologics.  Patient reports rash is pruritic and red.  She denies fever, chills, nausea vomiting, malaise, fatigue shortness of breath or wheezing.  No changes in appetite.  Patient has been using Claritin for her allergic rhinitis.  Patient reports having a branch sharp edge break her right hand which has a pruritic halo.  Patient also reports she has been started on atorvastatin which she believes could be causing  the allergic reaction.  She discussed the issue with her cardiologist who felt she should continue to take the statin however.        Review of Systems   Review of Systems   Constitutional:  Negative for activity change, appetite change, chills and fever.   Respiratory:  Negative for cough, chest tightness, shortness of breath and wheezing.    Cardiovascular:  Negative for chest pain and palpitations.   Gastrointestinal:  Negative for nausea and vomiting.   Musculoskeletal:  Negative for myalgias.   Skin:  Positive for rash.   Neurological:  Negative for dizziness, light-headedness and headaches.         Current Medications       Current Outpatient Medications:     amitriptyline (ELAVIL) 10 mg tablet, Take 1 tablet (10 mg total) by mouth daily at bedtime, Disp: 30 tablet, Rfl: 3    atenolol (TENORMIN) 25 mg tablet, TAKE 2 TABLETS BY MOUTH EVERY DAY, Disp: 90 tablet, Rfl: 3    atorvastatin (LIPITOR) 10 mg tablet, Take 10 mg by mouth daily, Disp: , Rfl:     cholestyramine (QUESTRAN) 4 g packet, Take 1 packet (4 g total) by mouth 3 (three) times a day with meals, Disp: 60 packet, Rfl: 3    clobetasol (TEMOVATE) 0.05 % cream, Apply topically 2 (two) times a day, Disp: 45 g, Rfl: 0    clonazePAM (KlonoPIN) 0.5 mg tablet, TAKE 1 TABLET BY MOUTH 2 TIMES A DAY. (Patient taking differently: Currently once daily, extra half if needed for depression), Disp: 60 tablet, Rfl: 0    diphenhydrAMINE (BENADRYL) 25 mg tablet, Take 1 tablet (25 mg total) by mouth daily at bedtime as needed for itching, Disp: 30 tablet, Rfl: 0    estradiol (Vagifem) 10 MCG TABS vaginal tablet, Insert 1 tablet (10 mcg total) into the vagina 2 (two) times a week, Disp: 8 tablet, Rfl: 6    fluticasone (FLONASE) 50 mcg/act nasal spray, SPRAY 1 SPRAY INTO EACH NOSTRIL TWICE A DAY, Disp: , Rfl:     hydroCHLOROthiazide 25 mg tablet, Take 0.5 tablets (12.5 mg total) by mouth daily, Disp: 45 tablet, Rfl: 0    hydrocortisone 2.5 % cream, Apply topically 2 (two)  times a day, Disp: 20 g, Rfl: 0    loratadine (CLARITIN) 10 mg tablet, Take 1 tablet (10 mg total) by mouth daily, Disp: 10 tablet, Rfl: 0    mesalamine (APRISO) 0.375 g 24 hr capsule, TAKE 2 CAPSULES BY MOUTH DAILY, Disp: 180 capsule, Rfl: 1    Probiotic Product (PROBIOTIC 10 ULTRA STRENGTH PO), Take by mouth, Disp: , Rfl:     zolpidem (AMBIEN) 10 mg tablet, Take 1 tablet (10 mg total) by mouth daily at bedtime as needed (insomnia), Disp: 30 tablet, Rfl: 0    cholecalciferol (VITAMIN D3) 1,000 units tablet, Take 2,000 Units by mouth 2 (two) times a day (Patient not taking: Reported on 4/18/2024), Disp: , Rfl:     cyclobenzaprine (FLEXERIL) 10 mg tablet, , Disp: , Rfl:     dicyclomine (BENTYL) 20 mg tablet, Take 1 tablet (20 mg total) by mouth every 6 (six) hours as needed (abdominal) (Patient not taking: Reported on 4/24/2024), Disp: 60 tablet, Rfl: 3    diphenoxylate-atropine (LOMOTIL) 2.5-0.025 mg per tablet, Take 1 tablet by mouth 4 (four) times a day as needed for diarrhea (Patient not taking: Reported on 4/18/2024), Disp: 60 tablet, Rfl: 3    niacinamide 500 mg tablet, , Disp: , Rfl:     Current Allergies     Allergies as of 04/24/2024 - Reviewed 04/24/2024   Allergen Reaction Noted    Levofloxacin Other (See Comments) 05/22/2019    Sulfa antibiotics Fever and Itching 05/22/2019    Cephalexin Diarrhea 08/25/2021    Nitrofurantoin Fever 05/22/2019    Soy allergy - food allergy Diarrhea and GI Intolerance 12/13/2023    Topiramate Other (See Comments) 08/25/2021    Bacitracin-neomycin-polymyxin Rash 05/22/2019    Sulfamethoxazole-trimethoprim Diarrhea 05/22/2019            The following portions of the patient's history were reviewed and updated as appropriate: allergies, current medications, past family history, past medical history, past social history, past surgical history and problem list.     Past Medical History:   Diagnosis Date    Abnormal Pap smear of cervix 1/6/22    Basal cell carcinoma (BCC) of  parietal region of scalp 06/13/2022    Left parietal scalp    Bipolar 1 disorder, depressed (HCC)     Bipolar 1 disorder, depressed ([facility]) 01/24/1975    Chronic kidney disease     Depression     H/O bladder infections     Hyperlipidemia     Hypertension     Lichen sclerosus     Microscopic colitis     Skin disorder     lichens sclerosis    Urinary tract infection        Past Surgical History:   Procedure Laterality Date    COLONOSCOPY      2013     HYSTERECTOMY      MOHS SURGERY Left 06/28/2022    Left parietal scalp       Family History   Problem Relation Age of Onset    Squamous cell carcinoma Mother     Kidney disease Father     Diabetic kidney disease Father     No Known Problems Son     No Known Problems Half-Sister          Medications have been verified.        Objective   /84   Pulse (!) 52   Temp (!) 97.4 °F (36.3 °C)   Resp 18   Wt 73 kg (161 lb)   SpO2 96%   BMI 28.52 kg/m²        Physical Exam     Physical Exam  Vitals and nursing note reviewed.   Constitutional:       General: She is not in acute distress.     Appearance: Normal appearance. She is normal weight. She is not ill-appearing, toxic-appearing or diaphoretic.   HENT:      Nose: Nose normal.      Mouth/Throat:      Mouth: Mucous membranes are moist.      Pharynx: Oropharynx is clear. No oropharyngeal exudate or posterior oropharyngeal erythema.   Eyes:      General: No scleral icterus.     Extraocular Movements: Extraocular movements intact.      Conjunctiva/sclera: Conjunctivae normal.      Pupils: Pupils are equal, round, and reactive to light.   Cardiovascular:      Rate and Rhythm: Normal rate and regular rhythm.      Pulses: Normal pulses.      Heart sounds: Normal heart sounds.   Pulmonary:      Effort: Pulmonary effort is normal. No respiratory distress.      Breath sounds: Normal breath sounds. No wheezing, rhonchi or rales.   Chest:      Chest wall: No tenderness.   Musculoskeletal:         General: Normal range of motion.       Cervical back: Normal range of motion and neck supple.   Skin:     Comments: Erythematous, papules of the upper extremities noted.  1 single on the left side of her neck 1 on the right side of the abdomen.  Various sizes from 5 mm to 10 mm.  Lesions karla on palpation.   Neurological:      Mental Status: She is alert and oriented to person, place, and time.      Coordination: Coordination normal.      Gait: Gait normal.   Psychiatric:         Mood and Affect: Mood normal.         Behavior: Behavior normal.

## 2024-04-29 ENCOUNTER — SOCIAL WORK (OUTPATIENT)
Dept: BEHAVIORAL/MENTAL HEALTH CLINIC | Facility: CLINIC | Age: 69
End: 2024-04-29
Payer: MEDICARE

## 2024-04-29 DIAGNOSIS — F39 MOOD DISORDER (HCC): Primary | ICD-10-CM

## 2024-04-29 PROCEDURE — 90791 PSYCH DIAGNOSTIC EVALUATION: CPT

## 2024-04-29 NOTE — BH CRISIS PLAN
Client Name: Yue Zepeda       Client YOB: 1955    [facility] Safety Plan      Creation Date: 4/29/24    Created By: Sammie Mcmillan       Step 1: Warning Signs:   Warning Signs   More isolated   Feeling numb            Step 2: Internal Coping Strategies:   Internal Coping Strategies   Hot showers   Taking care of the pets            Step 3: People and social settings that provide distraction:   Name Contact Information   Friend/ Niki number in phone   Manjula/ friend number in phone    Places   Go out to lunch           Step 4: People whom I can ask for help during a crisis:      Name Contact Information    Mom/ Heidi number in phone      Step 5: Professionals or agencies I can contact during a crisis:      Clinican/Agency Name Phone Emergency Contact    Sammie Mcmillan/ Marisela [facility] therapist [phone] scheduling/ or [email or web address]       Local Emergency Department Emergency Department Phone Emergency Department Address    [facility] -[phone] [facility] [address]        Crisis Phone Numbers:   Suicide Prevention Lifeline: Call or Text  026 Crisis Text Line: Text HOME to 624-298   Please note: Some [facility] do not have a separate number for Child/Adolescent specific crisis. If your county is not listed under Child/Adolescent, please call the adult number for your county      Adult Crisis Numbers: Child/Adolescent Crisis Numbers   [facility]: [phone] [facility]: [phone]   [facility]: [phone] [facility]: [phone]   [facility]: [phone] [place], [place]: [phone]   [facility]: [phone] [place]: [phone]   [place] [facility]: [phone]   [facility]: [phone]   [facility]: [phone]   [place] [facility]: [phone] (daytime) [phone] (after hours, weekends, holidays)      Step 6: Making the environment safer (plan for lethal means safety):   Patient did not  "identify any lethal methods: Yes     Optional: What is most important to me and worth living for?   \"My son\"     [place]uy Safety Plan. Radha Chávez and James Dsouza. Used with permission of the authors.           "

## 2024-04-29 NOTE — BH TREATMENT PLAN
"Outpatient Behavioral Health Psychotherapy Treatment Plan    Yue Zepeda  1955     Date of Initial Psychotherapy Assessment: 4/29/2024   Date of Current Treatment Plan: 04/29/24  Treatment Plan Target Date: TBD  Treatment Plan Expiration Date: 10/005780    Diagnosis:   1. Mood disorder (HCC)            Area(s) of Need: Recurrent depression, and loss of interest in things particularly since [facility].    Long Term Goal 1 (in the client's own words):  \"I would like to be able to become high functioning for me and not always put me last\"    Stage of Change: Contemplation    Target Date for completion: TBD     Anticipated therapeutic modalities: DBT, CBT, SFT, MI     People identified to complete this goal: Yue and therapist      Objective 1: (identify the means of measuring success in meeting the objective): Yue will learn and utilize at least 3 DBT skills over the next 6 months.      Objective 2: (identify the means of measuring success in meeting the objective): Yue will create and utilize a daily self-plan to include one morning and one evening relaxation and/or leisure activity.      Long Term Goal 2 (in the client's own words): \"Repair relationships\"    Stage of Change: Contemplation    Target Date for completion: TBD     Anticipated therapeutic modalities: DBT, CBT, CPT, MI     People identified to complete this goal: Yue and therapist      Objective 1: (identify the means of measuring success in meeting the objective): Yue will be able to learn at least 3 relaxation techniques over the next 6 months to be paired with memories of her trauma.      Objective 2: (identify the means of measuring success in meeting the objective): Yue will be able to retell her narrative of any trauma she chooses to explore over the next 6 months.        I am currently under the care of a [facility] psychiatric provider: no    My [facility] psychiatric provider is:N/A      I am currently taking psychiatric " "medications: Yes, as prescribed    I feel that I will be ready for discharge from mental health care when I reach the following (measurable goal/objective): \"When I am able to make daily decisions without worry about long-term consequences.\"    For children and adults who have a legal guardian:   Has there been any change to custody orders and/or guardianship status? NA. If yes, attach updated documentation.    I have created my Crisis Plan and have been offered a copy of this plan    Behavioral Health Treatment Plan [facility]: Diagnosis and Treatment Plan explained to Yue Zepeda acknowledges an understanding of their diagnosis. Yue Zepeda agrees to this treatment plan.    I have been offered a copy of this Treatment Plan. yes        "
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
## 2024-05-06 ENCOUNTER — SOCIAL WORK (OUTPATIENT)
Dept: BEHAVIORAL/MENTAL HEALTH CLINIC | Facility: CLINIC | Age: 69
End: 2024-05-06
Payer: MEDICARE

## 2024-05-06 DIAGNOSIS — F39 MOOD DISORDER (HCC): Primary | ICD-10-CM

## 2024-05-06 PROCEDURE — 90837 PSYTX W PT 60 MINUTES: CPT

## 2024-05-06 NOTE — PSYCH
"Behavioral Health Psychotherapy Progress Note    Psychotherapy Provided: Individual Psychotherapy     1. Mood disorder (HCC)            Goals addressed in session: Goal 1     DATA: Yue presented today casually dressed, neat. She is frustrated today when she arrives by the fact that her skin rash on her arms and trunk got progressively worse this week.  She saw a PCP today in [place] to address this and will f/u with a dermatologist Wednesday.  She said she was committed to come to today's appointment to show therapist she is \"in it\" and willing to do therapy. Therapist also punctuated this point with her that she is committed to tx.    We spent time processing her week. Yue described several challenges and she often uses reasoning mind.  We discussed Wise Mind concept today.  Yue often finds she will block emotions because when she does express them they become overwhelming. She was able to relate the Wise Mind concept to this discussion. Yue also related some of the difficulties with her relationship with her 91 year old mother, and reflected on how things were growing up. Therapist provided active listening and empathetic support as she discussed these issues. We were able to reflect on the cycle that her grandmother modeled to her mother, which in turn was how her mother parented her.  Yue was provided a handout on Wise Mind thinking and can complete an activity on that if she chooses to do so.  She was also provided a hand out on anxiety cycle and avoidance, and an assignment for recognizing anxiety triggers.  During this session, this clinician used the following therapeutic modalities: Client-centered Therapy, Cognitive Behavioral Therapy, Dialectical Behavior Therapy, Mindfulness-based Strategies, and Motivational Interviewing    Substance Abuse was not addressed during this session. If the client is diagnosed with a co-occurring substance use disorder, please indicate any changes in the frequency or " "amount of use: N/A. Stage of change for addressing substance use diagnoses: No substance use/Not applicable    ASSESSMENT:  Yue Zepeda presents with a Euthymic/ normal mood.     her affect is Normal range and intensity, which is congruent, with her mood and the content of the session. The client has made progress on their goals.     Yue Zepeda presents with a none risk of suicide, none risk of self-harm, and none risk of harm to others.    For any risk assessment that surpasses a \"low\" rating, a safety plan must be developed.    A safety plan was indicated: no  If yes, describe in detail N/A    PLAN: Between sessions, Yue Zepeda will review the handouts from today and consider doing the exercises with those. At the next session, the therapist will use Client-centered Therapy, Cognitive Behavioral Therapy, Dialectical Behavior Therapy, Mindfulness-based Strategies, and Motivational Interviewing to address anxiety and depressive symptoms.    Behavioral Health Treatment Plan and Discharge Planning: Yue Zepeda is aware of and agrees to continue to work on their treatment plan. They have identified and are working toward their discharge goals. yes    Visit start and stop times:    05/06/24  Start Time: 1230  Stop Time: 1323  Total Visit Time: 53 minutes  "

## 2024-05-13 ENCOUNTER — SOCIAL WORK (OUTPATIENT)
Dept: BEHAVIORAL/MENTAL HEALTH CLINIC | Facility: CLINIC | Age: 69
End: 2024-05-13
Payer: MEDICARE

## 2024-05-13 DIAGNOSIS — F39 MOOD DISORDER (HCC): Primary | ICD-10-CM

## 2024-05-13 PROCEDURE — 90837 PSYTX W PT 60 MINUTES: CPT

## 2024-05-13 NOTE — PSYCH
"Behavioral Health Psychotherapy Progress Note    Psychotherapy Provided: Individual Psychotherapy   1. Mood disorder (HCC)            Goals addressed in session: Goal 1     DATA: Yue presents today dressed neatly, and stating she is fatigued. We processed her week. She visited her mother this weekend. Discussed the expectations she has regarding conversations with her mother. Yue identifies that she is searching for answers from her childhood at times. Yue stated she did not work on any of the wise-mind activities this week as she felt more fatigued. She was focused on the visit with her mother and we spent time exploring those reasons. We also discussed need for feeling in control, what areas she could release control of vs what areas to keep control within her life.    During this session, this clinician used the following therapeutic modalities: Client-centered Therapy, Cognitive Behavioral Therapy, and Motivational Interviewing    Substance Abuse was not addressed during this session. If the client is diagnosed with a co-occurring substance use disorder, please indicate any changes in the frequency or amount of use: N/A. Stage of change for addressing substance use diagnoses: No substance use/Not applicable    ASSESSMENT:  Yue Zepeda presents with a Euthymic/ normal mood.     her affect is Normal range and intensity, which is congruent, with her mood and the content of the session. The client has made progress on their goals.     Yue Zepeda presents with a none risk of suicide, none risk of self-harm, and none risk of harm to others.    For any risk assessment that surpasses a \"low\" rating, a safety plan must be developed.    A safety plan was indicated: no  If yes, describe in detail N/A    PLAN: Between sessions, Yue Zepeda will consider areas of stress or responsibility (maintenance etc) that she could release control of vs what she needs to keep control of, for the next session, the therapist " will use Client-centered Therapy, Cognitive Behavioral Therapy, Dialectical Behavior Therapy, Mindfulness-based Strategies, Motivational Interviewing, and Solution-Focused Therapy to address ongoing anxiety and depressive mood symptoms.    Behavioral Health Treatment Plan and Discharge Planning: Yue Zepeda is aware of and agrees to continue to work on their treatment plan. They have identified and are working toward their discharge goals. yes    Visit start and stop times:    05/13/24  Start Time: 1230  Stop Time: 1324  Total Visit Time: 54 minutes

## 2024-05-16 ENCOUNTER — TELEPHONE (OUTPATIENT)
Dept: OBGYN CLINIC | Facility: CLINIC | Age: 69
End: 2024-05-16

## 2024-05-20 ENCOUNTER — SOCIAL WORK (OUTPATIENT)
Dept: BEHAVIORAL/MENTAL HEALTH CLINIC | Facility: CLINIC | Age: 69
End: 2024-05-20
Payer: MEDICARE

## 2024-05-20 DIAGNOSIS — F39 MOOD DISORDER (HCC): Primary | ICD-10-CM

## 2024-05-20 PROCEDURE — 90837 PSYTX W PT 60 MINUTES: CPT

## 2024-05-20 NOTE — PSYCH
"Behavioral Health Psychotherapy Progress Note    Psychotherapy Provided: Individual Psychotherapy     1. Mood disorder (HCC)            Goals addressed in session: Goal 1     DATA: Yue presents today casually dressed, and feeling improved physically as her poison ivy on her arms has now resolved. She spent most of this session processing her week. She was not able to identify any self-care that she completed this week. Spent most of session deflecting any heavily emotional issues. States she feels she sleeps too long. Has been tracking potential snoring with her phone. Declines wanting to do sleep apnea testing which has been previously suggested by physicians for her. We talked about her potential anxieties related to this. Will continue to work on building rapport.  During this session, this clinician used the following therapeutic modalities: Client-centered Therapy, Dialectical Behavior Therapy, Mindfulness-based Strategies, and Motivational Interviewing    Substance Abuse was not addressed during this session. If the client is diagnosed with a co-occurring substance use disorder, please indicate any changes in the frequency or amount of use: n/a. Stage of change for addressing substance use diagnoses: No substance use/Not applicable    ASSESSMENT:  Yue Zepeda presents with a Euthymic/ normal mood.     her affect is Normal range and intensity, which is congruent, with her mood and the content of the session. The client has made progress on their goals.     Yue Zepeda presents with a none risk of suicide, none risk of self-harm, and none risk of harm to others.    For any risk assessment that surpasses a \"low\" rating, a safety plan must be developed.    A safety plan was indicated: no  If yes, describe in detail n/a    PLAN: Between sessions, Yue Zepeda will attempt one self-care activity this week, such as taking a walk, or starting one of her projects. At the next session, the therapist will use " Client-centered Therapy, Cognitive Behavioral Therapy, Dialectical Behavior Therapy, Mindfulness-based Strategies, and Motivational Interviewing to address continued anxiety and depressive mood.    Behavioral Health Treatment Plan and Discharge Planning: Yue Zepeda is aware of and agrees to continue to work on their treatment plan. They have identified and are working toward their discharge goals. yes    Visit start and stop times:    05/20/24  Start Time: 1232  Stop Time: 1325  Total Visit Time: 53 minutes

## 2024-05-24 DIAGNOSIS — K58.2 IRRITABLE BOWEL SYNDROME WITH BOTH CONSTIPATION AND DIARRHEA: ICD-10-CM

## 2024-05-24 RX ORDER — MESALAMINE 0.38 G/1
0.75 CAPSULE, EXTENDED RELEASE ORAL DAILY
Qty: 180 CAPSULE | Refills: 1 | Status: SHIPPED | OUTPATIENT
Start: 2024-05-24

## 2024-06-03 ENCOUNTER — SOCIAL WORK (OUTPATIENT)
Dept: BEHAVIORAL/MENTAL HEALTH CLINIC | Facility: CLINIC | Age: 69
End: 2024-06-03

## 2024-06-03 DIAGNOSIS — F39 MOOD DISORDER (HCC): Primary | ICD-10-CM

## 2024-06-03 NOTE — PSYCH
Behavioral Health Psychotherapy Progress Note    Psychotherapy Provided: Individual Psychotherapy     1. Mood disorder (HCC)            Goals addressed in session: Goal 1     DATA: Yue presents today casually dressed and in pleasant mood. We processed her last two weeks. She attended a  last week and saw her siblings. She feels this went well overall. 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 Processed an event this week where she had a panic attack. She went hiking and came to a trail with some heights but she identified that she started to panic when she realized she could not see around the bend I the rocks ahead of her. She described rapid breathing, heartrate, and says she had bowel incontinent slightly when this occurred. She backed up and returned the other way. We processed the event and identified steps to use grounding next time. As we did this Yue was able to identify that she fears not being able to see if anyone would be coming at her.   Yue is walking more, and changing some eating patterns as part of her self-care plan. 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    During this session, this clinician used the following therapeutic modalities: Client-centered Therapy, Cognitive Behavioral Therapy, Dialectical Behavior Therapy, Mindfulness-based Strategies, and Motivational Interviewing    Substance Abuse was not addressed during this session. If the client is diagnosed with a co-occurring substance use disorder, please indicate any changes in the frequency or amount of use: n/a. Stage of change for addressing substance use diagnoses: No substance use/Not applicable    ASSESSMENT:  Yue Zepeda presents with a Euthymic/ normal mood.     her affect is Normal range and intensity, which is congruent, with her mood and the content of the session. The client has made progress on their goals.     Yue Zepeda presents with  "a none risk of suicide, none risk of self-harm, and none risk of harm to others.    For any risk assessment that surpasses a \"low\" rating, a safety plan must be developed.    A safety plan was indicated: no  If yes, describe in detail n/a - same safety plan as previously    PLAN: Between sessions, Yue Zepeda will continue with self-care plan. At the next session, the therapist will use Client-centered Therapy, Cognitive Behavioral Therapy, Dialectical Behavior Therapy, Mindfulness-based Strategies, and Motivational Interviewing to address anxiety, trauma responses.    Behavioral Health Treatment Plan and Discharge Planning: Yue Zepeda is aware of and agrees to continue to work on their treatment plan. They have identified and are working toward their discharge goals. yes    Visit start and stop times:    06/03/24  Start Time: 1230  Stop Time: 1325  Total Visit Time: 55 minutes  "

## 2024-06-04 RX ORDER — BUPROPION HYDROCHLORIDE 300 MG/1
TABLET ORAL
COMMUNITY

## 2024-06-04 RX ORDER — BETAMETHASONE DIPROPIONATE 0.05 %
OINTMENT (GRAM) TOPICAL
COMMUNITY
Start: 2024-04-25

## 2024-06-04 RX ORDER — CLOTRIMAZOLE AND BETAMETHASONE DIPROPIONATE 10; .64 MG/G; MG/G
CREAM TOPICAL
COMMUNITY

## 2024-06-05 ENCOUNTER — OFFICE VISIT (OUTPATIENT)
Dept: GASTROENTEROLOGY | Facility: CLINIC | Age: 69
End: 2024-06-05
Payer: MEDICARE

## 2024-06-05 VITALS
TEMPERATURE: 97.4 F | WEIGHT: 161.4 LBS | HEART RATE: 60 BPM | HEIGHT: 63 IN | OXYGEN SATURATION: 94 % | SYSTOLIC BLOOD PRESSURE: 112 MMHG | DIASTOLIC BLOOD PRESSURE: 80 MMHG | BODY MASS INDEX: 28.6 KG/M2

## 2024-06-05 DIAGNOSIS — K58.0 IRRITABLE BOWEL SYNDROME WITH DIARRHEA: Primary | ICD-10-CM

## 2024-06-05 PROCEDURE — 99213 OFFICE O/P EST LOW 20 MIN: CPT | Performed by: PHYSICIAN ASSISTANT

## 2024-06-05 NOTE — PROGRESS NOTES
[facility] - Outpatient Follow-up Note  Yue Zepeda 68 y.o. female MRN: [id number]  Encounter: [id number]          ASSESSMENT AND PLAN:      1. Irritable bowel syndrome with diarrhea  She notes symptom improvement with Amitriptyline 10mg at bedtime  She admits to increased fatigue throughout the day but she thinks this is multifactorial  She wants to continue the medicine and continue to monitor    She is still considering starting Humira or Rinvoq for her lichen sclerosis  We discussed this again today  ______________________________________________________________________    SUBJECTIVE: 68-year-old female with diarrhea predominant irritable bowel syndrome and a history of microscopic colitis as well as anxiety, hypertension, kidney disease and lichen sclerosis who presents for routine follow-up.  Given her ongoing GI symptoms at her last appointment we agreed to start amitriptyline.  She is taking 10 mg at bedtime.  She reports that it is helping significantly with her symptoms.  She is not taking Imodium routinely anymore.  She is not requiring dicyclomine or Lomotil.  If she knows that she has an upcoming stressful event she will take a packet of Questran and this is helping.  Overall she is doing very well.      REVIEW OF SYSTEMS IS OTHERWISE NEGATIVE.      Historical Information   Past Medical History:   Diagnosis Date    Abnormal Pap smear of cervix 1/6/22    Basal cell carcinoma (BCC) of parietal region of scalp 06/13/2022    Left parietal scalp    Bipolar 1 disorder, depressed (HCC)     Bipolar 1 disorder, depressed (HCC) 01/24/1975    Chronic kidney disease     Depression     Diverticulitis of colon     First colonoscopy age 52    GERD (gastroesophageal reflux disease)     H/O bladder infections     Hyperlipidemia     Hypertension     Irritable bowel syndrome 3 years ago    Diarhea, constipation, acid indigestion    Lichen sclerosus     Microscopic colitis     Skin disorder      lichens sclerosis    Urinary tract infection      Past Surgical History:   Procedure Laterality Date    COLONOSCOPY      2013     HYSTERECTOMY      MOHS SURGERY Left 2022    Left parietal scalp     Social History   Social History     Substance and Sexual Activity   Alcohol Use Yes    Alcohol/week: 1.0 standard drink of alcohol    Types: 1 Standard drinks or equivalent per week    Comment: 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  Average a wee     Social History     Substance and Sexual Activity   Drug Use Not Currently    Comment: In my teens and twenties     Social History     Tobacco Use   Smoking Status Former    Current packs/day: 0.00    Average packs/day: 2.0 packs/day for 20.0 years (40.0 ttl pk-yrs)    Types: Cigarettes    Start date: 1975    Quit date: 1995    Years since quittin.4   Smokeless Tobacco Never   Tobacco Comments    heavy smoker previously     Family History   Problem Relation Age of Onset    Squamous cell carcinoma Mother     Arthritis Mother     Diabetes Mother     Hypertension Mother     Kidney disease Father     Diabetic kidney disease Father     Early death Father         Age 33    Hypertension Father     No Known Problems Son     No Known Problems Half-Sister        Meds/Allergies       Current Outpatient Medications:     amitriptyline (ELAVIL) 10 mg tablet    atenolol (TENORMIN) 25 mg tablet    betamethasone dipropionate (DIPROSONE) 0.05 % ointment    buPROPion (WELLBUTRIN XL) 300 mg 24 hr tablet    cholestyramine (QUESTRAN) 4 g packet    clobetasol (TEMOVATE) 0.05 % cream    clonazePAM (KlonoPIN) 0.5 mg tablet    clotrimazole-betamethasone (LOTRISONE) 1-0.05 % cream    cyclobenzaprine (FLEXERIL) 10 mg tablet    diphenoxylate-atropine (LOMOTIL) 2.5-0.025 mg per tablet    estradiol (Vagifem) 10 MCG TABS vaginal tablet    fluticasone (FLONASE) 50 mcg/act nasal spray    hydroCHLOROthiazide 25 mg tablet    loratadine (CLARITIN) 10 mg tablet    Probiotic Product  "(PROBIOTIC 10 ULTRA STRENGTH PO)    zolpidem (AMBIEN) 10 mg tablet    dicyclomine (BENTYL) 20 mg tablet    Allergies   Allergen Reactions    Levofloxacin Other (See Comments)    Sulfa Antibiotics Fever and Itching    Cephalexin Diarrhea    Nitrofurantoin Fever    Soy Allergy - Food Allergy Diarrhea and GI Intolerance    Topiramate Other (See Comments)    Bacitracin-Neomycin-Polymyxin Rash    Sulfamethoxazole-Trimethoprim Diarrhea           Objective     Blood pressure 112/80, pulse 60, temperature (!) 97.4 °F (36.3 °C), temperature source Temporal, height 5' 3\" (1.6 m), weight 73.2 kg (161 lb 6.4 oz), SpO2 94%, not currently breastfeeding. Body mass index is 28.59 kg/m².      PHYSICAL EXAM:      General Appearance:   Alert, cooperative, no distress   HEENT:   Normocephalic, atraumatic, anicteric.     Neck:  Supple, symmetrical, trachea midline   Lungs:   Clear to auscultation bilaterally; no rales, rhonchi or wheezing; respirations unlabored    Heart::   Regular rate and rhythm; no murmur, rub, or gallop.   Abdomen:   Soft, non-tender, non-distended; normal bowel sounds; no masses, no organomegaly    Genitalia:   Deferred    Rectal:   Deferred    Extremities:  No cyanosis, clubbing or edema    Pulses:  2+ and symmetric    Skin:  No jaundice, rashes, or lesions    Lymph nodes:  No palpable cervical lymphadenopathy        Lab Results:   No visits with results within 1 Day(s) from this visit.   Latest known visit with results is:   Lab on 04/05/2024   Component Date Value    WBC 04/05/2024 7.92     RBC 04/05/2024 4.63     Hemoglobin 04/05/2024 14.0     Hematocrit 04/05/2024 41.2     MCV 04/05/2024 89     MCH 04/05/2024 30.2     MCHC 04/05/2024 34.0     RDW 04/05/2024 12.6     MPV 04/05/2024 9.7     Platelets 04/05/2024 264     nRBC 04/05/2024 0     Segmented % 04/05/2024 47     Immature Grans % 04/05/2024 0     Lymphocytes % 04/05/2024 45 (H)     Monocytes % 04/05/2024 7     Eosinophils Relative 04/05/2024 0     " Basophils Relative 04/05/2024 1     Absolute Neutrophils 04/05/2024 3.76     Absolute Immature Grans 04/05/2024 0.02     Absolute Lymphocytes 04/05/2024 3.56     Absolute Monocytes 04/05/2024 0.53     Eosinophils Absolute 04/05/2024 0.00     Basophils Absolute 04/05/2024 0.05     Sodium 04/05/2024 140     Potassium 04/05/2024 3.6     Chloride 04/05/2024 102     CO2 04/05/2024 30     ANION GAP 04/05/2024 8     BUN 04/05/2024 15     Creatinine 04/05/2024 0.96     Glucose, Fasting 04/05/2024 93     Calcium 04/05/2024 9.1     AST 04/05/2024 19     ALT 04/05/2024 24     Alkaline Phosphatase 04/05/2024 67     Total Protein 04/05/2024 6.8     Albumin 04/05/2024 4.3     Total Bilirubin 04/05/2024 0.40     eGFR 04/05/2024 61     Cholesterol 04/05/2024 222 (H)     Triglycerides 04/05/2024 129     HDL, Direct 04/05/2024 76     LDL Calculated 04/05/2024 120 (H)     CRP 04/05/2024 2.7     Sed Rate 04/05/2024 4          Radiology Results:   No results found.  Answers submitted by the patient for this visit:  Abdominal Pain Questionnaire (Submitted on 6/1/2024)  Chief Complaint: Abdominal pain  Chronicity: chronic  Onset: more than 1 year ago

## 2024-06-11 ENCOUNTER — SOCIAL WORK (OUTPATIENT)
Dept: BEHAVIORAL/MENTAL HEALTH CLINIC | Facility: CLINIC | Age: 69
End: 2024-06-11
Payer: MEDICARE

## 2024-06-11 DIAGNOSIS — F39 MOOD DISORDER (HCC): Primary | ICD-10-CM

## 2024-06-11 PROCEDURE — 90837 PSYTX W PT 60 MINUTES: CPT

## 2024-06-11 NOTE — PSYCH
"Behavioral Health Psychotherapy Progress Note    Psychotherapy Provided: Individual Psychotherapy     1. Mood disorder (HCC)            Goals addressed in session: Goal 1 and Goal 2     DATA: Yue presents today in person, casually dressed, and in pleasant mood but states she is extremely tired. She feels her body is \"just not right' or \"it is the amitriptyline\". We processed her week. 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 She is nervious about telling them no. We did some enactments of how she can approach this conversation. She will be following up with her PCP (Dr. Christina in [place]) next week. We talked about the possibility of asking for genesight testing regarding her medications and she will discuss with him. She also continues to describe sleep issues consistent with need for sleep testing. She admits that she was asked to do this by her PCP multiple times and always declines. She is considering asking again now. She does wake up startled at times and admits that she used to have night terrors at one point in her life when younger. She would wake up outside and not know how she got there. This was many years ago.     During this session, this clinician used the following therapeutic modalities: Client-centered Therapy, Cognitive Behavioral Therapy, Dialectical Behavior Therapy, and Motivational Interviewing    Substance Abuse was not addressed during this session. If the client is diagnosed with a co-occurring substance use disorder, please indicate any changes in the frequency or amount of use: n/a. Stage of change for addressing substance use diagnoses: No substance use/Not applicable    ASSESSMENT:  Yue Zepeda presents with a Euthymic/ normal mood.     her affect is Normal range and intensity, which is congruent, with her mood and the content of the session. The client has made progress on their " "goals.     Yue Zepeda presents with a none risk of suicide, none risk of self-harm, and none risk of harm to others.    For any risk assessment that surpasses a \"low\" rating, a safety plan must be developed.    A safety plan was indicated: no  If yes, describe in detail n/a same safety plan as previous/ no changes.    PLAN: Between sessions, Yue Zepeda will follow up at planned with her PCP, continue self-care plan. At the next session, the therapist will use Client-centered Therapy, Cognitive Behavioral Therapy, Dialectical Behavior Therapy, Mindfulness-based Strategies, and Motivational Interviewing to address anxiety,.    Behavioral Health Treatment Plan and Discharge Planning: Yue Zepeda is aware of and agrees to continue to work on their treatment plan. They have identified and are working toward their discharge goals. yes    Visit start and stop times:    06/11/24  Start Time: 1225  Stop Time: 1330  Total Visit Time: 65 minutes  "

## 2024-06-17 ENCOUNTER — SOCIAL WORK (OUTPATIENT)
Dept: BEHAVIORAL/MENTAL HEALTH CLINIC | Facility: CLINIC | Age: 69
End: 2024-06-17
Payer: MEDICARE

## 2024-06-17 DIAGNOSIS — F39 MOOD DISORDER (HCC): Primary | ICD-10-CM

## 2024-06-17 PROCEDURE — 90837 PSYTX W PT 60 MINUTES: CPT

## 2024-06-17 NOTE — PSYCH
"Behavioral Health Psychotherapy Progress Note    Psychotherapy Provided: Individual Psychotherapy     1. Mood disorder (HCC)            Goals addressed in session: Goal 1 and Goal 2     DATA: Yue presents today casually, neatly dressed and in pleasant mood. She reports feeling physically a bit better this week. Her mood has been better. We processed her week. After last week's session she felt a release of emotions, but was able to note that she was relaxed while processing these emotions. We spent some time with MI discussing things she is considering, such as possibly going back to school for something that interests her, or some other types of volunteer work. Worked through some continued issues regarding her relationship with mother today also. Mom is 93 and in a prison community. Worked on empathy, but balanced with boundaries.    During this session, this clinician used the following therapeutic modalities: Client-centered Therapy, Cognitive Behavioral Therapy, Dialectical Behavior Therapy, and Motivational Interviewing    Substance Abuse was not addressed during this session. If the client is diagnosed with a co-occurring substance use disorder, please indicate any changes in the frequency or amount of use: n/a. Stage of change for addressing substance use diagnoses: No substance use/Not applicable    ASSESSMENT:  Yue Zepeda presents with a Euthymic/ normal mood.     her affect is Normal range and intensity, which is congruent, with her mood and the content of the session. The client has made progress on their goals.     Yue Zepeda presents with a none risk of suicide, none risk of self-harm, and none risk of harm to others.    For any risk assessment that surpasses a \"low\" rating, a safety plan must be developed.    A safety plan was indicated: no  If yes, describe in detail n/a - same plan as previously.    PLAN: Between sessions, Yue Zepeda will continue with self-care plan. At the next " session, the therapist will use Client-centered Therapy, Cognitive Behavioral Therapy, Dialectical Behavior Therapy, Mindfulness-based Strategies, and Motivational Interviewing to address depressive symptoms, past traumas.    Behavioral Health Treatment Plan and Discharge Planning: Yue Zepeda is aware of and agrees to continue to work on their treatment plan. They have identified and are working toward their discharge goals. yes    Visit start and stop times:    06/17/24  Start Time: 1235  Stop Time: 1330  Total Visit Time: 55 minutes

## 2024-06-24 ENCOUNTER — SOCIAL WORK (OUTPATIENT)
Dept: BEHAVIORAL/MENTAL HEALTH CLINIC | Facility: CLINIC | Age: 69
End: 2024-06-24
Payer: MEDICARE

## 2024-06-24 ENCOUNTER — HOSPITAL ENCOUNTER (OUTPATIENT)
Age: 69
Discharge: HOME | End: 2024-06-24
Payer: MEDICARE

## 2024-06-24 DIAGNOSIS — Z12.39 ENCOUNTER FOR OTHER SCREENING FOR MALIGNANT NEOPLASM OF BREAST: ICD-10-CM

## 2024-06-24 DIAGNOSIS — N89.8 ITCHING IN THE VAGINAL AREA: ICD-10-CM

## 2024-06-24 DIAGNOSIS — F39 MOOD DISORDER (HCC): Primary | ICD-10-CM

## 2024-06-24 PROCEDURE — 77067 SCR MAMMO BI INCL CAD: CPT

## 2024-06-24 PROCEDURE — 90837 PSYTX W PT 60 MINUTES: CPT

## 2024-06-24 PROCEDURE — 77063 BREAST TOMOSYNTHESIS BI: CPT

## 2024-06-24 NOTE — PSYCH
"Behavioral Health Psychotherapy Progress Note    Psychotherapy Provided: Individual Psychotherapy     1. Mood disorder (HCC)            Goals addressed in session: Goal 1 and Goal 2     DATA: Yue presents today dressed neatly and in fair mood. She had a difficult week and we spent time processing this. Today we talked about the idea of catching thoughts when she is numerating on them and checking the thought for validity. Handouts provided to track thoughts with stressful situations, and emotions, and also to put the thought on trial for validity.   Yue did discuss her recent PCP visit, she sees a Dr. Ayoub in [place]. She is considering going for her sleep study and has a prescription for this now. She is very fatigued at times, often attributes this to her low dose amitriptyline that GI has her on, but admits she realizes it may be related to other issues such as possible sleep apnea.   During this session, this clinician used the following therapeutic modalities: Client-centered Therapy, Cognitive Behavioral Therapy, and Motivational Interviewing    Substance Abuse was not addressed during this session. If the client is diagnosed with a co-occurring substance use disorder, please indicate any changes in the frequency or amount of use: n/a. Stage of change for addressing substance use diagnoses: No substance use/Not applicable    ASSESSMENT:  Yue Zepeda presents with a Euthymic/ normal and Dysthymic mood.     her affect is Normal range and intensity and Tearful- at times, which is congruent, with her mood and the content of the session. The client has made progress on their goals.     Yue Zepeda presents with a none risk of suicide, none risk of self-harm, and none risk of harm to others.    For any risk assessment that surpasses a \"low\" rating, a safety plan must be developed.    A safety plan was indicated: no  If yes, describe in detail n/a same plan as previously/ no changes    PLAN: Between sessions, " Yue Zepeda will try to recognize thoughts or emotions attached with situations that cause her stress/ refer to her handouts. At the next session, the therapist will use Client-centered Therapy, Cognitive Behavioral Therapy, Dialectical Behavior Therapy, Mindfulness-based Strategies, and Motivational Interviewing to address continue to work on recognizing thought pattern errors.    Behavioral Health Treatment Plan and Discharge Planning: Yue Zepeda is aware of and agrees to continue to work on their treatment plan. They have identified and are working toward their discharge goals. yes    Visit start and stop times:    06/24/24  Start Time: 1225  Stop Time: 1335  Total Visit Time: 70 minutes

## 2024-07-01 ENCOUNTER — SOCIAL WORK (OUTPATIENT)
Dept: BEHAVIORAL/MENTAL HEALTH CLINIC | Facility: CLINIC | Age: 69
End: 2024-07-01
Payer: MEDICARE

## 2024-07-01 DIAGNOSIS — F39 MOOD DISORDER (HCC): Primary | ICD-10-CM

## 2024-07-01 PROCEDURE — 90837 PSYTX W PT 60 MINUTES: CPT

## 2024-07-01 NOTE — PSYCH
"Behavioral Health Psychotherapy Progress Note    Psychotherapy Provided: Individual Psychotherapy     1. Mood disorder (HCC)            Goals addressed in session: Goal 1 and Goal 2     DATA: Yue presents today, neatly dressed, and in pleasant mood. We spent time processing her week. Week included at least 3 different events where she noted that she did not have the negative reactions to people/events that she normally would previously. She reports her mood is improved this week. Her son will be visiting for about 3 days over the holiday this week. She did spend some time briefly reviewing her handouts on cognitive distortions this week (putting thoughts on trial).   During this session, this clinician used the following therapeutic modalities: Client-centered Therapy, Cognitive Behavioral Therapy, Dialectical Behavior Therapy, and Motivational Interviewing    Substance Abuse was not addressed during this session. If the client is diagnosed with a co-occurring substance use disorder, please indicate any changes in the frequency or amount of use: n/a. Stage of change for addressing substance use diagnoses: No substance use/Not applicable    ASSESSMENT:  Yue Zepeda presents with a Euthymic/ normal mood.     her affect is Normal range and intensity, which is congruent, with her mood and the content of the session. The client has made progress on their goals.     Yue Zepeda presents with a none risk of suicide, none risk of self-harm, and none risk of harm to others.    For any risk assessment that surpasses a \"low\" rating, a safety plan must be developed.    A safety plan was indicated: no  If yes, describe in detail n/a same plan as previously/ no changes    PLAN: Between sessions, Yue Zepeda will continue self-care plan and reviewing handouts previously provided. At the next session, the therapist will use Client-centered Therapy, Cognitive Behavioral Therapy, Dialectical Behavior Therapy, " Mindfulness-based Strategies, and Motivational Interviewing to address continue to address cognitive distortions.    Behavioral Health Treatment Plan and Discharge Planning: Yue Zepeda is aware of and agrees to continue to work on their treatment plan. They have identified and are working toward their discharge goals. yes    Visit start and stop times:    07/01/24  Start Time: 1225  Stop Time: 1324  Total Visit Time: 59 minutes

## 2024-07-03 DIAGNOSIS — K58.0 IRRITABLE BOWEL SYNDROME WITH DIARRHEA: ICD-10-CM

## 2024-07-03 RX ORDER — AMITRIPTYLINE HYDROCHLORIDE 10 MG/1
10 TABLET, FILM COATED ORAL
Qty: 90 TABLET | Refills: 1 | Status: SHIPPED | OUTPATIENT
Start: 2024-07-03

## 2024-07-07 DIAGNOSIS — I10 PRIMARY HYPERTENSION: ICD-10-CM

## 2024-07-08 RX ORDER — HYDROCHLOROTHIAZIDE 25 MG/1
12.5 TABLET ORAL DAILY
Qty: 50 TABLET | Refills: 0 | Status: SHIPPED | OUTPATIENT
Start: 2024-07-08

## 2024-07-15 ENCOUNTER — SOCIAL WORK (OUTPATIENT)
Dept: BEHAVIORAL/MENTAL HEALTH CLINIC | Facility: CLINIC | Age: 69
End: 2024-07-15
Payer: MEDICARE

## 2024-07-15 DIAGNOSIS — F39 MOOD DISORDER (HCC): Primary | ICD-10-CM

## 2024-07-15 PROCEDURE — 90837 PSYTX W PT 60 MINUTES: CPT

## 2024-07-15 NOTE — PSYCH
"Behavioral Health Psychotherapy Progress Note    Psychotherapy Provided: Individual Psychotherapy     1. Mood disorder (HCC)            Goals addressed in session: Goal 1 and Goal 2     DATA: Yue presents today dressed nicely and in a pleasant mood. We processed the past two weeks. She had a visit with her son that did not go well. They had an argument, but she was able to recognize her part in it, and apologized to him, and was able to keep to the subject of the disagreement. Also processed a visit with her mother last week. She is hoping to see her brother in [facility] in a few weeks but is anxious due to her frequent GI concerns. We discussed self-advocating with her family prior to the visit, along with self-advocating when eating out, and how this may decrease her anxieties related to this.   During this session, this clinician used the following therapeutic modalities: Client-centered Therapy, Cognitive Behavioral Therapy, Dialectical Behavior Therapy, and Motivational Interviewing    Substance Abuse was not addressed during this session. If the client is diagnosed with a co-occurring substance use disorder, please indicate any changes in the frequency or amount of use: n/a. Stage of change for addressing substance use diagnoses: No substance use/Not applicable    ASSESSMENT:  Yue Zepeda presents with a Euthymic/ normal mood.     her affect is Normal range and intensity, which is congruent, with her mood and the content of the session. The client has made progress on their goals.     Yue Zepeda presents with a none risk of suicide, none risk of self-harm, and none risk of harm to others.    For any risk assessment that surpasses a \"low\" rating, a safety plan must be developed.    A safety plan was indicated: no  If yes, describe in detail same plan as previously/ no changes    PLAN: Between sessions, Yue Zepeda will continue self care plan. At the next session, the therapist will use Client-centered " Therapy, Cognitive Behavioral Therapy, Dialectical Behavior Therapy, Mindfulness-based Strategies, and Motivational Interviewing to address anxiety.    Behavioral Health Treatment Plan and Discharge Planning: Yue Lópezbuffy is aware of and agrees to continue to work on their treatment plan. They have identified and are working toward their discharge goals. yes    Visit start and stop times:    07/15/24  Start Time: 1220  Stop Time: 1325  Total Visit Time: 65 minutes

## 2024-07-29 ENCOUNTER — SOCIAL WORK (OUTPATIENT)
Dept: BEHAVIORAL/MENTAL HEALTH CLINIC | Facility: CLINIC | Age: 69
End: 2024-07-29
Payer: MEDICARE

## 2024-07-29 DIAGNOSIS — F39 MOOD DISORDER (HCC): Primary | ICD-10-CM

## 2024-07-29 PROCEDURE — 90837 PSYTX W PT 60 MINUTES: CPT

## 2024-07-29 NOTE — PSYCH
"Behavioral Health Psychotherapy Progress Note    Psychotherapy Provided: Individual Psychotherapy     1. Mood disorder (HCC)            Goals addressed in session: Goal 1 and Goal 2     DATA: Yue presents today in pleasant mood, casually dressed. We processed the last few weeks. She is concerned about ongoing fatigue at times and we discussed if she needs to f/u with her primary on this. Explored today some of the perceptions she had from when her step-father  versus her mother's perceptions of the events. Her description is that her mother would often remember things differently from her. 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  During this session, this clinician used the following therapeutic modalities: Client-centered Therapy, Cognitive Behavioral Therapy, and Motivational Interviewing    Substance Abuse was not addressed during this session. If the client is diagnosed with a co-occurring substance use disorder, please indicate any changes in the frequency or amount of use: n/a. Stage of change for addressing substance use diagnoses: No substance use/Not applicable    ASSESSMENT:  Yue Zepeda presents with a Euthymic/ normal mood.     her affect is Normal range and intensity, which is congruent, with her mood and the content of the session. The client has made progress on their goals.     Yue Zepeda presents with a none risk of suicide, none risk of self-harm, and none risk of harm to others.    For any risk assessment that surpasses a \"low\" rating, a safety plan must be developed.    A safety plan was indicated: no  If yes, describe in detail n/a same plan as previously/ no changes.    PLAN: Between sessions, Yue Zepeda will continue self-care plan. At the next session, the therapist will use Client-centered Therapy, Cognitive Behavioral Therapy, Dialectical Behavior Therapy, Mindfulness-based Strategies, and Motivational Interviewing to " address continue CBT work on thought distortions.    Behavioral Health Treatment Plan and Discharge Planning: Yue Zepeda is aware of and agrees to continue to work on their treatment plan. They have identified and are working toward their discharge goals. yes    Visit start and stop times:    07/29/24  Start Time: 1155  Stop Time: 1255  Total Visit Time: 60 minutes    8/5/2024 - note addendum to correct error in the flowsheet times of visit.  Correct visit times now reflected of 1155 to 1255 = 60 minutes.

## 2024-07-31 ENCOUNTER — TELEPHONE (OUTPATIENT)
Age: 69
End: 2024-07-31

## 2024-08-05 ENCOUNTER — SOCIAL WORK (OUTPATIENT)
Dept: BEHAVIORAL/MENTAL HEALTH CLINIC | Facility: CLINIC | Age: 69
End: 2024-08-05
Payer: MEDICARE

## 2024-08-05 DIAGNOSIS — F39 MOOD DISORDER (HCC): Primary | ICD-10-CM

## 2024-08-05 PROCEDURE — 90837 PSYTX W PT 60 MINUTES: CPT

## 2024-08-05 NOTE — PSYCH
"Behavioral Health Psychotherapy Progress Note    Psychotherapy Provided: Individual Psychotherapy     1. Mood disorder (HCC)            Goals addressed in session: Goal 1 and Goal 2     DATA: Yue presents today casually dressed. She is in a fair and pleasant mood. We processed her week. She is deciding about buying or leasing a car and admits she gets very caught up in details and has difficulty making decisions. She has struggled with this from week to week with various other topics as well. We processed through some of this including ways in which her parents had her approach decisions and anxiety related to this.  During this session, this clinician used the following therapeutic modalities: Client-centered Therapy, Cognitive Behavioral Therapy, and Motivational Interviewing    Substance Abuse was not addressed during this session. If the client is diagnosed with a co-occurring substance use disorder, please indicate any changes in the frequency or amount of use: n/a. Stage of change for addressing substance use diagnoses: No substance use/Not applicable    ASSESSMENT:  Yue Zepeda presents with a Euthymic/ normal mood.     her affect is Normal range and intensity, which is congruent, with her mood and the content of the session. The client has made progress on their goals.     Yue Zepeda presents with a none risk of suicide, none risk of self-harm, and none risk of harm to others.    For any risk assessment that surpasses a \"low\" rating, a safety plan must be developed.    A safety plan was indicated: no  If yes, describe in detail n/a same safety plan as previously/ no changes    PLAN: Between sessions, Yue Zepeda will continue working on identifying thoughts that are related to anxiety with decisions. At the next session, the therapist will use Client-centered Therapy, Cognitive Behavioral Therapy, Dialectical Behavior Therapy, Mindfulness-based Strategies, and Motivational Interviewing to address " anxiety.    Behavioral Health Treatment Plan and Discharge Planning: Yue Zepeda is aware of and agrees to continue to work on their treatment plan. They have identified and are working toward their discharge goals. yes    Visit start and stop times:    08/05/24  Start Time: 1155  Stop Time: 1254  Total Visit Time: 59 minutes

## 2024-08-20 ENCOUNTER — SOCIAL WORK (OUTPATIENT)
Dept: BEHAVIORAL/MENTAL HEALTH CLINIC | Facility: CLINIC | Age: 69
End: 2024-08-20
Payer: MEDICARE

## 2024-08-20 ENCOUNTER — HOSPITAL ENCOUNTER (OUTPATIENT)
Dept: ULTRASOUND IMAGING | Facility: CLINIC | Age: 69
Discharge: HOME | End: 2024-08-20
Payer: MEDICARE

## 2024-08-20 DIAGNOSIS — F39 MOOD DISORDER (HCC): Primary | ICD-10-CM

## 2024-08-20 DIAGNOSIS — Q44.1 OTHER CONGENITAL MALFORMATIONS OF GALLBLADDER: ICD-10-CM

## 2024-08-20 PROCEDURE — 90837 PSYTX W PT 60 MINUTES: CPT

## 2024-08-20 PROCEDURE — 76700 US EXAM ABDOM COMPLETE: CPT

## 2024-08-20 NOTE — PSYCH
"Behavioral Health Psychotherapy Progress Note    Psychotherapy Provided: Individual Psychotherapy     1. Mood disorder (HCC)            Goals addressed in session: Goal 1 and Goal 2     DATA: Jeanine presents today dressed neatly, fair mood. We processed her week. She admits today that she recognizes depression as some of her symptoms more than just physical. Denies an SI/HI intent or plan of any kind. Psychoeducation reviewed of how physiological and psychological conditions play a part in somatic issues. She continues to weigh options of different treatments her doctors have offered her. She is considering going back to med management, but remains undecided for now. We talked about her schedule and difficulties with motivation and indecision right now. Made a goal for her to start getting on a more consistent schedule of getting up and dressed by a certain time of day, and to set out her clothing the night before to help with motivation.  During this session, this clinician used the following therapeutic modalities: Client-centered Therapy, Cognitive Behavioral Therapy, and Motivational Interviewing    Substance Abuse was not addressed during this session. If the client is diagnosed with a co-occurring substance use disorder, please indicate any changes in the frequency or amount of use: n/a prev hx of alcohol use in remission. Stage of change for addressing substance use diagnoses: Maintenance    ASSESSMENT:  Jeanine Zepeda presents with a Euthymic/ normal mood.     her affect is Normal range and intensity, which is congruent, with her mood and the content of the session. The client has made progress on their goals.     Jeanine Zepeda presents with a none risk of suicide, none risk of self-harm, and none risk of harm to others.    For any risk assessment that surpasses a \"low\" rating, a safety plan must be developed.    A safety plan was indicated: no  If yes, describe in detail n/a same safety plan as previously/ no " changes.    PLAN: Between sessions, Jeanine Zepeda will work on her daily schedule, lay out clothing night before. At the next session, the therapist will use Client-centered Therapy, Cognitive Behavioral Therapy, Dialectical Behavior Therapy, Mindfulness-based Strategies, and Motivational Interviewing to address anxiety/ depression.    Behavioral Health Treatment Plan and Discharge Planning: Jeanine Zepeda is aware of and agrees to continue to work on their treatment plan. They have identified and are working toward their discharge goals. yes    Visit start and stop times:    08/20/24  Start Time: 1200  Stop Time: 1300  Total Visit Time: 60 minutes

## 2024-08-22 ENCOUNTER — OFFICE VISIT (OUTPATIENT)
Dept: CARDIOLOGY CLINIC | Facility: CLINIC | Age: 69
End: 2024-08-22
Payer: MEDICARE

## 2024-08-22 VITALS
BODY MASS INDEX: 28.88 KG/M2 | RESPIRATION RATE: 16 BRPM | SYSTOLIC BLOOD PRESSURE: 118 MMHG | HEIGHT: 63 IN | DIASTOLIC BLOOD PRESSURE: 76 MMHG | HEART RATE: 58 BPM | WEIGHT: 163 LBS | OXYGEN SATURATION: 98 %

## 2024-08-22 DIAGNOSIS — I10 PRIMARY HYPERTENSION: ICD-10-CM

## 2024-08-22 DIAGNOSIS — R00.1 BRADYCARDIA: Primary | ICD-10-CM

## 2024-08-22 DIAGNOSIS — E78.00 PURE HYPERCHOLESTEROLEMIA: ICD-10-CM

## 2024-08-22 DIAGNOSIS — I25.10 ASCVD (ARTERIOSCLEROTIC CARDIOVASCULAR DISEASE): ICD-10-CM

## 2024-08-22 PROCEDURE — 99214 OFFICE O/P EST MOD 30 MIN: CPT | Performed by: PHYSICIAN ASSISTANT

## 2024-08-22 PROCEDURE — 93000 ELECTROCARDIOGRAM COMPLETE: CPT | Performed by: PHYSICIAN ASSISTANT

## 2024-08-22 RX ORDER — ATORVASTATIN CALCIUM 10 MG/1
10 TABLET, FILM COATED ORAL DAILY
COMMUNITY
Start: 2024-06-19

## 2024-08-22 RX ORDER — HYDROCHLOROTHIAZIDE 25 MG/1
25 TABLET ORAL DAILY
Qty: 90 TABLET | Refills: 3 | Status: SHIPPED | OUTPATIENT
Start: 2024-08-22

## 2024-08-22 NOTE — PATIENT INSTRUCTIONS
All questions answered.  Discontinue atenolol, increase HCTZ to 25 daily.  Continue to monitor.  Patient due for labs from PCP.  Plan for coronary calcium score for further evaluation of ASCVD given history of moderate to extensive abdominal aortic atherosclerosis and hyperlipidemia.  Patient plans to continue statins until coronary calcium score.  Patient is considering discontinuing given extensive lichen sclerosus.  Continue all medications. Previous studies reviewed with patient, medications reviewed and possible side effects discussed. Continue risk factor modification. Optimize weight, regular exercise and follow up with appropriate specialists and primary care physician as discussed.  All questions answered. Patient advised to report any problems prompting to medical attention. Return for follow up visit in 3 months or earlier if needed

## 2024-08-22 NOTE — PROGRESS NOTES
[facility] [place]  [address]  [place] [address]  Cardiology Follow Up    Yue Zepeda  1955  [id number]    1. Bradycardia  POCT ECG      2. ASCVD (arteriosclerotic cardiovascular disease)  CT coronary calcium score      3. Primary hypertension  hydroCHLOROthiazide 25 mg tablet      4. Pure hypercholesterolemia            Discussion/Summary:  Bradycardia  Hr's 40-50's at times per pt  Stop atenolol  Monitor HR/Bp  Report any HR does NOT come up may need to consider event monitor for further evaluation  Last TSH 1.67 (dec 2023)    2. ASCVD  Moderate to extensive abdominal aortic atherosclerosis  Plan for coronary calcium score for further evaluation    3.  Hypertension  Stable, 118/76  Discontinue atenolol  Increase HCTZ to 25 daily    4.  Hyperlipidemia  Continue statins  Further recommendations based on results of coronary calcium score    All questions answered.  Discontinue atenolol, increase HCTZ to 25 daily.  Continue to monitor.  Patient due for labs from PCP.  Plan for coronary calcium score for further evaluation of ASCVD given history of moderate to extensive abdominal aortic atherosclerosis and hyperlipidemia.  Patient plans to continue statins until coronary calcium score.  Patient is considering discontinuing given extensive lichen sclerosus.  Continue all medications. Previous studies reviewed with patient, medications reviewed and possible side effects discussed. Continue risk factor modification. Optimize weight, regular exercise and follow up with appropriate specialists and primary care physician as discussed.  All questions answered. Patient advised to report any problems prompting to medical attention. Return for follow up visit in 3 months or earlier if needed    Chief Complaint   Patient presents with    Follow-up       Interval History: Patient presents for an acute visit with complaints of low blood pressure.  States over the last couple of weeks  she has been monitoring her blood pressure and heart rate closely and noticed that her heart rate has been in the 50s and blood pressure has gone down to the low 100s.  Patient states at times she does feel dizzy.  Patient has been on atenolol for more than 20 years.  Patient states she is also plagued with the doses recently and when she tried stopping HCTZ her blood pressure increased into the 170s.  Patient is currently taking atenolol 12.5 and HCTZ 12.5.  Patient states her heart rate is usually in the 50s to 60s and rarely goes above 90.  Patient feels fatigue.  Patient also has questions about whether she should stay on Lipitor as she has read multiple studies that Lipitor worsens lichen sclerosus which she has a bad case of.  Patient follows with a specialist in [place].  Patient also has known moderate to extensive atherosclerotic changes in the abdominal aorta.  Patient was a heavy smoker 2-1/2 packs a day for 20 years but has since stopped.  Patient is also retired.  States she is not as active as she used to be when she was working full-time.    Last stress test October 2022, upsloping ST segment depression not meeting criteria for ischemia, no perfusion defects noted    Last echo October 2022 EF 60%, mild MAC    PMH: Hypertension, bradycardia, abdominal aortic atherosclerosis, allergies, depression previously requiring ECT in the past, fibromyalgia, lichen sclerosis    Patient Active Problem List   Diagnosis    Microscopic colitis    Fibromyalgia    Chronic renal failure, stage 3a (HCC)    Primary hypertension    Lichen sclerosus    Articular cartilage disorder of hand    Acquired trigger finger of right ring finger    Osteoarthritis of carpometacarpal (CMC) joint of both thumbs    Mood disorder (HCC)    Unspecified primary angle-closure glaucoma, stage unspecified    Thoracic outlet syndrome associated with cervical rib    Meniere disease    Postherpetic neuralgia    Memory difficulty    Chronic  kidney disease-mineral and bone disorder    Uveitis     Past Medical History:   Diagnosis Date    Abnormal Pap smear of cervix 24    Basal cell carcinoma (BCC) of parietal region of scalp 2022    Left parietal scalp    Bipolar 1 disorder, depressed (HCC)     Bipolar 1 disorder, depressed (HCC) 1975    Chronic kidney disease     Depression     Diverticulitis of colon     First colonoscopy age 52    GERD (gastroesophageal reflux disease)     H/O bladder infections     Hyperlipidemia     Hypertension     Irritable bowel syndrome 3 years ago    Diarhea, constipation, acid indigestion    Lichen sclerosus     Microscopic colitis     Skin disorder     lichens sclerosis    Urinary tract infection      Social History     Socioeconomic History    Marital status: Single     Spouse name: Not on file    Number of children: Not on file    Years of education: Not on file    Highest education level: Not on file   Occupational History    Not on file   Tobacco Use    Smoking status: Former     Current packs/day: 0.00     Average packs/day: 2.0 packs/day for 20.0 years (40.0 ttl pk-yrs)     Types: Cigarettes     Start date: 1975     Quit date: 1995     Years since quittin.6    Smokeless tobacco: Never    Tobacco comments:     heavy smoker previously   Vaping Use    Vaping status: Some Days    Substances: CBD   Substance and Sexual Activity    Alcohol use: Yes     Alcohol/week: 1.0 standard drink of alcohol     Types: 1 Standard drinks or equivalent per week     Comment: 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  Average a wee    Drug use: Not Currently     Comment: In my teens and twenties    Sexual activity: Not Currently     Partners: Male     Birth control/protection: Post-menopausal, Female Sterilization     Comment: Partial hysterectomy ?   Other Topics Concern    Not on file   Social History Narrative    Not on file     Social Determinants of Health     Financial Resource Strain: Low Risk   (1/26/2023)    Overall Financial Resource Strain (CARDIA)     Difficulty of Paying Living Expenses: Not very hard   Food Insecurity: Not on file   Transportation Needs: No Transportation Needs (1/26/2023)    PRAPARE - Transportation     Lack of Transportation (Medical): No     Lack of Transportation (Non-Medical): No   Physical Activity: Not on file   Stress: Not on file   Social Connections: Not on file   Intimate Partner Violence: Not on file   Housing Stability: Not on file      Family History   Problem Relation Age of Onset    Squamous cell carcinoma Mother     Arthritis Mother     Diabetes Mother     Hypertension Mother     Cancer Mother 55        uter    Kidney disease Father     Diabetic kidney disease Father     Early death Father         Age 33    Hypertension Father     Breast cancer Maternal Grandmother 66    No Known Problems Paternal Grandmother     No Known Problems Son     Cervical cancer Maternal Aunt 80    No Known Problems Half-Sister      Past Surgical History:   Procedure Laterality Date    COLONOSCOPY      2013     HYSTERECTOMY      MOHS SURGERY Left 06/28/2022    Left parietal scalp       Current Outpatient Medications:     amitriptyline (ELAVIL) 10 mg tablet, TAKE 1 TABLET BY MOUTH DAILY AT BEDTIME, Disp: 90 tablet, Rfl: 1    atorvastatin (LIPITOR) 10 mg tablet, Take 10 mg by mouth daily, Disp: , Rfl:     cholestyramine (QUESTRAN) 4 g packet, Take 1 packet (4 g total) by mouth 3 (three) times a day with meals, Disp: 60 packet, Rfl: 3    clobetasol (TEMOVATE) 0.05 % cream, Apply topically 2 (two) times a day, Disp: 45 g, Rfl: 0    clonazePAM (KlonoPIN) 0.5 mg tablet, TAKE 1 TABLET BY MOUTH 2 TIMES A DAY. (Patient taking differently: Currently once daily, extra half if needed for depression), Disp: 60 tablet, Rfl: 0    clotrimazole-betamethasone (LOTRISONE) 1-0.05 % cream, , Disp: , Rfl:     cyclobenzaprine (FLEXERIL) 10 mg tablet, prn, Disp: , Rfl:     dicyclomine (BENTYL) 20 mg tablet, Take 1  "tablet (20 mg total) by mouth every 6 (six) hours as needed (abdominal), Disp: 60 tablet, Rfl: 3    diphenoxylate-atropine (LOMOTIL) 2.5-0.025 mg per tablet, Take 1 tablet by mouth 4 (four) times a day as needed for diarrhea (Patient taking differently: Take 1 tablet by mouth 4 (four) times a day as needed for diarrhea prn), Disp: 60 tablet, Rfl: 3    estradiol (Vagifem) 10 MCG TABS vaginal tablet, Insert 1 tablet (10 mcg total) into the vagina 2 (two) times a week, Disp: 8 tablet, Rfl: 6    fluticasone (FLONASE) 50 mcg/act nasal spray, SPRAY 1 SPRAY INTO EACH NOSTRIL TWICE A DAY, Disp: , Rfl:     hydroCHLOROthiazide 25 mg tablet, Take 1 tablet (25 mg total) by mouth daily, Disp: 90 tablet, Rfl: 3    loratadine (CLARITIN) 10 mg tablet, Take 1 tablet (10 mg total) by mouth daily, Disp: 10 tablet, Rfl: 0    Probiotic Product (PROBIOTIC 10 ULTRA STRENGTH PO), Take by mouth, Disp: , Rfl:     zolpidem (AMBIEN) 10 mg tablet, Take 1 tablet (10 mg total) by mouth daily at bedtime as needed (insomnia), Disp: 30 tablet, Rfl: 0  Allergies   Allergen Reactions    Levofloxacin Other (See Comments)    Sulfa Antibiotics Fever and Itching    Cephalexin Diarrhea    Nitrofurantoin Fever    Soy Allergy - Food Allergy Diarrhea and GI Intolerance    Topiramate Other (See Comments)    Bacitracin-Neomycin-Polymyxin Rash    Sulfamethoxazole-Trimethoprim Diarrhea       EKG: SINUS CECI RATE 53, NSTWA  Imaging: No results found.    Review of Systems:  Review of Systems   Constitutional: Negative.    Respiratory: Negative.     Cardiovascular: Negative.    Musculoskeletal: Negative.    Neurological: Negative.    Hematological: Negative.    Psychiatric/Behavioral: Negative.     All other systems reviewed and are negative.        /76 (BP Location: Left arm, Patient Position: Sitting, Cuff Size: Standard)   Pulse 58   Resp 16   Ht 5' 3\" (1.6 m)   Wt 73.9 kg (163 lb)   SpO2 98%   BMI 28.87 kg/m²     Physical Exam:  Physical " Exam  Vitals and nursing note reviewed.   Constitutional:       Appearance: Normal appearance.   HENT:      Head: Normocephalic and atraumatic.   Cardiovascular:      Rate and Rhythm: Regular rhythm. Bradycardia present.      Pulses: Normal pulses.      Heart sounds: Normal heart sounds.   Pulmonary:      Effort: Pulmonary effort is normal.      Breath sounds: Normal breath sounds.   Musculoskeletal:         General: Normal range of motion.      Cervical back: Normal range of motion and neck supple.   Skin:     General: Skin is warm and dry.   Neurological:      General: No focal deficit present.      Mental Status: She is alert and oriented to person, place, and time.   Psychiatric:         Mood and Affect: Mood normal.         Behavior: Behavior normal.         Thought Content: Thought content normal.         Judgment: Judgment normal.

## 2024-08-23 ENCOUNTER — TELEPHONE (OUTPATIENT)
Dept: GASTROENTEROLOGY | Facility: CLINIC | Age: 69
End: 2024-08-23

## 2024-08-23 NOTE — TELEPHONE ENCOUNTER
----- Message from Cherelle Duran PA-C sent at 8/22/2024  3:54 PM EDT -----  Please advise patient that her ultrasound looks good.  She has a gallbladder polyp which is completely stable.  No growth.

## 2024-08-23 NOTE — TELEPHONE ENCOUNTER
----- Message from Cherelle Duran PA-C sent at 8/22/2024  3:55 PM EDT -----  I am sorry, that should have meant multiple polyps

## 2024-08-26 ENCOUNTER — SOCIAL WORK (OUTPATIENT)
Dept: BEHAVIORAL/MENTAL HEALTH CLINIC | Facility: CLINIC | Age: 69
End: 2024-08-26
Payer: MEDICARE

## 2024-08-26 DIAGNOSIS — F39 MOOD DISORDER (HCC): Primary | ICD-10-CM

## 2024-08-26 PROCEDURE — 90837 PSYTX W PT 60 MINUTES: CPT

## 2024-08-26 NOTE — PSYCH
"Behavioral Health Psychotherapy Progress Note    Psychotherapy Provided: Individual Psychotherapy     1. Mood disorder (HCC)            Goals addressed in session: Goal 1 and Goal 2     DATA: Yue presents today casually dressed and in an upbeat mood. We processed her week. She saw cardiologist and had a medication change decreasing her atenolol and she feels that this has given her more energy. She did follow through with setting her clothing out the night before to help her motivation. Today discussed some recent events where she was able to use some assertive communication techniques. Discussed a previous relationship she had and her reasons and circumstances of why she discontinued this. We explored how this has related to her trauma with her family in the past also.  During this session, this clinician used the following therapeutic modalities: Client-centered Therapy, Cognitive Behavioral Therapy, and Motivational Interviewing    Substance Abuse was not addressed during this session. If the client is diagnosed with a co-occurring substance use disorder, please indicate any changes in the frequency or amount of use: n/a sober for many years. Stage of change for addressing substance use diagnoses: Maintenance    ASSESSMENT:  Jeanine Zepeda presents with a Euthymic/ normal mood.     her affect is Normal range and intensity, which is congruent, with her mood and the content of the session. The client has made progress on their goals.     Jeanine Zepeda presents with a none risk of suicide, none risk of self-harm, and none risk of harm to others.    For any risk assessment that surpasses a \"low\" rating, a safety plan must be developed.    A safety plan was indicated: no  If yes, describe in detail n/a same safety plan as previously/ no changes.    PLAN: Between sessions, Jeanine Zepeda will continue working self-care plan. At the next session, the therapist will use Client-centered Therapy, Cognitive Behavioral Therapy, " ----- Message from Singh Gilman sent at 7/14/2021  9:38 AM CDT -----  Regarding: Unread Message Notification  Contact: [phone]  Good morning-    At your visit here a few months ago we discussed the possibility that the paroxetine was contributing to weight gain.  Just checking in with you to see if you may want to consider switching to another medication. Let me kow if you have any thoughts.    José Miguel   Dialectical Behavior Therapy, and Motivational Interviewing to address anxiety/ depressive symptoms.    Behavioral Health Treatment Plan and Discharge Planning: Jeanine Zepeda is aware of and agrees to continue to work on their treatment plan. They have identified and are working toward their discharge goals. no    Visit start and stop times:    08/26/24  Start Time: 1207  Stop Time: 1315  Total Visit Time: 68 minutes

## 2024-09-03 ENCOUNTER — SOCIAL WORK (OUTPATIENT)
Dept: BEHAVIORAL/MENTAL HEALTH CLINIC | Facility: CLINIC | Age: 69
End: 2024-09-03
Payer: MEDICARE

## 2024-09-03 DIAGNOSIS — F39 MOOD DISORDER (HCC): Primary | ICD-10-CM

## 2024-09-03 PROCEDURE — 90837 PSYTX W PT 60 MINUTES: CPT

## 2024-09-03 NOTE — PSYCH
"Behavioral Health Psychotherapy Progress Note    Psychotherapy Provided: Individual Psychotherapy     1. Mood disorder (HCC)            Goals addressed in session: Goal 1 and Goal 2     DATA: Yue presents today neatly casually dressed and in an upbeat mood. We processed her week. She is feeling more energy again this week since continuing with her previously noted medication change (atenolol was decreased by cardiologist). 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 We discussed a cognitive distortion that she often goes to \"I must have done something wrong\" or \"they're mad at me\". This was in relation to a friend who has not been calling her recently. We looked at evidence for and against this using thought trial. Challenged Yue to consider that sometimes it is not related to her, but to what is going on in the other person's life.    During this session, this clinician used the following therapeutic modalities: Client-centered Therapy, Cognitive Behavioral Therapy, and Motivational Interviewing    Substance Abuse was not addressed during this session. If the client is diagnosed with a co-occurring substance use disorder, please indicate any changes in the frequency or amount of use: previous alcohol abuse/ in remission. Stage of change for addressing substance use diagnoses: Maintenance    ASSESSMENT:  Jeanine Zepeda presents with a Euthymic/ normal mood.     her affect is Normal range and intensity, which is congruent, with her mood and the content of the session. The client has made progress on their goals.     Jeanine Zepeda presents with a none risk of suicide, none risk of self-harm, and none risk of harm to others.    For any risk assessment that surpasses a \"low\" rating, a safety plan must be developed.    A safety plan was indicated: no  If yes, describe in detail n/a same safety plan as previously/ no changes.    PLAN: Between sessions, Jeanine Zepeda will " continue self-care plan, use though trial as needed. At the next session, the therapist will use Client-centered Therapy, Cognitive Behavioral Therapy, and Motivational Interviewing to address cognitive distortions.    Behavioral Health Treatment Plan and Discharge Planning: Jeanine Zepeda is aware of and agrees to continue to work on their treatment plan. They have identified and are working toward their discharge goals. yes    Visit start and stop times:    09/03/24  Start Time: 1155  Stop Time: 1300  Total Visit Time: 65 minutes

## 2024-09-09 ENCOUNTER — SOCIAL WORK (OUTPATIENT)
Dept: BEHAVIORAL/MENTAL HEALTH CLINIC | Facility: CLINIC | Age: 69
End: 2024-09-09
Payer: MEDICARE

## 2024-09-09 DIAGNOSIS — F39 MOOD DISORDER (HCC): Primary | ICD-10-CM

## 2024-09-09 PROCEDURE — 90837 PSYTX W PT 60 MINUTES: CPT
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
## 2024-09-10 ENCOUNTER — OFFICE VISIT (OUTPATIENT)
Dept: GASTROENTEROLOGY | Facility: CLINIC | Age: 69
End: 2024-09-10
Payer: MEDICARE

## 2024-09-10 VITALS
DIASTOLIC BLOOD PRESSURE: 80 MMHG | HEIGHT: 63 IN | SYSTOLIC BLOOD PRESSURE: 130 MMHG | TEMPERATURE: 98.5 F | OXYGEN SATURATION: 96 % | WEIGHT: 161.6 LBS | HEART RATE: 63 BPM | BODY MASS INDEX: 28.63 KG/M2

## 2024-09-10 DIAGNOSIS — K58.0 IRRITABLE BOWEL SYNDROME WITH DIARRHEA: Primary | ICD-10-CM

## 2024-09-10 PROCEDURE — 99213 OFFICE O/P EST LOW 20 MIN: CPT | Performed by: PHYSICIAN ASSISTANT

## 2024-09-10 RX ORDER — ATENOLOL 25 MG/1
12.5 TABLET ORAL
COMMUNITY

## 2024-09-10 NOTE — PROGRESS NOTES
[facility] - Outpatient Follow-up Note  Yue Zepeda 68 y.o. female MRN: [id number]  Encounter: [id number]          ASSESSMENT AND PLAN:      1. Irritable bowel syndrome with diarrhea  She remains on Amitriptyline 10mg qHS which does help  She uses Questran or Imodium with dicyclomine as needed which help  She has discovered an apparent intolerance to soy which she has been avoiding    Thankfully, she saw cardiology again who changed the doses of her BP meds due to fatigue and bradycardia.   Her fatigue is significantly improved even with ongoing Elavil use    She is still following with dermatology who wants to give her Rinvoq for her Lichen sclerosis.   He is convinced this will help most of her ongoing symptoms    She admits again that her GI symptoms have significantly impacted her quality of life  She has been unable to go on vacation out of fear of diarrhea and incontinent episodes  She is working to improve this    ______________________________________________________________________    SUBJECTIVE:  68 year old female with diarrhea predominant irritable bowel syndrome, microscopic colits in remission as per her most recent colonoscopy, gallbladder polyps, lichen sclerosis, bradycardia, hypertension, hyperlipidemia who presents for follow-up.  She remains on amitriptyline 10 mg at bedtime.  At her last appointment she admitted that it was helping her diarrhea and abdominal pain symptoms but she continued making her increasingly fatigued during the day.  She followed up with cardiology who she has been seeing for many years.  After listening to this complaint they decided to decrease the dose of her atenolol and increase her hydrochlorothiazide.  She was noted to be bradycardic multiple times over the past few years.  She reports that since making this change her daytime fatigue symptoms have improved significantly.  She is better able to function.  She is tolerating  amitriptyline.  She has learned recently that she has at least an intolerance to soy.  She has been trying to avoid this as much as possible.  She is struggling with this as so many foods contain soy but she is working hard at it.  She reports that anytime that she has consumed something with soy recently she has immediate urgent diarrhea.  She continues to follow with dermatology who wants to start revoke due to her lichen sclerosis this doctor remains convinced that it will not only help her lichen sclerosis but also most of her other complaints as well including gastrointestinal.      REVIEW OF SYSTEMS IS OTHERWISE NEGATIVE.      Historical Information   Past Medical History:   Diagnosis Date    Abnormal Pap smear of cervix 1/6/22    Basal cell carcinoma (BCC) of parietal region of scalp 06/13/2022    Left parietal scalp    Bipolar 1 disorder, depressed (HCC)     Bipolar 1 disorder, depressed (HCC) 01/24/1975    Chronic kidney disease     Depression     Diverticulitis of colon     First colonoscopy age 52    GERD (gastroesophageal reflux disease)     H/O bladder infections     Hyperlipidemia     Hypertension     Irritable bowel syndrome 3 years ago    Diarhea, constipation, acid indigestion    Lichen sclerosus     Microscopic colitis     Skin disorder     lichens sclerosis    Urinary tract infection      Past Surgical History:   Procedure Laterality Date    COLONOSCOPY      2013     HYSTERECTOMY      MOHS SURGERY Left 06/28/2022    Left parietal scalp     Social History   Social History     Substance and Sexual Activity   Alcohol Use Yes    Alcohol/week: 1.0 standard drink of alcohol    Types: 1 Standard drinks or equivalent per week    Comment: 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  Average a wee     Social History     Substance and Sexual Activity   Drug Use Not Currently    Comment: In my teens and twenties     Social History     Tobacco Use   Smoking Status Former    Current packs/day: 0.00    Average  "packs/day: 2.0 packs/day for 20.0 years (40.0 ttl pk-yrs)    Types: Cigarettes    Start date: 1975    Quit date: 1995    Years since quittin.7   Smokeless Tobacco Never   Tobacco Comments    heavy smoker previously     Family History   Problem Relation Age of Onset    Squamous cell carcinoma Mother     Arthritis Mother     Diabetes Mother     Hypertension Mother     Cancer Mother 55        uter    Kidney disease Father     Diabetic kidney disease Father     Early death Father         Age 33    Hypertension Father     Breast cancer Maternal Grandmother 66    No Known Problems Paternal Grandmother     No Known Problems Son     Cervical cancer Maternal Aunt 80    No Known Problems Half-Sister        Meds/Allergies       Current Outpatient Medications:     amitriptyline (ELAVIL) 10 mg tablet    atenolol (TENORMIN) 25 mg tablet    atorvastatin (LIPITOR) 10 mg tablet    cholestyramine (QUESTRAN) 4 g packet    clobetasol (TEMOVATE) 0.05 % cream    clonazePAM (KlonoPIN) 0.5 mg tablet    clotrimazole-betamethasone (LOTRISONE) 1-0.05 % cream    cyclobenzaprine (FLEXERIL) 10 mg tablet    dicyclomine (BENTYL) 20 mg tablet    diphenoxylate-atropine (LOMOTIL) 2.5-0.025 mg per tablet    estradiol (Vagifem) 10 MCG TABS vaginal tablet    fluticasone (FLONASE) 50 mcg/act nasal spray    hydroCHLOROthiazide 25 mg tablet    loratadine (CLARITIN) 10 mg tablet    Probiotic Product (PROBIOTIC 10 ULTRA STRENGTH PO)    zolpidem (AMBIEN) 10 mg tablet    Allergies   Allergen Reactions    Levofloxacin Other (See Comments)    Sulfa Antibiotics Fever and Itching    Cephalexin Diarrhea    Nitrofurantoin Fever    Soy Allergy - Food Allergy Diarrhea and GI Intolerance    Topiramate Other (See Comments)    Bacitracin-Neomycin-Polymyxin Rash    Sulfamethoxazole-Trimethoprim Diarrhea           Objective     Blood pressure 130/80, pulse 63, temperature 98.5 °F (36.9 °C), temperature source Tympanic, height 5' 3\" (1.6 m), weight 73.3 kg (161 " lb 9.6 oz), SpO2 96%, not currently breastfeeding. Body mass index is 28.63 kg/m².      PHYSICAL EXAM:      General Appearance:   Alert, cooperative, no distress   HEENT:   Normocephalic, atraumatic, anicteric.     Neck:  Supple, symmetrical, trachea midline   Lungs:   Clear to auscultation bilaterally; no rales, rhonchi or wheezing; respirations unlabored    Heart::   Regular rate and rhythm; no murmur, rub, or gallop.   Abdomen:   Soft, non-tender, non-distended; normal bowel sounds; no masses, no organomegaly    Genitalia:   Deferred    Rectal:   Deferred    Extremities:  No cyanosis, clubbing or edema    Pulses:  2+ and symmetric    Skin:  No jaundice, rashes, or lesions    Lymph nodes:  No palpable cervical lymphadenopathy        Lab Results:   No visits with results within 1 Day(s) from this visit.   Latest known visit with results is:   Lab on 04/05/2024   Component Date Value    WBC 04/05/2024 7.92     RBC 04/05/2024 4.63     Hemoglobin 04/05/2024 14.0     Hematocrit 04/05/2024 41.2     MCV 04/05/2024 89     MCH 04/05/2024 30.2     MCHC 04/05/2024 34.0     RDW 04/05/2024 12.6     MPV 04/05/2024 9.7     Platelets 04/05/2024 264     nRBC 04/05/2024 0     Segmented % 04/05/2024 47     Immature Grans % 04/05/2024 0     Lymphocytes % 04/05/2024 45 (H)     Monocytes % 04/05/2024 7     Eosinophils Relative 04/05/2024 0     Basophils Relative 04/05/2024 1     Absolute Neutrophils 04/05/2024 3.76     Absolute Immature Grans 04/05/2024 0.02     Absolute Lymphocytes 04/05/2024 3.56     Absolute Monocytes 04/05/2024 0.53     Eosinophils Absolute 04/05/2024 0.00     Basophils Absolute 04/05/2024 0.05     Sodium 04/05/2024 140     Potassium 04/05/2024 3.6     Chloride 04/05/2024 102     CO2 04/05/2024 30     ANION GAP 04/05/2024 8     BUN 04/05/2024 15     Creatinine 04/05/2024 0.96     Glucose, Fasting 04/05/2024 93     Calcium 04/05/2024 9.1     AST 04/05/2024 19     ALT 04/05/2024 24     Alkaline Phosphatase 04/05/2024  67     Total Protein 04/05/2024 6.8     Albumin 04/05/2024 4.3     Total Bilirubin 04/05/2024 0.40     eGFR 04/05/2024 61     Cholesterol 04/05/2024 222 (H)     Triglycerides 04/05/2024 129     HDL, Direct 04/05/2024 76     LDL Calculated 04/05/2024 120 (H)     CRP 04/05/2024 2.7     Sed Rate 04/05/2024 4          Radiology Results:   US abdomen complete    Result Date: 8/22/2024  Narrative: ABDOMEN ULTRASOUND, COMPLETE INDICATION: Q44.1: Other congenital malformations of gallbladder. Gallbladder polyps COMPARISON: Ultrasound 8/25/2023 and 12/10/2020 TECHNIQUE: Real-time ultrasound of the abdomen was performed with a curvilinear transducer with both volumetric sweeps and still imaging techniques. FINDINGS: PANCREAS: Portions of the pancreas are obscured by bowel gas. Visualized portions of the pancreas are unremarkable. AORTA AND IVC: Visualized portions are normal for patient age. LIVER: Size: Within normal range. The liver measures 14.4 cm in the midclavicular line. Contour: Surface contour is smooth. Parenchyma: Echogenicity and echotexture are within normal limits. Multiple hepatic cysts measuring up to 4 cm, with the 3.9 cm cyst in the left lobe being mildly enlarged from prior. Limited imaging of the main portal vein shows it to be patent and hepatopetal. BILIARY: The gallbladder is normal in caliber. No wall thickening or pericholecystic fluid. No stones or sludge identified. No sonographic Boyce's sign. Multiple gallbladder polyps with the largest measuring 8 mm which is stable since at least December 2020. Reference: Management of Incidentally Detected Gallbladder Polyps: Society of Radiologists in Ultrasound Consensus Conference Recommendations. Radiology 2022; 000:1-12. https://pubs.rsna.org/doi/full/10.1148/radiol.[email or web address] No intrahepatic biliary dilatation. CBD measures 5.0 mm. No choledocholithiasis. KIDNEY: Right kidney measures 9.2 x 4.1 x 4.0 cm. Volume 78.3 mL Kidney within normal limits. Left  kidney measures 8.5 x 3.6 x 3.9 cm. Volume 63.2 mL Simple 1.2 cm cyst in left upper pole kidney is unchanged. SPLEEN: Measures 11.8 x 11.6 x 5.8 cm. Volume 412.4 mL Within normal limits. ASCITES: None.     Impression: Gallbladder polyps measuring up to 8 mm demonstrate 4-year stability. As such, no further follow-up is necessary. Resident: Gerry Ornelas I, the attending radiologist, have reviewed the images and agree with the final report above. Workstation performed: [id number]   Answers submitted by the patient for this visit:  Abdominal Pain Questionnaire (Submitted on 9/3/2024)  Chief Complaint: Abdominal pain  Chronicity: chronic  Onset: more than 1 year ago  Onset quality: undetermined  Frequency: rarely  Progression since onset: resolved  constipation: Yes  diarrhea: Yes  flatus: Yes

## 2024-09-16 ENCOUNTER — SOCIAL WORK (OUTPATIENT)
Dept: BEHAVIORAL/MENTAL HEALTH CLINIC | Facility: CLINIC | Age: 69
End: 2024-09-16
Payer: MEDICARE

## 2024-09-16 DIAGNOSIS — F39 MOOD DISORDER (HCC): Primary | ICD-10-CM

## 2024-09-16 PROCEDURE — 90837 PSYTX W PT 60 MINUTES: CPT

## 2024-09-16 NOTE — PSYCH
"Behavioral Health Psychotherapy Progress Note    Psychotherapy Provided: Individual Psychotherapy     1. Mood disorder (HCC)            Goals addressed in session: Goal 1 and Goal 2     DATA: Yue presents today casual, neatly dressed and in pleasant mood. We processed her week. She continues to feel her mood has been better, this is 3rd week. She did have one issue this week where she chose to drink (she occasionally will socially drink) this was at home at night while watching political debate. She had an old mixed drink from over a year ago in the fridge and thinks it was bad and made her very sick for the next day. She said this reminder her of her younger days when she would drink too much. We spent some time processing that. Also discussed today her relationships with her son Lloyd and her mother as we continue to work through those family dynamics.   During this session, this clinician used the following therapeutic modalities: Client-centered Therapy, Cognitive Behavioral Therapy, and Motivational Interviewing    Substance Abuse was not addressed during this session. If the client is diagnosed with a co-occurring substance use disorder, please indicate any changes in the frequency or amount of use: drinks occasionally, but usually socially. Stage of change for addressing substance use diagnoses: Maintenance    ASSESSMENT:  Jeanine Zepeda presents with a Euthymic/ normal mood.     her affect is Normal range and intensity, which is congruent, with her mood and the content of the session. The client has made progress on their goals.     Jeanine Zepeda presents with a none risk of suicide, none risk of self-harm, and none risk of harm to others.    For any risk assessment that surpasses a \"low\" rating, a safety plan must be developed.    A safety plan was indicated: no  If yes, describe in detail n/a    PLAN: Between sessions, Jeanine Zepeda will continue self care plan. At the next session, the therapist will use " Client-centered Therapy, Cognitive Behavioral Therapy, Dialectical Behavior Therapy, and Motivational Interviewing to address continue exploring family relationships she wants to focus on repairing, anxiety, depression.    Behavioral Health Treatment Plan and Discharge Planning: Jeanine Zepeda is aware of and agrees to continue to work on their treatment plan. They have identified and are working toward their discharge goals. yes    Visit start and stop times:    09/16/24  Start Time: 1200  Stop Time: 1300  Total Visit Time: 60 minutes

## 2024-09-30 ENCOUNTER — SOCIAL WORK (OUTPATIENT)
Dept: BEHAVIORAL/MENTAL HEALTH CLINIC | Facility: CLINIC | Age: 69
End: 2024-09-30
Payer: MEDICARE

## 2024-09-30 DIAGNOSIS — F39 MOOD DISORDER (HCC): Primary | ICD-10-CM

## 2024-09-30 PROCEDURE — 90837 PSYTX W PT 60 MINUTES: CPT

## 2024-09-30 NOTE — PSYCH
"Behavioral Health Psychotherapy Progress Note    Psychotherapy Provided: Individual Psychotherapy     1. Mood disorder (HCC)            Goals addressed in session: Goal 1 and Goal 2     DATA: Jeanine presents today neatly dressed. She is always pleasant. States that her week was difficult though due to increased GI issues. These issues affect her often and almost daily.  When asked if she has conveyed to her GI doctor how much it affects her daily life she stated she has not really told her. We talked about being assertive with communicating her symptoms clearly to her other providers.   Jeanine's birthday was this past week and she did go out with her mother. This visit went well. Jeanine states that when she is sick and takes a day to rest that she feels \"guilty\". We spent some time processing that today.  During this session, this clinician used the following therapeutic modalities: Client-centered Therapy, Cognitive Behavioral Therapy, Cognitive Processing Therapy, and Motivational Interviewing    Substance Abuse was addressed during this session. If the client is diagnosed with a co-occurring substance use disorder, please indicate any changes in the frequency or amount of use: Jeanine states she has not been using alcohol since last visit. Stage of change for addressing substance use diagnoses: Maintenance    ASSESSMENT:  Jeanine Zepeda presents with a Euthymic/ normal mood.     her affect is Normal range and intensity, which is congruent, with her mood and the content of the session. The client has made progress on their goals.     Jeanine Zepeda presents with a none risk of suicide, none risk of self-harm, and none risk of harm to others.    For any risk assessment that surpasses a \"low\" rating, a safety plan must be developed.    A safety plan was indicated: no  If yes, describe in detail n/a    PLAN: Between sessions, Jeanine Zepeda will contact GI doctor and express symptoms clearly, use assertive communication, self-care plan. " At the next session, the therapist will use Client-centered Therapy, Cognitive Behavioral Therapy, Dialectical Behavior Therapy, and Motivational Interviewing to address continue to explore effects of trauma and how she is coping daily.    Behavioral Health Treatment Plan and Discharge Planning: Jeanine Zepeda is aware of and agrees to continue to work on their treatment plan. They have identified and are working toward their discharge goals. yes    Visit start and stop times:    09/30/24  Start Time: 1200  Stop Time: 1300  Total Visit Time: 60 minutes

## 2024-10-07 ENCOUNTER — SOCIAL WORK (OUTPATIENT)
Dept: BEHAVIORAL/MENTAL HEALTH CLINIC | Facility: CLINIC | Age: 69
End: 2024-10-07
Payer: MEDICARE

## 2024-10-07 DIAGNOSIS — F39 MOOD DISORDER (HCC): Primary | ICD-10-CM

## 2024-10-07 PROCEDURE — 90837 PSYTX W PT 60 MINUTES: CPT
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
## 2024-10-10 ENCOUNTER — HOSPITAL ENCOUNTER (OUTPATIENT)
Dept: CT IMAGING | Facility: CLINIC | Age: 69
Discharge: HOME | End: 2024-10-10
Payer: COMMERCIAL

## 2024-10-10 DIAGNOSIS — I25.10 ASCVD (ARTERIOSCLEROTIC CARDIOVASCULAR DISEASE): ICD-10-CM

## 2024-10-10 PROCEDURE — 75571 CT HRT W/O DYE W/CA TEST: CPT

## 2024-10-14 ENCOUNTER — SOCIAL WORK (OUTPATIENT)
Dept: BEHAVIORAL/MENTAL HEALTH CLINIC | Facility: CLINIC | Age: 69
End: 2024-10-14
Payer: MEDICARE

## 2024-10-14 DIAGNOSIS — F39 MOOD DISORDER (HCC): Primary | ICD-10-CM

## 2024-10-14 PROCEDURE — 90837 PSYTX W PT 60 MINUTES: CPT

## 2024-10-14 NOTE — BH TREATMENT PLAN
"Outpatient Behavioral Health Psychotherapy Treatment Plan     Yue Zepeda  1955      Date of Initial Psychotherapy Assessment: 4/29/2024   Date of Current Treatment Plan: 10/14/2024  Treatment Plan Target Date: TBD  Treatment Plan Expiration Date: 3/12/2025     Diagnosis:   1. Mood disorder (HCC)                Area(s) of Need: Recurrent depression, and loss of interest in things particularly since FDC.     Long Term Goal 1 (in the client's own words):  \"I would like to be able to become high functioning for me and not always put me last\"     Stage of Change: Contemplation     Target Date for completion: TBD             Anticipated therapeutic modalities: DBT, CBT, CPT, SF, MI, mindfulness, client-centered             People identified to complete this goal: Yue and therapist            Objective 1: (identify the means of measuring success in meeting the objective): Over the next 6 months, Yue will learn and utilize at least 3 DBT concepts, distress tolerance skills when experiencing triggers.   (Update 10/14/2024: Objective updated)            Objective 2: (identify the means of measuring success in meeting the objective): Yue will create and utilize a daily self-plan to include one morning and one evening relaxation and/or leisure activity.  (Update 10/14/2024: Objective continues. Jeanine has been working towards including self-care into a daily practice. Her physical symptoms also have challenged her at times to feeling up to doing some of her self-care activities.)    Objective 3: (New objective 10/14/2024) Over the next 6 months, Yue will identify and replace negative self-talk messages that contribute  to reinforcing low self-esteem by replacing with positives about herself. Yue will make a list of positives and previous successes in her life   to assist with this.     Objective 4: ( New objective 10/14/2024) Yue will continue to take any behavioral health medications as prescribed and " "agreed to with her doctor, and will continue to report any concerns or side effects to her physician as needed.     Objective 5: (New Objective 10/14/2024) Over the next 6 months, Yue will use assertive communication or DBT skills to express needs when she identifies situations where her voice is not being heard by those around her.       Long Term Goal 2 (in the client's own words): \"Repair relationships\"     Stage of Change: Contemplation     Target Date for completion: TBD             Anticipated therapeutic modalities: DBT, CBT, CPT, MI, client-centered             People identified to complete this goal: Yue and therapist                    Objective 1: (identify the means of measuring success in meeting the objective): Yue will be able to learn at least 3 relaxation techniques over the next 6 months to be paired with memories of her trauma.   Update 10/14/2024 - This objective will continue. Jeanine is still exploring sources of her trauma.                    Objective 2: (identify the means of measuring success in meeting the objective): Yue will be able to retell her narrative of any trauma she chooses to explore over the next 6 months.    Update 10/14/2024 - This objective will continue. Jeanine has been retelling her story at her pace and we will continue uncovering her traumas at the pace she is comfortable with.     Objective 3: (New Objective 10/14/24) Over the next 6 months, Yue will learn and be able to verbalize the response of the brain to trauma, and the physical responses that accompany it and how this process is then activated by triggers to past trauma.     Objective 4: (New Objective 10/14/24) Over the next 6 months, Yue will explore and identify situations that are triggers to her trauma response.     Objective 5: (New Objective 10/14/24) Over the next 6 months, Yue will begin to develop an impact statement regarding areas of trauma she chooses to address and share it in " "session.     Objective 6: (New Objective 10/14/24) Over the next 6 months, Yue will write or share in session her narrative of trauma events related to her impact statement.     Objective 7: (New Objective 10/14/24) Over the next 6 months, Yue will  rewrite or share in session a description of the events now with reflection of new thoughts and beliefs related to it,  and discuss this restructured version of the event reinforcing her new insights (especially in regards to common trauma themes of safety, trust, power, control, esteem, and intimacy).           I am currently under the care of a [facility] psychiatric provider: no     My [facility] psychiatric provider is:N/A       I am currently taking psychiatric medications: Yes, as prescribed     I feel that I will be ready for discharge from mental health care when I reach the following (measurable goal/objective): \"When I am able to make daily decisions without worry about long-term consequences.\"     For children and adults who have a legal guardian:          Has there been any change to custody orders and/or guardianship status? NA. If yes, attach updated documentation.     I have created my Crisis Plan and have been offered a copy of this plan     Behavioral Health Treatment Plan  Luke: Diagnosis and Treatment Plan explained to Yue Zepeda acknowledges an understanding of their diagnosis. Yue Zepeda agrees to this treatment plan.     I have been offered a copy of this Treatment Plan. yes  "
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
## 2024-10-15 ENCOUNTER — TELEPHONE (OUTPATIENT)
Dept: CARDIOLOGY CLINIC | Facility: CLINIC | Age: 69
End: 2024-10-15

## 2024-10-15 NOTE — TELEPHONE ENCOUNTER
----- Message from Bushra Alonzo PA-C sent at 10/15/2024  1:33 PM EDT -----  Pls call calcium score came back at 708  Slightly high  Should def continue lipitor  Monitor for symptoms that occur with exertion  Report any symptoms to medical attention  How has hr been since stopping atenolol??

## 2024-10-15 NOTE — TELEPHONE ENCOUNTER
Spoke with patient and she verbally understood result. Patient did not stop taking atorvastatin 10 mg and atenolol 12.5 mg .

## 2024-10-17 ENCOUNTER — TELEPHONE (OUTPATIENT)
Dept: CARDIOLOGY CLINIC | Facility: CLINIC | Age: 69
End: 2024-10-17

## 2024-10-17 NOTE — TELEPHONE ENCOUNTER
Jeanine Zepeda   to [facility] (supporting Damien Funes MD)         10/16/24  7:17 PM  Hi I was called with simple information to take my prescriptions and watch for shortness of breath etc .  I would appreciate am explanation of test and what my future looks like with this problem.  I honestly have felt -diskissed-by your office.  I know your busy but I respectfully deserve a descriptive answer of test and future treatment of my arteries -a plan.  I live alone so waiting for a heart attack is simply not an option.  I also requested that meds be called in to [facility] -they are not there.  I sent results to my primary so I am aware of the seriousness of these results.  Thank you.

## 2024-10-21 DIAGNOSIS — E78.00 PURE HYPERCHOLESTEROLEMIA: ICD-10-CM

## 2024-10-21 DIAGNOSIS — I10 PRIMARY HYPERTENSION: Primary | ICD-10-CM

## 2024-10-21 NOTE — TELEPHONE ENCOUNTER
Medication: Atorvastatin    Dose/Frequency: 10mg daily    Quantity: 90 R3    Pharmacy: [facility] [place]    Office:   [] PCP/Provider -   [] Speciality/Provider - Dr Funes    Does the patient have enough for 3 days?   [] Yes   [x] No - Send as HP to POD

## 2024-10-21 NOTE — TELEPHONE ENCOUNTER
Medication: Atenolol    Dose/Frequency: 25mg tablet, take 12.5mg at bedtime    Quantity: 45 R3    Pharmacy: [facility] [place]    Office:   [] PCP/Provider -   [x] Speciality/Provider - Dr Easton    Does the patient have enough for 3 days?   [] Yes   [x] No - Send as HP to POD

## 2024-10-22 RX ORDER — ATENOLOL 25 MG/1
12.5 TABLET ORAL DAILY
Qty: 90 TABLET | Refills: 3 | Status: SHIPPED | OUTPATIENT
Start: 2024-10-22 | End: 2024-10-24 | Stop reason: ALTCHOICE

## 2024-10-22 RX ORDER — ATORVASTATIN CALCIUM 10 MG/1
10 TABLET, FILM COATED ORAL DAILY
Qty: 90 TABLET | Refills: 3 | Status: SHIPPED | OUTPATIENT
Start: 2024-10-22

## 2024-10-24 ENCOUNTER — OFFICE VISIT (OUTPATIENT)
Dept: GASTROENTEROLOGY | Facility: CLINIC | Age: 69
End: 2024-10-24
Payer: MEDICARE

## 2024-10-24 ENCOUNTER — TELEPHONE (OUTPATIENT)
Age: 69
End: 2024-10-24

## 2024-10-24 VITALS
OXYGEN SATURATION: 98 % | TEMPERATURE: 97.5 F | HEART RATE: 60 BPM | SYSTOLIC BLOOD PRESSURE: 123 MMHG | WEIGHT: 162 LBS | DIASTOLIC BLOOD PRESSURE: 79 MMHG | HEIGHT: 63 IN | BODY MASS INDEX: 28.7 KG/M2

## 2024-10-24 DIAGNOSIS — K58.0 IRRITABLE BOWEL SYNDROME WITH DIARRHEA: ICD-10-CM

## 2024-10-24 DIAGNOSIS — R06.02 SHORTNESS OF BREATH: Primary | ICD-10-CM

## 2024-10-24 DIAGNOSIS — I10 PRIMARY HYPERTENSION: ICD-10-CM

## 2024-10-24 DIAGNOSIS — K58.2 IRRITABLE BOWEL SYNDROME WITH BOTH CONSTIPATION AND DIARRHEA: Primary | ICD-10-CM

## 2024-10-24 DIAGNOSIS — E78.00 PURE HYPERCHOLESTEROLEMIA: ICD-10-CM

## 2024-10-24 PROCEDURE — 99213 OFFICE O/P EST LOW 20 MIN: CPT | Performed by: PHYSICIAN ASSISTANT

## 2024-10-24 RX ORDER — ATENOLOL 25 MG/1
12.5 TABLET ORAL DAILY
Start: 2024-10-24

## 2024-10-24 RX ORDER — AMITRIPTYLINE HYDROCHLORIDE 10 MG/1
20 TABLET ORAL
Qty: 60 TABLET | Refills: 3 | Status: SHIPPED | OUTPATIENT
Start: 2024-10-24

## 2024-10-24 NOTE — PROGRESS NOTES
Ambulatory Visit  Name: Yue Zepeda      : 1955      MRN: [id number]  Encounter Provider: Cherelle Duran PA-C  Encounter Date: 10/24/2024   Encounter department: [facility] [place]    Assessment & Plan  Irritable bowel syndrome with both constipation and diarrhea  She is really struggling  Most days she feels constipated - she has Bms but the are small and sometimes difficult to pass  She feels bloated  She has episodes of severe, uncontrollable, incontinent diarrhea - she uses Dicyclomie and Lomotil but will then not have a BM for several days    Previously tried and failed fiber, probiotic, dietary changes, peppermint oil, xifaxan    As symptoms are worsening I advised   - Switch fiber to Benefiber powder  - Increase Amitriptyline to 20mg qHS  - Retry Xifaxan course    We also discussed trying digestive enzymes, mushroom coffee    She is struggling with anxiety  She is having anxiety about cardiac issues (recent CT calcium score was high)  She is scheduled for a stress test in November      History of Present Illness     Yue Zepeda is a 69 y.o. female with irritable bowel syndrome, GERD, lichen sclerosis, hypertension, Ménière's disease, osteoarthritis, anxiety who presents for follow-up.  She is not doing well.  She is having significant anxiety surrounding all of her medical issues including her digestive system and cardiology issues.  She is seeing a therapist routinely.  She reports that most days she has very small but loose stools.  She never feels completely emptied.  She reports that she will even occasionally have stools in the middle of the night.  She then describes these severe episodes of diarrhea which occur unpredictably.  She reports that it is so severe that she is largely incontinent of liquid stool.  On a daily basis she is taking Metamucil Gummies.  She just restarted a probiotic.  She is still avoiding soy which she is sure is a problem.   "She uses dicyclomine and Lomotil plus or minus cholestyramine when she has her severe episodes of diarrhea.  She continues to be afraid to leave the house sometimes.  She continues to be anxious about any upcoming events that she may have.  She does have a history of microscopic colitis and had been treated with mesalamine which was stopped after a colonoscopy in February showed no evidence of microscopic colitis.  She is not sure that mesalamine was ever helpful.   Recently suggested maybe she restarted but she does not want to overtake medication.  She is taking amitriptyline 10 mg at bedtime.  It was previously felt that this was causing her excessive fatigue which is why the dose was never increased however it was recently realized this was due to other medications.      Review of Systems        Objective     /79 (BP Location: Right arm, Patient Position: Sitting, Cuff Size: Standard)   Pulse 60   Temp 97.5 °F (36.4 °C) (Temporal)   Ht 5' 3\" (1.6 m)   Wt 73.5 kg (162 lb)   SpO2 98%   BMI 28.70 kg/m²     Physical Exam    "

## 2024-10-24 NOTE — TELEPHONE ENCOUNTER
PA for Xifaxan 550 mg SUBMITTED     via    []CMM-KEY:   [x]Kettyrireshma-Case ID #     [id number]    Payer: [employer] P[employer] Part D   Phone: [phone]   Fax: [phone]     []Availity-Auth ID # NDC #   []Faxed to plan   []Other website   []Phone call Case ID #     Office notes sent, clinical questions answered. Awaiting determination    Turnaround time for your insurance to make a decision on your Prior Authorization can take 7-21 business days.

## 2024-10-25 DIAGNOSIS — I25.10 CORONARY ARTERY DISEASE INVOLVING NATIVE HEART WITHOUT ANGINA PECTORIS, UNSPECIFIED VESSEL OR LESION TYPE: ICD-10-CM

## 2024-10-25 DIAGNOSIS — I10 PRIMARY HYPERTENSION: Primary | ICD-10-CM

## 2024-10-25 NOTE — TELEPHONE ENCOUNTER
PA for Xifaxan APPROVED     Date(s) approved Authorized from October 24, 2024 to November 7, 2024        Case #    Patient advised by          [][employer] Inchart Message  [x]Phone call pt voiced understand    []LMOM  []L/M to call office as no active Communication consent on file  []Unable to leave detailed message as VM not approved on Communication consent       Pharmacy advised by    [x]Fax  []Phone call    Approval letter scanned into Media Yes

## 2024-10-28 ENCOUNTER — SOCIAL WORK (OUTPATIENT)
Dept: BEHAVIORAL/MENTAL HEALTH CLINIC | Facility: CLINIC | Age: 69
End: 2024-10-28
Payer: MEDICARE

## 2024-10-28 DIAGNOSIS — F39 MOOD DISORDER (HCC): Primary | ICD-10-CM

## 2024-10-28 PROCEDURE — 90837 PSYTX W PT 60 MINUTES: CPT

## 2024-10-28 NOTE — PSYCH
"Behavioral Health Psychotherapy Progress Note    Psychotherapy Provided: Individual Psychotherapy     1. Mood disorder (HCC)            Goals addressed in session: Goal 1 and Goal 2     DATA: Yue presents today neatly dressed, low mood. We processed the past few weeks. She states that she received some medical news the past week that distressed her and will be having some tests this coming week. This has her very worried. She also reports that her son, Lloyd, is having mental health issues again and that she did set some boundaries with him to protect her own mental health right now. We talked through these situations today. We reviewed a few common thought pattern errors in regards to her self-blaming for any health issues. Yue would like to resume weekly sessions for now.   During this session, this clinician used the following therapeutic modalities: Client-centered Therapy, Cognitive Behavioral Therapy, Cognitive Processing Therapy, Motivational Interviewing, and Solution-Focused Therapy    Substance Abuse was not addressed during this session. If the client is diagnosed with a co-occurring substance use disorder, please indicate any changes in the frequency or amount of use: n/a. Stage of change for addressing substance use diagnoses: Maintenance    ASSESSMENT:  Jeanine Zepeda presents with a Euthymic/ normal mood.     her affect is Normal range and intensity, which is congruent, with her mood and the content of the session. The client has made progress on their goals.     Jeanine Zepeda presents with a none risk of suicide, none risk of self-harm, and none risk of harm to others.    For any risk assessment that surpasses a \"low\" rating, a safety plan must be developed.    A safety plan was indicated: no  If yes, describe in detail n/a    PLAN: Between sessions, Jeanine Zepeda will continue self-care plan. At the next session, the therapist will use Client-centered Therapy, Cognitive Behavioral Therapy, Cognitive " Processing Therapy, Dialectical Behavior Therapy, Motivational Interviewing, and Solution-Focused Therapy to address depression/anxiety.    Behavioral Health Treatment Plan and Discharge Planning: Jeanine Zepeda is aware of and agrees to continue to work on their treatment plan. They have identified and are working toward their discharge goals. yes    Visit start and stop times:    10/28/24  Start Time: 1200  Stop Time: 1300  Total Visit Time: 60 minutes

## 2024-10-31 ENCOUNTER — HOSPITAL ENCOUNTER (OUTPATIENT)
Dept: NON INVASIVE DIAGNOSTICS | Facility: CLINIC | Age: 69
Discharge: HOME | End: 2024-10-31
Payer: MEDICARE

## 2024-10-31 VITALS
WEIGHT: 162 LBS | SYSTOLIC BLOOD PRESSURE: 148 MMHG | BODY MASS INDEX: 28.7 KG/M2 | HEIGHT: 63 IN | HEART RATE: 54 BPM | DIASTOLIC BLOOD PRESSURE: 94 MMHG | OXYGEN SATURATION: 100 %

## 2024-10-31 DIAGNOSIS — R06.02 SHORTNESS OF BREATH: ICD-10-CM

## 2024-10-31 LAB
CHEST PAIN STATEMENT: NORMAL
MAX DIASTOLIC BP: 94 MMHG
MAX HR PERCENT: 95 %
MAX HR: 146 BPM
MAX PREDICTED HEART RATE: 153 BPM
NUC STRESS DIASTOLIC VOLUME INDEX: 32 ML/M2
NUC STRESS EJECTION FRACTION: 86 %
NUC STRESS SYSTOLIC VOLUME INDEX: 4 ML/M2
PROTOCOL NAME: NORMAL
RATE PRESSURE PRODUCT: NORMAL
REASON FOR TERMINATION: NORMAL
SL CV REST NUCLEAR ISOTOPE DOSE: 7.71 MCI
SL CV STRESS NUCLEAR ISOTOPE DOSE: 24.8 MCI
SL CV STRESS RECOVERY BP: NORMAL MMHG
SL CV STRESS RECOVERY HR: 82 BPM
SL CV STRESS RECOVERY O2 SAT: 100 %
SL CV STRESS STAGE REACHED: 3
STRESS ANGINA INDEX: 0
STRESS BASELINE BP: NORMAL MMHG
STRESS BASELINE HR: 54 BPM
STRESS O2 SAT REST: 100 %
STRESS PEAK HR: 146 BPM
STRESS POST ESTIMATED WORKLOAD: 10.1 METS
STRESS POST EXERCISE DUR MIN: 7 MIN
STRESS POST EXERCISE DUR MIN: 7 MIN
STRESS POST EXERCISE DUR SEC: 30 SEC
STRESS POST EXERCISE DUR SEC: 30 SEC
STRESS POST O2 SAT PEAK: 100 %
STRESS POST PEAK BP: 214 MMHG
STRESS POST PEAK HR: 146 BPM
STRESS POST PEAK SYSTOLIC BP: 214 MMHG
STRESS/REST PERFUSION RATIO: 0.84
TARGET HR FORMULA: NORMAL
TEST INDICATION: NORMAL

## 2024-10-31 PROCEDURE — 93018 CV STRESS TEST I&R ONLY: CPT | Performed by: INTERNAL MEDICINE

## 2024-10-31 PROCEDURE — 78452 HT MUSCLE IMAGE SPECT MULT: CPT

## 2024-10-31 PROCEDURE — 93017 CV STRESS TEST TRACING ONLY: CPT

## 2024-10-31 PROCEDURE — A9502 TC99M TETROFOSMIN: HCPCS

## 2024-10-31 PROCEDURE — 93016 CV STRESS TEST SUPVJ ONLY: CPT | Performed by: INTERNAL MEDICINE

## 2024-10-31 PROCEDURE — 78452 HT MUSCLE IMAGE SPECT MULT: CPT | Performed by: INTERNAL MEDICINE

## 2024-11-01 ENCOUNTER — HOSPITAL ENCOUNTER (OUTPATIENT)
Dept: NON INVASIVE DIAGNOSTICS | Facility: CLINIC | Age: 69
Discharge: HOME | End: 2024-11-01
Payer: MEDICARE

## 2024-11-01 VITALS
SYSTOLIC BLOOD PRESSURE: 123 MMHG | WEIGHT: 162 LBS | DIASTOLIC BLOOD PRESSURE: 79 MMHG | BODY MASS INDEX: 28.7 KG/M2 | HEIGHT: 63 IN | HEART RATE: 61 BPM

## 2024-11-01 DIAGNOSIS — R06.02 SHORTNESS OF BREATH: ICD-10-CM

## 2024-11-01 LAB
AORTIC ROOT: 2.9 CM
AORTIC VALVE MEAN VELOCITY: 11.2 M/S
APICAL FOUR CHAMBER EJECTION FRACTION: 70 %
AV MEAN GRADIENT: 5 MMHG
AV PEAK GRADIENT: 10 MMHG
BSA FOR ECHO PROCEDURE: 1.77 M2
DOP CALC AO PEAK VEL: 1.55 M/S
DOP CALC AO VTI: 35.33 CM
E WAVE DECELERATION TIME: 215 MS
E/A RATIO: 1.43
FRACTIONAL SHORTENING: 39 (ref 28–44)
INTERVENTRICULAR SEPTUM IN DIASTOLE (PARASTERNAL SHORT AXIS VIEW): 0.7 CM
INTERVENTRICULAR SEPTUM: 0.7 CM (ref 0.6–1.1)
LAAS-AP2: 13.3 CM2
LAAS-AP4: 14 CM2
LEFT ATRIUM AREA SYSTOLE SINGLE PLANE A4C: 14.4 CM2
LEFT ATRIUM SIZE: 3.3 CM
LEFT ATRIUM VOLUME (MOD BIPLANE): 30 ML
LEFT ATRIUM VOLUME INDEX (MOD BIPLANE): 16.9 ML/M2
LEFT INTERNAL DIMENSION IN SYSTOLE: 2.3 CM (ref 2.1–4)
LEFT VENTRICULAR INTERNAL DIMENSION IN DIASTOLE: 3.8 CM (ref 3.5–6)
LEFT VENTRICULAR POSTERIOR WALL IN END DIASTOLE: 0.6 CM
LEFT VENTRICULAR STROKE VOLUME: 43 ML
LVSV (TEICH): 43 ML
MV E'TISSUE VEL-SEP: 8 CM/S
MV PEAK A VEL: 0.79 M/S
MV PEAK E VEL: 113 CM/S
MV STENOSIS PRESSURE HALF TIME: 62 MS
MV VALVE AREA P 1/2 METHOD: 3.55
RIGHT ATRIUM AREA SYSTOLE A4C: 13.5 CM2
RIGHT VENTRICLE ID DIMENSION: 3.1 CM
SL CV LEFT ATRIUM LENGTH A2C: 4.9 CM
SL CV LV EF: 70
SL CV PED ECHO LEFT VENTRICLE DIASTOLIC VOLUME (MOD BIPLANE) 2D: 62 ML
SL CV PED ECHO LEFT VENTRICLE SYSTOLIC VOLUME (MOD BIPLANE) 2D: 19 ML
TR MAX PG: 28 MMHG
TR PEAK VELOCITY: 2.7 M/S
TRICUSPID ANNULAR PLANE SYSTOLIC EXCURSION: 2.1 CM
TRICUSPID VALVE PEAK REGURGITATION VELOCITY: 2.66 M/S

## 2024-11-01 PROCEDURE — 93306 TTE W/DOPPLER COMPLETE: CPT

## 2024-11-01 PROCEDURE — 93306 TTE W/DOPPLER COMPLETE: CPT | Performed by: INTERNAL MEDICINE

## 2024-11-02 ENCOUNTER — APPOINTMENT (OUTPATIENT)
Age: 69
End: 2024-11-02
Payer: MEDICARE

## 2024-11-02 DIAGNOSIS — E78.00 PURE HYPERCHOLESTEROLEMIA: ICD-10-CM

## 2024-11-02 LAB
ALBUMIN SERPL BCG-MCNC: 4.1 G/DL (ref 3.5–5)
ALP SERPL-CCNC: 69 U/L (ref 34–104)
ALT SERPL W P-5'-P-CCNC: 29 U/L (ref 7–52)
ANION GAP SERPL CALCULATED.3IONS-SCNC: 8 MMOL/L (ref 4–13)
AST SERPL W P-5'-P-CCNC: 24 U/L (ref 13–39)
BILIRUB SERPL-MCNC: 0.46 MG/DL (ref 0.2–1)
BUN SERPL-MCNC: 15 MG/DL (ref 5–25)
CALCIUM SERPL-MCNC: 9.1 MG/DL (ref 8.4–10.2)
CHLORIDE SERPL-SCNC: 102 MMOL/L (ref 96–108)
CHOLEST SERPL-MCNC: 142 MG/DL
CO2 SERPL-SCNC: 31 MMOL/L (ref 21–32)
CREAT SERPL-MCNC: 0.98 MG/DL (ref 0.6–1.3)
GFR SERPL CREATININE-BSD FRML MDRD: 59 ML/MIN/1.73SQ M
GLUCOSE P FAST SERPL-MCNC: 98 MG/DL (ref 65–99)
HDLC SERPL-MCNC: 61 MG/DL
LDLC SERPL CALC-MCNC: 61 MG/DL (ref 0–100)
NONHDLC SERPL-MCNC: 81 MG/DL
POTASSIUM SERPL-SCNC: 3.6 MMOL/L (ref 3.5–5.3)
PROT SERPL-MCNC: 6.8 G/DL (ref 6.4–8.4)
SODIUM SERPL-SCNC: 141 MMOL/L (ref 135–147)
TRIGL SERPL-MCNC: 99 MG/DL

## 2024-11-02 PROCEDURE — 36415 COLL VENOUS BLD VENIPUNCTURE: CPT

## 2024-11-02 PROCEDURE — 80061 LIPID PANEL: CPT

## 2024-11-02 PROCEDURE — 80053 COMPREHEN METABOLIC PANEL: CPT

## 2024-11-05 ENCOUNTER — SOCIAL WORK (OUTPATIENT)
Dept: BEHAVIORAL/MENTAL HEALTH CLINIC | Facility: CLINIC | Age: 69
End: 2024-11-05
Payer: MEDICARE

## 2024-11-05 DIAGNOSIS — F39 MOOD DISORDER (HCC): Primary | ICD-10-CM

## 2024-11-05 PROCEDURE — 90837 PSYTX W PT 60 MINUTES: CPT

## 2024-11-07 ENCOUNTER — TELEPHONE (OUTPATIENT)
Dept: CARDIOLOGY CLINIC | Facility: CLINIC | Age: 69
End: 2024-11-07

## 2024-11-07 NOTE — TELEPHONE ENCOUNTER
----- Message from Bushra Alonzo PA-C sent at 11/6/2024  4:39 PM EST -----  Pls call stress test was good, along with echo  Advise to monitor for any symptoms out of the ordinary especially with exertion and let us know    Labs looked good too, keep all meds the same  This is good news

## 2024-11-11 ENCOUNTER — SOCIAL WORK (OUTPATIENT)
Dept: BEHAVIORAL/MENTAL HEALTH CLINIC | Facility: CLINIC | Age: 69
End: 2024-11-11
Payer: MEDICARE

## 2024-11-11 DIAGNOSIS — F39 MOOD DISORDER (HCC): Primary | ICD-10-CM

## 2024-11-11 PROCEDURE — 90837 PSYTX W PT 60 MINUTES: CPT

## 2024-11-11 NOTE — PSYCH
"Behavioral Health Psychotherapy Progress Note    Psychotherapy Provided: Individual Psychotherapy     1. Mood disorder (HCC)            Goals addressed in session: Goal 1 and Goal 2     DATA: Jeanine presents today casually dressed and in pleasant mood. We processed her week. She has some fears mostly related to the recent national election and we spent time discussing what is in her control and out of her control. She was able to recognize that some of these worries are resulting in overthinking at times. Reviewed thought trials. She is also focusing on self-care this week. She describes mostly indecision as the primary symptom this week, and we discussed strategies to manage this.    During this session, this clinician used the following therapeutic modalities: Client-centered Therapy, Cognitive Behavioral Therapy, Cognitive Processing Therapy, Dialectical Behavior Therapy, and Motivational Interviewing    Substance Abuse was not addressed during this session. If the client is diagnosed with a co-occurring substance use disorder, please indicate any changes in the frequency or amount of use: n/a drinks occasionally. Stage of change for addressing substance use diagnoses: Maintenance    ASSESSMENT:  Jeanine Zepeda presents with a Euthymic/ normal mood.     her affect is Normal range and intensity, which is congruent, with her mood and the content of the session. The client has made progress on their goals.     Jeanine Zepeda presents with a none risk of suicide, none risk of self-harm, and none risk of harm to others.    For any risk assessment that surpasses a \"low\" rating, a safety plan must be developed.    A safety plan was indicated: no  If yes, describe in detail n/a    PLAN: Between sessions, Jeanine Zepeda will continue self-care plan. At the next session, the therapist will use Client-centered Therapy, Cognitive Behavioral Therapy, Cognitive Processing Therapy, Dialectical Behavior Therapy, and Motivational " Interviewing to address depression.    Behavioral Health Treatment Plan and Discharge Planning: Jeanine Zepeda is aware of and agrees to continue to work on their treatment plan. They have identified and are working toward their discharge goals. yes    Visit start and stop times:    11/11/24  Start Time: 1200  Stop Time: 1255  Total Visit Time: 55 minutes

## 2024-11-18 ENCOUNTER — SOCIAL WORK (OUTPATIENT)
Dept: BEHAVIORAL/MENTAL HEALTH CLINIC | Facility: CLINIC | Age: 69
End: 2024-11-18
Payer: MEDICARE

## 2024-11-18 DIAGNOSIS — F39 MOOD DISORDER (HCC): Primary | ICD-10-CM

## 2024-11-18 PROCEDURE — 90837 PSYTX W PT 60 MINUTES: CPT

## 2024-11-18 NOTE — PSYCH
"Behavioral Health Psychotherapy Progress Note    Psychotherapy Provided: Individual Psychotherapy     1. Mood disorder (HCC)            Goals addressed in session: Goal 1 and Goal 2     DATA: Yue presents today neatly dressed. She is in an upbeat mood. We processed her week. 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 We celebrated her being able to navigate several situations throughout this event without anxiety. She also was able to rest the next day, taking care of herself, and so feels her body recuperated quickly from the event. Typically after events like this she will be exhausted for several days and reports that leads to depression afterwards. We explored differences with this event compared to past situations that produced anxiety and depression.   During this session, this clinician used the following therapeutic modalities: Client-centered Therapy, Cognitive Behavioral Therapy, Cognitive Processing Therapy, and Motivational Interviewing    Substance Abuse was not addressed during this session. If the client is diagnosed with a co-occurring substance use disorder, please indicate any changes in the frequency or amount of use: n/a. Stage of change for addressing substance use diagnoses: Maintenance    ASSESSMENT:  Jeanine Zepeda presents with a Euthymic/ normal mood.     her affect is Normal range and intensity, which is congruent, with her mood and the content of the session. The client has made progress on their goals.     Jeanine Zepeda presents with a none risk of suicide, none risk of self-harm, and none risk of harm to others.    For any risk assessment that surpasses a \"low\" rating, a safety plan must be developed.    A safety plan was indicated: no  If yes, describe in detail n/a    PLAN: Between sessions, Jeanine Zepeda will continue self-care plan. At the next session, the therapist will use Client-centered Therapy, " Cognitive Behavioral Therapy, Cognitive Processing Therapy, Dialectical Behavior Therapy, and Motivational Interviewing to address depression/anxiety.    Behavioral Health Treatment Plan and Discharge Planning: Jeanine Zepeda is aware of and agrees to continue to work on their treatment plan. They have identified and are working toward their discharge goals. yes    Visit start and stop times:    11/18/24  Start Time: 1200  Stop Time: 1254  Total Visit Time: 54 minutes

## 2024-11-25 ENCOUNTER — SOCIAL WORK (OUTPATIENT)
Dept: BEHAVIORAL/MENTAL HEALTH CLINIC | Facility: CLINIC | Age: 69
End: 2024-11-25
Payer: MEDICARE

## 2024-11-25 DIAGNOSIS — F39 MOOD DISORDER (HCC): Primary | ICD-10-CM

## 2024-11-25 PROCEDURE — 90837 PSYTX W PT 60 MINUTES: CPT

## 2024-11-25 NOTE — PSYCH
"Behavioral Health Psychotherapy Progress Note    Psychotherapy Provided: Individual Psychotherapy     1. Mood disorder (HCC)            Goals addressed in session: Goal 1 and Goal 2     DATA: Jeanine presented today casually dressed and in a pleasant mood today. We processed her week. Jeanine dealt with several financial decisions over the past week and this triggered her comparing herself to her half-siblings. We spent time processing the reasons for this comparison, and checking the thoughts for validity. Jeanine was able to recognize that she often struggles with attachments to others, including her mother, and half-siblings. Lost her biological father at age 7. Notes she has always pushed others away once they get too close and that she struggles with making any commitments overall. Jeanine will be spending the Thanksgiving holiday at her mother's with her son Lloyd nolan.  During this session, this clinician used the following therapeutic modalities: Client-centered Therapy, Cognitive Behavioral Therapy, Cognitive Processing Therapy, Dialectical Behavior Therapy, Mindfulness-based Strategies, and Motivational Interviewing    Substance Abuse was not addressed during this session. If the client is diagnosed with a co-occurring substance use disorder, please indicate any changes in the frequency or amount of use: drinks socially. Stage of change for addressing substance use diagnoses: Maintenance    ASSESSMENT:  Jeanine Zepeda presents with a Euthymic/ normal mood.     her affect is Normal range and intensity, which is congruent, with her mood and the content of the session. The client has made progress on their goals.     Jeanine Zepeda presents with a none risk of suicide, none risk of self-harm, and none risk of harm to others.    For any risk assessment that surpasses a \"low\" rating, a safety plan must be developed.    A safety plan was indicated: no  If yes, describe in detail n/a same plan as previously    PLAN: Between sessions, " Jeanine Zepeda will continue to use thought trial as needed. At the next session, the therapist will use Client-centered Therapy, Cognitive Behavioral Therapy, Cognitive Processing Therapy, Dialectical Behavior Therapy, Mindfulness-based Strategies, and Motivational Interviewing to address anxiety/depression/ attachment.    Behavioral Health Treatment Plan and Discharge Planning: Jeanine Zepeda is aware of and agrees to continue to work on their treatment plan. They have identified and are working toward their discharge goals. yes    Visit start and stop times:    11/25/24  Start Time: 1210  Stop Time: 1305  Total Visit Time: 55 minutes

## 2024-12-02 ENCOUNTER — DOCUMENTATION (OUTPATIENT)
Dept: BEHAVIORAL/MENTAL HEALTH CLINIC | Facility: CLINIC | Age: 69
End: 2024-12-02

## 2024-12-02 DIAGNOSIS — K58.0 IRRITABLE BOWEL SYNDROME WITH DIARRHEA: ICD-10-CM

## 2024-12-02 NOTE — PROGRESS NOTES
Received message from patient that she has a sore throat today and needs to cancel appt. She is already scheduled for next week, appt cancelled for her.

## 2024-12-03 RX ORDER — AMITRIPTYLINE HYDROCHLORIDE 10 MG/1
20 TABLET ORAL
Qty: 60 TABLET | Refills: 5 | Status: SHIPPED | OUTPATIENT
Start: 2024-12-03

## 2024-12-09 ENCOUNTER — SOCIAL WORK (OUTPATIENT)
Dept: BEHAVIORAL/MENTAL HEALTH CLINIC | Facility: CLINIC | Age: 69
End: 2024-12-09
Payer: MEDICARE

## 2024-12-09 DIAGNOSIS — F39 MOOD DISORDER (HCC): Primary | ICD-10-CM

## 2024-12-09 PROCEDURE — 90837 PSYTX W PT 60 MINUTES: CPT

## 2024-12-09 NOTE — PSYCH
"Behavioral Health Psychotherapy Progress Note    Psychotherapy Provided: Individual Psychotherapy     1. Mood disorder (HCC)            Goals addressed in session: Goal 1 and Goal 2     DATA: Yue presents today neatly dressed. We processed the past two weeks. She expressed frustration that her severe diarrhea returned this past week after she had been in a remission from it for several months. She plans to see her GI on Friday about this. She says she \"feels lazy\" and \"guilty\" when she isn't productive. We worked on reframing this to understanding that she is doing what her body needs each day.   During this session, this clinician used the following therapeutic modalities: Client-centered Therapy, Cognitive Behavioral Therapy, Cognitive Processing Therapy, and Motivational Interviewing    Substance Abuse was not addressed during this session. If the client is diagnosed with a co-occurring substance use disorder, please indicate any changes in the frequency or amount of use: Jeanine drinks occasionally socially. Stage of change for addressing substance use diagnoses: Maintenance    ASSESSMENT:  Jeanine Zepeda presents with a Euthymic/ normal and Dysthymic mood.     her affect is Normal range and intensity, which is congruent, with her mood and the content of the session. The client has made progress on their goals.     Jeanine Zepeda presents with a none risk of suicide, none risk of self-harm, and none risk of harm to others.    For any risk assessment that surpasses a \"low\" rating, a safety plan must be developed.    A safety plan was indicated: no  If yes, describe in detail n/a    PLAN: Between sessions, Jeanine Zepeda will continue self-care plan. At the next session, the therapist will use Client-centered Therapy, Cognitive Behavioral Therapy, Cognitive Processing Therapy, Dialectical Behavior Therapy, and Motivational Interviewing to address depression.    Behavioral Health Treatment Plan and Discharge Planning: Jeanine" Duane is aware of and agrees to continue to work on their treatment plan. They have identified and are working toward their discharge goals. yes    Visit start and stop times:    12/09/24  Start Time: 1201  Stop Time: 1256  Total Visit Time: 55 minutes

## 2024-12-13 ENCOUNTER — OFFICE VISIT (OUTPATIENT)
Dept: GASTROENTEROLOGY | Facility: CLINIC | Age: 69
End: 2024-12-13
Payer: MEDICARE

## 2024-12-13 VITALS
DIASTOLIC BLOOD PRESSURE: 76 MMHG | WEIGHT: 165 LBS | HEART RATE: 68 BPM | TEMPERATURE: 97.6 F | OXYGEN SATURATION: 98 % | SYSTOLIC BLOOD PRESSURE: 122 MMHG | HEIGHT: 63 IN | BODY MASS INDEX: 29.23 KG/M2

## 2024-12-13 DIAGNOSIS — K58.0 IRRITABLE BOWEL SYNDROME WITH DIARRHEA: ICD-10-CM

## 2024-12-13 DIAGNOSIS — Z11.59 ENCOUNTER FOR SCREENING FOR OTHER VIRAL DISEASES: ICD-10-CM

## 2024-12-13 DIAGNOSIS — K52.832 LYMPHOCYTIC COLITIS: Primary | ICD-10-CM

## 2024-12-13 PROCEDURE — 99213 OFFICE O/P EST LOW 20 MIN: CPT | Performed by: PHYSICIAN ASSISTANT

## 2024-12-13 NOTE — PROGRESS NOTES
Name: Yue Zepeda      : 1955      MRN: [id number]  Encounter Provider: Cherelle Duran PA-C  Encounter Date: 2024   Encounter department: [facility] [place]  :  Assessment & Plan  Lymphocytic colitis  Biopsy proven in 2019  Failed Budesonide and Mesalamine  She continues to have diarrhea and is fearful of leaving the house in case of an attack    She wants to try Humira  She also has Lichen Sclerosis which Humira can help and Uveitis  She is HLA-B27 positive for AS    We had a long discussion about risks and benefits including immunosuppression, pericarditis, malignancy, drug induced liver disease    Will check hepatitis panel and Quantiferron gold  Recent renal and liver function tests are normal    She will stay UTD on routine vaccinations    She follows routinely with a dermatologist    Irritable bowel syndrome with diarrhea  She actually did very well after a Xifaxan course for about 6 weeks  Her diarrhea returned this past weekend  She continues to fluctuate between diarrhea and constipation  She continues to be anxious about leaving the house over fears of attacks        History of Present Illness   HPI  Yue Zepeda is a 69 y.o. female with microscopic colitis, irritable bowel syndrome, uveitis, lichen sclerosis, anxiety, depression who presents for routine follow-up.  She continues to be plagued with diarrhea.  She reports that the episodes are severe and unpredictable.  She did complete a Xifaxan course after her last appointment and admits that she actually felt very well for about 6 weeks before the symptoms returned.  She continues to live in fear of leaving the house as she does not know when attacks of diarrhea will occur.  She has had to wear depends because of the diarrhea.  She continues to report bloating and abdominal discomfort.  She has no rectal bleeding.  She is maintaining her weight.  For her microscopic colitis she has previously  tried and failed budesonide and mesalamine.  She finds it difficult to follow a low FODMAP diet but does use FODMAP enzymes which she finds to be helpful.    History obtained from: patient    Review of Systems  Medical History Reviewed by provider this encounter:     .  Past Medical History   Past Medical History:   Diagnosis Date    Abnormal Pap smear of cervix 1/6/22    Basal cell carcinoma (BCC) of parietal region of scalp 06/13/2022    Left parietal scalp    Bipolar 1 disorder, depressed (HCC)     Bipolar 1 disorder, depressed (HCC) 01/24/1975    Chronic kidney disease     Depression     Diverticulitis of colon     First colonoscopy age 52    GERD (gastroesophageal reflux disease)     H/O bladder infections     Hyperlipidemia     Hypertension     Irritable bowel syndrome 3 years ago    Diarhea, constipation, acid indigestion    Lichen sclerosus     Microscopic colitis     Skin disorder     lichens sclerosis    Urinary tract infection      Past Surgical History:   Procedure Laterality Date    COLONOSCOPY      2013     HYSTERECTOMY      MOHS SURGERY Left 06/28/2022    Left parietal scalp     Family History   Problem Relation Age of Onset    Squamous cell carcinoma Mother     Arthritis Mother     Diabetes Mother     Hypertension Mother     Cancer Mother 55        uter    Kidney disease Father     Diabetic kidney disease Father     Early death Father         Age 33    Hypertension Father     Breast cancer Maternal Grandmother 66    No Known Problems Paternal Grandmother     No Known Problems Son     Cervical cancer Maternal Aunt 80    No Known Problems Half-Sister       reports that she quit smoking about 29 years ago. Her smoking use included cigarettes. She started smoking about 49 years ago. She has a 40 pack-year smoking history. She has never used smokeless tobacco. She reports current alcohol use of about 1.0 standard drink of alcohol per week. She reports that she does not currently use drugs.  Current  Outpatient Medications on File Prior to Visit   Medication Sig Dispense Refill    amitriptyline (ELAVIL) 10 mg tablet Take 2 tablets (20 mg total) by mouth daily at bedtime 60 tablet 5    atenolol (TENORMIN) 25 mg tablet Take 0.5 tablets (12.5 mg total) by mouth daily At bedtime      atorvastatin (LIPITOR) 10 mg tablet Take 1 tablet (10 mg total) by mouth daily 90 tablet 3    cholestyramine (QUESTRAN) 4 g packet Take 1 packet (4 g total) by mouth 3 (three) times a day with meals 60 packet 3    clobetasol (TEMOVATE) 0.05 % cream Apply topically 2 (two) times a day 45 g 0    clonazePAM (KlonoPIN) 0.5 mg tablet TAKE 1 TABLET BY MOUTH 2 TIMES A DAY. 60 tablet 0    clotrimazole-betamethasone (LOTRISONE) 1-0.05 % cream       cyclobenzaprine (FLEXERIL) 10 mg tablet prn      dicyclomine (BENTYL) 20 mg tablet Take 1 tablet (20 mg total) by mouth every 6 (six) hours as needed (abdominal) 60 tablet 3    diphenoxylate-atropine (LOMOTIL) 2.5-0.025 mg per tablet Take 1 tablet by mouth 4 (four) times a day as needed for diarrhea (Patient taking differently: Take 1 tablet by mouth 4 (four) times a day as needed for diarrhea prn) 60 tablet 3    estradiol (Vagifem) 10 MCG TABS vaginal tablet Insert 1 tablet (10 mcg total) into the vagina 2 (two) times a week 8 tablet 6    fluticasone (FLONASE) 50 mcg/act nasal spray SPRAY 1 SPRAY INTO EACH NOSTRIL TWICE A DAY      hydroCHLOROthiazide 25 mg tablet Take 1 tablet (25 mg total) by mouth daily 90 tablet 3    loratadine (CLARITIN) 10 mg tablet Take 1 tablet (10 mg total) by mouth daily 10 tablet 0    Probiotic Product (PROBIOTIC 10 ULTRA STRENGTH PO) Take by mouth      zolpidem (AMBIEN) 10 mg tablet Take 1 tablet (10 mg total) by mouth daily at bedtime as needed (insomnia) 30 tablet 0     No current facility-administered medications on file prior to visit.     Allergies   Allergen Reactions    Levofloxacin Other (See Comments)    Sulfa Antibiotics Fever and Itching    Amoxicillin-Pot  Clavulanate Other (See Comments)    Cephalexin Diarrhea    Molds & Smuts Other (See Comments)    Nitrofurantoin Fever    Soy Allergy - Food Allergy Diarrhea and GI Intolerance    Topiramate Other (See Comments)    Wheat Bran - Food Allergy Other (See Comments)    Bacitracin-Neomycin-Polymyxin Rash    Sulfamethoxazole-Trimethoprim Diarrhea      Current Outpatient Medications on File Prior to Visit   Medication Sig Dispense Refill    amitriptyline (ELAVIL) 10 mg tablet Take 2 tablets (20 mg total) by mouth daily at bedtime 60 tablet 5    atenolol (TENORMIN) 25 mg tablet Take 0.5 tablets (12.5 mg total) by mouth daily At bedtime      atorvastatin (LIPITOR) 10 mg tablet Take 1 tablet (10 mg total) by mouth daily 90 tablet 3    cholestyramine (QUESTRAN) 4 g packet Take 1 packet (4 g total) by mouth 3 (three) times a day with meals 60 packet 3    clobetasol (TEMOVATE) 0.05 % cream Apply topically 2 (two) times a day 45 g 0    clonazePAM (KlonoPIN) 0.5 mg tablet TAKE 1 TABLET BY MOUTH 2 TIMES A DAY. 60 tablet 0    clotrimazole-betamethasone (LOTRISONE) 1-0.05 % cream       cyclobenzaprine (FLEXERIL) 10 mg tablet prn      dicyclomine (BENTYL) 20 mg tablet Take 1 tablet (20 mg total) by mouth every 6 (six) hours as needed (abdominal) 60 tablet 3    diphenoxylate-atropine (LOMOTIL) 2.5-0.025 mg per tablet Take 1 tablet by mouth 4 (four) times a day as needed for diarrhea (Patient taking differently: Take 1 tablet by mouth 4 (four) times a day as needed for diarrhea prn) 60 tablet 3    estradiol (Vagifem) 10 MCG TABS vaginal tablet Insert 1 tablet (10 mcg total) into the vagina 2 (two) times a week 8 tablet 6    fluticasone (FLONASE) 50 mcg/act nasal spray SPRAY 1 SPRAY INTO EACH NOSTRIL TWICE A DAY      hydroCHLOROthiazide 25 mg tablet Take 1 tablet (25 mg total) by mouth daily 90 tablet 3    loratadine (CLARITIN) 10 mg tablet Take 1 tablet (10 mg total) by mouth daily 10 tablet 0    Probiotic Product (PROBIOTIC 10 ULTRA  "STRENGTH PO) Take by mouth      zolpidem (AMBIEN) 10 mg tablet Take 1 tablet (10 mg total) by mouth daily at bedtime as needed (insomnia) 30 tablet 0     No current facility-administered medications on file prior to visit.      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  Average a wee    Drug use: Not Currently     Comment: In my teens and twenties    Sexual activity: Not Currently     Partners: Male     Birth control/protection: Post-menopausal, Female Sterilization     Comment: Partial hysterectomy ?        Objective   /76 (BP Location: Right arm, Patient Position: Sitting, Cuff Size: Standard)   Pulse 68   Temp 97.6 °F (36.4 °C) (Temporal)   Ht 5' 3\" (1.6 m)   Wt 74.8 kg (165 lb)   SpO2 98%   BMI 29.23 kg/m²      Physical Exam      "

## 2024-12-13 NOTE — ASSESSMENT & PLAN NOTE
Biopsy proven in 2019  Failed Budesonide and Mesalamine  She continues to have diarrhea and is fearful of leaving the house in case of an attack    She wants to try Humira  She also has Lichen Sclerosis which Humira can help and Uveitis  She is HLA-B27 positive for AS    We had a long discussion about risks and benefits including immunosuppression, pericarditis, malignancy, drug induced liver disease    Will check hepatitis panel and Quantiferron gold  Recent renal and liver function tests are normal    She will stay UTD on routine vaccinations    She follows routinely with a dermatologist

## 2024-12-16 ENCOUNTER — SOCIAL WORK (OUTPATIENT)
Dept: BEHAVIORAL/MENTAL HEALTH CLINIC | Facility: CLINIC | Age: 69
End: 2024-12-16
Payer: MEDICARE

## 2024-12-16 DIAGNOSIS — F39 MOOD DISORDER (HCC): Primary | ICD-10-CM

## 2024-12-16 PROCEDURE — 90837 PSYTX W PT 60 MINUTES: CPT

## 2024-12-16 NOTE — PSYCH
"Behavioral Health Psychotherapy Progress Note    Psychotherapy Provided: Individual Psychotherapy     1. Mood disorder (HCC)            Goals addressed in session: Goal 1 and Goal 2     DATA: Yue presents today neatly dressed, and in a dysthymic mood. We processed her week. She feels her mood has been low this week with little energy. She says, \"I feel ungrounded\". We discussed ways for her to feel more grounded overall, and to increase her self-care this week. 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 She thinks that she may have a sinus infection starting and that this could be contributing to her low mood.   During this session, this clinician used the following therapeutic modalities: Client-centered Therapy, Cognitive Behavioral Therapy, and Cognitive Processing Therapy    Substance Abuse was not addressed during this session. If the client is diagnosed with a co-occurring substance use disorder, please indicate any changes in the frequency or amount of use: Yue drinks socially in small amounts. Stage of change for addressing substance use diagnoses: Maintenance    ASSESSMENT:  Jeanine Zepeda presents with a Dysthymic mood.     her affect is Normal range and intensity, which is congruent, with her mood and the content of the session. The client has made progress on their goals.     Jeanine Zepeda presents with a none risk of suicide, none risk of self-harm, and none risk of harm to others.    For any risk assessment that surpasses a \"low\" rating, a safety plan must be developed.    A safety plan was indicated: no  If yes, describe in detail n/a    PLAN: Between sessions, Jeanine Zepeda will continue self-care plan. At the next session, the therapist will use Client-centered Therapy, Cognitive Behavioral Therapy, Cognitive Processing Therapy, Dialectical Behavior Therapy, Mindfulness-based Strategies, and " Motivational Interviewing to address depression.    Behavioral Health Treatment Plan and Discharge Planning: Jeanine Zepeda is aware of and agrees to continue to work on their treatment plan. They have identified and are working toward their discharge goals. yes    Depression Follow-up Plan Completed: Not applicable    Visit start and stop times:    12/16/24  Start Time: 1200  Stop Time: 1255  Total Visit Time: 55 minutes

## 2024-12-17 DIAGNOSIS — L90.0 LICHEN SCLEROSUS: ICD-10-CM

## 2024-12-17 RX ORDER — CLOBETASOL PROPIONATE 0.5 MG/G
CREAM TOPICAL 2 TIMES DAILY
Qty: 45 G | Refills: 0 | Status: SHIPPED | OUTPATIENT
Start: 2024-12-17

## 2024-12-23 ENCOUNTER — SOCIAL WORK (OUTPATIENT)
Dept: BEHAVIORAL/MENTAL HEALTH CLINIC | Facility: CLINIC | Age: 69
End: 2024-12-23
Payer: MEDICARE

## 2024-12-23 DIAGNOSIS — F39 MOOD DISORDER (HCC): Primary | ICD-10-CM

## 2024-12-23 PROCEDURE — 90837 PSYTX W PT 60 MINUTES: CPT

## 2024-12-23 NOTE — PSYCH
"Behavioral Health Psychotherapy Progress Note    Psychotherapy Provided: Individual Psychotherapy     1. Mood disorder (HCC)            Goals addressed in session: Goal 1 and Goal 2     DATA: Yue presents today neatly dressed. Her mood is better this week and more upbeat. She saw her PCP this past week and he encouraged her to start a Humera tx that her dermatologist suggested and she says this made her feel more secure about her decision. We explored today why she is often insecure about decisions she makes. She was able to identify that her parents always questioned her decision making. For the holiday she plans to see her son, Lloyd, and her mother.  During this session, this clinician used the following therapeutic modalities: Client-centered Therapy, Cognitive Behavioral Therapy, Cognitive Processing Therapy, and Motivational Interviewing    Substance Abuse was not addressed during this session. If the client is diagnosed with a co-occurring substance use disorder, please indicate any changes in the frequency or amount of use: n/a. Stage of change for addressing substance use diagnoses: Maintenance    ASSESSMENT:  Jeanine Zepeda presents with a Euthymic/ normal mood.     her affect is Normal range and intensity, which is congruent, with her mood and the content of the session. The client has made progress on their goals.     Jeanine Zepeda presents with a none risk of suicide, none risk of self-harm, and none risk of harm to others.    For any risk assessment that surpasses a \"low\" rating, a safety plan must be developed.    A safety plan was indicated: no  If yes, describe in detail n/a    PLAN: Between sessions, Jeanine Zepeda will continue working on self-care plan. At the next session, the therapist will use Client-centered Therapy, Cognitive Behavioral Therapy, Cognitive Processing Therapy, and Motivational Interviewing to address anxiety/depression.    Behavioral Health Treatment Plan and Discharge Planning: " Jeanine Zepeda is aware of and agrees to continue to work on their treatment plan. They have identified and are working toward their discharge goals. yes    Depression Follow-up Plan Completed: Not applicable    Visit start and stop times:    12/23/24  Start Time: 1204  Stop Time: 1258  Total Visit Time: 54 minutes

## 2024-12-30 ENCOUNTER — SOCIAL WORK (OUTPATIENT)
Dept: BEHAVIORAL/MENTAL HEALTH CLINIC | Facility: CLINIC | Age: 69
End: 2024-12-30
Payer: MEDICARE

## 2024-12-30 DIAGNOSIS — F39 MOOD DISORDER (HCC): Primary | ICD-10-CM

## 2024-12-30 PROCEDURE — 90837 PSYTX W PT 60 MINUTES: CPT

## 2024-12-30 NOTE — PSYCH
"Behavioral Health Psychotherapy Progress Note    Psychotherapy Provided: Individual Psychotherapy     1. Mood disorder (HCC)            Goals addressed in session: Goal 1 and Goal 2     DATA: Jeanine presents today neatly dressed. We processed her week. [place] she was able to spend with her mother and son. She noted several circumstances where her mother claimed Jeanine said things that she did not and her son, Lloyd, defended her. She felt this was significant because he saw it and it validated for her that her mother does do this to her frequently. Jeanine is struggling today with making some decisions. Some are bigger decisions (like starting a new medication), others are smaller everyday decisions. Therapist challenged her that this is a recurring theme for her that she is afraid to  make decisions. She was able to recognize that she goes back to the fear that her mother and siblings will blame her if she makes the \"wrong\" choice. We worked on the thought patterns associated with this.   During this session, this clinician used the following therapeutic modalities: Client-centered Therapy, Cognitive Behavioral Therapy, Cognitive Processing Therapy, and Motivational Interviewing    Substance Abuse was not addressed during this session. If the client is diagnosed with a co-occurring substance use disorder, please indicate any changes in the frequency or amount of use: Jeanine occasionally will drink socially. Stage of change for addressing substance use diagnoses: Maintenance    ASSESSMENT:  Jeanine Zepeda presents with a Euthymic/ normal mood.     her affect is Normal range and intensity, which is congruent, with her mood and the content of the session. The client has made progress on their goals.     Jeanine Zepeda presents with a none risk of suicide, none risk of self-harm, and none risk of harm to others.    For any risk assessment that surpasses a \"low\" rating, a safety plan must be developed.    A safety plan was indicated: " no  If yes, describe in detail n/a    PLAN: Between sessions, Jeanine Zepeda will continue with self-care plan. At the next session, the therapist will use Client-centered Therapy, Cognitive Behavioral Therapy, Cognitive Processing Therapy, Dialectical Behavior Therapy, Mindfulness-based Strategies, and Motivational Interviewing to address anxiety/depression/ cognitive distortions.    Behavioral Health Treatment Plan and Discharge Planning: Jeanine Zepeda is aware of and agrees to continue to work on their treatment plan. They have identified and are working toward their discharge goals. yes    Depression Follow-up Plan Completed: Not applicable    Visit start and stop times:    12/30/24  Start Time: 1200  Stop Time: 1300  Total Visit Time: 60 minutes

## 2025-01-06 ENCOUNTER — TELEPHONE (OUTPATIENT)
Dept: BEHAVIORAL/MENTAL HEALTH CLINIC | Facility: CLINIC | Age: 70
End: 2025-01-06

## 2025-01-06 NOTE — TELEPHONE ENCOUNTER
Patient emailed at 2:00pm that she had to miss appt today due to an emergency with her dog. Rescheduled for next Monday.

## 2025-01-07 DIAGNOSIS — E78.00 PURE HYPERCHOLESTEROLEMIA: ICD-10-CM

## 2025-01-07 DIAGNOSIS — K58.0 IRRITABLE BOWEL SYNDROME WITH DIARRHEA: ICD-10-CM

## 2025-01-07 DIAGNOSIS — I10 PRIMARY HYPERTENSION: ICD-10-CM

## 2025-01-07 DIAGNOSIS — N95.2 ATROPHIC VAGINITIS: ICD-10-CM

## 2025-01-08 RX ORDER — ESTRADIOL 10 UG/1
1 INSERT VAGINAL 2 TIMES WEEKLY
Qty: 8 TABLET | Refills: 5 | Status: SHIPPED | OUTPATIENT
Start: 2025-01-09

## 2025-01-08 RX ORDER — AMITRIPTYLINE HYDROCHLORIDE 10 MG/1
20 TABLET ORAL
Qty: 60 TABLET | Refills: 5 | Status: SHIPPED | OUTPATIENT
Start: 2025-01-08

## 2025-01-09 RX ORDER — HYDROCHLOROTHIAZIDE 25 MG/1
25 TABLET ORAL DAILY
Qty: 90 TABLET | Refills: 1 | Status: SHIPPED | OUTPATIENT
Start: 2025-01-09

## 2025-01-09 RX ORDER — ATORVASTATIN CALCIUM 10 MG/1
10 TABLET, FILM COATED ORAL DAILY
Qty: 90 TABLET | Refills: 1 | Status: SHIPPED | OUTPATIENT
Start: 2025-01-09

## 2025-01-13 ENCOUNTER — SOCIAL WORK (OUTPATIENT)
Dept: BEHAVIORAL/MENTAL HEALTH CLINIC | Facility: CLINIC | Age: 70
End: 2025-01-13
Payer: MEDICARE

## 2025-01-13 DIAGNOSIS — F39 MOOD DISORDER (HCC): Primary | ICD-10-CM

## 2025-01-13 PROCEDURE — 90837 PSYTX W PT 60 MINUTES: CPT

## 2025-01-13 NOTE — PSYCH
"Behavioral Health Psychotherapy Progress Note    Psychotherapy Provided: Individual Psychotherapy     1. Mood disorder (HCC)            Goals addressed in session: Goal 1 and Goal 2     DATA: Omar presents today neatly dressed, pleasant mood, appropriate eye contact. We processed the past few weeks. She has a family wedding coming up this weekend and has concerns about seeing some of her family members. We worked on processing this and practiced how to apply assertive communication and keeping boundaries. Omar also attended the  of an older friend who did dog shows with her. She was able to see several other friends at this event which was helpful. She feels overall her mood has been up this week and her energy is improved as well. She continues her self-care plan.   During this session, this clinician used the following therapeutic modalities: Client-centered Therapy, Cognitive Behavioral Therapy, Cognitive Processing Therapy, and Motivational Interviewing    Substance Abuse was not addressed during this session. If the client is diagnosed with a co-occurring substance use disorder, please indicate any changes in the frequency or amount of use: omar maintains sobriety. Stage of change for addressing substance use diagnoses: Maintenance    ASSESSMENT:  Omar Zepeda presents with a Euthymic/ normal mood.     her affect is Normal range and intensity, which is congruent, with her mood and the content of the session. The client has made progress on their goals.     Omar Zepeda presents with a none risk of suicide, none risk of self-harm, and none risk of harm to others.    For any risk assessment that surpasses a \"low\" rating, a safety plan must be developed.    A safety plan was indicated: no  If yes, describe in detail n/a     PLAN: Between sessions, Omar Zepeda will continue with self-care plan. At the next session, the therapist will use Client-centered Therapy, Cognitive Behavioral Therapy, Cognitive Processing " Therapy, Dialectical Behavior Therapy, Mindfulness-based Strategies, and Motivational Interviewing to address anxiety/depression.    Behavioral Health Treatment Plan and Discharge Planning: Jeanine Zepeda is aware of and agrees to continue to work on their treatment plan. They have identified and are working toward their discharge goals. yes    Depression Follow-up Plan Completed: Not applicable    Visit start and stop times:    01/13/25  Start Time: 1201  Stop Time: 1255  Total Visit Time: 54 minutes

## 2025-01-28 ENCOUNTER — TELEPHONE (OUTPATIENT)
Age: 70
End: 2025-01-28

## 2025-01-28 NOTE — TELEPHONE ENCOUNTER
PA for (Vagifem) 10 MCG TABS SUBMITTED to [employer]    via    [x]CMM-KEY: [id number]  []Surescripts-Case ID #   []Availity-Auth ID # NDC #   []Faxed to plan   []Other website   []Phone call Case ID #     []PA sent as URGENT    All office notes, labs and other pertaining documents and studies sent. Clinical questions answered. Awaiting determination from insurance company.     Turnaround time for your insurance to make a decision on your Prior Authorization can take 7-21 business days.

## 2025-01-30 NOTE — TELEPHONE ENCOUNTER
PA for (Vagifem) 10 MCG APPROVED     Date(s) approved through 12/31/2025    Case #    Patient advised by          [x]MyChart Message  []Phone call   []LMOM  []L/M to call office as no active Communication consent on file  []Unable to leave detailed message as VM not approved on Communication consent       Pharmacy advised by    [x]Fax  []Phone call    Approval letter scanned into Media Yes

## 2025-02-03 ENCOUNTER — SOCIAL WORK (OUTPATIENT)
Dept: BEHAVIORAL/MENTAL HEALTH CLINIC | Facility: CLINIC | Age: 70
End: 2025-02-03
Payer: MEDICARE

## 2025-02-03 DIAGNOSIS — F39 MOOD DISORDER (HCC): Primary | ICD-10-CM

## 2025-02-03 PROCEDURE — 90837 PSYTX W PT 60 MINUTES: CPT

## 2025-02-03 NOTE — PSYCH
"Behavioral Health Psychotherapy Progress Note    Psychotherapy Provided: Individual Psychotherapy     1. Mood disorder (HCC)            Goals addressed in session: Goal 1 and Goal 2     DATA: Jeanine presents today neatly dressed, pleasant, fully engaged in session, appropriate eye contact. We processed the past month. She attended family wedding with all of her extended family including her half-siblings. We processed some of the conversations and comments they made towards her. She also had a few days where she was tearful about some recent national events, plane crashes etc. We processed through these and therapist validated the concerns and grief associated with these events. Overall Jeanine feels her body has been doing well physically and this is helping her mood remain steady. Reinforced self-care plan today.  During this session, this clinician used the following therapeutic modalities: Client-centered Therapy, Cognitive Behavioral Therapy, Cognitive Processing Therapy, and Motivational Interviewing    Substance Abuse was not addressed during this session. If the client is diagnosed with a co-occurring substance use disorder, please indicate any changes in the frequency or amount of use: remains sober. Stage of change for addressing substance use diagnoses: Maintenance    ASSESSMENT:  Jeanine Zepeda presents with a Euthymic/ normal mood.     her affect is Normal range and intensity, which is congruent, with her mood and the content of the session. The client has made progress on their goals.     Jeanine Zepeda presents with a none risk of suicide, none risk of self-harm, and none risk of harm to others.    For any risk assessment that surpasses a \"low\" rating, a safety plan must be developed.    A safety plan was indicated: no  If yes, describe in detail n/a    PLAN: Between sessions, Jeanine Zepeda will continue working on self-care plan. At the next session, the therapist will use Client-centered Therapy, Cognitive " Behavioral Therapy, Cognitive Processing Therapy, Dialectical Behavior Therapy, Mindfulness-based Strategies, and Motivational Interviewing to address anxiety/depression.    Behavioral Health Treatment Plan and Discharge Planning: Jeanine Zepeda is aware of and agrees to continue to work on their treatment plan. They have identified and are working toward their discharge goals. yes    Depression Follow-up Plan Completed: Not applicable    Visit start and stop times:    02/03/25  Start Time: 1155  Stop Time: 1255  Total Visit Time: 60 minutes

## 2025-02-07 NOTE — PROGRESS NOTES
" Nutrition Assessment Form    Patient Name: Yue Zepeda    YOB: 1955    Sex: Female     Assessment Date: 2/13/2025  Start Time: 1:27 PM Stop Time: 2:27 PM Total Minutes: 60     Data:  Present at session: self   Parent/Patient Concerns/reason for visit: \"I had trouble with IBS, chronic diarrhea, a bad relationship with food and I don't know what to eat.\"   Medical Dx/Reason for Referral: I10 (ICD-10-CM) - Primary hypertension  I25.10 (ICD-10-CM) - Coronary artery disease involving native heart without angina pectoris, unspecified vessel or lesion type   Past Medical History:   Diagnosis Date    Abnormal Pap smear of cervix 1/6/22    Basal cell carcinoma (BCC) of parietal region of scalp 06/13/2022    Left parietal scalp    Bipolar 1 disorder, depressed (HCC)     Bipolar 1 disorder, depressed (HCC) 01/24/1975    Chronic kidney disease     Depression     Diverticulitis of colon     First colonoscopy age 52    GERD (gastroesophageal reflux disease)     H/O bladder infections     Hyperlipidemia     Hypertension     Irritable bowel syndrome 3 years ago    Diarhea, constipation, acid indigestion    Lichen sclerosus     Microscopic colitis     Skin disorder     lichens sclerosis    Urinary tract infection        Current Outpatient Medications   Medication Sig Dispense Refill    amitriptyline (ELAVIL) 10 mg tablet Take 2 tablets (20 mg total) by mouth daily at bedtime 60 tablet 5    atenolol (TENORMIN) 25 mg tablet Take 0.5 tablets (12.5 mg total) by mouth daily At bedtime      atorvastatin (LIPITOR) 10 mg tablet Take 1 tablet (10 mg total) by mouth daily 90 tablet 1    cholestyramine (QUESTRAN) 4 g packet Take 1 packet (4 g total) by mouth 3 (three) times a day with meals 60 packet 3    clobetasol (TEMOVATE) 0.05 % cream Apply topically 2 (two) times a day 45 g 0    clonazePAM (KlonoPIN) 0.5 mg tablet TAKE 1 TABLET BY MOUTH 2 TIMES A DAY. 60 tablet 0    clotrimazole-betamethasone (LOTRISONE) 1-0.05 % cream  " "     cyclobenzaprine (FLEXERIL) 10 mg tablet prn      dicyclomine (BENTYL) 20 mg tablet Take 1 tablet (20 mg total) by mouth every 6 (six) hours as needed (abdominal) 60 tablet 3    diphenoxylate-atropine (LOMOTIL) 2.5-0.025 mg per tablet Take 1 tablet by mouth 4 (four) times a day as needed for diarrhea (Patient taking differently: Take 1 tablet by mouth 4 (four) times a day as needed for diarrhea prn) 60 tablet 3    estradiol (Vagifem) 10 MCG TABS vaginal tablet Insert 1 tablet (10 mcg total) into the vagina 2 (two) times a week 8 tablet 5    fluticasone (FLONASE) 50 mcg/act nasal spray SPRAY 1 SPRAY INTO EACH NOSTRIL TWICE A DAY      hydroCHLOROthiazide 25 mg tablet Take 1 tablet (25 mg total) by mouth daily 90 tablet 1    loratadine (CLARITIN) 10 mg tablet Take 1 tablet (10 mg total) by mouth daily 10 tablet 0    Probiotic Product (PROBIOTIC 10 ULTRA STRENGTH PO) Take by mouth      zolpidem (AMBIEN) 10 mg tablet Take 1 tablet (10 mg total) by mouth daily at bedtime as needed (insomnia) 30 tablet 0     No current facility-administered medications for this visit.        Additional Meds/Supplements: Benefiber daily, No cholesteyramine, Probitoics, MVI not every daily, Niacinamide 500 mg 2x per day (3-4 x per week), L-lycine   Special Learning Needs/barriers to learning/any new barriers None   Height: Ht Readings from Last 5 Encounters:   12/13/24 5' 3\" (1.6 m)   11/01/24 5' 3\" (1.6 m)   10/31/24 5' 3\" (1.6 m)   10/24/24 5' 3\" (1.6 m)   09/10/24 5' 3\" (1.6 m)      Weight: Wt Readings from Last 10 Encounters:   12/13/24 74.8 kg (165 lb)   11/01/24 73.5 kg (162 lb)   10/31/24 73.5 kg (162 lb)   10/24/24 73.5 kg (162 lb)   09/10/24 73.3 kg (161 lb 9.6 oz)   08/22/24 73.9 kg (163 lb)   06/05/24 73.2 kg (161 lb 6.4 oz)   04/24/24 73 kg (161 lb)   04/18/24 72.6 kg (160 lb)   03/14/24 72.1 kg (159 lb)     Estimated body mass index is 29.23 kg/m² as calculated from the following:    Height as of 12/13/24: 5' 3\" (1.6 m).    " "Weight as of 24: 74.8 kg (165 lb).   Recent Weight Change: []Yes     [x]No  Amount: 165# today       Energy Needs: No calculations performed for this visit   Allergies   Allergen Reactions    Levofloxacin Other (See Comments)    Sulfa Antibiotics Fever and Itching    Amoxicillin-Pot Clavulanate Other (See Comments)    Cephalexin Diarrhea    Molds & Smuts Other (See Comments)    Nitrofurantoin Fever    Soy Allergy - Food Allergy Diarrhea and GI Intolerance    Topiramate Other (See Comments)    Wheat Bran - Food Allergy Other (See Comments)    Bacitracin-Neomycin-Polymyxin Rash    Sulfamethoxazole-Trimethoprim Diarrhea    or intolerances    Social History     Substance and Sexual Activity   Alcohol Use Yes    Alcohol/week: 1.0 standard drink of alcohol    Types: 1 Standard drinks or equivalent per week    Comment: 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  Average a wee    No- socially/rarely, once per week or month   Social History     Tobacco Use   Smoking Status Former    Current packs/day: 0.00    Average packs/day: 2.0 packs/day for 20.0 years (40.0 ttl pk-yrs)    Types: Cigarettes    Start date: 1975    Quit date: 1995    Years since quittin.1   Smokeless Tobacco Never   Tobacco Comments    heavy smoker previously       Who shops? patient   Who cooks/cooking methods/Eating out/take out habits   patient  Take out often - [employer], [employer] restaurant     Exercise: None     Other: ie: Sleep habits/ stress level/ work habits household-lives with ?/ food security Lives alone  Food security: Good   Stress: moderate, goes to counseling  Sleep: \"lian snore function, urination\" broken sleep  Works with [employer]   Prior Nutritional Counseling? []Yes     [x]No  When:      Why:         Diet Hx:  Breakfast: SKIP - she is fasting  OR  Cereal (raisin bran, organic milk) stays in fridge for lunch time  Yogurt with honey and granola   Lunch: SKIP   Dinner: Take out often - [employer]otle, " beet salad, grilled shrimp, greens  [employer] turkey meal   Frozen pizzas    Snacks: Ice cream - large bowl  Balanced breaks  Beverages: Hint water, starbucks     Other Notes/ Initial Assessment:   Jeanine presents to visit reporting she has multiple dietary concerns. She has IBS, diarrhea, and feels she has an allergy to soy products. She has been avoiding soy and some vegetables such as broccoli and green beans. She reports working with her GI provider on symptom management and her medication have been working well with decreased episodes of diarrhea. She consumes typically one meal per day which is typically at the end of the day when she is very hungry followed by a bowl of ice cream. She has been trying to fast and consume meals in an 8 hour window. She states she dines out frequently but tries to choose better options. She does not like cooking and has tried meal delivery services such as [employer] in the past. Jeanine does not engage in physical activity aside from work but does have a [employer] membership. We reviewed types of fats and animal protein consumed with goals for lowering elevated lipid labs. Emphasized limiting fats/oils, purchasing lean meats, and using heart-healthy cooking methods. Discussed using salt-free seasoning blends, herbs, or citrus to flavor foods in place of salt. Reviewed goal of sodium intake of < 2300 mg per day. Discussed the importance of eating 3 meals daily and not skipping, and how metabolism is affected.  Also discussed adding in small snacks or 5-6 small meals daily if desired vs 3 larger ones, and appropriate options and portions.  Appropriate timing of meals, including eating within 1 hr of waking and eating meals slowly 20mins/meals, minimizing mindless/boredom or habitual eating, etc was also mentioned. Provided mediterranean nutrition therapy per request, IBS nutrition therapy, NLA dietary fats and your heart and goals via email. Will follow up in May.         Updated assessment  (Follow up note only):           Nutrition Diagnosis:   Food and nutrition related knowledge deficit  related to Lack of prior exposure to accurate nutrition related information as evidenced by Conditions associated with diagnosis or treatment       Any change or new dx since previous visit:     Nutrition Diagnosis:         Medical Nutrition Therapy Intervention:  []Individualized Meal Plan [x]Understanding Lab Values: Lipids   []Basic Pathophysiology of Disease []Food/Medication Interactions   []Food Diary [x]Exercise: 150 mins of moderate activity weekly including wt bearing activities 2-3x/wk x 10-15mins. Discussed importance of activity throughout the lifecycle and its impact on overall health/stress management, etc. - Discussed walking after meals to promote gastric motility     []Lifestyle/Behavior Modification Techniques []Medication, Mechanism of Action   [x]Label Reading: CHO/ Na/ Fat: Reviewed general label reading (grams versus %) and serving size. Discussed that if having a larger serving size, you would account for the amount consumed (ie: Serving size = 1 cup but 2 cups consumed). Used teachback method to reinforce concept   []Self Blood Glucose Monitoring   []Weight/BMI Goals: gain/lose/maintain []Other -           Comprehension: []Excellent  []Very Good  [x]Good  []Fair   []Poor    Receptivity: []Excellent  []Very Good  [x]Good  []Fair   []Poor    Expected Compliance: []Excellent  []Very Good  [x]Good  []Fair   []Poor        Goals (initial)/ Progress made on previous goals/new goals:  1. Jeanine will consume 2 snacks, 1 meal per day.   2. Jeanine will limit saturated fat to < 7% of calories (~15 g) per day.    3. Jeanine will limit sodium in 2300 mg per day.        No follow-ups on file.  Labs:  CMP  Lab Results   Component Value Date    K 3.6 11/02/2024     11/02/2024    CO2 31 11/02/2024    BUN 15 11/02/2024    CREATININE 0.98 11/02/2024    GLUF 98 11/02/2024    CALCIUM 9.1 11/02/2024    AST 24  "11/02/2024    ALT 29 11/02/2024    ALKPHOS 69 11/02/2024    EGFR 59 11/02/2024       BMP  Lab Results   Component Value Date    CALCIUM 9.1 11/02/2024    K 3.6 11/02/2024    CO2 31 11/02/2024     11/02/2024    BUN 15 11/02/2024    CREATININE 0.98 11/02/2024       Lipids  No results found for: \"CHOL\"  Lab Results   Component Value Date    HDL 61 11/02/2024    HDL 76 04/05/2024    HDL 77 12/12/2023     Lab Results   Component Value Date    LDLCALC 61 11/02/2024    LDLCALC 120 (H) 04/05/2024    LDLCALC 129 (H) 12/12/2023     Lab Results   Component Value Date    TRIG 99 11/02/2024    TRIG 129 04/05/2024    TRIG 127 12/12/2023     No results found for: \"CHOLHDL\"    Hemoglobin A1C  Lab Results   Component Value Date    HGBA1C 4.8 01/09/2023       Fasting Glucose  Lab Results   Component Value Date    GLUF 98 11/02/2024       Insulin     Thyroid  No results found for: \"TSH\", \"M8FRPIJ\", \"V3UKBYI\", \"THYROIDAB\"    Hepatic Function Panel  Lab Results   Component Value Date    ALT 29 11/02/2024    AST 24 11/02/2024    ALKPHOS 69 11/02/2024       Celiac Disease Antibody Panel  No results found for: \"ENDOMYSIAL IGA\", \"GLIADIN IGA\", \"GLIADIN IGG\", \"IGA\", \"TISSUE TRANSGLUT AB\", \"TTG IGA\"   Iron  No results found for: \"IRON\", \"TIBC\", \"FERRITIN\"         Kina Monk RD  [facility] CLIN[facility]  [address] [place] [address] [place] [id number]    "

## 2025-02-10 ENCOUNTER — SOCIAL WORK (OUTPATIENT)
Dept: BEHAVIORAL/MENTAL HEALTH CLINIC | Facility: CLINIC | Age: 70
End: 2025-02-10
Payer: MEDICARE

## 2025-02-10 DIAGNOSIS — F39 MOOD DISORDER (HCC): Primary | ICD-10-CM

## 2025-02-10 PROCEDURE — 90837 PSYTX W PT 60 MINUTES: CPT

## 2025-02-10 NOTE — PSYCH
"Behavioral Health Psychotherapy Progress Note    Psychotherapy Provided: Individual Psychotherapy     1. Mood disorder (HCC)            Goals addressed in session: Goal 1 and Goal 2     DATA: Jeanine presents today neatly dressed, pleasant mood, fully engaged in session, appropriate eye contact. We processed the past week. She states she is feeling physically a little worse d/t some issues, but mentally she says she feels \"pretty good\". 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 Met with her new  last week and she states this also relieved some anxiety for her. Jeanine states she has been doing well with keeping her boundaries this week with extended family. She is continuing her daily self-care activities and we reinforced that plan.   During this session, this clinician used the following therapeutic modalities: Client-centered Therapy, Cognitive Behavioral Therapy, Cognitive Processing Therapy, and Motivational Interviewing    Substance Abuse was not addressed during this session. If the client is diagnosed with a co-occurring substance use disorder, please indicate any changes in the frequency or amount of use: Jeanine occasionally drinks socially. Stage of change for addressing substance use diagnoses: Maintenance    ASSESSMENT:  Jeanine Zepeda presents with a Euthymic/ normal mood.     her affect is Normal range and intensity, which is congruent, with her mood and the content of the session. The client has made progress on their goals.     Jeanine Zepeda presents with a none risk of suicide, none risk of self-harm, and none risk of harm to others.    For any risk assessment that surpasses a \"low\" rating, a safety plan must be developed.    A safety plan was indicated: no  If yes, describe in detail n/a    PLAN: Between sessions, Jeanine Zepeda will continue working on her self-care plan. At the next session, the therapist will use Client-centered Therapy, Cognitive Behavioral Therapy, Cognitive " Processing Therapy, and Motivational Interviewing to address anxiety/depression.    Behavioral Health Treatment Plan and Discharge Planning: Jeanine Zepeda is aware of and agrees to continue to work on their treatment plan. They have identified and are working toward their discharge goals. yes    Depression Follow-up Plan Completed: Not applicable    Visit start and stop times:    02/10/25  Start Time: 1201  Stop Time: 1255  Total Visit Time: 54 minutes

## 2025-02-11 DIAGNOSIS — N89.8 ITCHING IN THE VAGINAL AREA: Primary | ICD-10-CM

## 2025-02-11 RX ORDER — FLUCONAZOLE 150 MG/1
150 TABLET ORAL ONCE
Qty: 2 TABLET | Refills: 0 | Status: SHIPPED | OUTPATIENT
Start: 2025-02-11 | End: 2025-02-11

## 2025-02-13 ENCOUNTER — CLINICAL SUPPORT (OUTPATIENT)
Dept: NUTRITION | Facility: HOSPITAL | Age: 70
End: 2025-02-13
Payer: MEDICARE

## 2025-02-13 DIAGNOSIS — I25.10 CORONARY ARTERY DISEASE INVOLVING NATIVE HEART WITHOUT ANGINA PECTORIS, UNSPECIFIED VESSEL OR LESION TYPE: ICD-10-CM

## 2025-02-13 DIAGNOSIS — I10 PRIMARY HYPERTENSION: ICD-10-CM

## 2025-02-13 PROCEDURE — 97802 MEDICAL NUTRITION INDIV IN: CPT

## 2025-02-24 ENCOUNTER — SOCIAL WORK (OUTPATIENT)
Dept: BEHAVIORAL/MENTAL HEALTH CLINIC | Facility: CLINIC | Age: 70
End: 2025-02-24
Payer: MEDICARE

## 2025-02-24 DIAGNOSIS — F39 MOOD DISORDER (HCC): Primary | ICD-10-CM

## 2025-02-24 PROCEDURE — 90837 PSYTX W PT 60 MINUTES: CPT

## 2025-02-24 NOTE — PSYCH
"Behavioral Health Psychotherapy Progress Note    Psychotherapy Provided: Individual Psychotherapy     1. Mood disorder (HCC)            Goals addressed in session: Goal 1 and Goal 2     DATA: Jeanine presents today neatly dressed, fully engaged in session, appropriate eye contact. We processed the past few weeks. Jeanine notes that her body just feels more tired right now. She says that every year at this time she starts to feel this way and just \"gives in to it\". As we explored this more today she notes that her biological father  in April. She was only about 7 years old. She says her mother always insists that he wasn't really sick for long and it was sudden, yet Jeanine says she remembers a few things and mother always tells her she is wrong. We talked about how the sensory effects of the time of year can often be connected to a traumatic event in life and can trigger these kinds of reactions. Jeanine says that for as long as she can remember she starts to feel this way this time of year. We discussed working on processing more what she remembers instead of just accepting her mother's narrative and dismissing it. Reinforced her self-care plan for this time period with focus on taking care of her physical body.  During this session, this clinician used the following therapeutic modalities: Client-centered Therapy, Cognitive Behavioral Therapy, Cognitive Processing Therapy, and Motivational Interviewing    Substance Abuse was not addressed during this session. If the client is diagnosed with a co-occurring substance use disorder, please indicate any changes in the frequency or amount of use: Jeanine is maintaining sobriety. Stage of change for addressing substance use diagnoses: Maintenance    ASSESSMENT:  Jeanine Zepeda presents with a Euthymic/ normal and Dysthymic mood.     her affect is Normal range and intensity, which is congruent, with her mood and the content of the session. The client has made progress on their goals.     " "Jeanine Zepeda presents with a minimal risk of suicide, minimal risk of self-harm, and none risk of harm to others.    For any risk assessment that surpasses a \"low\" rating, a safety plan must be developed.    A safety plan was indicated: no  If yes, describe in detail n/a    PLAN: Between sessions, Jeanine Zepeda will self-care plan with focus on taking care of her physical body. At the next session, the therapist will use Client-centered Therapy, Cognitive Behavioral Therapy, Cognitive Processing Therapy, and Motivational Interviewing to address depression/anxiety/trauma processing.    Behavioral Health Treatment Plan and Discharge Planning: Jeanine Zepeda is aware of and agrees to continue to work on their treatment plan. They have identified and are working toward their discharge goals. yes    Depression Follow-up Plan Completed: Not applicable    Visit start and stop times:    02/24/25  Start Time: 1203  Stop Time: 1259  Total Visit Time: 56 minutes  "

## 2025-03-10 ENCOUNTER — SOCIAL WORK (OUTPATIENT)
Dept: BEHAVIORAL/MENTAL HEALTH CLINIC | Facility: CLINIC | Age: 70
End: 2025-03-10
Payer: MEDICARE

## 2025-03-10 DIAGNOSIS — F39 MOOD DISORDER (HCC): Primary | ICD-10-CM

## 2025-03-10 PROCEDURE — 90837 PSYTX W PT 60 MINUTES: CPT

## 2025-03-10 NOTE — PSYCH
"Behavioral Health Psychotherapy Progress Note    Psychotherapy Provided: Individual Psychotherapy     1. Mood disorder (HCC)            Goals addressed in session: Goal 1 and Goal 2     DATA: Jeanine presents today neatly dressed, fully engaged in session, appropriate eye contact. She states today that she is still feeling down more, depressed. She says she \"gave in\" to it the other day and allowed herself to spend one day in bed. Admits that this made is harder to get up the next day so she is not planning on doing that again. She is planning on seeing her mother tomorrow, GI doctor Wednesday, and then Dr. Christina next week. Today we explored the reasons she thinks she feels worse \"in the spring\". Jeanine did identify that she feels she cannot get close to people because she is afraid that when she allows herself to get too close to people they see that she is not as confident as she appears. We spent some time processing that. She expresses that her step-siblings always made her feel that she was not good enough because she was different from them, and she fears others will do the same to her if they get too close to her. We began to work a little on this cognitive distortion.   During this session, this clinician used the following therapeutic modalities: Client-centered Therapy, Cognitive Behavioral Therapy, Cognitive Processing Therapy, and Motivational Interviewing    Substance Abuse was not addressed during this session. If the client is diagnosed with a co-occurring substance use disorder, please indicate any changes in the frequency or amount of use: remains sober. Stage of change for addressing substance use diagnoses: Maintenance    ASSESSMENT:  Jeanine Zepeda presents with a Euthymic/ normal and Dysthymic mood.     her affect is Normal range and intensity, which is congruent, with her mood and the content of the session. The client has made progress on their goals.     Jeanine Zepeda presents with a minimal risk of " "suicide, minimal risk of self-harm, and none risk of harm to others.    For any risk assessment that surpasses a \"low\" rating, a safety plan must be developed.    A safety plan was indicated: no  If yes, describe in detail n/a same safety plan as previously/ no changes.    PLAN: Between sessions, Jeanine Zepeda will follow through with daily self-care plan, follow through with her upcoming doctor visits. At the next session, the therapist will use Client-centered Therapy, Cognitive Behavioral Therapy, Cognitive Processing Therapy, and Motivational Interviewing to address anxiety/ depression.    Behavioral Health Treatment Plan and Discharge Planning: Jeanine Zepeda is aware of and agrees to continue to work on their treatment plan. They have identified and are working toward their discharge goals. yes    Depression Follow-up Plan Completed: Not applicable    Visit start and stop times:    03/10/25  Start Time: 1200  Stop Time: 1256  Total Visit Time: 56 minutes  "

## 2025-03-12 ENCOUNTER — OFFICE VISIT (OUTPATIENT)
Dept: GASTROENTEROLOGY | Facility: CLINIC | Age: 70
End: 2025-03-12
Payer: MEDICARE

## 2025-03-12 VITALS
WEIGHT: 165 LBS | HEIGHT: 63 IN | HEART RATE: 78 BPM | SYSTOLIC BLOOD PRESSURE: 110 MMHG | OXYGEN SATURATION: 99 % | TEMPERATURE: 97.6 F | BODY MASS INDEX: 29.23 KG/M2 | DIASTOLIC BLOOD PRESSURE: 76 MMHG

## 2025-03-12 DIAGNOSIS — K58.0 IRRITABLE BOWEL SYNDROME WITH DIARRHEA: ICD-10-CM

## 2025-03-12 DIAGNOSIS — K52.832 LYMPHOCYTIC COLITIS: Primary | ICD-10-CM

## 2025-03-12 DIAGNOSIS — K21.9 GASTROESOPHAGEAL REFLUX DISEASE, UNSPECIFIED WHETHER ESOPHAGITIS PRESENT: ICD-10-CM

## 2025-03-12 DIAGNOSIS — R13.19 ESOPHAGEAL DYSPHAGIA: ICD-10-CM

## 2025-03-12 PROCEDURE — 99213 OFFICE O/P EST LOW 20 MIN: CPT | Performed by: PHYSICIAN ASSISTANT

## 2025-03-12 NOTE — PROGRESS NOTES
Name: Yue Zepeda      : 1955      MRN: [id number]  Encounter Provider: Cherelle Duran PA-C  Encounter Date: 3/12/2025   Encounter department: [facility] [place]  :  Assessment & Plan  Irritable bowel syndrome with diarrhea  She is actually doing really well  She realized she cannot tolerate soy  Since cutting this out of her diet she has not had any serious diarrhea issues    She is actually struggling more with hard stools and straining  She is going to cut back on her benefiber to 1/2 packet daily  She is going to work to increase her water consumption       Gastroesophageal reflux disease, unspecified whether esophagitis present  Some increased symptoms recently  Mostly nocturnal - add famotidine at bedtime; avoid eating close to bedtime  Consider need for EGD       Esophageal dysphagia  Intermittent  Solid food only  Evaluate for improvement with Famotidine           History of Present Illness   HPI  Yue Zepeda is a 69 y.o. female with lymphocytic colitis who presents for routine follow-up.  She reports that overall she is doing quite well.  She has realized that she has an intolerance to soy.  She has completely cut this out of her diet and has not had any major diarrhea issues since doing so.  At her last appointment she was still having diarrhea which was causing her to be afraid to leave the house to the point that she was actually considering trying Humira as this has been studied somewhat with microscopic colitis treatment.  She no longer thinks that this is necessary.  She reports some recent heartburn.  This is mostly nighttime symptoms.  She has had some intermittent dysphagia to solids.  She has never had an EGD.  She is not taking any medication for this.          Review of Systems  Medical History Reviewed by provider this encounter:  Tobacco  Allergies  Meds  Problems  Med Hx  Surg Hx  Fam Hx     .  Past Medical History   Past Medical  History:   Diagnosis Date    Abnormal Pap smear of cervix 1/6/22    Basal cell carcinoma (BCC) of parietal region of scalp 06/13/2022    Left parietal scalp    Bipolar 1 disorder, depressed (HCC)     Bipolar 1 disorder, depressed (HCC) 01/24/1975    Chronic kidney disease     Depression     Diverticulitis of colon     First colonoscopy age 52    GERD (gastroesophageal reflux disease)     H/O bladder infections     Hyperlipidemia     Hypertension     Irritable bowel syndrome 3 years ago    Diarhea, constipation, acid indigestion    Lichen sclerosus     Microscopic colitis     Skin disorder     lichens sclerosis    Urinary tract infection      Past Surgical History:   Procedure Laterality Date    COLONOSCOPY      2013     HYSTERECTOMY      MOHS SURGERY Left 06/28/2022    Left parietal scalp     Family History   Problem Relation Age of Onset    Squamous cell carcinoma Mother     Arthritis Mother     Diabetes Mother     Hypertension Mother     Cancer Mother 55        uter    Kidney disease Father     Diabetic kidney disease Father     Early death Father         Age 33    Hypertension Father     Breast cancer Maternal Grandmother 66    No Known Problems Paternal Grandmother     No Known Problems Son     Cervical cancer Maternal Aunt 80    No Known Problems Half-Sister       reports that she quit smoking about 30 years ago. Her smoking use included cigarettes. She started smoking about 50 years ago. She has a 40 pack-year smoking history. She has never used smokeless tobacco. She reports current alcohol use of about 1.0 standard drink of alcohol per week. She reports that she does not currently use drugs.  Current Outpatient Medications   Medication Instructions    amitriptyline (ELAVIL) 20 mg, Oral, Daily at bedtime    atenolol (TENORMIN) 12.5 mg, Oral, Daily, At bedtime    atorvastatin (LIPITOR) 10 mg, Oral, Daily    clobetasol (TEMOVATE) 0.05 % cream Topical, 2 times daily    clonazePAM (KlonoPIN) 0.5 mg tablet TAKE 1  TABLET BY MOUTH 2 TIMES A DAY.    clotrimazole-betamethasone (LOTRISONE) 1-0.05 % cream     cyclobenzaprine (FLEXERIL) 10 mg tablet prn    dicyclomine (BENTYL) 20 mg, Oral, Every 6 hours PRN    diphenoxylate-atropine (LOMOTIL) 2.5-0.025 mg per tablet 1 tablet, Oral, 4 times daily PRN    estradiol (VAGIFEM) 10 mcg, Vaginal, 2 times weekly    fluticasone (FLONASE) 50 mcg/act nasal spray SPRAY 1 SPRAY INTO EACH NOSTRIL TWICE A DAY    hydroCHLOROthiazide 25 mg, Oral, Daily    loratadine (CLARITIN) 10 mg, Oral, Daily    Probiotic Product (PROBIOTIC 10 ULTRA STRENGTH PO) Take by mouth    zolpidem (AMBIEN) 10 mg, Oral, Daily at bedtime PRN     Allergies   Allergen Reactions    Levofloxacin Other (See Comments)    Sulfa Antibiotics Fever and Itching    Amoxicillin-Pot Clavulanate Other (See Comments)    Cephalexin Diarrhea    Molds & Smuts Other (See Comments)    Nitrofurantoin Fever    Soy Allergy - Food Allergy Diarrhea and GI Intolerance    Topiramate Other (See Comments)    Wheat Bran - Food Allergy Other (See Comments)    Bacitracin-Neomycin-Polymyxin Rash    Sulfamethoxazole-Trimethoprim Diarrhea      Current Outpatient Medications on File Prior to Visit   Medication Sig Dispense Refill    amitriptyline (ELAVIL) 10 mg tablet Take 2 tablets (20 mg total) by mouth daily at bedtime 60 tablet 5    atenolol (TENORMIN) 25 mg tablet Take 0.5 tablets (12.5 mg total) by mouth daily At bedtime      atorvastatin (LIPITOR) 10 mg tablet Take 1 tablet (10 mg total) by mouth daily 90 tablet 1    clobetasol (TEMOVATE) 0.05 % cream Apply topically 2 (two) times a day 45 g 0    clonazePAM (KlonoPIN) 0.5 mg tablet TAKE 1 TABLET BY MOUTH 2 TIMES A DAY. 60 tablet 0    clotrimazole-betamethasone (LOTRISONE) 1-0.05 % cream       cyclobenzaprine (FLEXERIL) 10 mg tablet prn      dicyclomine (BENTYL) 20 mg tablet Take 1 tablet (20 mg total) by mouth every 6 (six) hours as needed (abdominal) 60 tablet 3    diphenoxylate-atropine (LOMOTIL)  2.5-0.025 mg per tablet Take 1 tablet by mouth 4 (four) times a day as needed for diarrhea (Patient taking differently: Take 1 tablet by mouth 4 (four) times a day as needed for diarrhea prn) 60 tablet 3    estradiol (Vagifem) 10 MCG TABS vaginal tablet Insert 1 tablet (10 mcg total) into the vagina 2 (two) times a week 8 tablet 5    fluticasone (FLONASE) 50 mcg/act nasal spray SPRAY 1 SPRAY INTO EACH NOSTRIL TWICE A DAY      hydroCHLOROthiazide 25 mg tablet Take 1 tablet (25 mg total) by mouth daily 90 tablet 1    loratadine (CLARITIN) 10 mg tablet Take 1 tablet (10 mg total) by mouth daily 10 tablet 0    Probiotic Product (PROBIOTIC 10 ULTRA STRENGTH PO) Take by mouth      zolpidem (AMBIEN) 10 mg tablet Take 1 tablet (10 mg total) by mouth daily at bedtime as needed (insomnia) 30 tablet 0    [DISCONTINUED] cholestyramine (QUESTRAN) 4 g packet Take 1 packet (4 g total) by mouth 3 (three) times a day with meals 60 packet 3     No current facility-administered medications on file prior to visit.      Social History     Tobacco Use    Smoking status: Former     Current packs/day: 0.00     Average packs/day: 2.0 packs/day for 20.0 years (40.0 ttl pk-yrs)     Types: Cigarettes     Start date: 1975     Quit date: 1995     Years since quittin.2    Smokeless tobacco: Never    Tobacco comments:     heavy smoker previously   Vaping Use    Vaping status: Some Days    Substances: CBD   Substance and Sexual Activity    Alcohol use: Yes     Alcohol/week: 1.0 standard drink of alcohol     Types: 1 Standard drinks or equivalent per week     Comment: 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  Average a wee    Drug use: Not Currently     Comment: In my teens and twenties    Sexual activity: Not Currently     Partners: Male     Birth control/protection: Post-menopausal, Female Sterilization     Comment: Partial hysterectomy ?        Objective   /76 (BP Location: Right arm, Patient Position: Sitting, Cuff  "Size: Standard)   Pulse 78   Temp 97.6 °F (36.4 °C) (Temporal)   Ht 5' 3\" (1.6 m)   Wt 74.8 kg (165 lb)   SpO2 99%   BMI 29.23 kg/m²      Physical Exam      "

## 2025-04-07 ENCOUNTER — SOCIAL WORK (OUTPATIENT)
Dept: BEHAVIORAL/MENTAL HEALTH CLINIC | Facility: CLINIC | Age: 70
End: 2025-04-07
Payer: MEDICARE

## 2025-04-07 DIAGNOSIS — F39 MOOD DISORDER (HCC): Primary | ICD-10-CM

## 2025-04-07 PROCEDURE — 90837 PSYTX W PT 60 MINUTES: CPT

## 2025-04-07 NOTE — PSYCH
"Behavioral Health Psychotherapy Progress Note    Psychotherapy Provided: Individual Psychotherapy     1. Mood disorder (HCC)            Goals addressed in session: Goal 1 and Goal 2     DATA: Jeanine presents today neatly dressed, she is pleasant, fully engaged in session, Appropriate eye contact. We processed the past several weeks. She reports that she had one day this weekend where she became ill, had loose stools, upset stomach, chills, and this made her feel like her \"body was betraying me again\" and so she cried much of that day. She now is feeling better and says she realizes that may have been a virus rather than her autoimmune issues alone. It is often difficult for her to differentiate between these. We worked on updating her safety plan today, and reinforced her self-care activities while doing this. Tx plan also updated.   During this session, this clinician used the following therapeutic modalities: Client-centered Therapy, Cognitive Behavioral Therapy, Cognitive Processing Therapy, and Motivational Interviewing    Substance Abuse was not addressed during this session. If the client is diagnosed with a co-occurring substance use disorder, please indicate any changes in the frequency or amount of use: n/a. Stage of change for addressing substance use diagnoses: Maintenance    ASSESSMENT:  Jeanine Zepeda presents with a Euthymic/ normal mood.     her affect is Normal range and intensity, which is congruent, with her mood and the content of the session. The client has made progress on their goals.     Jeanine Zepeda presents with a minimal risk of suicide, minimal risk of self-harm, and none risk of harm to others.    For any risk assessment that surpasses a \"low\" rating, a safety plan must be developed.    A safety plan was indicated: no  If yes, describe in detail n/a    PLAN: Between sessions, Jeanine Zepeda will continue working on self-care plan activities daily. At the next session, the therapist will use " Client-centered Therapy, Cognitive Behavioral Therapy, Cognitive Processing Therapy, Dialectical Behavior Therapy, and Motivational Interviewing to address anxiety/depression.    Behavioral Health Treatment Plan and Discharge Planning: Jeanine Zepeda is aware of and agrees to continue to work on their treatment plan. They have identified and are working toward their discharge goals. yes    Depression Follow-up Plan Completed: Not applicable    Visit start and stop times:    04/07/25  Start Time: 1200  Stop Time: 1255  Total Visit Time: 55 minutes

## 2025-04-07 NOTE — BH CRISIS PLAN
Client Name: Jeanine Zepeda       Client YOB: 1955    [facility] Safety Plan      Creation Date: 4/29/24 Update Date: 4/7/25   Created By: Sammie Mcmillan Last Updated By: Sammie Mcmillan      Step 1: Warning Signs:   Warning Signs   More isolated   Feeling numb   Feeling more tired and less rested   overthinking   Get quieter            Step 2: Internal Coping Strategies:   Internal Coping Strategies   Hot showers   Taking care of the pets   Anything with heat   Focusing on Radical Acceptance   medication if needed            Step 3: People and social settings that provide distraction:   Name Contact Information   Friend/ Niki number in phone   Manjula/ friend number in phone    Places   Go out to lunch   Walking           Step 4: People whom I can ask for help during a crisis:      Name Contact Information    Manjula/ friend number in phone      Step 5: Professionals or agencies I can contact during a crisis:      Clinican/Agency Name Phone Emergency Contact    Sammie Mcmillan/ [facility] Sched[facility]/ [phone]       Local Emergency Department Emergency Department Phone Emergency Department Address    [facility] [phone] [address] [place] [address] [address]        Crisis Phone Numbers:   Suicide Prevention Lifeline: Call or Text  074 Crisis Text Line: Text HOME to 043-717   Please note: Some [facility] do not have a separate number for Child/Adolescent specific crisis. If your county is not listed under Child/Adolescent, please call the adult number for your county      Adult Crisis Numbers: Child/Adolescent Crisis Numbers   [facility]: [phone] [facility]: [phone]   [facility]: [phone] [facility]: [phone]   [facility]: [phone] [place]: [phone]   [facility]: [phone] [place] [facility]: [phone]   [place] [facility]: [phone]   [facility]: [phone]   [facility]:  [phone]   [facility]: [phone] (daytime) [phone] (after hours, weekends, holidays)      Step 6: Making the environment safer (plan for lethal means safety):   Patient did not identify any lethal methods: Yes     Optional: What is most important to me and worth living for?   \"My son\"     [facility] Safety Plan. Radha Chávez and James Dsouza. Used with permission of the authors.           "

## 2025-04-07 NOTE — BH TREATMENT PLAN
"Outpatient Behavioral Health Psychotherapy Treatment Plan     Yue Zepeda  1955      Date of Initial Psychotherapy Assessment: 4/29/2024   Date of Current Treatment Plan: 4/7/2025  Treatment Plan Target Date: TBD  Treatment Plan Expiration Date: 10/5/2025     Diagnosis:   1. Mood disorder (HCC)                Area(s) of Need: Recurrent depression, and loss of interest in things particularly since longterm.     Long Term Goal 1 (in the client's own words):  \"I would like to be able to become high functioning for me and not always put me last\"     Stage of Change: Contemplation     Target Date for completion: TBD             Anticipated therapeutic modalities: DBT, CBT, CPT, SF, MI, mindfulness, client-centered             People identified to complete this goal: Yue and therapist            Objective 1: (identify the means of measuring success in meeting the objective): Over the next 6 months, Yue will learn and utilize at least 3 DBT concepts, distress tolerance skills when experiencing triggers.          (Update 10/14/2024: Objective updated)    (Update 4/7/2025: Objective continues)            Objective 2: (identify the means of measuring success in meeting the objective): Yue will create and utilize a daily self-plan to include one morning and one evening relaxation and/or leisure activity.  (Update 10/14/2024: Objective continues. Jeanine has been working towards including self-care into a daily practice. Her physical symptoms also have challenged her at times to feeling up to doing some of her self-care activities.)    (Update 4/7/2025: Objective continues)    Objective 3: (New objective 10/14/2024) Over the next 6 months, Yue will identify and replace negative self-talk messages that   Contribute to reinforcing low self-esteem by replacing with positives about herself. Yue will make a list of positives and previous successes in her life to assist with this.          (Update 4/7/2025: Objective " "not started/ continues)     Objective 4: ( New objective 10/14/2024) Yue will continue to take any behavioral health medications as prescribed and agreed to with her doctor, and will continue to report any concerns or side effects to her physician as needed.   (Update 4/7/2025: Objective continues)     Objective 5: (New Objective 10/14/2024) Over the next 6 months, Yue will use assertive communication or DBT skills to express needs when she identifies situations where her voice is not being heard by those around her.   (Update 4/7/2025: Objective continues)      Long Term Goal 2 (in the client's own words): \"Repair relationships\"     Stage of Change: Contemplation     Target Date for completion: TBD             Anticipated therapeutic modalities: DBT, CBT, CPT, MI, client-centered             People identified to complete this goal: Yue and therapist                    Objective 1: (identify the means of measuring success in meeting the objective): Yue will be able to learn at least 3 relaxation techniques over the next 6 months to be paired with memories of her trauma.          Update 10/14/2024 - This objective will continue. Jeanine is still exploring sources of her trauma.   (Update 4/7/2025: Objective continues)                    Objective 2: (identify the means of measuring success in meeting the objective): Yue will be able to retell her narrative of any trauma she chooses to explore over the next 6 months.          Update 10/14/2024 - This objective will continue. Jeanine has been retelling her story at her pace and we will continue uncovering her traumas at the pace she is comfortable with.    (Update 4/7/2025: Objective continues)     Objective 3: (New Objective 10/14/24) Over the next 6 months, Yue will learn and be able to verbalize the response of the brain to trauma, and the physical responses that accompany it and how this process is then activated by triggers to past trauma.             " "Objective 4: (New Objective 10/14/24) Over the next 6 months, Yue will explore and identify situations that are triggers to her trauma response.             Objective 5: (New Objective 10/14/24) Over the next 6 months, Yue will begin to develop an impact statement regarding areas of trauma she chooses to address and share it in session.             Objective 6: (New Objective 10/14/24) Over the next 6 months, Yue will write or share in session her narrative of trauma events related to her impact statement.             Objective 7: (New Objective 10/14/24) Over the next 6 months, Yue will  rewrite or share in session a description of the events now with reflection of new thoughts and beliefs related to it,  and discuss this restructured version of the event reinforcing her new insights (especially in regards to common trauma themes of safety, trust, power, control, esteem, and intimacy).           I am currently under the care of a [facility] psychiatric provider: no     My [facility] psychiatric provider is:N/A       I am currently taking psychiatric medications: Yes, as prescribed     I feel that I will be ready for discharge from mental health care when I reach the following (measurable goal/objective): \"When I am able to make daily decisions without worry about long-term consequences.\"     For children and adults who have a legal guardian:          Has there been any change to custody orders and/or guardianship status? NA. If yes, attach updated documentation.     I have created my Crisis Plan and have been offered a copy of this plan     Behavioral Health Treatment Plan [facility]: Diagnosis and Treatment Plan explained to Yue Zepeda acknowledges an understanding of their diagnosis. Yue Zepeda agrees to this treatment plan.     I have been offered a copy of this Treatment Plan. yes  "

## 2025-04-21 ENCOUNTER — SOCIAL WORK (OUTPATIENT)
Dept: BEHAVIORAL/MENTAL HEALTH CLINIC | Facility: CLINIC | Age: 70
End: 2025-04-21
Payer: MEDICARE

## 2025-04-21 DIAGNOSIS — F39 MOOD DISORDER (HCC): Primary | ICD-10-CM

## 2025-04-21 PROCEDURE — 90837 PSYTX W PT 60 MINUTES: CPT

## 2025-04-21 NOTE — PSYCH
"Behavioral Health Psychotherapy Progress Note    Psychotherapy Provided: Individual Psychotherapy     1. Mood disorder (HCC)            Goals addressed in session: Goal 1 and Goal 2     DATA: Jeanine presents today neatly dressed, pleasant, fully engaged in session, appropriate eye contact. We processed the past few weeks. Jeanine reports feeling much better the past two weeks with her mood. 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 She has good insight into how this environment significantly improved her mood. 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 She also lost a friend this past week and attended the  this weekend and we processed her feeling related to that loss.  During this session, this clinician used the following therapeutic modalities: Client-centered Therapy, Cognitive Behavioral Therapy, Cognitive Processing Therapy, and Motivational Interviewing    Substance Abuse was not addressed during this session. If the client is diagnosed with a co-occurring substance use disorder, please indicate any changes in the frequency or amount of use: n/a. Stage of change for addressing substance use diagnoses: Maintenance    ASSESSMENT:  Jeanine Zepeda presents with a Euthymic/ normal mood.     her affect is Normal range and intensity, which is congruent, with her mood and the content of the session. The client has made progress on their goals.     Jeanine Zepeda presents with a none risk of suicide, none risk of self-harm, and none risk of harm to others.    For any risk assessment that surpasses a \"low\" rating, a safety plan must be developed.    A safety plan was indicated: no  If yes, describe in detail n/a    PLAN: Between sessions, Jeanine Zepeda will continue with " self-care plan. At the next session, the therapist will use Client-centered Therapy, Cognitive Behavioral Therapy, Cognitive Processing Therapy, and Motivational Interviewing to address anxiety/depression.    Behavioral Health Treatment Plan and Discharge Planning: Jeanine Zepeda is aware of and agrees to continue to work on their treatment plan. They have identified and are working toward their discharge goals. yes    Depression Follow-up Plan Completed: Not applicable    Visit start and stop times:    04/21/25  Start Time: 1203  Stop Time: 1256  Total Visit Time: 53 minutes

## 2025-05-06 ENCOUNTER — SOCIAL WORK (OUTPATIENT)
Dept: BEHAVIORAL/MENTAL HEALTH CLINIC | Facility: CLINIC | Age: 70
End: 2025-05-06
Payer: MEDICARE

## 2025-05-06 DIAGNOSIS — F33.1 MODERATE EPISODE OF RECURRENT MAJOR DEPRESSIVE DISORDER (HCC): ICD-10-CM

## 2025-05-06 DIAGNOSIS — F39 MOOD DISORDER (HCC): Primary | ICD-10-CM

## 2025-05-06 PROCEDURE — 90837 PSYTX W PT 60 MINUTES: CPT

## 2025-05-06 NOTE — PSYCH
"Behavioral Health Psychotherapy Progress Note    Psychotherapy Provided: Individual Psychotherapy     1. Mood disorder (HCC)            Goals addressed in session: Goal 1 and Goal 2     DATA: Jeanine presents today neatly dressed, pleasant, fully engaged in session, appropriate eye contact. We processed the past few weeks. Jeanine reports today that her past few weeks have been good and that she feels her mood lifted, but also feeling physically better and that the two are related. She was able to go to a family event with her siblings for a few days, and then last weekend she attended her high school reunion. At the reunion she was able to make amends with an old friend and they agreed to start working on their friendship freshly now after over 30 years. 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 We worked through a few cognitive distortions related to what others may think of her. Reinforced her self-care plan.  During this session, this clinician used the following therapeutic modalities: Client-centered Therapy, Cognitive Behavioral Therapy, Cognitive Processing Therapy, and Motivational Interviewing    Substance Abuse was addressed during this session. If the client is diagnosed with a co-occurring substance use disorder, please indicate any changes in the frequency or amount of use: Jeanine has not been drinking. Stage of change for addressing substance use diagnoses: Maintenance    ASSESSMENT:  Jeanine Zepeda presents with a Euthymic/ normal mood.     her affect is Normal range and intensity, which is congruent, with her mood and the content of the session. The client has made progress on their goals.     Jeanine Zepeda presents with a minimal risk of suicide, none risk of self-harm, and none risk of harm to others.    For any risk assessment that surpasses a \"low\" rating, a safety plan must be developed.    A safety plan was indicated: " no  If yes, describe in detail n/a same safety plan as previously/ no changes.    PLAN: Between sessions, Jeanine Zepeda will continue with self-care plan. At the next session, the therapist will use Client-centered Therapy, Cognitive Behavioral Therapy, Cognitive Processing Therapy, and Motivational Interviewing to address anxiety/depression.    Behavioral Health Treatment Plan and Discharge Planning: Jeanine Zepeda is aware of and agrees to continue to work on their treatment plan. They have identified and are working toward their discharge goals. yes    Depression Follow-up Plan Completed: Not applicable    Visit start and stop times:    05/06/25  Start Time: 1401  Stop Time: 1455  Total Visit Time: 54 minutes

## 2025-05-13 PROBLEM — F32.1 CURRENT MODERATE EPISODE OF MAJOR DEPRESSIVE DISORDER (HCC): Status: ACTIVE | Noted: 2025-05-13

## 2025-05-20 ENCOUNTER — SOCIAL WORK (OUTPATIENT)
Dept: BEHAVIORAL/MENTAL HEALTH CLINIC | Facility: CLINIC | Age: 70
End: 2025-05-20
Payer: MEDICARE

## 2025-05-20 DIAGNOSIS — F33.1 MODERATE EPISODE OF RECURRENT MAJOR DEPRESSIVE DISORDER (HCC): Primary | ICD-10-CM

## 2025-05-20 PROCEDURE — 90837 PSYTX W PT 60 MINUTES: CPT

## 2025-05-20 NOTE — PSYCH
"Behavioral Health Psychotherapy Progress Note    Psychotherapy Provided: Individual Psychotherapy     1. Moderate episode of recurrent major depressive disorder (HCC)            Goals addressed in session: Goal 1 and Goal 2     DATA: Jeanine was seen today as planned and presents neatly dressed, pleasant, fully engaged in our session, appropriate eye contact. We processed the past few weeks. She states she continues to be feeling less depressed. She is continuing her activities daily and keeping busy. She reports being more social than in past months and continuing her volunteer commitments. Today we talked about a few relationships she has had recent contact with, including her ex reaching out to her. We worked through any potential cognitive distortions related to that relationship.   During this session, this clinician used the following therapeutic modalities: Client-centered Therapy, Cognitive Behavioral Therapy, Cognitive Processing Therapy, and Motivational Interviewing    Substance Abuse was not addressed during this session. If the client is diagnosed with a co-occurring substance use disorder, please indicate any changes in the frequency or amount of use: not using. Stage of change for addressing substance use diagnoses: Maintenance    ASSESSMENT:  Jeanine Zepeda presents with a Euthymic/ normal mood.     her affect is Normal range and intensity, which is congruent, with her mood and the content of the session. The client has made progress on their goals.     Jeanine Zepeda presents with a minimal risk of suicide, none risk of self-harm, and none risk of harm to others.    For any risk assessment that surpasses a \"low\" rating, a safety plan must be developed.    A safety plan was indicated: no  If yes, describe in detail n/a    PLAN: Between sessions, Jeanine Zepeda will continue her daily plan of self-care and activities. At the next session, the therapist will use Client-centered Therapy, Cognitive Behavioral " Therapy, Cognitive Processing Therapy, and Motivational Interviewing to address anxiety/depression.    Behavioral Health Treatment Plan and Discharge Planning: Jeanine Zepeda is aware of and agrees to continue to work on their treatment plan. They have identified and are working toward their discharge goals. yes    Depression Follow-up Plan Completed: Not applicable    Visit start and stop times:    05/20/25  Start Time: 1300  Stop Time: 1354  Total Visit Time: 54 minutes

## 2025-06-02 ENCOUNTER — OFFICE VISIT (OUTPATIENT)
Dept: CARDIOLOGY CLINIC | Facility: CLINIC | Age: 70
End: 2025-06-02
Payer: MEDICARE

## 2025-06-02 ENCOUNTER — SOCIAL WORK (OUTPATIENT)
Dept: BEHAVIORAL/MENTAL HEALTH CLINIC | Facility: CLINIC | Age: 70
End: 2025-06-02
Payer: MEDICARE

## 2025-06-02 VITALS
WEIGHT: 164 LBS | HEART RATE: 76 BPM | BODY MASS INDEX: 29.06 KG/M2 | HEIGHT: 63 IN | DIASTOLIC BLOOD PRESSURE: 76 MMHG | RESPIRATION RATE: 16 BRPM | SYSTOLIC BLOOD PRESSURE: 128 MMHG | OXYGEN SATURATION: 98 %

## 2025-06-02 DIAGNOSIS — F33.1 MODERATE EPISODE OF RECURRENT MAJOR DEPRESSIVE DISORDER (HCC): Primary | ICD-10-CM

## 2025-06-02 DIAGNOSIS — R06.02 SHORTNESS OF BREATH ON EXERTION: Primary | ICD-10-CM

## 2025-06-02 DIAGNOSIS — I25.10 CORONARY ARTERY CALCIFICATION: ICD-10-CM

## 2025-06-02 DIAGNOSIS — E78.2 MIXED HYPERLIPIDEMIA: ICD-10-CM

## 2025-06-02 DIAGNOSIS — I10 PRIMARY HYPERTENSION: ICD-10-CM

## 2025-06-02 DIAGNOSIS — E78.00 PURE HYPERCHOLESTEROLEMIA: ICD-10-CM

## 2025-06-02 DIAGNOSIS — I51.89 DIASTOLIC DYSFUNCTION: ICD-10-CM

## 2025-06-02 PROCEDURE — 99214 OFFICE O/P EST MOD 30 MIN: CPT | Performed by: INTERNAL MEDICINE

## 2025-06-02 PROCEDURE — 90837 PSYTX W PT 60 MINUTES: CPT

## 2025-06-02 RX ORDER — ASPIRIN 81 MG/1
81 TABLET, CHEWABLE ORAL DAILY
Start: 2025-06-02

## 2025-06-02 RX ORDER — CARVEDILOL 3.12 MG/1
1 TABLET ORAL 2 TIMES DAILY
COMMUNITY
Start: 2025-03-17

## 2025-06-02 RX ORDER — ATORVASTATIN CALCIUM 10 MG/1
10 TABLET, FILM COATED ORAL DAILY
Qty: 90 TABLET | Refills: 1 | Status: SHIPPED | OUTPATIENT
Start: 2025-06-02

## 2025-06-02 NOTE — PROGRESS NOTES
CARDIOLOGY OFFICE VISIT  [facility]  [address] [place] [address], [place] [id number]  Tel: [phone]      NAME: Yue Zepeda  AGE: 69 y.o.  SEX: female  : 1955  MRN: [id number]      Chief Complaint:  Chief Complaint   Patient presents with    Follow-up         History of Present Illness:   Patient comes for follow up. States she lives alone, is an anxious person and frequently has shortness of breath. Pt denies chest pain / pressure, palpitations, lightheadedness, syncope, swelling feet, orthopnea, PND, claudication.    Coronary artery calcification -   currently on carvedilol and atorvastatin.  Asked to start aspirin 81 mg OD as well.  Recent echocardiogram and stress test findings discussed with the patient    Primary hypertension, DD -  Has been hypertensive for many years.  Taking medications regularly.  Denies lightheadedness, headache, medication side effects.      Mixed hyperlipidemia -  Has had hyperlipidemia for many years.  Taking statin regularly along with diet control.  Denies myalgia.  PCP closely monitoring the blood work.    Past Medical History:  Past Medical History[1]      Past Surgical History:  Past Surgical History[2]      Family History:  Family History[3]      Social History:  Social History[4]      Active Problems:  Problem List[5]      The following portions of the patient's history were reviewed and updated as appropriate: past medical history, past surgical history, past family history,  past social history, current medications, allergies and problem list.      Review of Systems:  Constitutional: Denies fever, chills  Eyes: Denies eye redness, eye discharge  ENT: Denies sneezing, nasal discharge, sore throat   Respiratory: Denies cough, expectoration. +shortness of breath  Cardiovascular: Denies chest pain, palpitations, lower extremity swelling  Gastrointestinal: Denies abdominal pain, nausea, vomiting,  diarrhea  Genito-Urinary: Denies dysuria, incontinence  Musculoskeletal: Denies back pain, joint pain, muscle pain  Neurologic: Denies lightheadedness, syncope, headache, seizures  Endocrine: Denies polydipsia, temperature intolerance  Allergy and Immunology: Denies hives, insect bite sensitivity  Hematological and Lymphatic: Denies bleeding problems, swollen glands   Psychological: Denies suicidal ideation, panic    Vitals:  Vitals:    06/02/25 1533   BP: 128/76   Pulse: 76   Resp: 16   SpO2: 98%       Body mass index is 29.05 kg/m².    Weight (last 2 days)       Date/Time Weight    06/02/25 1533 74.4 (164)              Physical Examination:  General: Patient is not in acute distress. Awake, alert, oriented in time, place and person. Responding to commands  Head: Normocephalic. Atraumatic  Eyes: Both pupils normal sized, round and reactive to light. Nonicteric  ENT: Normal external ear canals  Neck: Supple. JVP not raised. Trachea central. No thyromegaly  Lungs: Bilateral bronchovascular breath sounds with no crackles or rhonchi  Chest wall: No tenderness  Cardiovascular: S1 and S2 normal. No murmur, rub or gallop  Gastrointestinal: Abdomen soft, nontender. No guarding or rigidity. Liver and spleen not palpable. Bowel sounds present  Neurologic: Patient is awake, alert, oriented in time, place and person. Responding to commands. Moving all extremities  Lymphatic: No cervical lymphadenopathy  Back: Symmetric. No CVA tenderness  Extremities: No clubbing, cyanosis or edema      Laboratory Results:  CBC with diff:   Lab Results   Component Value Date    WBC 7.92 04/05/2024    RBC 4.63 04/05/2024    HGB 14.0 04/05/2024    HCT 41.2 04/05/2024    MCV 89 04/05/2024    MCH 30.2 04/05/2024    RDW 12.6 04/05/2024     04/05/2024       CMP:  Lab Results   Component Value Date    CREATININE 0.98 11/02/2024    CREATININE 0.91 02/01/2023    BUN 15 11/02/2024    BUN 15 02/01/2023    K 3.6 11/02/2024    K 3.9 02/01/2023    CL  "102 11/02/2024     02/01/2023    CO2 31 11/02/2024    CO2 26 02/01/2023    ALKPHOS 69 11/02/2024    ALKPHOS 54 02/01/2023    ALT 29 11/02/2024    ALT 33 02/01/2023    AST 24 11/02/2024    AST 33 02/01/2023       Lab Results   Component Value Date    HGBA1C 4.8 01/09/2023    PHOS 3.7 09/23/2022       Lipid Profile:   No results found for: \"CHOL\"  Lab Results   Component Value Date    HDL 61 11/02/2024    HDL 76 04/05/2024    HDL 77 12/12/2023     Lab Results   Component Value Date    LDLCALC 61 11/02/2024    LDLCALC 120 (H) 04/05/2024    LDLCALC 129 (H) 12/12/2023     Lab Results   Component Value Date    TRIG 99 11/02/2024    TRIG 129 04/05/2024    TRIG 127 12/12/2023       Cardiac testing:   Results for orders placed during the hospital encounter of 11/01/24    Echo complete w/ contrast if indicated    Interpretation Summary    Left Ventricle: Left ventricular cavity size is normal. Wall thickness is normal. The left ventricular ejection fraction is 70%. Systolic function is normal. Wall motion is normal. Diastolic function is moderately abnormal, consistent with grade II (pseudonormal) relaxation.    Tricuspid Valve: There is mild regurgitation.      Results for orders placed during the hospital encounter of 10/31/24    NM myocardial perfusion spect (stress and/or rest)    Interpretation Summary    Stress ECG: No ST deviation is noted. The ECG was negative for ischemia. The stress ECG is negative for ischemia after maximal exercise at a maximal HR of 146 bpm (95% of MPHR) and 10.1 METS.    Perfusion: There are no perfusion defects.    Stress Combined Conclusion: The ECG and SPECT imaging portions of the stress study are concordant with no evidence of stress induced myocardial ischemia. Left ventricular perfusion is normal.    Stress Function: Stress ejection fraction is 86%.        Medications:  Current Medications[6]      Allergies:  Allergies[7]      Assessment and Plan:  1. Shortness of breath on exertion " (Primary)  PFT ordered for evaluation  Patient has an extensive smoking history in the past  - Complete PFT with post bronchodilator; Future    2. Coronary artery calcification  Continue carvedilol, atorvastatin.  Add aspirin 81 mg daily  - aspirin 81 mg chewable tablet; Chew 1 tablet (81 mg total) daily    3. Primary hypertension  BP stable.  Continue carvedilol 3.125 mg twice daily and HCTZ 25 mg daily    4. Mixed hyperlipidemia  Continue atorvastatin 10 mg daily and diet control.  LDL at goal      Recommend aggressive risk factor modification and therapeutic lifestyle changes.  Low-salt, low-calorie, low-fat, low-cholesterol diet with regular exercise and to optimize weight.  I will defer the ordering and monitoring of necessity lab studies to you, but I am available and happy to review and manage any of the data at your request in the future.    Discussed concepts of atherosclerosis, including signs and symptoms of cardiac disease.    Previous studies were reviewed.    Safety measures were reviewed.  Questions were entertained and answered.  Patient was advised to report any problems requiring medical attention.    Follow-up with PCP and appropriate specialist and lab work as discussed.    Return for follow up visit as scheduled or earlier, if needed.  Thank you for allowing me to participate in the care and evaluation of your patient.  Should you have any questions, please feel free to contact me.    Kalina Collazo MD  6/2/2025,4:09 PM         [1]   Past Medical History:  Diagnosis Date    Abnormal Pap smear of cervix 1/6/22    Basal cell carcinoma (BCC) of parietal region of scalp 06/13/2022    Left parietal scalp    Bipolar 1 disorder, depressed (HCC)     Bipolar 1 disorder, depressed (HCC) 01/24/1975    Chronic kidney disease     Depression     Diverticulitis of colon     First colonoscopy age 52    GERD (gastroesophageal reflux disease)     H/O bladder infections     Hyperlipidemia     Hypertension      Irritable bowel syndrome 3 years ago    Diarhea, constipation, acid indigestion    Lichen sclerosus     Microscopic colitis     Skin disorder     lichens sclerosis    Urinary tract infection    [2]   Past Surgical History:  Procedure Laterality Date    COLONOSCOPY      2013     HYSTERECTOMY      MOHS SURGERY Left 2022    Left parietal scalp   [3]   Family History  Problem Relation Name Age of Onset    Squamous cell carcinoma Mother Heidi     Arthritis Mother Heidi     Diabetes Mother Heidi     Hypertension Mother Heidi     Cancer Mother Heidi 55        uter    Kidney disease Father Ruben     Diabetic kidney disease Father Ruben     Early death Father Ruben         Age 33    Hypertension Father Ruben     Breast cancer Maternal Grandmother  66    No Known Problems Paternal Grandmother      No Known Problems Son      Cervical cancer Maternal Aunt  80    No Known Problems Half-Sister     [4]   Social History  Socioeconomic History    Marital status: Single   Tobacco Use    Smoking status: Former     Current packs/day: 0.00     Average packs/day: 2.0 packs/day for 20.0 years (40.0 ttl pk-yrs)     Types: Cigarettes     Start date: 1975     Quit date: 1995     Years since quittin.4    Smokeless tobacco: Never    Tobacco comments:     heavy smoker previously   Vaping Use    Vaping status: Some Days    Substances: CBD   Substance and Sexual Activity    Alcohol use: Yes     Alcohol/week: 1.0 standard drink of alcohol     Types: 1 Standard drinks or equivalent per week     Comment: 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  Average a wee    Drug use: Not Currently     Comment: In my teens and twenties    Sexual activity: Not Currently     Partners: Male     Birth control/protection: Post-menopausal, Female Sterilization     Comment: Partial hysterectomy ?     Social Drivers of Health     Financial Resource Strain: Low Risk  (2023)    Overall Financial Resource Strain (CARDIA)     Difficulty of Paying  Living Expenses: Not very hard   Transportation Needs: No Transportation Needs (1/26/2023)    PRAPARE - Transportation     Lack of Transportation (Medical): No     Lack of Transportation (Non-Medical): No   [5]   Patient Active Problem List  Diagnosis    Microscopic colitis    Fibromyalgia    Chronic renal failure, stage 3a (HCC)    Primary hypertension    Lichen sclerosus    Articular cartilage disorder of hand    Acquired trigger finger of right ring finger    Osteoarthritis of carpometacarpal (CMC) joint of both thumbs    Unspecified primary angle-closure glaucoma, stage unspecified    Thoracic outlet syndrome associated with cervical rib    Meniere disease    Postherpetic neuralgia    Memory difficulty    Chronic kidney disease-mineral and bone disorder    Uveitis    Current moderate episode of major depressive disorder (HCC)   [6]   Current Outpatient Medications:     amitriptyline (ELAVIL) 10 mg tablet, Take 2 tablets (20 mg total) by mouth daily at bedtime, Disp: 60 tablet, Rfl: 5    aspirin 81 mg chewable tablet, Chew 1 tablet (81 mg total) daily, Disp: , Rfl:     carvedilol (COREG) 3.125 mg tablet, Take 1 tablet by mouth 2 (two) times a day, Disp: , Rfl:     clobetasol (TEMOVATE) 0.05 % cream, Apply topically 2 (two) times a day, Disp: 45 g, Rfl: 0    clonazePAM (KlonoPIN) 0.5 mg tablet, TAKE 1 TABLET BY MOUTH 2 TIMES A DAY., Disp: 60 tablet, Rfl: 0    cyclobenzaprine (FLEXERIL) 10 mg tablet, prn, Disp: , Rfl:     dicyclomine (BENTYL) 20 mg tablet, Take 1 tablet (20 mg total) by mouth every 6 (six) hours as needed (abdominal), Disp: 60 tablet, Rfl: 3    diphenoxylate-atropine (LOMOTIL) 2.5-0.025 mg per tablet, Take 1 tablet by mouth 4 (four) times a day as needed for diarrhea, Disp: 60 tablet, Rfl: 3    estradiol (Vagifem) 10 MCG TABS vaginal tablet, Insert 1 tablet (10 mcg total) into the vagina 2 (two) times a week, Disp: 8 tablet, Rfl: 5    fluticasone (FLONASE) 50 mcg/act nasal spray, , Disp: , Rfl:      hydroCHLOROthiazide 25 mg tablet, Take 1 tablet (25 mg total) by mouth daily, Disp: 90 tablet, Rfl: 1    loratadine (CLARITIN) 10 mg tablet, Take 1 tablet (10 mg total) by mouth daily, Disp: 10 tablet, Rfl: 0    Probiotic Product (PROBIOTIC 10 ULTRA STRENGTH PO), Take by mouth, Disp: , Rfl:     zolpidem (AMBIEN) 10 mg tablet, Take 1 tablet (10 mg total) by mouth daily at bedtime as needed (insomnia), Disp: 30 tablet, Rfl: 0  [7]   Allergies  Allergen Reactions    Levofloxacin Other (See Comments)    Sulfa Antibiotics Fever and Itching    Amoxicillin-Pot Clavulanate Other (See Comments)    Cephalexin Diarrhea    Molds & Smuts Other (See Comments)    Nitrofurantoin Fever    Soy Allergy - Food Allergy Diarrhea and GI Intolerance    Soybean-Containing Drug Products - Food Allergy GI Intolerance     Soy     Topiramate Other (See Comments)    Wheat Bran - Food Allergy Other (See Comments)    Bacitracin-Neomycin-Polymyxin Rash    Sulfamethoxazole-Trimethoprim Diarrhea

## 2025-06-02 NOTE — PSYCH
"Behavioral Health Psychotherapy Progress Note    Psychotherapy Provided: Individual Psychotherapy     1. Moderate episode of recurrent major depressive disorder (HCC)            Goals addressed in session: Goal 1 and Goal 2     DATA: Jeanine presents today neatly dressed, pleasant, fully engaged in our session, appropriate eye contact. We processed recent events. Shares her mother fell today and her brother is currently at the ER with mother as they wait to see what injuries she has. Jeanine has another doctor appt after this so she does not plan to go to [place] to mother until tomorrow unless necessary. Jeanine reports she is feeling a little more depressed, but as we processed this says she is not sure if it is depression or just \"feeling guilty\" when she takes some down time in her schedule. We talked about this and expectations she sets for herself. She admits she tends to compare herself always to the standards of her siblings growing up. Continues to show improved insight. Reinforced self-care plans.   During this session, this clinician used the following therapeutic modalities: Client-centered Therapy, Cognitive Behavioral Therapy, Cognitive Processing Therapy, and Motivational Interviewing    Substance Abuse was not addressed during this session. If the client is diagnosed with a co-occurring substance use disorder, please indicate any changes in the frequency or amount of use: n/a. Stage of change for addressing substance use diagnoses: Maintenance    ASSESSMENT:  Jeanine Zepeda presents with a Euthymic/ normal mood.     her affect is Normal range and intensity, which is congruent, with her mood and the content of the session. The client has made progress on their goals.     Jeanine Zepeda presents with a minimal risk of suicide, none risk of self-harm, and none risk of harm to others.    For any risk assessment that surpasses a \"low\" rating, a safety plan must be developed.    A safety plan was indicated: no  If yes, describe " in detail n/a    PLAN: Between sessions, Jeanine Zepeda will continue working on self-care plan. At the next session, the therapist will use Client-centered Therapy, Cognitive Behavioral Therapy, Cognitive Processing Therapy, and Motivational Interviewing to address anxiety/depression.    Behavioral Health Treatment Plan and Discharge Planning: Jeanine Zepeda is aware of and agrees to continue to work on their treatment plan. They have identified and are working toward their discharge goals. yes    Depression Follow-up Plan Completed: Not applicable    Visit start and stop times:    06/02/25  Start Time: 1401  Stop Time: 1455  Total Visit Time: 54 minutes

## 2025-06-12 ENCOUNTER — OFFICE VISIT (OUTPATIENT)
Dept: GASTROENTEROLOGY | Facility: CLINIC | Age: 70
End: 2025-06-12
Payer: MEDICARE

## 2025-06-12 VITALS
SYSTOLIC BLOOD PRESSURE: 124 MMHG | HEART RATE: 75 BPM | HEIGHT: 63 IN | OXYGEN SATURATION: 99 % | TEMPERATURE: 97.5 F | BODY MASS INDEX: 29.06 KG/M2 | WEIGHT: 164 LBS | DIASTOLIC BLOOD PRESSURE: 85 MMHG

## 2025-06-12 DIAGNOSIS — K58.0 IRRITABLE BOWEL SYNDROME WITH DIARRHEA: Primary | ICD-10-CM

## 2025-06-12 DIAGNOSIS — R13.19 ESOPHAGEAL DYSPHAGIA: ICD-10-CM

## 2025-06-12 DIAGNOSIS — K21.9 GASTROESOPHAGEAL REFLUX DISEASE, UNSPECIFIED WHETHER ESOPHAGITIS PRESENT: ICD-10-CM

## 2025-06-12 PROCEDURE — 99213 OFFICE O/P EST LOW 20 MIN: CPT | Performed by: PHYSICIAN ASSISTANT

## 2025-06-12 RX ORDER — FAMOTIDINE 20 MG/1
20 TABLET, FILM COATED ORAL 2 TIMES DAILY
COMMUNITY

## 2025-06-12 NOTE — PATIENT INSTRUCTIONS
Scheduled date of EGD(as of today):8/6/25  Physician performing EGD:Odin  Location of EGD:[place]  Instructions reviewed with patient by:Trever blood  Clearances:  none
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
## 2025-06-30 ENCOUNTER — SOCIAL WORK (OUTPATIENT)
Dept: BEHAVIORAL/MENTAL HEALTH CLINIC | Facility: CLINIC | Age: 70
End: 2025-06-30
Payer: MEDICARE

## 2025-06-30 DIAGNOSIS — F33.1 MODERATE EPISODE OF RECURRENT MAJOR DEPRESSIVE DISORDER (HCC): Primary | ICD-10-CM

## 2025-06-30 PROCEDURE — 90837 PSYTX W PT 60 MINUTES: CPT

## 2025-06-30 NOTE — PSYCH
"Behavioral Health Psychotherapy Progress Note    Psychotherapy Provided: Individual Psychotherapy     1. Moderate episode of recurrent major depressive disorder (HCC)            Goals addressed in session: Goal 1 and Goal 2     DATA: Jeanine presents today casually dressed, pleasant, fully engaged in session, appropriate eye contact. We processed her recent events. She has been stressed since her mother fell a few weeks ago. Her mom is ok,but had bumps and bruises and appointments related to this. Jeanine said one night she became overwhelmed thinking about when her mom does eventually pass away that then all her family will no longer be there and she will be all alone. We spent time looking at the validity of this thought pattern. Jeanine recognized that part of her fear is that she feels her mother still \"controls\" her and her decisions. Therapist challenged her with all the things she independently makes decisions about every day. We reinforced her daily self-care plan.   During this session, this clinician used the following therapeutic modalities: Client-centered Therapy, Cognitive Behavioral Therapy, Cognitive Processing Therapy, and Motivational Interviewing    Substance Abuse was not addressed during this session. If the client is diagnosed with a co-occurring substance use disorder, please indicate any changes in the frequency or amount of use: n/a. Stage of change for addressing substance use diagnoses: Maintenance    ASSESSMENT:  Jeanine Zepeda presents with a Euthymic/ normal mood.     her affect is Normal range and intensity, which is congruent, with her mood and the content of the session. The client has made progress on their goals.     Jeanine Zepeda presents with a none risk of suicide, none risk of self-harm, and none risk of harm to others.    For any risk assessment that surpasses a \"low\" rating, a safety plan must be developed.    A safety plan was indicated: no  If yes, describe in detail n/a    PLAN: Between " sessions, Jeanine Zepeda will continue with daily self-care plan. At the next session, the therapist will use Client-centered Therapy, Cognitive Behavioral Therapy, Cognitive Processing Therapy, and Motivational Interviewing to address anxiety/depression.    Behavioral Health Treatment Plan and Discharge Planning: Jeanine Zepeda is aware of and agrees to continue to work on their treatment plan. They have identified and are working toward their discharge goals. yes    Depression Follow-up Plan Completed: Yes see above    Visit start and stop times:    06/30/25  Start Time: 1200  Stop Time: 1255  Total Visit Time: 55 minutes

## 2025-07-10 ENCOUNTER — TELEPHONE (OUTPATIENT)
Age: 70
End: 2025-07-10

## 2025-07-10 DIAGNOSIS — Z12.31 ENCOUNTER FOR SCREENING MAMMOGRAM FOR MALIGNANT NEOPLASM OF BREAST: Primary | ICD-10-CM

## 2025-07-13 DIAGNOSIS — K58.0 IRRITABLE BOWEL SYNDROME WITH DIARRHEA: ICD-10-CM

## 2025-07-14 ENCOUNTER — SOCIAL WORK (OUTPATIENT)
Dept: BEHAVIORAL/MENTAL HEALTH CLINIC | Facility: CLINIC | Age: 70
End: 2025-07-14
Payer: MEDICARE

## 2025-07-14 DIAGNOSIS — F33.1 MODERATE EPISODE OF RECURRENT MAJOR DEPRESSIVE DISORDER (HCC): Primary | ICD-10-CM

## 2025-07-14 PROCEDURE — 90837 PSYTX W PT 60 MINUTES: CPT
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
## 2025-07-15 RX ORDER — AMITRIPTYLINE HYDROCHLORIDE 10 MG/1
20 TABLET ORAL
Qty: 60 TABLET | Refills: 5 | Status: SHIPPED | OUTPATIENT
Start: 2025-07-15

## 2025-07-21 ENCOUNTER — APPOINTMENT (OUTPATIENT)
Age: 70
End: 2025-07-21
Payer: MEDICARE

## 2025-07-21 DIAGNOSIS — E78.5 HYPERLIPIDEMIA, UNSPECIFIED HYPERLIPIDEMIA TYPE: ICD-10-CM

## 2025-07-21 DIAGNOSIS — E55.9 VITAMIN D DEFICIENCY: ICD-10-CM

## 2025-07-21 DIAGNOSIS — M10.9 GOUT, UNSPECIFIED CAUSE, UNSPECIFIED CHRONICITY, UNSPECIFIED SITE: ICD-10-CM

## 2025-07-21 DIAGNOSIS — M05.89 OTHER RHEUMATOID ARTHRITIS WITH RHEUMATOID FACTOR OF MULTIPLE SITES (HCC): ICD-10-CM

## 2025-07-21 DIAGNOSIS — D50.8 IRON DEFICIENCY ANEMIA SECONDARY TO INADEQUATE DIETARY IRON INTAKE: ICD-10-CM

## 2025-07-21 DIAGNOSIS — E07.89 HEMORRHAGE AND INFARCTION OF THYROID: ICD-10-CM

## 2025-07-21 DIAGNOSIS — L93.0 LUPUS ERYTHEMATOSUS, UNSPECIFIED FORM: ICD-10-CM

## 2025-07-21 DIAGNOSIS — E34.321: ICD-10-CM

## 2025-07-21 DIAGNOSIS — E29.1 3-OXO-5 ALPHA-STEROID DELTA 4-DEHYDROGENASE DEFICIENCY: ICD-10-CM

## 2025-07-21 DIAGNOSIS — Z20.1 CONTACT WITH TUBERCULOSIS: ICD-10-CM

## 2025-07-21 DIAGNOSIS — N17.8 ACUTE RENAL FAILURE WITH PATHOLOGICAL LESION IN KIDNEY (HCC): ICD-10-CM

## 2025-07-21 DIAGNOSIS — R89.1 ABNORMAL LEVEL OF HORMONES IN SPECIMENS FROM OTH ORG/TISS: ICD-10-CM

## 2025-07-21 DIAGNOSIS — K73.9 CHRONIC HEPATITIS, UNSPECIFIED (HCC): ICD-10-CM

## 2025-07-21 DIAGNOSIS — E11.00 TYPE 2 DIABETES MELLITUS WITH HYPEROSMOLARITY WITHOUT COMA, UNSPECIFIED WHETHER LONG TERM INSULIN USE (HCC): ICD-10-CM

## 2025-07-21 LAB
25(OH)D3 SERPL-MCNC: 30.7 NG/ML (ref 30–100)
ALBUMIN SERPL BCG-MCNC: 4.2 G/DL (ref 3.5–5)
ALP SERPL-CCNC: 76 U/L (ref 34–104)
ALT SERPL W P-5'-P-CCNC: 24 U/L (ref 7–52)
ANION GAP SERPL CALCULATED.3IONS-SCNC: 11 MMOL/L (ref 4–13)
AST SERPL W P-5'-P-CCNC: 23 U/L (ref 13–39)
BILIRUB SERPL-MCNC: 0.43 MG/DL (ref 0.2–1)
BUN SERPL-MCNC: 11 MG/DL (ref 5–25)
CALCIUM SERPL-MCNC: 9.5 MG/DL (ref 8.4–10.2)
CHLORIDE SERPL-SCNC: 103 MMOL/L (ref 96–108)
CHOLEST SERPL-MCNC: 139 MG/DL (ref ?–200)
CO2 SERPL-SCNC: 28 MMOL/L (ref 21–32)
CREAT SERPL-MCNC: 1.07 MG/DL (ref 0.6–1.3)
EST. AVERAGE GLUCOSE BLD GHB EST-MCNC: 97 MG/DL
GFR SERPL CREATININE-BSD FRML MDRD: 53 ML/MIN/1.73SQ M
GLUCOSE P FAST SERPL-MCNC: 106 MG/DL (ref 65–99)
HBA1C MFR BLD: 5 %
HDLC SERPL-MCNC: 67 MG/DL
LDLC SERPL CALC-MCNC: 52 MG/DL (ref 0–100)
NONHDLC SERPL-MCNC: 72 MG/DL
POTASSIUM SERPL-SCNC: 3.4 MMOL/L (ref 3.5–5.3)
PROT SERPL-MCNC: 6.9 G/DL (ref 6.4–8.4)
RHEUMATOID FACT SERPL-ACNC: <10 IU/ML
SODIUM SERPL-SCNC: 142 MMOL/L (ref 135–147)
T4 FREE SERPL-MCNC: 0.73 NG/DL (ref 0.61–1.12)
TRIGL SERPL-MCNC: 98 MG/DL (ref ?–150)
TSH SERPL DL<=0.05 MIU/L-ACNC: 1.84 UIU/ML (ref 0.45–4.5)
URATE SERPL-MCNC: 7 MG/DL (ref 2–7.5)

## 2025-07-21 PROCEDURE — 84443 ASSAY THYROID STIM HORMONE: CPT

## 2025-07-21 PROCEDURE — 86038 ANTINUCLEAR ANTIBODIES: CPT

## 2025-07-21 PROCEDURE — 80061 LIPID PANEL: CPT

## 2025-07-21 PROCEDURE — 86225 DNA ANTIBODY NATIVE: CPT

## 2025-07-21 PROCEDURE — 82306 VITAMIN D 25 HYDROXY: CPT

## 2025-07-21 PROCEDURE — 87340 HEPATITIS B SURFACE AG IA: CPT

## 2025-07-21 PROCEDURE — 84550 ASSAY OF BLOOD/URIC ACID: CPT

## 2025-07-21 PROCEDURE — 80053 COMPREHEN METABOLIC PANEL: CPT

## 2025-07-21 PROCEDURE — 36415 COLL VENOUS BLD VENIPUNCTURE: CPT

## 2025-07-21 PROCEDURE — 84305 ASSAY OF SOMATOMEDIN: CPT

## 2025-07-21 PROCEDURE — 84403 ASSAY OF TOTAL TESTOSTERONE: CPT

## 2025-07-21 PROCEDURE — 83036 HEMOGLOBIN GLYCOSYLATED A1C: CPT

## 2025-07-21 PROCEDURE — 86431 RHEUMATOID FACTOR QUANT: CPT

## 2025-07-21 PROCEDURE — 84270 ASSAY OF SEX HORMONE GLOBUL: CPT

## 2025-07-21 PROCEDURE — 86200 CCP ANTIBODY: CPT

## 2025-07-21 PROCEDURE — 84402 ASSAY OF FREE TESTOSTERONE: CPT

## 2025-07-21 PROCEDURE — 86705 HEP B CORE ANTIBODY IGM: CPT

## 2025-07-21 PROCEDURE — 86480 TB TEST CELL IMMUN MEASURE: CPT

## 2025-07-21 PROCEDURE — 84439 ASSAY OF FREE THYROXINE: CPT

## 2025-07-21 PROCEDURE — 86803 HEPATITIS C AB TEST: CPT

## 2025-07-22 LAB
GAMMA INTERFERON BACKGROUND BLD IA-ACNC: 0.01 IU/ML
HBV CORE IGM SER QL: NORMAL
HBV SURFACE AG SER QL: NORMAL
HCV AB SER QL: NORMAL
M TB IFN-G BLD-IMP: NEGATIVE
M TB IFN-G CD4+ BCKGRND COR BLD-ACNC: 0 IU/ML
M TB IFN-G CD4+ BCKGRND COR BLD-ACNC: 0.01 IU/ML
MITOGEN IGNF BCKGRD COR BLD-ACNC: 5.41 IU/ML
SHBG SERPL-SCNC: 60.9 NMOL/L (ref 17.3–125)
TESTOST FREE SERPL-MCNC: 0.2 PG/ML (ref 0–4.2)
TESTOST SERPL-MCNC: 12 NG/DL (ref 3–67)

## 2025-07-24 LAB — IGF-I SERPL-MCNC: 51 NG/ML (ref 52–196)

## 2025-07-26 LAB
CCP AB SER IA-ACNC: 1.1 (ref ?–10)
DSDNA IGG SERPL IA-ACNC: <0.9 IU/ML (ref ?–15)
NUCLEAR IGG SER IA-RTO: <0.09 RATIO (ref ?–1)

## 2025-07-28 ENCOUNTER — SOCIAL WORK (OUTPATIENT)
Dept: BEHAVIORAL/MENTAL HEALTH CLINIC | Facility: CLINIC | Age: 70
End: 2025-07-28
Payer: MEDICARE

## 2025-07-28 DIAGNOSIS — F33.1 MODERATE EPISODE OF RECURRENT MAJOR DEPRESSIVE DISORDER (HCC): Primary | ICD-10-CM

## 2025-07-28 PROCEDURE — 90837 PSYTX W PT 60 MINUTES: CPT

## 2025-07-29 ENCOUNTER — HOSPITAL ENCOUNTER (OUTPATIENT)
Dept: PULMONOLOGY | Facility: HOSPITAL | Age: 70
Discharge: HOME | End: 2025-07-29
Payer: MEDICARE

## 2025-07-29 DIAGNOSIS — R06.02 SHORTNESS OF BREATH ON EXERTION: ICD-10-CM

## 2025-07-29 PROCEDURE — 94760 N-INVAS EAR/PLS OXIMETRY 1: CPT

## 2025-07-29 PROCEDURE — 94726 PLETHYSMOGRAPHY LUNG VOLUMES: CPT

## 2025-07-29 PROCEDURE — 94060 EVALUATION OF WHEEZING: CPT | Performed by: INTERNAL MEDICINE

## 2025-07-29 PROCEDURE — 94729 DIFFUSING CAPACITY: CPT

## 2025-07-29 PROCEDURE — 94729 DIFFUSING CAPACITY: CPT | Performed by: INTERNAL MEDICINE

## 2025-07-29 PROCEDURE — 94726 PLETHYSMOGRAPHY LUNG VOLUMES: CPT | Performed by: INTERNAL MEDICINE

## 2025-07-29 PROCEDURE — 94060 EVALUATION OF WHEEZING: CPT

## 2025-07-29 RX ORDER — ALBUTEROL SULFATE 0.83 MG/ML
2.5 SOLUTION RESPIRATORY (INHALATION) ONCE
Status: COMPLETED | OUTPATIENT
Start: 2025-07-29 | End: 2025-07-29

## 2025-07-29 RX ADMIN — ALBUTEROL SULFATE 2.5 MG: 2.5 SOLUTION RESPIRATORY (INHALATION) at 11:29

## 2025-07-30 ENCOUNTER — RESULTS FOLLOW-UP (OUTPATIENT)
Dept: NON INVASIVE DIAGNOSTICS | Facility: HOSPITAL | Age: 70
End: 2025-07-30

## 2025-07-30 DIAGNOSIS — N95.2 ATROPHIC VAGINITIS: ICD-10-CM

## 2025-07-31 RX ORDER — ESTRADIOL 10 UG/1
1 TABLET, FILM COATED VAGINAL 2 TIMES WEEKLY
Qty: 8 TABLET | Refills: 2 | Status: SHIPPED | OUTPATIENT
Start: 2025-07-31

## 2025-08-05 ENCOUNTER — HOSPITAL ENCOUNTER (OUTPATIENT)
Dept: NON INVASIVE DIAGNOSTICS | Facility: CLINIC | Age: 70
Discharge: HOME | End: 2025-08-05
Attending: FAMILY MEDICINE
Payer: MEDICARE

## 2025-08-05 DIAGNOSIS — I25.10 CORONARY ATHEROSCLEROSIS OF NATIVE CORONARY ARTERY: ICD-10-CM

## 2025-08-05 PROCEDURE — 93880 EXTRACRANIAL BILAT STUDY: CPT | Performed by: INTERNAL MEDICINE

## 2025-08-05 PROCEDURE — 93880 EXTRACRANIAL BILAT STUDY: CPT

## 2025-08-06 ENCOUNTER — HOSPITAL ENCOUNTER (OUTPATIENT)
Dept: GASTROENTEROLOGY | Facility: HOSPITAL | Age: 70
Setting detail: OUTPATIENT SURGERY
Discharge: HOME | End: 2025-08-06
Attending: PHYSICIAN ASSISTANT
Payer: MEDICARE

## 2025-08-06 ENCOUNTER — ANESTHESIA (OUTPATIENT)
Dept: GASTROENTEROLOGY | Facility: HOSPITAL | Age: 70
End: 2025-08-06
Payer: MEDICARE

## 2025-08-06 ENCOUNTER — ANESTHESIA EVENT (OUTPATIENT)
Dept: GASTROENTEROLOGY | Facility: HOSPITAL | Age: 70
End: 2025-08-06
Payer: MEDICARE

## 2025-08-06 RX ORDER — PROPOFOL 10 MG/ML
INJECTION, EMULSION INTRAVENOUS AS NEEDED
Status: DISCONTINUED | OUTPATIENT
Start: 2025-08-06 | End: 2025-08-06

## 2025-08-06 RX ORDER — LIDOCAINE HYDROCHLORIDE 20 MG/ML
INJECTION, SOLUTION EPIDURAL; INFILTRATION; INTRACAUDAL; PERINEURAL AS NEEDED
Status: DISCONTINUED | OUTPATIENT
Start: 2025-08-06 | End: 2025-08-06

## 2025-08-06 RX ORDER — SODIUM CHLORIDE, SODIUM LACTATE, POTASSIUM CHLORIDE, CALCIUM CHLORIDE 600; 310; 30; 20 MG/100ML; MG/100ML; MG/100ML; MG/100ML
INJECTION, SOLUTION INTRAVENOUS CONTINUOUS PRN
Status: DISCONTINUED | OUTPATIENT
Start: 2025-08-06 | End: 2025-08-06

## 2025-08-06 RX ADMIN — PROPOFOL 200 MG: 10 INJECTION, EMULSION INTRAVENOUS at 08:35

## 2025-08-06 RX ADMIN — LIDOCAINE HYDROCHLORIDE 50 MG: 20 INJECTION, SOLUTION EPIDURAL; INFILTRATION; INTRACAUDAL; PERINEURAL at 08:35

## 2025-08-06 RX ADMIN — SODIUM CHLORIDE, SODIUM LACTATE, POTASSIUM CHLORIDE, AND CALCIUM CHLORIDE: .6; .31; .03; .02 INJECTION, SOLUTION INTRAVENOUS at 08:27

## 2025-08-12 ENCOUNTER — HOSPITAL ENCOUNTER (OUTPATIENT)
Age: 70
Discharge: HOME | End: 2025-08-12
Payer: MEDICARE

## 2025-08-18 ENCOUNTER — TELEPHONE (OUTPATIENT)
Dept: NEPHROLOGY | Facility: CLINIC | Age: 70
End: 2025-08-18

## 2025-08-18 ENCOUNTER — SOCIAL WORK (OUTPATIENT)
Dept: BEHAVIORAL/MENTAL HEALTH CLINIC | Facility: CLINIC | Age: 70
End: 2025-08-18
Payer: MEDICARE

## 2025-08-18 DIAGNOSIS — N18.30 STAGE 3 CHRONIC KIDNEY DISEASE, UNSPECIFIED WHETHER STAGE 3A OR 3B CKD (HCC): Primary | ICD-10-CM

## 2025-08-18 DIAGNOSIS — F33.1 MODERATE EPISODE OF RECURRENT MAJOR DEPRESSIVE DISORDER (HCC): Primary | ICD-10-CM

## 2025-08-18 PROCEDURE — 90837 PSYTX W PT 60 MINUTES: CPT
